# Patient Record
Sex: MALE | Race: WHITE | NOT HISPANIC OR LATINO | Employment: OTHER | RURAL
[De-identification: names, ages, dates, MRNs, and addresses within clinical notes are randomized per-mention and may not be internally consistent; named-entity substitution may affect disease eponyms.]

---

## 2020-04-20 ENCOUNTER — HISTORICAL (OUTPATIENT)
Dept: ADMINISTRATIVE | Facility: HOSPITAL | Age: 65
End: 2020-04-20

## 2020-04-20 LAB
ALBUMIN SERPL BCP-MCNC: 3.2 G/DL (ref 3.4–5)
ALBUMIN/GLOB SERPL: 1 {RATIO}
ALP SERPL-CCNC: 151 U/L (ref 50–136)
ALT SERPL W P-5'-P-CCNC: 30 U/L (ref 12–78)
AST SERPL W P-5'-P-CCNC: 20 U/L (ref 15–37)
BILIRUB SERPL-MCNC: 0.4 MG/DL (ref 0.2–1)
BUN SERPL-MCNC: 10 MG/DL (ref 7–18)
CALCIUM SERPL-MCNC: 8.4 MG/DL (ref 8.5–10.1)
CHLORIDE SERPL-SCNC: 105 MMOL/L (ref 101–111)
CK SERPL-CCNC: 59 U/L (ref 26–308)
CO2 SERPL-SCNC: 30 MMOL/L (ref 21–32)
CREAT SERPL-MCNC: 0.9 MG/DL (ref 0.6–1.3)
GLOBULIN SER-MCNC: 3.8 G/DL
GLUCOSE SERPL-MCNC: 71 MG/DL (ref 74–106)
POTASSIUM SERPL-SCNC: 3.8 MMOL/L (ref 3.6–5)
PROT SERPL-MCNC: 7 G/DL (ref 6.4–8.2)
SODIUM SERPL-SCNC: 141 MMOL/L (ref 135–145)

## 2020-04-21 LAB — MYOGLOBIN SERPL-MCNC: 42 NG/ML (ref 16–116)

## 2020-10-20 LAB — NONINV COLON CA DNA+OCC BLD SCRN STL QL: NEGATIVE

## 2020-11-09 LAB — COMPLEXED PSA SERPL-MCNC: 0.6 NG/ML

## 2021-06-01 ENCOUNTER — TELEPHONE (OUTPATIENT)
Dept: FAMILY MEDICINE | Facility: CLINIC | Age: 66
End: 2021-06-01

## 2021-06-01 RX ORDER — METOPROLOL SUCCINATE 25 MG/1
25 TABLET, EXTENDED RELEASE ORAL DAILY
COMMUNITY
End: 2021-12-01

## 2021-06-01 RX ORDER — DOCUSATE SODIUM 100 MG/1
100 CAPSULE, LIQUID FILLED ORAL DAILY PRN
COMMUNITY

## 2021-06-01 RX ORDER — ASPIRIN 81 MG/1
81 TABLET ORAL DAILY
COMMUNITY

## 2021-06-01 RX ORDER — PANTOPRAZOLE SODIUM 40 MG/1
40 TABLET, DELAYED RELEASE ORAL DAILY
COMMUNITY
End: 2021-10-06 | Stop reason: SINTOL

## 2021-06-01 RX ORDER — ATORVASTATIN CALCIUM 80 MG/1
80 TABLET, FILM COATED ORAL DAILY
COMMUNITY
End: 2022-04-01

## 2021-06-10 ENCOUNTER — OFFICE VISIT (OUTPATIENT)
Dept: SURGERY | Facility: CLINIC | Age: 66
End: 2021-06-10
Payer: MEDICARE

## 2021-06-10 VITALS — BODY MASS INDEX: 19.89 KG/M2 | HEART RATE: 91 BPM | OXYGEN SATURATION: 96 % | HEIGHT: 75 IN | WEIGHT: 160 LBS

## 2021-06-10 DIAGNOSIS — I73.9 PVD (PERIPHERAL VASCULAR DISEASE): Primary | ICD-10-CM

## 2021-06-10 DIAGNOSIS — G45.8 SUBCLAVIAN STEAL SYNDROME: ICD-10-CM

## 2021-06-10 PROCEDURE — 99212 OFFICE O/P EST SF 10 MIN: CPT | Mod: S$PBB,,, | Performed by: SURGERY

## 2021-06-10 PROCEDURE — 99214 OFFICE O/P EST MOD 30 MIN: CPT | Mod: PBBFAC | Performed by: SURGERY

## 2021-06-10 PROCEDURE — 99212 PR OFFICE/OUTPT VISIT, EST, LEVL II, 10-19 MIN: ICD-10-PCS | Mod: S$PBB,,, | Performed by: SURGERY

## 2021-06-23 ENCOUNTER — OFFICE VISIT (OUTPATIENT)
Dept: SURGERY | Facility: CLINIC | Age: 66
End: 2021-06-23
Payer: MEDICARE

## 2021-06-23 ENCOUNTER — HOSPITAL ENCOUNTER (OUTPATIENT)
Dept: RADIOLOGY | Facility: HOSPITAL | Age: 66
Discharge: HOME | End: 2021-06-23
Attending: SURGERY
Payer: MEDICARE

## 2021-06-23 VITALS — BODY MASS INDEX: 19.89 KG/M2 | WEIGHT: 160 LBS | OXYGEN SATURATION: 98 % | HEIGHT: 75 IN

## 2021-06-23 DIAGNOSIS — R59.0 LOCALIZED ENLARGED LYMPH NODES: ICD-10-CM

## 2021-06-23 DIAGNOSIS — I65.29 OCCLUSION AND STENOSIS OF UNSPECIFIED CAROTID ARTERY: ICD-10-CM

## 2021-06-23 DIAGNOSIS — G45.8 SUBCLAVIAN STEAL SYNDROME: Primary | ICD-10-CM

## 2021-06-23 DIAGNOSIS — I65.23 BILATERAL CAROTID ARTERY STENOSIS: ICD-10-CM

## 2021-06-23 DIAGNOSIS — R59.0 LYMPHADENOPATHY, CERVICAL: ICD-10-CM

## 2021-06-23 DIAGNOSIS — G45.8 SUBCLAVIAN STEAL SYNDROME: ICD-10-CM

## 2021-06-23 PROCEDURE — 93880 US CAROTID BILATERAL: ICD-10-PCS | Mod: 26,,, | Performed by: SURGERY

## 2021-06-23 PROCEDURE — 93880 EXTRACRANIAL BILAT STUDY: CPT | Mod: 26,,, | Performed by: SURGERY

## 2021-06-23 PROCEDURE — 99213 OFFICE O/P EST LOW 20 MIN: CPT | Mod: PBBFAC,25 | Performed by: SURGERY

## 2021-06-23 PROCEDURE — 99212 OFFICE O/P EST SF 10 MIN: CPT | Mod: S$PBB,,, | Performed by: SURGERY

## 2021-06-23 PROCEDURE — 99212 PR OFFICE/OUTPT VISIT, EST, LEVL II, 10-19 MIN: ICD-10-PCS | Mod: S$PBB,,, | Performed by: SURGERY

## 2021-06-23 PROCEDURE — 93880 EXTRACRANIAL BILAT STUDY: CPT | Mod: TC

## 2021-07-07 ENCOUNTER — HOSPITAL ENCOUNTER (OUTPATIENT)
Dept: RADIOLOGY | Facility: HOSPITAL | Age: 66
Discharge: HOME | End: 2021-07-07
Attending: SURGERY
Payer: MEDICARE

## 2021-07-07 DIAGNOSIS — G45.8 SUBCLAVIAN STEAL SYNDROME: ICD-10-CM

## 2021-07-07 LAB — CREAT SERPL-MCNC: 1.1 MG/DL (ref 0.6–1.3)

## 2021-07-07 PROCEDURE — 70498 CT ANGIOGRAPHY NECK: CPT | Mod: 26,,, | Performed by: RADIOLOGY

## 2021-07-07 PROCEDURE — 70498 CT ANGIOGRAPHY NECK: CPT | Mod: TC

## 2021-07-07 PROCEDURE — 70498 CTA NECK: ICD-10-PCS | Mod: 26,,, | Performed by: RADIOLOGY

## 2021-07-07 PROCEDURE — 82565 ASSAY OF CREATININE: CPT

## 2021-07-07 PROCEDURE — 25500020 PHARM REV CODE 255: Performed by: SURGERY

## 2021-07-07 RX ADMIN — IOPAMIDOL 100 ML: 755 INJECTION, SOLUTION INTRAVENOUS at 10:07

## 2021-07-15 ENCOUNTER — OFFICE VISIT (OUTPATIENT)
Dept: SURGERY | Facility: CLINIC | Age: 66
End: 2021-07-15
Payer: MEDICARE

## 2021-07-15 VITALS — OXYGEN SATURATION: 98 % | HEART RATE: 96 BPM | WEIGHT: 160 LBS | HEIGHT: 75 IN | BODY MASS INDEX: 19.89 KG/M2

## 2021-07-15 DIAGNOSIS — I65.23 BILATERAL CAROTID ARTERY STENOSIS: Primary | ICD-10-CM

## 2021-07-15 PROCEDURE — 99212 PR OFFICE/OUTPT VISIT, EST, LEVL II, 10-19 MIN: ICD-10-PCS | Mod: S$PBB,,, | Performed by: SURGERY

## 2021-07-15 PROCEDURE — 99214 OFFICE O/P EST MOD 30 MIN: CPT | Mod: PBBFAC | Performed by: SURGERY

## 2021-07-15 PROCEDURE — 99212 OFFICE O/P EST SF 10 MIN: CPT | Mod: S$PBB,,, | Performed by: SURGERY

## 2021-08-09 ENCOUNTER — OFFICE VISIT (OUTPATIENT)
Dept: CARDIOLOGY | Facility: CLINIC | Age: 66
End: 2021-08-09
Payer: MEDICARE

## 2021-08-09 VITALS
OXYGEN SATURATION: 92 % | BODY MASS INDEX: 19.89 KG/M2 | SYSTOLIC BLOOD PRESSURE: 142 MMHG | HEIGHT: 75 IN | HEART RATE: 74 BPM | DIASTOLIC BLOOD PRESSURE: 74 MMHG | WEIGHT: 160 LBS

## 2021-08-09 DIAGNOSIS — I10 ESSENTIAL HYPERTENSION: ICD-10-CM

## 2021-08-09 DIAGNOSIS — I25.10 CORONARY ARTERY DISEASE INVOLVING NATIVE CORONARY ARTERY OF NATIVE HEART WITHOUT ANGINA PECTORIS: ICD-10-CM

## 2021-08-09 DIAGNOSIS — I10 HYPERTENSION, UNSPECIFIED TYPE: Primary | ICD-10-CM

## 2021-08-09 DIAGNOSIS — R94.31 ABNORMAL ELECTROCARDIOGRAM (ECG) (EKG): ICD-10-CM

## 2021-08-09 DIAGNOSIS — E78.5 HYPERLIPIDEMIA, UNSPECIFIED HYPERLIPIDEMIA TYPE: ICD-10-CM

## 2021-08-09 DIAGNOSIS — Z72.0 TOBACCO ABUSE DISORDER: ICD-10-CM

## 2021-08-09 DIAGNOSIS — I65.21 CAROTID STENOSIS, ASYMPTOMATIC, RIGHT: ICD-10-CM

## 2021-08-09 PROCEDURE — 99214 OFFICE O/P EST MOD 30 MIN: CPT | Mod: PBBFAC | Performed by: INTERNAL MEDICINE

## 2021-08-09 PROCEDURE — 99214 PR OFFICE/OUTPT VISIT, EST, LEVL IV, 30-39 MIN: ICD-10-PCS | Mod: S$PBB,,, | Performed by: INTERNAL MEDICINE

## 2021-08-09 PROCEDURE — 99214 OFFICE O/P EST MOD 30 MIN: CPT | Mod: S$PBB,,, | Performed by: INTERNAL MEDICINE

## 2021-08-09 PROCEDURE — 93005 ELECTROCARDIOGRAM TRACING: CPT | Mod: PBBFAC | Performed by: INTERNAL MEDICINE

## 2021-08-09 PROCEDURE — 93010 ELECTROCARDIOGRAM REPORT: CPT | Mod: S$PBB,,, | Performed by: INTERNAL MEDICINE

## 2021-08-09 PROCEDURE — 93010 EKG 12-LEAD: ICD-10-PCS | Mod: S$PBB,,, | Performed by: INTERNAL MEDICINE

## 2021-08-10 ENCOUNTER — DOCUMENTATION ONLY (OUTPATIENT)
Dept: CARDIOLOGY | Facility: CLINIC | Age: 66
End: 2021-08-10

## 2021-08-18 ENCOUNTER — HOSPITAL ENCOUNTER (OUTPATIENT)
Dept: RADIOLOGY | Facility: HOSPITAL | Age: 66
Discharge: HOME | End: 2021-08-18
Attending: INTERNAL MEDICINE
Payer: MEDICARE

## 2021-08-18 ENCOUNTER — HOSPITAL ENCOUNTER (OUTPATIENT)
Dept: CARDIOLOGY | Facility: HOSPITAL | Age: 66
Discharge: HOME | End: 2021-08-18
Attending: INTERNAL MEDICINE
Payer: MEDICARE

## 2021-08-18 VITALS
HEIGHT: 75 IN | SYSTOLIC BLOOD PRESSURE: 171 MMHG | BODY MASS INDEX: 19.89 KG/M2 | DIASTOLIC BLOOD PRESSURE: 103 MMHG | HEART RATE: 90 BPM | WEIGHT: 160 LBS

## 2021-08-18 DIAGNOSIS — R94.31 ABNORMAL ELECTROCARDIOGRAM (ECG) (EKG): ICD-10-CM

## 2021-08-18 LAB
CV STRESS BASE HR: 78 BPM
DIASTOLIC BLOOD PRESSURE: 103 MMHG
OHS CV CPX 1 MINUTE RECOVERY HEART RATE: 125 BPM
OHS CV CPX 85 PERCENT MAX PREDICTED HEART RATE MALE: 130
OHS CV CPX MAX PREDICTED HEART RATE: 153
OHS CV CPX PATIENT IS FEMALE: 0
OHS CV CPX PATIENT IS MALE: 1
OHS CV CPX PEAK DIASTOLIC BLOOD PRESSURE: 87 MMHG
OHS CV CPX PEAK HEAR RATE: 137 BPM
OHS CV CPX PEAK RATE PRESSURE PRODUCT: NORMAL
OHS CV CPX PEAK SYSTOLIC BLOOD PRESSURE: 202 MMHG
OHS CV CPX PERCENT MAX PREDICTED HEART RATE ACHIEVED: 90
OHS CV CPX RATE PRESSURE PRODUCT PRESENTING: NORMAL
SYSTOLIC BLOOD PRESSURE: 171 MMHG

## 2021-08-18 PROCEDURE — 93018 NUCLEAR STRESS TEST (CUPID ONLY): ICD-10-PCS | Mod: ,,, | Performed by: INTERNAL MEDICINE

## 2021-08-18 PROCEDURE — A9500 TC99M SESTAMIBI: HCPCS

## 2021-08-18 PROCEDURE — 93017 CV STRESS TEST TRACING ONLY: CPT

## 2021-08-18 PROCEDURE — 93016 NUCLEAR STRESS TEST (CUPID ONLY): ICD-10-PCS | Mod: ,,, | Performed by: NURSE PRACTITIONER

## 2021-08-18 PROCEDURE — 78452 HT MUSCLE IMAGE SPECT MULT: CPT | Mod: TC

## 2021-08-18 PROCEDURE — 93016 CV STRESS TEST SUPVJ ONLY: CPT | Mod: ,,, | Performed by: NURSE PRACTITIONER

## 2021-08-18 PROCEDURE — 78452 NM MYOCARDIAL PERFUSION SPECT MULTI STUDY: ICD-10-PCS | Mod: 26,,, | Performed by: INTERNAL MEDICINE

## 2021-08-18 PROCEDURE — 93018 CV STRESS TEST I&R ONLY: CPT | Mod: ,,, | Performed by: INTERNAL MEDICINE

## 2021-08-18 PROCEDURE — 78452 HT MUSCLE IMAGE SPECT MULT: CPT | Mod: 26,,, | Performed by: INTERNAL MEDICINE

## 2021-09-01 PROBLEM — I10 HYPERTENSION: Status: ACTIVE | Noted: 2021-09-01

## 2021-09-01 PROBLEM — I65.21 CAROTID STENOSIS, ASYMPTOMATIC, RIGHT: Status: ACTIVE | Noted: 2021-09-01

## 2021-09-01 PROBLEM — E78.5 HYPERLIPIDEMIA: Status: ACTIVE | Noted: 2021-09-01

## 2021-09-01 PROBLEM — I25.10 CORONARY ARTERY DISEASE INVOLVING NATIVE CORONARY ARTERY OF NATIVE HEART WITHOUT ANGINA PECTORIS: Status: ACTIVE | Noted: 2021-09-01

## 2021-09-01 PROBLEM — Z72.0 TOBACCO ABUSE DISORDER: Status: ACTIVE | Noted: 2021-09-01

## 2021-09-01 PROBLEM — R94.31 ABNORMAL ELECTROCARDIOGRAM (ECG) (EKG): Status: ACTIVE | Noted: 2021-09-01

## 2021-09-02 ENCOUNTER — OFFICE VISIT (OUTPATIENT)
Dept: CARDIOLOGY | Facility: CLINIC | Age: 66
End: 2021-09-02
Payer: MEDICARE

## 2021-09-02 VITALS
BODY MASS INDEX: 19.65 KG/M2 | OXYGEN SATURATION: 95 % | HEIGHT: 75 IN | DIASTOLIC BLOOD PRESSURE: 94 MMHG | SYSTOLIC BLOOD PRESSURE: 156 MMHG | HEART RATE: 75 BPM | WEIGHT: 158 LBS

## 2021-09-02 DIAGNOSIS — R94.39 ABNORMAL STRESS TEST: Primary | ICD-10-CM

## 2021-09-02 DIAGNOSIS — I25.10 CORONARY ARTERY DISEASE INVOLVING NATIVE CORONARY ARTERY OF NATIVE HEART WITHOUT ANGINA PECTORIS: ICD-10-CM

## 2021-09-02 DIAGNOSIS — R94.39 ABNORMAL STRESS TEST: ICD-10-CM

## 2021-09-02 DIAGNOSIS — Z01.812 PRE-PROCEDURE LAB EXAM: Primary | ICD-10-CM

## 2021-09-02 DIAGNOSIS — I10 ESSENTIAL HYPERTENSION: ICD-10-CM

## 2021-09-02 PROCEDURE — 99215 OFFICE O/P EST HI 40 MIN: CPT | Mod: S$PBB,,, | Performed by: INTERNAL MEDICINE

## 2021-09-02 PROCEDURE — 99215 PR OFFICE/OUTPT VISIT, EST, LEVL V, 40-54 MIN: ICD-10-PCS | Mod: S$PBB,,, | Performed by: INTERNAL MEDICINE

## 2021-09-02 PROCEDURE — 99214 OFFICE O/P EST MOD 30 MIN: CPT | Mod: PBBFAC | Performed by: INTERNAL MEDICINE

## 2021-09-02 RX ORDER — SODIUM CHLORIDE 0.9 % (FLUSH) 0.9 %
2 SYRINGE (ML) INJECTION
Status: CANCELLED | OUTPATIENT
Start: 2021-09-15

## 2021-09-13 DIAGNOSIS — Z01.812 PRE-PROCEDURE LAB EXAM: Primary | ICD-10-CM

## 2021-09-13 LAB
CTP QC/QA: YES
SARS-COV-2 RDRP RESP QL NAA+PROBE: NEGATIVE

## 2021-09-14 PROBLEM — Z01.812 PRE-PROCEDURE LAB EXAM: Status: ACTIVE | Noted: 2021-09-14

## 2021-09-14 PROBLEM — R94.39 ABNORMAL STRESS TEST: Status: ACTIVE | Noted: 2021-09-14

## 2021-09-15 ENCOUNTER — HOSPITAL ENCOUNTER (OUTPATIENT)
Facility: HOSPITAL | Age: 66
Discharge: HOME | End: 2021-09-15
Attending: INTERNAL MEDICINE | Admitting: INTERNAL MEDICINE
Payer: MEDICARE

## 2021-09-15 VITALS
BODY MASS INDEX: 19.4 KG/M2 | WEIGHT: 156 LBS | HEART RATE: 71 BPM | OXYGEN SATURATION: 95 % | DIASTOLIC BLOOD PRESSURE: 78 MMHG | SYSTOLIC BLOOD PRESSURE: 148 MMHG | TEMPERATURE: 98 F | RESPIRATION RATE: 16 BRPM | HEIGHT: 75 IN

## 2021-09-15 DIAGNOSIS — R94.39 ABNORMAL STRESS TEST: ICD-10-CM

## 2021-09-15 PROCEDURE — 93459 L HRT ART/GRFT ANGIO: CPT | Mod: 26,,, | Performed by: INTERNAL MEDICINE

## 2021-09-15 PROCEDURE — 63600175 PHARM REV CODE 636 W HCPCS: Performed by: INTERNAL MEDICINE

## 2021-09-15 PROCEDURE — 93005 ELECTROCARDIOGRAM TRACING: CPT

## 2021-09-15 PROCEDURE — 93005 ELECTROCARDIOGRAM TRACING: CPT | Mod: 59

## 2021-09-15 PROCEDURE — 93459 L HRT ART/GRFT ANGIO: CPT | Performed by: INTERNAL MEDICINE

## 2021-09-15 PROCEDURE — 99153 MOD SED SAME PHYS/QHP EA: CPT | Performed by: INTERNAL MEDICINE

## 2021-09-15 PROCEDURE — 99152 MOD SED SAME PHYS/QHP 5/>YRS: CPT | Performed by: INTERNAL MEDICINE

## 2021-09-15 PROCEDURE — 25500020 PHARM REV CODE 255: Performed by: INTERNAL MEDICINE

## 2021-09-15 PROCEDURE — 93459: ICD-10-PCS | Mod: 26,,, | Performed by: INTERNAL MEDICINE

## 2021-09-15 PROCEDURE — C1894 INTRO/SHEATH, NON-LASER: HCPCS | Performed by: INTERNAL MEDICINE

## 2021-09-15 PROCEDURE — 27201423 OPTIME MED/SURG SUP & DEVICES STERILE SUPPLY: Performed by: INTERNAL MEDICINE

## 2021-09-15 PROCEDURE — 27000080 OPTIME MED/SURG SUP & DEVICES GENERAL CLASSIFICATION: Performed by: INTERNAL MEDICINE

## 2021-09-15 PROCEDURE — 93010 ELECTROCARDIOGRAM REPORT: CPT | Mod: 59,ICN,, | Performed by: INTERNAL MEDICINE

## 2021-09-15 PROCEDURE — 93010 EKG 12-LEAD: ICD-10-PCS | Mod: 59,ICN,, | Performed by: INTERNAL MEDICINE

## 2021-09-15 PROCEDURE — 27100168 OPTIME MED/SURG SUP & DEVICES NON-STERILE SUPPLY: Performed by: INTERNAL MEDICINE

## 2021-09-15 PROCEDURE — 27800903 OPTIME MED/SURG SUP & DEVICES OTHER IMPLANTS: Performed by: INTERNAL MEDICINE

## 2021-09-15 PROCEDURE — 25000003 PHARM REV CODE 250: Performed by: INTERNAL MEDICINE

## 2021-09-15 RX ORDER — ACETAMINOPHEN 325 MG/1
650 TABLET ORAL EVERY 4 HOURS PRN
Status: DISCONTINUED | OUTPATIENT
Start: 2021-09-15 | End: 2021-09-15 | Stop reason: HOSPADM

## 2021-09-15 RX ORDER — SODIUM CHLORIDE 0.9 % (FLUSH) 0.9 %
2 SYRINGE (ML) INJECTION
Status: DISCONTINUED | OUTPATIENT
Start: 2021-09-15 | End: 2021-09-15 | Stop reason: HOSPADM

## 2021-09-15 RX ORDER — LIDOCAINE HYDROCHLORIDE 10 MG/ML
INJECTION INFILTRATION; PERINEURAL
Status: DISCONTINUED | OUTPATIENT
Start: 2021-09-15 | End: 2021-09-15 | Stop reason: HOSPADM

## 2021-09-15 RX ORDER — SODIUM CHLORIDE 450 MG/100ML
INJECTION, SOLUTION INTRAVENOUS
Status: DISCONTINUED | OUTPATIENT
Start: 2021-09-15 | End: 2021-09-15 | Stop reason: HOSPADM

## 2021-09-15 RX ORDER — FENTANYL CITRATE 50 UG/ML
INJECTION, SOLUTION INTRAMUSCULAR; INTRAVENOUS
Status: DISCONTINUED | OUTPATIENT
Start: 2021-09-15 | End: 2021-09-15 | Stop reason: HOSPADM

## 2021-09-15 RX ORDER — SODIUM CHLORIDE 450 MG/100ML
125 INJECTION, SOLUTION INTRAVENOUS CONTINUOUS
Status: DISCONTINUED | OUTPATIENT
Start: 2021-09-15 | End: 2021-09-15 | Stop reason: HOSPADM

## 2021-09-15 RX ORDER — ONDANSETRON 4 MG/1
8 TABLET, ORALLY DISINTEGRATING ORAL EVERY 8 HOURS PRN
Status: DISCONTINUED | OUTPATIENT
Start: 2021-09-15 | End: 2021-09-15 | Stop reason: HOSPADM

## 2021-09-15 RX ORDER — HEPARIN SOD,PORCINE/0.9 % NACL 1000/500ML
INTRAVENOUS SOLUTION INTRAVENOUS
Status: DISCONTINUED | OUTPATIENT
Start: 2021-09-15 | End: 2021-09-15 | Stop reason: HOSPADM

## 2021-09-15 RX ORDER — MIDAZOLAM HYDROCHLORIDE 1 MG/ML
INJECTION INTRAMUSCULAR; INTRAVENOUS
Status: DISCONTINUED | OUTPATIENT
Start: 2021-09-15 | End: 2021-09-15 | Stop reason: HOSPADM

## 2021-09-16 LAB — CATH EF QUANTITATIVE: 65 %

## 2021-10-05 ENCOUNTER — PATIENT OUTREACH (OUTPATIENT)
Dept: FAMILY MEDICINE | Facility: CLINIC | Age: 66
End: 2021-10-05

## 2021-10-06 ENCOUNTER — OFFICE VISIT (OUTPATIENT)
Dept: FAMILY MEDICINE | Facility: CLINIC | Age: 66
End: 2021-10-06
Payer: MEDICARE

## 2021-10-06 VITALS
SYSTOLIC BLOOD PRESSURE: 142 MMHG | WEIGHT: 156.81 LBS | OXYGEN SATURATION: 99 % | TEMPERATURE: 98 F | DIASTOLIC BLOOD PRESSURE: 94 MMHG | HEART RATE: 71 BPM | HEIGHT: 75 IN | BODY MASS INDEX: 19.5 KG/M2 | RESPIRATION RATE: 14 BRPM

## 2021-10-06 DIAGNOSIS — Z12.5 ENCOUNTER FOR SCREENING FOR MALIGNANT NEOPLASM OF PROSTATE: ICD-10-CM

## 2021-10-06 DIAGNOSIS — Z23 ENCOUNTER FOR IMMUNIZATION: Primary | ICD-10-CM

## 2021-10-06 DIAGNOSIS — Z11.59 ENCOUNTER FOR SCREENING FOR OTHER VIRAL DISEASES: ICD-10-CM

## 2021-10-06 DIAGNOSIS — K21.9 GASTROESOPHAGEAL REFLUX DISEASE, UNSPECIFIED WHETHER ESOPHAGITIS PRESENT: ICD-10-CM

## 2021-10-06 DIAGNOSIS — F17.210 CIGARETTE NICOTINE DEPENDENCE WITHOUT COMPLICATION: ICD-10-CM

## 2021-10-06 PROCEDURE — G0439 PPPS, SUBSEQ VISIT: HCPCS | Mod: ,,, | Performed by: FAMILY MEDICINE

## 2021-10-06 PROCEDURE — G0008 FLU VACCINE - QUADRIVALENT - HIGH DOSE (65+) PRESERVATIVE FREE IM: ICD-10-PCS | Mod: ,,, | Performed by: FAMILY MEDICINE

## 2021-10-06 PROCEDURE — 90662 IIV NO PRSV INCREASED AG IM: CPT | Mod: ,,, | Performed by: FAMILY MEDICINE

## 2021-10-06 PROCEDURE — G0008 ADMIN INFLUENZA VIRUS VAC: HCPCS | Mod: ,,, | Performed by: FAMILY MEDICINE

## 2021-10-06 PROCEDURE — G0439 PR MEDICARE ANNUAL WELLNESS SUBSEQUENT VISIT: ICD-10-PCS | Mod: ,,, | Performed by: FAMILY MEDICINE

## 2021-10-06 PROCEDURE — 90662 FLU VACCINE - QUADRIVALENT - HIGH DOSE (65+) PRESERVATIVE FREE IM: ICD-10-PCS | Mod: ,,, | Performed by: FAMILY MEDICINE

## 2021-10-06 RX ORDER — OMEPRAZOLE 20 MG/1
20 CAPSULE, DELAYED RELEASE ORAL DAILY
Qty: 30 CAPSULE | Refills: 1 | Status: SHIPPED | OUTPATIENT
Start: 2021-10-06 | End: 2021-11-15

## 2021-10-06 RX ORDER — OMEPRAZOLE 20 MG/1
20 CAPSULE, DELAYED RELEASE ORAL DAILY
COMMUNITY
End: 2021-10-06 | Stop reason: SDUPTHER

## 2021-10-14 ENCOUNTER — OFFICE VISIT (OUTPATIENT)
Dept: CARDIOLOGY | Facility: CLINIC | Age: 66
End: 2021-10-14
Payer: MEDICARE

## 2021-10-14 VITALS
WEIGHT: 158 LBS | TEMPERATURE: 98 F | HEIGHT: 75 IN | BODY MASS INDEX: 19.65 KG/M2 | HEART RATE: 80 BPM | OXYGEN SATURATION: 95 % | SYSTOLIC BLOOD PRESSURE: 142 MMHG | DIASTOLIC BLOOD PRESSURE: 74 MMHG

## 2021-10-14 DIAGNOSIS — I25.10 CORONARY ARTERY DISEASE INVOLVING NATIVE CORONARY ARTERY OF NATIVE HEART WITHOUT ANGINA PECTORIS: Primary | ICD-10-CM

## 2021-10-14 DIAGNOSIS — I10 ESSENTIAL HYPERTENSION: ICD-10-CM

## 2021-10-14 DIAGNOSIS — E78.5 HYPERLIPIDEMIA, UNSPECIFIED HYPERLIPIDEMIA TYPE: ICD-10-CM

## 2021-10-14 DIAGNOSIS — I73.9 PVD (PERIPHERAL VASCULAR DISEASE): ICD-10-CM

## 2021-10-14 PROCEDURE — 99213 PR OFFICE/OUTPT VISIT, EST, LEVL III, 20-29 MIN: ICD-10-PCS | Mod: ,,, | Performed by: INTERNAL MEDICINE

## 2021-10-14 PROCEDURE — 99213 OFFICE O/P EST LOW 20 MIN: CPT | Mod: ,,, | Performed by: INTERNAL MEDICINE

## 2021-11-10 PROBLEM — I73.9 PVD (PERIPHERAL VASCULAR DISEASE): Status: ACTIVE | Noted: 2021-11-10

## 2021-11-15 DIAGNOSIS — K21.9 GASTROESOPHAGEAL REFLUX DISEASE, UNSPECIFIED WHETHER ESOPHAGITIS PRESENT: ICD-10-CM

## 2021-11-15 RX ORDER — OMEPRAZOLE 20 MG/1
CAPSULE, DELAYED RELEASE ORAL
Qty: 30 CAPSULE | Refills: 1 | Status: SHIPPED | OUTPATIENT
Start: 2021-11-15 | End: 2022-03-08 | Stop reason: SDUPTHER

## 2021-11-23 ENCOUNTER — LAB VISIT (OUTPATIENT)
Dept: LAB | Facility: CLINIC | Age: 66
End: 2021-11-23
Payer: MEDICARE

## 2021-11-23 DIAGNOSIS — E78.5 HYPERLIPIDEMIA, UNSPECIFIED HYPERLIPIDEMIA TYPE: ICD-10-CM

## 2021-11-23 DIAGNOSIS — Z12.5 SCREENING FOR MALIGNANT NEOPLASM OF PROSTATE: ICD-10-CM

## 2021-11-23 DIAGNOSIS — Z11.59 ENCOUNTER FOR SCREENING FOR OTHER VIRAL DISEASES: Primary | ICD-10-CM

## 2021-11-23 LAB
CHOLEST SERPL-MCNC: 139 MG/DL (ref 0–200)
CHOLEST/HDLC SERPL: 2.5 {RATIO}
HCV AB SER QL: NORMAL
HDLC SERPL-MCNC: 55 MG/DL (ref 40–60)
LDLC SERPL CALC-MCNC: 66 MG/DL
LDLC/HDLC SERPL: 1.2 {RATIO}
NONHDLC SERPL-MCNC: 84 MG/DL
PSA SERPL-MCNC: 0.56 NG/ML (ref 0–4.1)
TRIGL SERPL-MCNC: 88 MG/DL (ref 35–150)
VLDLC SERPL-MCNC: 18 MG/DL

## 2021-11-23 PROCEDURE — G0103 PSA SCREENING: HCPCS | Mod: ,,, | Performed by: CLINICAL MEDICAL LABORATORY

## 2021-11-23 PROCEDURE — 86803 HEPATITIS C ANTIBODY: ICD-10-PCS | Mod: ,,, | Performed by: CLINICAL MEDICAL LABORATORY

## 2021-11-23 PROCEDURE — 80061 LIPID PANEL: CPT | Mod: ,,, | Performed by: CLINICAL MEDICAL LABORATORY

## 2021-11-23 PROCEDURE — 80061 LIPID PANEL: ICD-10-PCS | Mod: ,,, | Performed by: CLINICAL MEDICAL LABORATORY

## 2021-11-23 PROCEDURE — G0103 PSA, SCREENING: ICD-10-PCS | Mod: ,,, | Performed by: CLINICAL MEDICAL LABORATORY

## 2021-11-23 PROCEDURE — 86803 HEPATITIS C AB TEST: CPT | Mod: ,,, | Performed by: CLINICAL MEDICAL LABORATORY

## 2021-12-01 RX ORDER — METOPROLOL SUCCINATE 25 MG/1
TABLET, EXTENDED RELEASE ORAL
Qty: 90 TABLET | Refills: 1 | Status: SHIPPED | OUTPATIENT
Start: 2021-12-01 | End: 2022-04-27

## 2022-03-08 DIAGNOSIS — K21.9 GASTROESOPHAGEAL REFLUX DISEASE, UNSPECIFIED WHETHER ESOPHAGITIS PRESENT: ICD-10-CM

## 2022-03-11 DIAGNOSIS — Z71.89 COMPLEX CARE COORDINATION: ICD-10-CM

## 2022-03-14 RX ORDER — OMEPRAZOLE 20 MG/1
20 CAPSULE, DELAYED RELEASE ORAL DAILY
Qty: 30 CAPSULE | Refills: 2 | Status: SHIPPED | OUTPATIENT
Start: 2022-03-14 | End: 2022-10-24 | Stop reason: SDUPTHER

## 2022-04-01 DIAGNOSIS — E78.5 HYPERLIPIDEMIA, UNSPECIFIED HYPERLIPIDEMIA TYPE: Primary | ICD-10-CM

## 2022-04-01 RX ORDER — ATORVASTATIN CALCIUM 80 MG/1
TABLET, FILM COATED ORAL
Qty: 90 TABLET | Refills: 1 | Status: SHIPPED | OUTPATIENT
Start: 2022-04-01 | End: 2022-10-24 | Stop reason: SDUPTHER

## 2022-04-01 RX ORDER — ATORVASTATIN CALCIUM 80 MG/1
80 TABLET, FILM COATED ORAL NIGHTLY
Qty: 90 TABLET | Refills: 1 | Status: CANCELLED | OUTPATIENT
Start: 2022-04-01

## 2022-04-14 ENCOUNTER — OFFICE VISIT (OUTPATIENT)
Dept: CARDIOLOGY | Facility: CLINIC | Age: 67
End: 2022-04-14
Payer: MEDICARE

## 2022-04-14 VITALS
HEIGHT: 75 IN | DIASTOLIC BLOOD PRESSURE: 83 MMHG | OXYGEN SATURATION: 97 % | HEART RATE: 74 BPM | SYSTOLIC BLOOD PRESSURE: 149 MMHG | WEIGHT: 157 LBS | BODY MASS INDEX: 19.52 KG/M2

## 2022-04-14 DIAGNOSIS — I73.9 PVD (PERIPHERAL VASCULAR DISEASE): Primary | ICD-10-CM

## 2022-04-14 DIAGNOSIS — I10 ESSENTIAL HYPERTENSION: ICD-10-CM

## 2022-04-14 DIAGNOSIS — I65.21 CAROTID STENOSIS, ASYMPTOMATIC, RIGHT: ICD-10-CM

## 2022-04-14 DIAGNOSIS — F17.210 CIGARETTE NICOTINE DEPENDENCE WITHOUT COMPLICATION: ICD-10-CM

## 2022-04-14 DIAGNOSIS — I25.10 CORONARY ARTERY DISEASE INVOLVING NATIVE CORONARY ARTERY OF NATIVE HEART WITHOUT ANGINA PECTORIS: ICD-10-CM

## 2022-04-14 PROCEDURE — 99214 OFFICE O/P EST MOD 30 MIN: CPT | Mod: ,,, | Performed by: INTERNAL MEDICINE

## 2022-04-14 PROCEDURE — 99214 PR OFFICE/OUTPT VISIT, EST, LEVL IV, 30-39 MIN: ICD-10-PCS | Mod: ,,, | Performed by: INTERNAL MEDICINE

## 2022-04-14 NOTE — PROGRESS NOTES
Cardiology Clinic Note:    PCP: Jc Walton DO    REFERRING PHYSICIAN: Jc Walton DO    CHIEF COMPLAINT: CAD    HISTORY OF PRESENT ILLNESS:  Bhaskar Patel is a 67 y.o. male who presents for evaluation of CAD.     Pt reports chest pain has resolved. He is been able to perform all normal activities of daily living without provocation of chest pain, pressure or shortness of breath.  Pt did not have planned carotid revascularization due to spike in COVID again.     He continues to smoke 3/4 to 1/2 pack daily       Review of Systems   Constitutional: Negative.    HENT: Negative.    Eyes: Negative.    Respiratory: Negative.    Cardiovascular: Negative.    Gastrointestinal: Negative.    Genitourinary: Negative.    Musculoskeletal: Negative.    Skin: Negative.    Neurological: Negative.    Endo/Heme/Allergies: Negative.    Psychiatric/Behavioral: Negative.        PAST MEDICAL HISTORY:  Past Medical History:   Diagnosis Date    GERD (gastroesophageal reflux disease)     Hypertension        PAST SURGICAL HISTORY:  Past Surgical History:   Procedure Laterality Date    CORONARY ARTERY BYPASS GRAFT      HERNIA REPAIR      LEFT HEART CATHETERIZATION Left 9/15/2021    Procedure: Left heart cath;  Surgeon: Quoc Vo DO;  Location: [facility] CATH LAB;  Service: Cardiology;  Laterality: Left;       SOCIAL HISTORY:  Social History     Socioeconomic History    Marital status:    Occupational History    Occupation: retired   Tobacco Use    Smoking status: Current Every Day Smoker     Packs/day: 0.50     Years: 50.00     Pack years: 25.00     Types: Cigarettes     Start date: 1971    Smokeless tobacco: Never Used    Tobacco comment: already did [place] tobacco quit[place]. not interested in trying again. states he will quit on his own   Substance and Sexual Activity    Alcohol use: Not Currently    Drug use: Never    Sexual activity: Not Currently       FAMILY HISTORY:  Family History   Problem  "Relation Age of Onset    Alzheimer's disease Mother     Polycystic kidney disease Mother     Hypertension Father     Heart disease Father     Cancer Sister     Heart disease Sister     Hypertension Sister        ALLERGIES:  Allergies as of 04/14/2022    (No Known Allergies)         MEDICATIONS:  Current Outpatient Medications on File Prior to Visit   Medication Sig Dispense Refill    aspirin (ECOTRIN) 81 MG EC tablet Take 81 mg by mouth once daily.      atorvastatin (LIPITOR) 80 MG tablet TAKE ONE TABLET BY MOUTH AT BEDTIME 90 tablet 1    docusate sodium (COLACE) 100 MG capsule Take 100 mg by mouth daily as needed.       metoprolol succinate (TOPROL-XL) 25 MG 24 hr tablet TAKE ONE TABLET BY MOUTH EVERY DAY 90 tablet 1    omeprazole (PRILOSEC) 20 MG capsule Take 1 capsule (20 mg total) by mouth once daily. 30 capsule 2     No current facility-administered medications on file prior to visit.          PHYSICAL EXAM:  Blood pressure (!) 149/83, pulse 74, height 6' 3" (1.905 m), weight 71.2 kg (157 lb), SpO2 97 %.  Wt Readings from Last 3 Encounters:   04/14/22 71.2 kg (157 lb)   10/14/21 71.7 kg (158 lb)   10/06/21 71.1 kg (156 lb 12.8 oz)      Body mass index is 19.62 kg/m².    Physical Exam  Vitals and nursing note reviewed.   Constitutional:       Appearance: Normal appearance. He is normal weight.   HENT:      Head: Normocephalic and atraumatic.      Right Ear: External ear normal.      Left Ear: External ear normal.   Eyes:      General: No scleral icterus.        Right eye: No discharge.         Left eye: No discharge.      Extraocular Movements: Extraocular movements intact.      Conjunctiva/sclera: Conjunctivae normal.      Pupils: Pupils are equal, round, and reactive to light.   Cardiovascular:      Rate and Rhythm: Normal rate and regular rhythm.      Pulses: Normal pulses.      Heart sounds: Normal heart sounds. No murmur heard.    No friction rub. No gallop.   Pulmonary:      Effort: Pulmonary " effort is normal.      Breath sounds: Normal breath sounds. No wheezing, rhonchi or rales.   Chest:      Chest wall: No tenderness.   Abdominal:      General: Abdomen is flat. Bowel sounds are normal. There is no distension.      Palpations: Abdomen is soft.      Tenderness: There is no abdominal tenderness. There is no guarding or rebound.   Musculoskeletal:         General: No swelling or tenderness. Normal range of motion.      Cervical back: Normal range of motion and neck supple.   Skin:     General: Skin is warm and dry.      Findings: No erythema or rash.   Neurological:      General: No focal deficit present.      Mental Status: He is alert and oriented to person, place, and time.      Cranial Nerves: No cranial nerve deficit.      Motor: No weakness.      Gait: Gait normal.   Psychiatric:         Mood and Affect: Mood normal.         Behavior: Behavior normal.         Thought Content: Thought content normal.         Judgment: Judgment normal.          LABS REVIEWED:  Lab Results   Component Value Date    WBC 9.47 09/02/2021    RBC 4.51 (L) 09/02/2021    HGB 14.7 09/02/2021    HCT 43.7 09/02/2021    MCV 96.9 (H) 09/02/2021    MCH 32.6 (H) 09/02/2021    MCHC 33.6 09/02/2021    RDW 14.1 09/02/2021     (L) 09/02/2021    MPV 12.7 (H) 09/02/2021    NRBC 0.0 09/02/2021     Lab Results   Component Value Date     09/02/2021    K 4.2 09/02/2021     09/02/2021    CO2 29 09/02/2021    BUN 11 09/02/2021     Lab Results   Component Value Date     05/13/2021    AST 20 05/13/2021    ALT 22 05/13/2021     Lab Results   Component Value Date    GLU 92 09/02/2021     Lab Results   Component Value Date    CHOL 139 11/23/2021    HDL 55 11/23/2021    TRIG 88 11/23/2021    CHOLHDL 2.5 11/23/2021       CARDIAC STUDIES REVIEWED:    OTHER IMAGING STUDIES REVIEWED:    ASSESSMENT: PLAN:  1. CAD: stable class 1 angina, post CABG, severe triple vessel disease with SVG to RCA and LAD, grafts patent at [facility] 9/2021,  well preserved EF at 55%             \        2  PVD: severe carotid stenosis, undergoing evaluation for revascularization, pt is at low cardiovascular risk for planned carotid surgery. Refer back to Dr. Spencer                     3. Hypertension: adequate control on current meds.                4.  Tobacco abuse - discussed smoking cessatino - decrease to 5 cigarettes daily

## 2022-04-27 ENCOUNTER — OFFICE VISIT (OUTPATIENT)
Dept: SURGERY | Facility: CLINIC | Age: 67
End: 2022-04-27
Payer: MEDICARE

## 2022-04-27 VITALS — WEIGHT: 160 LBS | BODY MASS INDEX: 28.35 KG/M2 | HEIGHT: 63 IN

## 2022-04-27 DIAGNOSIS — I65.22 LEFT CAROTID STENOSIS: Primary | ICD-10-CM

## 2022-04-27 DIAGNOSIS — G45.8 SUBCLAVIAN STEAL SYNDROME: ICD-10-CM

## 2022-04-27 DIAGNOSIS — Z01.818 PRE-OP TESTING: ICD-10-CM

## 2022-04-27 PROCEDURE — 99213 OFFICE O/P EST LOW 20 MIN: CPT | Mod: S$PBB,,, | Performed by: SURGERY

## 2022-04-27 PROCEDURE — 99213 PR OFFICE/OUTPT VISIT, EST, LEVL III, 20-29 MIN: ICD-10-PCS | Mod: S$PBB,,, | Performed by: SURGERY

## 2022-04-27 PROCEDURE — 99214 OFFICE O/P EST MOD 30 MIN: CPT | Mod: PBBFAC | Performed by: SURGERY

## 2022-04-27 RX ORDER — METOPROLOL SUCCINATE 25 MG/1
TABLET, EXTENDED RELEASE ORAL
Qty: 90 TABLET | Refills: 1 | Status: SHIPPED | OUTPATIENT
Start: 2022-04-27 | End: 2022-10-24 | Stop reason: SDUPTHER

## 2022-04-27 RX ORDER — SODIUM CHLORIDE 9 MG/ML
INJECTION, SOLUTION INTRAVENOUS CONTINUOUS
Status: CANCELLED | OUTPATIENT
Start: 2022-04-27

## 2022-04-27 NOTE — PROGRESS NOTES
"Subjective:       Patient ID: Bhaskar Patel is a 67 y.o. male.    Chief Complaint: Follow-up (Carotid stenosis. Had cardiac eval)  Status patient follow-up.  Patient has a proxy 70% left ICA stenosis.  He has got some right subclavian stenosis.  Patient is asymptomatic from his carotid disease he has been deemed low risk for surgery by Cardiology plans schedule if he wants to after May 15th, cut the pros and cons clean bleeding infection the goal of reducing stroke risk over time, the risk of stroke with surgery bleeding infection scarring Cardiac Risk  family history includes Alzheimer's disease in his mother; Cancer in his sister; Heart disease in his father and sister; Hypertension in his father and sister; Polycystic kidney disease in his mother.  Past Medical History:   Diagnosis Date    GERD (gastroesophageal reflux disease)     Hypertension       Past Surgical History:   Procedure Laterality Date    CORONARY ARTERY BYPASS GRAFT      HERNIA REPAIR      LEFT HEART CATHETERIZATION Left 9/15/2021    Procedure: Left heart cath;  Surgeon: Quoc Vo DO;  Location: [facility] CATH LAB;  Service: Cardiology;  Laterality: Left;       reports that he has been smoking cigarettes. He started smoking about 51 years ago. He has a 25.00 pack-year smoking history. He has never used smokeless tobacco. He reports previous alcohol use. He reports that he does not use drugs.   HPI  Review of Systems      Objective:      Ht 5' 3" (1.6 m)   Wt 72.6 kg (160 lb)   BMI 28.34 kg/m²    Physical Exam  Constitutional:       Appearance: Normal appearance.   Cardiovascular:      Rate and Rhythm: Normal rate.      Pulses: Normal pulses.      Comments: Left carotid bruit  Pulmonary:      Effort: Pulmonary effort is normal.      Breath sounds: Normal breath sounds.   Skin:     General: Skin is warm and dry.   Neurological:      General: No focal deficit present.      Mental Status: He is alert and oriented to person, place, and " time.   Psychiatric:         Mood and Affect: Mood normal.         Behavior: Behavior normal.         Thought Content: Thought content normal.         Judgment: Judgment normal.           Assessment:       1. Subclavian steal syndrome    2. Left carotid stenosis        Plan:       Left carotid endarterectomy will schedule

## 2022-04-27 NOTE — H&P (VIEW-ONLY)
"Subjective:       Patient ID: Bhaskar Patel is a 67 y.o. male.    Chief Complaint: Follow-up (Carotid stenosis. Had cardiac eval)  Status patient follow-up.  Patient has a proxy 70% left ICA stenosis.  He has got some right subclavian stenosis.  Patient is asymptomatic from his carotid disease he has been deemed low risk for surgery by Cardiology plans schedule if he wants to after May 15th, cut the pros and cons clean bleeding infection the goal of reducing stroke risk over time, the risk of stroke with surgery bleeding infection scarring Cardiac Risk  family history includes Alzheimer's disease in his mother; Cancer in his sister; Heart disease in his father and sister; Hypertension in his father and sister; Polycystic kidney disease in his mother.  Past Medical History:   Diagnosis Date    GERD (gastroesophageal reflux disease)     Hypertension       Past Surgical History:   Procedure Laterality Date    CORONARY ARTERY BYPASS GRAFT      HERNIA REPAIR      LEFT HEART CATHETERIZATION Left 9/15/2021    Procedure: Left heart cath;  Surgeon: Quoc Vo DO;  Location: [facility] CATH LAB;  Service: Cardiology;  Laterality: Left;       reports that he has been smoking cigarettes. He started smoking about 51 years ago. He has a 25.00 pack-year smoking history. He has never used smokeless tobacco. He reports previous alcohol use. He reports that he does not use drugs.   HPI  Review of Systems      Objective:      Ht 5' 3" (1.6 m)   Wt 72.6 kg (160 lb)   BMI 28.34 kg/m²    Physical Exam  Constitutional:       Appearance: Normal appearance.   Cardiovascular:      Rate and Rhythm: Normal rate.      Pulses: Normal pulses.      Comments: Left carotid bruit  Pulmonary:      Effort: Pulmonary effort is normal.      Breath sounds: Normal breath sounds.   Skin:     General: Skin is warm and dry.   Neurological:      General: No focal deficit present.      Mental Status: He is alert and oriented to person, place, and " time.   Psychiatric:         Mood and Affect: Mood normal.         Behavior: Behavior normal.         Thought Content: Thought content normal.         Judgment: Judgment normal.           Assessment:       1. Subclavian steal syndrome    2. Left carotid stenosis        Plan:       Left carotid endarterectomy will schedule

## 2022-04-27 NOTE — PATIENT INSTRUCTIONS
[facility]                                                                                                                                                                                                                                                                                                                                                                                                                                                                         Preoperative Instructions        Your pre-op lab work and Covid testing will be on 5/12 on the 1st floor of the [facility] building.  EKG is located on 2nd floor of [facility].     YOU MUST QUARANTINE FROM TIME OF COVID TESTING UNTIL SURGERY                                                                                  Day of Surgery      Your surgery is scheduled for 5/16 at [facility] on the ground floor of the Ambulatory building.     Your arrival time is 6:00              DO NOT EAT OR DRINK ANYTHING AFTER MIDNIGHT.          You may take blood pressure medication with a small drink of water the morning of surgery.                                 DO NOT TAKE INSULIN OR ANY OTHER BLOOD SUGAR MEDICATIONS.           The following blood sugar medications have to be stopped 48 hours prior to surgery:                    Metformin        Glucovance          Metaglip             Fortamet           Glucophage                   Riomet             Avandamet          Glimepiride              IF YOU ARE ON ANY OF THESE BLOOD THINNERS, MAKE SURE YOUR PHYSICIAN IS AWARE.                Eliquis/Apixaban             Xarelto/Rivaroxaban             Plavix/Clopidogrel                  Wafarin/Coumadin,Jantoven           Pletal/Cilostazol               Pradaxa/Dibigatran                           PLEASE USE CHLORHEXIDINE WASH THE NIGHT BEFORE SURGERY AND THE MORNING OF SURGERY.             YOU CANNOT DRIVE YOURSELF HOME FROM THE HOSPITAL THE DAY OF SURGERY.        Please have a  with you.           Bring all medications, that you are currently taking, with you to the hospital the morning of your procedure.           Please leave all valuables at home.          Children under the age of 18 must be accompanied by an adult.          PLEASE UNDERSTAND THAT OUR OFFICE DOES NOT GIVE PATHOLOGY RESULTS OR TEST RESULTS OVER THE PHONE.         THIS WILL BE DISCUSSED WITH YOU ON YOUR FOLLOW UP APPOINTMENT TO DISCUSS IN PERSON.

## 2022-05-16 ENCOUNTER — HOSPITAL ENCOUNTER (INPATIENT)
Facility: HOSPITAL | Age: 67
LOS: 1 days | Discharge: HOME | DRG: 039 | End: 2022-05-17
Attending: SURGERY | Admitting: SURGERY
Payer: MEDICARE

## 2022-05-16 ENCOUNTER — ANESTHESIA (OUTPATIENT)
Dept: SURGERY | Facility: HOSPITAL | Age: 67
DRG: 039 | End: 2022-05-16
Payer: MEDICARE

## 2022-05-16 ENCOUNTER — ANESTHESIA EVENT (OUTPATIENT)
Dept: SURGERY | Facility: HOSPITAL | Age: 67
DRG: 039 | End: 2022-05-16
Payer: MEDICARE

## 2022-05-16 DIAGNOSIS — G45.8 SUBCLAVIAN STEAL SYNDROME: ICD-10-CM

## 2022-05-16 DIAGNOSIS — I65.21 CAROTID STENOSIS, ASYMPTOMATIC, RIGHT: Primary | ICD-10-CM

## 2022-05-16 DIAGNOSIS — I65.22 LEFT CAROTID STENOSIS: ICD-10-CM

## 2022-05-16 DIAGNOSIS — I10 HYPERTENSION: ICD-10-CM

## 2022-05-16 LAB
HGB BLD-MCNC: 14.1 G/DL (ref 13.5–18)
INDIRECT COOMBS: NORMAL
RH BLD: NORMAL

## 2022-05-16 PROCEDURE — 36000708 HC OR TIME LEV III 1ST 15 MIN: Performed by: SURGERY

## 2022-05-16 PROCEDURE — 27000716 HC OXISENSOR PROBE, ANY SIZE: Performed by: ANESTHESIOLOGY

## 2022-05-16 PROCEDURE — 27000689 HC BLADE LARYNGOSCOPE ANY SIZE: Performed by: ANESTHESIOLOGY

## 2022-05-16 PROCEDURE — D9220A PRA ANESTHESIA: Mod: CRNA,,, | Performed by: NURSE ANESTHETIST, CERTIFIED REGISTERED

## 2022-05-16 PROCEDURE — 27201423 OPTIME MED/SURG SUP & DEVICES STERILE SUPPLY: Performed by: SURGERY

## 2022-05-16 PROCEDURE — 88304 TISSUE EXAM BY PATHOLOGIST: CPT | Mod: 26,XU,, | Performed by: PATHOLOGY

## 2022-05-16 PROCEDURE — 88304 TISSUE EXAM BY PATHOLOGIST: CPT | Mod: SUR | Performed by: SURGERY

## 2022-05-16 PROCEDURE — 37000008 HC ANESTHESIA 1ST 15 MINUTES: Performed by: SURGERY

## 2022-05-16 PROCEDURE — C1729 CATH, DRAINAGE: HCPCS | Performed by: SURGERY

## 2022-05-16 PROCEDURE — 88304 PR  SURG PATH,LEVEL III: ICD-10-PCS | Mod: 26,XU,, | Performed by: PATHOLOGY

## 2022-05-16 PROCEDURE — D9220A PRA ANESTHESIA: Mod: ANES,,, | Performed by: ANESTHESIOLOGY

## 2022-05-16 PROCEDURE — 88311 DECALCIFY TISSUE: CPT | Mod: 26,,, | Performed by: PATHOLOGY

## 2022-05-16 PROCEDURE — 85018 HEMOGLOBIN: CPT | Performed by: NURSE PRACTITIONER

## 2022-05-16 PROCEDURE — 88304 TISSUE EXAM BY PATHOLOGIST: CPT | Mod: 26,,, | Performed by: PATHOLOGY

## 2022-05-16 PROCEDURE — 25000003 PHARM REV CODE 250: Performed by: NURSE ANESTHETIST, CERTIFIED REGISTERED

## 2022-05-16 PROCEDURE — 35301 RECHANNELING OF ARTERY: CPT | Mod: LT,,, | Performed by: SURGERY

## 2022-05-16 PROCEDURE — 27000165 HC TUBE, ETT CUFFED: Performed by: ANESTHESIOLOGY

## 2022-05-16 PROCEDURE — 63600175 PHARM REV CODE 636 W HCPCS: Performed by: NURSE PRACTITIONER

## 2022-05-16 PROCEDURE — 27000510 HC BLANKET BAIR HUGGER ANY SIZE: Performed by: ANESTHESIOLOGY

## 2022-05-16 PROCEDURE — C1768 GRAFT, VASCULAR: HCPCS | Performed by: SURGERY

## 2022-05-16 PROCEDURE — D9220A PRA ANESTHESIA: ICD-10-PCS | Mod: ANES,,, | Performed by: ANESTHESIOLOGY

## 2022-05-16 PROCEDURE — 25000003 PHARM REV CODE 250: Performed by: SURGERY

## 2022-05-16 PROCEDURE — 25000003 PHARM REV CODE 250: Performed by: NURSE PRACTITIONER

## 2022-05-16 PROCEDURE — 35301 PR THROMBOENDARTECTMY NECK,NECK INCIS: ICD-10-PCS | Mod: LT,,, | Performed by: SURGERY

## 2022-05-16 PROCEDURE — 27000260 *HC AIRWAY ORAL: Performed by: ANESTHESIOLOGY

## 2022-05-16 PROCEDURE — 20000000 HC ICU ROOM

## 2022-05-16 PROCEDURE — 37000009 HC ANESTHESIA EA ADD 15 MINS: Performed by: SURGERY

## 2022-05-16 PROCEDURE — 88311 SURGICAL PATHOLOGY: ICD-10-PCS | Mod: 26,,, | Performed by: PATHOLOGY

## 2022-05-16 PROCEDURE — D9220A PRA ANESTHESIA: ICD-10-PCS | Mod: CRNA,,, | Performed by: NURSE ANESTHETIST, CERTIFIED REGISTERED

## 2022-05-16 PROCEDURE — 63600175 PHARM REV CODE 636 W HCPCS: Performed by: NURSE ANESTHETIST, CERTIFIED REGISTERED

## 2022-05-16 PROCEDURE — 36000709 HC OR TIME LEV III EA ADD 15 MIN: Performed by: SURGERY

## 2022-05-16 PROCEDURE — 36415 COLL VENOUS BLD VENIPUNCTURE: CPT | Performed by: SURGERY

## 2022-05-16 PROCEDURE — 86850 RBC ANTIBODY SCREEN: CPT | Performed by: SURGERY

## 2022-05-16 PROCEDURE — 27000655: Performed by: ANESTHESIOLOGY

## 2022-05-16 PROCEDURE — 36415 COLL VENOUS BLD VENIPUNCTURE: CPT | Performed by: NURSE PRACTITIONER

## 2022-05-16 DEVICE — PATCH XENOSURE BIOLOGIC 1CM X 6CM: Type: IMPLANTABLE DEVICE | Site: CAROTID | Status: FUNCTIONAL

## 2022-05-16 RX ORDER — PROTAMINE SULFATE 10 MG/ML
INJECTION, SOLUTION INTRAVENOUS
Status: DISCONTINUED | OUTPATIENT
Start: 2022-05-16 | End: 2022-05-16

## 2022-05-16 RX ORDER — ASPIRIN 325 MG
325 TABLET, DELAYED RELEASE (ENTERIC COATED) ORAL DAILY
Status: DISCONTINUED | OUTPATIENT
Start: 2022-05-16 | End: 2022-05-17 | Stop reason: HOSPADM

## 2022-05-16 RX ORDER — LIDOCAINE HYDROCHLORIDE 20 MG/ML
INJECTION, SOLUTION EPIDURAL; INFILTRATION; INTRACAUDAL; PERINEURAL
Status: DISCONTINUED | OUTPATIENT
Start: 2022-05-16 | End: 2022-05-16

## 2022-05-16 RX ORDER — DOCUSATE SODIUM 100 MG/1
100 CAPSULE, LIQUID FILLED ORAL DAILY PRN
Status: DISCONTINUED | OUTPATIENT
Start: 2022-05-16 | End: 2022-05-17 | Stop reason: HOSPADM

## 2022-05-16 RX ORDER — TRAMADOL HYDROCHLORIDE 50 MG/1
50 TABLET ORAL EVERY 4 HOURS PRN
Status: DISCONTINUED | OUTPATIENT
Start: 2022-05-16 | End: 2022-05-17 | Stop reason: HOSPADM

## 2022-05-16 RX ORDER — OXYCODONE HYDROCHLORIDE 5 MG/1
5 TABLET ORAL
Status: CANCELLED | OUTPATIENT
Start: 2022-05-16

## 2022-05-16 RX ORDER — ATORVASTATIN CALCIUM 80 MG/1
80 TABLET, FILM COATED ORAL NIGHTLY
Status: DISCONTINUED | OUTPATIENT
Start: 2022-05-16 | End: 2022-05-17 | Stop reason: HOSPADM

## 2022-05-16 RX ORDER — FENTANYL CITRATE 50 UG/ML
INJECTION, SOLUTION INTRAMUSCULAR; INTRAVENOUS
Status: DISCONTINUED | OUTPATIENT
Start: 2022-05-16 | End: 2022-05-16

## 2022-05-16 RX ORDER — CEFAZOLIN SODIUM 2 G/50ML
2 SOLUTION INTRAVENOUS
Status: DISCONTINUED | OUTPATIENT
Start: 2022-05-16 | End: 2022-05-16 | Stop reason: HOSPADM

## 2022-05-16 RX ORDER — SODIUM CHLORIDE 9 MG/ML
INJECTION, SOLUTION INTRAVENOUS CONTINUOUS
Status: DISCONTINUED | OUTPATIENT
Start: 2022-05-16 | End: 2022-05-16

## 2022-05-16 RX ORDER — MEPERIDINE HYDROCHLORIDE 25 MG/ML
25 INJECTION INTRAMUSCULAR; INTRAVENOUS; SUBCUTANEOUS EVERY 10 MIN PRN
Status: CANCELLED | OUTPATIENT
Start: 2022-05-16 | End: 2022-05-16

## 2022-05-16 RX ORDER — PROPOFOL 10 MG/ML
VIAL (ML) INTRAVENOUS
Status: DISCONTINUED | OUTPATIENT
Start: 2022-05-16 | End: 2022-05-16

## 2022-05-16 RX ORDER — FAMOTIDINE 20 MG/1
20 TABLET, FILM COATED ORAL 2 TIMES DAILY
Status: DISCONTINUED | OUTPATIENT
Start: 2022-05-16 | End: 2022-05-17 | Stop reason: HOSPADM

## 2022-05-16 RX ORDER — CEFAZOLIN SODIUM 2 G/50ML
2 SOLUTION INTRAVENOUS
Status: COMPLETED | OUTPATIENT
Start: 2022-05-16 | End: 2022-05-17

## 2022-05-16 RX ORDER — ONDANSETRON 2 MG/ML
4 INJECTION INTRAMUSCULAR; INTRAVENOUS EVERY 12 HOURS PRN
Status: DISCONTINUED | OUTPATIENT
Start: 2022-05-16 | End: 2022-05-17 | Stop reason: HOSPADM

## 2022-05-16 RX ORDER — ATORVASTATIN CALCIUM 40 MG/1
40 TABLET, FILM COATED ORAL DAILY
Status: DISCONTINUED | OUTPATIENT
Start: 2022-05-16 | End: 2022-05-16

## 2022-05-16 RX ORDER — DOCUSATE SODIUM 100 MG/1
100 CAPSULE, LIQUID FILLED ORAL 2 TIMES DAILY
Status: DISCONTINUED | OUTPATIENT
Start: 2022-05-16 | End: 2022-05-17 | Stop reason: HOSPADM

## 2022-05-16 RX ORDER — ONDANSETRON 2 MG/ML
4 INJECTION INTRAMUSCULAR; INTRAVENOUS DAILY PRN
Status: CANCELLED | OUTPATIENT
Start: 2022-05-16

## 2022-05-16 RX ORDER — DIPHENHYDRAMINE HYDROCHLORIDE 50 MG/ML
25 INJECTION INTRAMUSCULAR; INTRAVENOUS EVERY 6 HOURS PRN
Status: CANCELLED | OUTPATIENT
Start: 2022-05-16

## 2022-05-16 RX ORDER — ROCURONIUM BROMIDE 10 MG/ML
INJECTION, SOLUTION INTRAVENOUS
Status: DISCONTINUED | OUTPATIENT
Start: 2022-05-16 | End: 2022-05-16

## 2022-05-16 RX ORDER — LABETALOL HYDROCHLORIDE 5 MG/ML
INJECTION, SOLUTION INTRAVENOUS
Status: DISCONTINUED | OUTPATIENT
Start: 2022-05-16 | End: 2022-05-16

## 2022-05-16 RX ORDER — SODIUM CHLORIDE 9 MG/ML
INJECTION, SOLUTION INTRAVENOUS CONTINUOUS
Status: DISCONTINUED | OUTPATIENT
Start: 2022-05-16 | End: 2022-05-17 | Stop reason: HOSPADM

## 2022-05-16 RX ORDER — MUPIROCIN 20 MG/G
OINTMENT TOPICAL 2 TIMES DAILY
Status: DISCONTINUED | OUTPATIENT
Start: 2022-05-16 | End: 2022-05-17 | Stop reason: HOSPADM

## 2022-05-16 RX ORDER — PHENYLEPHRINE HYDROCHLORIDE 10 MG/ML
INJECTION INTRAVENOUS
Status: DISCONTINUED | OUTPATIENT
Start: 2022-05-16 | End: 2022-05-16

## 2022-05-16 RX ORDER — PANTOPRAZOLE SODIUM 40 MG/1
40 TABLET, DELAYED RELEASE ORAL DAILY
Refills: 2 | Status: DISCONTINUED | OUTPATIENT
Start: 2022-05-16 | End: 2022-05-17 | Stop reason: HOSPADM

## 2022-05-16 RX ORDER — HYDROMORPHONE HYDROCHLORIDE 2 MG/ML
0.5 INJECTION, SOLUTION INTRAMUSCULAR; INTRAVENOUS; SUBCUTANEOUS EVERY 5 MIN PRN
Status: CANCELLED | OUTPATIENT
Start: 2022-05-16

## 2022-05-16 RX ORDER — MORPHINE SULFATE 8 MG/ML
3 INJECTION INTRAMUSCULAR; INTRAVENOUS; SUBCUTANEOUS
Status: DISCONTINUED | OUTPATIENT
Start: 2022-05-16 | End: 2022-05-17 | Stop reason: HOSPADM

## 2022-05-16 RX ORDER — NICARDIPINE HYDROCHLORIDE 0.2 MG/ML
0-15 INJECTION INTRAVENOUS CONTINUOUS
Status: DISCONTINUED | OUTPATIENT
Start: 2022-05-16 | End: 2022-05-16

## 2022-05-16 RX ORDER — MORPHINE SULFATE 8 MG/ML
4 INJECTION INTRAMUSCULAR; INTRAVENOUS; SUBCUTANEOUS EVERY 5 MIN PRN
Status: CANCELLED | OUTPATIENT
Start: 2022-05-16

## 2022-05-16 RX ORDER — HEPARIN SODIUM 1000 [USP'U]/ML
INJECTION, SOLUTION INTRAVENOUS; SUBCUTANEOUS
Status: DISCONTINUED | OUTPATIENT
Start: 2022-05-16 | End: 2022-05-16

## 2022-05-16 RX ORDER — HYDROCODONE BITARTRATE AND ACETAMINOPHEN 5; 325 MG/1; MG/1
1 TABLET ORAL EVERY 4 HOURS PRN
Status: DISCONTINUED | OUTPATIENT
Start: 2022-05-16 | End: 2022-05-17 | Stop reason: HOSPADM

## 2022-05-16 RX ORDER — ENOXAPARIN SODIUM 100 MG/ML
40 INJECTION SUBCUTANEOUS EVERY 24 HOURS
Status: DISCONTINUED | OUTPATIENT
Start: 2022-05-16 | End: 2022-05-17 | Stop reason: HOSPADM

## 2022-05-16 RX ORDER — EPHEDRINE SULFATE 50 MG/ML
INJECTION, SOLUTION INTRAVENOUS
Status: DISCONTINUED | OUTPATIENT
Start: 2022-05-16 | End: 2022-05-16

## 2022-05-16 RX ORDER — NICARDIPINE HYDROCHLORIDE 2.5 MG/ML
INJECTION INTRAVENOUS CONTINUOUS PRN
Status: DISCONTINUED | OUTPATIENT
Start: 2022-05-16 | End: 2022-05-16

## 2022-05-16 RX ORDER — METOPROLOL SUCCINATE 25 MG/1
25 TABLET, EXTENDED RELEASE ORAL DAILY
Status: DISCONTINUED | OUTPATIENT
Start: 2022-05-16 | End: 2022-05-17 | Stop reason: HOSPADM

## 2022-05-16 RX ORDER — ASPIRIN 81 MG/1
81 TABLET ORAL DAILY
Status: DISCONTINUED | OUTPATIENT
Start: 2022-05-16 | End: 2022-05-16

## 2022-05-16 RX ORDER — ONDANSETRON 2 MG/ML
INJECTION INTRAMUSCULAR; INTRAVENOUS
Status: DISCONTINUED | OUTPATIENT
Start: 2022-05-16 | End: 2022-05-16

## 2022-05-16 RX ORDER — LIDOCAINE HYDROCHLORIDE 10 MG/ML
INJECTION INFILTRATION; PERINEURAL
Status: DISCONTINUED | OUTPATIENT
Start: 2022-05-16 | End: 2022-05-16 | Stop reason: HOSPADM

## 2022-05-16 RX ADMIN — PANTOPRAZOLE SODIUM 40 MG: 40 TABLET, DELAYED RELEASE ORAL at 10:05

## 2022-05-16 RX ADMIN — CEFAZOLIN SODIUM 2 G: 10 INJECTION, POWDER, FOR SOLUTION INTRAVENOUS at 11:05

## 2022-05-16 RX ADMIN — FAMOTIDINE 20 MG: 20 TABLET ORAL at 10:05

## 2022-05-16 RX ADMIN — EPHEDRINE SULFATE 25 MG: 50 INJECTION INTRAVENOUS at 08:05

## 2022-05-16 RX ADMIN — FAMOTIDINE 20 MG: 20 TABLET ORAL at 08:05

## 2022-05-16 RX ADMIN — METOPROLOL SUCCINATE 25 MG: 25 TABLET, EXTENDED RELEASE ORAL at 10:05

## 2022-05-16 RX ADMIN — LIDOCAINE HYDROCHLORIDE 100 MG: 20 INJECTION, SOLUTION INTRAVENOUS at 07:05

## 2022-05-16 RX ADMIN — SODIUM CHLORIDE 5 MG/HR: 9 INJECTION, SOLUTION INTRAVENOUS at 10:05

## 2022-05-16 RX ADMIN — ONDANSETRON 4 MG: 2 INJECTION INTRAMUSCULAR; INTRAVENOUS at 08:05

## 2022-05-16 RX ADMIN — NICARDIPINE HYDROCHLORIDE 5 MG/HR: 25 INJECTION INTRAVENOUS at 08:05

## 2022-05-16 RX ADMIN — DOCUSATE SODIUM 100 MG: 100 CAPSULE, LIQUID FILLED ORAL at 08:05

## 2022-05-16 RX ADMIN — SODIUM CHLORIDE 10 MG/HR: 9 INJECTION, SOLUTION INTRAVENOUS at 04:05

## 2022-05-16 RX ADMIN — PHENYLEPHRINE HYDROCHLORIDE 100 MCG: 10 INJECTION INTRAVENOUS at 09:05

## 2022-05-16 RX ADMIN — SODIUM CHLORIDE: 9 INJECTION, SOLUTION INTRAVENOUS at 06:05

## 2022-05-16 RX ADMIN — MUPIROCIN: 20 OINTMENT TOPICAL at 10:05

## 2022-05-16 RX ADMIN — FENTANYL CITRATE 100 MCG: 50 INJECTION INTRAMUSCULAR; INTRAVENOUS at 07:05

## 2022-05-16 RX ADMIN — ROCURONIUM BROMIDE 50 MG: 10 INJECTION, SOLUTION INTRAVENOUS at 08:05

## 2022-05-16 RX ADMIN — CEFAZOLIN 2 G: 1 INJECTION, POWDER, FOR SOLUTION INTRAMUSCULAR; INTRAVENOUS; PARENTERAL at 08:05

## 2022-05-16 RX ADMIN — ASPIRIN 325 MG: 325 TABLET, COATED ORAL at 10:05

## 2022-05-16 RX ADMIN — HYDROCODONE BITARTRATE AND ACETAMINOPHEN 1 TABLET: 5; 325 TABLET ORAL at 08:05

## 2022-05-16 RX ADMIN — PHENYLEPHRINE HYDROCHLORIDE 100 MCG: 10 INJECTION INTRAVENOUS at 08:05

## 2022-05-16 RX ADMIN — ROCURONIUM BROMIDE 50 MG: 10 INJECTION, SOLUTION INTRAVENOUS at 07:05

## 2022-05-16 RX ADMIN — TRAMADOL HYDROCHLORIDE 50 MG: 50 TABLET, COATED ORAL at 04:05

## 2022-05-16 RX ADMIN — SODIUM CHLORIDE: 9 INJECTION, SOLUTION INTRAVENOUS at 10:05

## 2022-05-16 RX ADMIN — PROTAMINE SULFATE 20 MG: 10 INJECTION, SOLUTION INTRAVENOUS at 09:05

## 2022-05-16 RX ADMIN — MORPHINE SULFATE 3.04 MG: 8 INJECTION INTRAVENOUS at 10:05

## 2022-05-16 RX ADMIN — ATORVASTATIN CALCIUM 80 MG: 80 TABLET, FILM COATED ORAL at 08:05

## 2022-05-16 RX ADMIN — HEPARIN SODIUM 4000 UNITS: 1000 INJECTION INTRAVENOUS; SUBCUTANEOUS at 08:05

## 2022-05-16 RX ADMIN — PROPOFOL 150 MG: 10 INJECTION, EMULSION INTRAVENOUS at 07:05

## 2022-05-16 RX ADMIN — DOCUSATE SODIUM 100 MG: 100 CAPSULE, LIQUID FILLED ORAL at 10:05

## 2022-05-16 RX ADMIN — MUPIROCIN: 20 OINTMENT TOPICAL at 08:05

## 2022-05-16 RX ADMIN — SUGAMMADEX 200 MG: 100 INJECTION, SOLUTION INTRAVENOUS at 09:05

## 2022-05-16 RX ADMIN — LABETALOL HYDROCHLORIDE 5 MG: 5 INJECTION, SOLUTION INTRAVENOUS at 09:05

## 2022-05-16 RX ADMIN — ENOXAPARIN SODIUM 40 MG: 40 INJECTION SUBCUTANEOUS at 04:05

## 2022-05-16 RX ADMIN — CEFAZOLIN SODIUM 2 G: 10 INJECTION, POWDER, FOR SOLUTION INTRAVENOUS at 04:05

## 2022-05-16 NOTE — PLAN OF CARE
Problem: Adult Inpatient Plan of Care  Goal: Plan of Care Review  Outcome: Ongoing, Progressing  Goal: Patient-Specific Goal (Individualized)  Outcome: Ongoing, Progressing  Goal: Absence of Hospital-Acquired Illness or Injury  Outcome: Ongoing, Progressing  Goal: Optimal Comfort and Wellbeing  Outcome: Ongoing, Progressing  Goal: Readiness for Transition of Care  Outcome: Ongoing, Progressing     Problem: Infection  Goal: Absence of Infection Signs and Symptoms  Outcome: Ongoing, Progressing     Problem: Bleeding (Revascularization)  Goal: Absence of Bleeding  Outcome: Ongoing, Progressing     Problem: Bowel Motility Impaired (Revascularization)  Goal: Effective Bowel Elimination  Outcome: Ongoing, Progressing     Problem: Fluid and Electrolyte Imbalance (Revascularization)  Goal: Fluid and Electrolyte Balance  Outcome: Ongoing, Progressing     Problem: Infection (Revascularization)  Goal: Absence of Infection Signs and Symptoms  Outcome: Ongoing, Progressing     Problem: Ongoing Anesthesia Effects (Revascularization)  Goal: Anesthesia/Sedation Recovery  Outcome: Ongoing, Progressing     Problem: Pain (Revascularization)  Goal: Acceptable Pain Control  Outcome: Ongoing, Progressing     Problem: Postoperative Nausea and Vomiting (Revascularization)  Goal: Nausea and Vomiting Relief  Outcome: Ongoing, Progressing     Problem: Postoperative Urinary Retention (Revascularization)  Goal: Effective Urinary Elimination  Outcome: Ongoing, Progressing     Problem: Respiratory Compromise (Revascularization)  Goal: Effective Oxygenation and Ventilation  Outcome: Ongoing, Progressing     Problem: Tissue Perfusion Altered (Revascularization)  Goal: Effective Tissue Perfusion  Outcome: Ongoing, Progressing

## 2022-05-16 NOTE — ANESTHESIA PREPROCEDURE EVALUATION
05/16/2022  Bhaskar Patel is a 67 y.o., male.      Pre-op Assessment    I have reviewed the Patient Summary Reports.    I have reviewed the NPO Status.   I have reviewed the Medications.     Review of Systems  Social:  Non-Smoker, No Alcohol Use    Hematology/Oncology:  Hematology Normal   Oncology Normal     EENT/Dental:EENT/Dental Normal   Cardiovascular:   Hypertension CAD  CABG/stent  ECG has been reviewed.    Pulmonary:  Pulmonary Normal    Renal/:  Renal/ Normal     Hepatic/GI:   GERD    Musculoskeletal:  Musculoskeletal Normal    Neurological:  Neurology Normal    Endocrine:  Endocrine Normal    Dermatological:  Skin Normal    Psych:  Psychiatric Normal           Physical Exam  General: Well nourished, Cooperative, Alert and Oriented    Airway:  Mallampati: II / II  Mouth Opening: Normal  TM Distance: Normal  Neck ROM: Normal ROM    Dental:  Dentures    Chest/Lungs:  Clear to auscultation    Heart:  Rate: Normal  Rhythm: Regular Rhythm  Sounds: Normal        Chemistry        Component Value Date/Time     05/12/2022 0849    K 4.7 05/12/2022 0849     05/12/2022 0849    CO2 28 05/12/2022 0849    BUN 13 05/12/2022 0849    CREATININE 1.11 05/12/2022 0849     05/12/2022 0849        Component Value Date/Time    CALCIUM 8.7 05/12/2022 0849    ALKPHOS 119 (H) 05/13/2021 0853    AST 20 05/13/2021 0853    ALT 22 05/13/2021 0853    BILITOT 0.6 05/13/2021 0853    ESTGFRAFRICA 109 04/20/2020 1330    EGFRNONAA 70 05/12/2022 0849        Lab Results   Component Value Date    WBC 8.51 05/12/2022    RBC 4.51 (L) 05/12/2022    HGB 14.8 05/12/2022    MCV 98.2 (H) 05/12/2022    MCH 32.8 (H) 05/12/2022    MCHC 33.4 05/12/2022    RDW 14.0 05/12/2022     (L) 05/12/2022    MPV 13.1 (H) 05/12/2022    LYMPH 25.1 (L) 05/12/2022    LYMPH 2.14 05/12/2022    MONO 6.1 (H) 05/12/2022    EOS 0.25  05/12/2022    BASO 0.04 05/12/2022     Results for orders placed or performed during the hospital encounter of 09/15/21   EKG 12-lead    Collection Time: 09/15/21  7:28 AM    Narrative    Test Reason : R94.39,    Vent. Rate : 059 BPM     Atrial Rate : 059 BPM     P-R Int : 142 ms          QRS Dur : 096 ms      QT Int : 412 ms       P-R-T Axes : 090 086 086 degrees     QTc Int : 407 ms    Sinus bradycardia  Incomplete right bundle branch block  Possible Anterior infarct (cited on or before 09-AUG-2021)  Abnormal ECG  When compared with ECG of 09-AUG-2021 16:14,  No significant change was found  Confirmed by Yamileth Adame DO ([id number]) on 9/21/2021 12:56:14 PM    Referred By: YAMILETH ADAME           Confirmed By:Yamileth Adame DO         Anesthesia Plan  Type of Anesthesia, risks & benefits discussed:    Anesthesia Type: Gen ETT  Intra-op Monitoring Plan: Standard ASA Monitors and Art Line  Post Op Pain Control Plan: multimodal analgesia  Induction:  IV  Airway Plan: Direct  Informed Consent: Informed consent signed with the Patient and all parties understand the risks and agree with anesthesia plan.  All questions answered. Patient consented to blood products? Yes  ASA Score: 3  Day of Surgery Review of History & Physical: H&P Update referred to the surgeon/provider.    Ready For Surgery From Anesthesia Perspective.     .

## 2022-05-16 NOTE — BRIEF OP NOTE
[facility] - Periop Services  Brief Operative Note    SUMMARY     Surgery Date: 5/16/2022     Surgeon(s) and Role:     * Karen Spencer MD - Primary    Assisting Surgeon: None      Patient has asymptomatic cerebrovascular disease 70% or greater left carotid  Pre-op Diagnosis:  Left carotid stenosis [I65.22]    Post-op Diagnosis:  Post-Op Diagnosis Codes:     * Left carotid stenosis [I65.22]    Procedure(s) (LRB):  ENDARTERECTOMY-CAROTID (Left)    Anesthesia: General    Operative Findings:  Moderate severe calcified atherosclerosis of the bulb and internal carotid artery on the left    Estimated Blood Loss: * No values recorded between 5/16/2022  8:20 AM and 5/16/2022  9:31 AM *    Estimated Blood Loss has been documented.         Specimens:   Specimen (24h ago, onward)             Start     Ordered    05/16/22 0914  Surgical Pathology  RELEASE UPON ORDERING         05/16/22 0914                [id number]

## 2022-05-16 NOTE — OP NOTE
[facility] - Periop Services  General Surgery  Operative Note    SUMMARY     Date of Procedure: 5/16/2022     Procedure: Procedure(s) (LRB):  ENDARTERECTOMY-CAROTID (Left)       Surgeon(s) and Role:     * Karen Sepncer MD - Primary    Assisting Surgeon: None      Patient has is moderate to severe calcified atherosclerosis asymptomatic left internal carotid, pros and cons associated endarterectomy would been addressed with patient family member i interoperative neuro monitoring was utilized      Pre-Operative Diagnosis: Left carotid stenosis [I65.22]    Post-Operative Diagnosis: Post-Op Diagnosis Codes:     * Left carotid stenosis [I65.22]    Anesthesia: General    Operative Findings (including complications, if any):  Moderate to severe left ICA stenosis    Description of Technical Procedures:  Patient taken operative suite.  Appropriate general endotracheal anesthesia assured.  Left neck and chest prepped draped.  Ioban applied.  Preop antibiotics utilized.  Oblique left neck incision was made we dissected medial sternocleidomastoid take control the common carotid artery the distal internal carotid artery the external carotid artery and the superior thyroid arteries.  Vessel loops were applied.  We identified preserve the hypoglossal and vagus nerves.  Utilize hemoclips and suture ligated small vascular structures as needed for visualization interrupted neuro monitoring was utilized.  The patient was heparinized after adequate circulation time a clamped the vascular structures longitudinal arteriotomy was made on the internal starting at the common extending up to the internal.  We next utilized [place] a ball hook performed endarterectomy in usual fashion transected the plaque just above the bifurcation endarterectomy proximally and eversion endarterectomy of the external.  We next removed the distal plaque with downward lateral motion.  Removed all intraluminal debris placed 270 Prolene tacking sutures  distally.  We next took bovine pericardium patch sutured it running 6 0 Prolene on a C1 needle.  Prior to closure we flushed it secured the sutures restored flow the external carotid artery after several cardiac cycles to the internal carotid.  Single repair stitch was applied proximally.  On-table Doppler was performed with excellent signals.  Patient's heparin was reversed with protamine.  Ten flat GIO drain was placed.  We close multiple layers 2-0 Vicryl 3-0 Vicryl running 4 Monocryl no changes during intraoperative monitoring    Significant Surgical Tasks Conducted by the Assistant(s), if Applicable:  Skin closure    Estimated Blood Loss (EBL): * No values recorded between 5/16/2022  8:20 AM and 5/16/2022  9:32 AM *           Implants:   Implant Name Type Inv. Item Serial No.  Lot No. LRB No. Used Action   PATCH XENOSURE BIOLOGIC 1CM X 6CM - [id number] Graft PATCH XENOSURE BIOLOGIC 1CM X 6CM  [facility] ([facility] [id number] 1 Implanted       Specimens:   Specimen (24h ago, onward)             Start     Ordered    05/16/22 0914  Surgical Pathology  RELEASE UPON ORDERING         05/16/22 0914                        Condition: Good    Disposition: PACU - hemodynamically stable.    Attestation: I was present and scrubbed for the entire procedure.

## 2022-05-16 NOTE — ANESTHESIA PROCEDURE NOTES
Intubation    Date/Time: 5/16/2022 7:46 AM  Performed by: Jc Lopez CRNA  Authorized by: Robert Contrersa MD     Intubation:     Induction:  Intravenous    Mask Ventilation:  Easy mask    Attempts:  1    Attempted By:  CRNA    Method of Intubation:  Direct    Blade:  Yuli 3    Laryngeal View Grade: Grade I - full view of cords      Difficult Airway Encountered?: No      Complications:  None    Airway Device:  Oral endotracheal tube    Airway Device Size:  7.5    Style/Cuff Inflation:  Cuffed    Tube secured:  23    Secured at:  The lips    Placement Verified By:  Colorimetric ETCO2 device    Complicating Factors:  Large/floppy epiglottis    Findings Post-Intubation:  BS equal bilateral and atraumatic/condition of teeth unchanged

## 2022-05-16 NOTE — TRANSFER OF CARE
"Anesthesia Transfer of Care Note    Patient: Bhaskar Patel    Procedure(s) Performed: Procedure(s) (LRB):  ENDARTERECTOMY-CAROTID (Left)    Patient location: ICU    Anesthesia Type: general    Transport from OR: Transported from OR on 2-3 L/min O2 by NC with adequate spontaneous ventilation. Continuous ECG monitoring in transport. Continuous SpO2 monitoring in transport. Continuos invasive BP monitoring in transport    Post pain: adequate analgesia    Post assessment: no apparent anesthetic complications and tolerated procedure well    Post vital signs: stable    Level of consciousness: awake, alert and oriented    Nausea/Vomiting: no nausea/vomiting    Complications: none    Transfer of care protocol was followed      Last vitals:   Visit Vitals  /72 (BP Location: Other (Comment), Patient Position: Lying)   Pulse 71   Temp 36.7 °C (98.1 °F) (Oral)   Resp 17   Ht 6' 3" (1.905 m)   Wt 70.3 kg (155 lb)   SpO2 96%   BMI 19.37 kg/m²     "

## 2022-05-16 NOTE — ANESTHESIA POSTPROCEDURE EVALUATION
Anesthesia Post Evaluation    Patient: Bhaskar VU Amanda    Procedure(s) Performed: Procedure(s) (LRB):  ENDARTERECTOMY-CAROTID (Left)    Final Anesthesia Type: general      Patient location during evaluation: PACU  Patient participation: Yes- Able to Participate  Level of consciousness: awake and alert  Post-procedure vital signs: reviewed and stable  Pain management: adequate  Airway patency: patent  CHANDAN mitigation strategies: Multimodal analgesia  PONV status at discharge: No PONV  Anesthetic complications: no      Cardiovascular status: blood pressure returned to baseline  Respiratory status: unassisted  Hydration status: euvolemic  Follow-up not needed.          Vitals Value Taken Time   /60 05/16/22 1500   Temp 36.4 °C (97.6 °F) 05/16/22 1300   Pulse 65 05/16/22 1505   Resp 15 05/16/22 1505   SpO2 92 % 05/16/22 1505   Vitals shown include unvalidated device data.      No case tracking events are documented in the log.      Pain/Chao Score: Pain Rating Prior to Med Admin: 10 (5/16/2022 10:14 AM)  Pain Rating Post Med Admin: 2 (5/16/2022 11:01 AM)

## 2022-05-17 VITALS
OXYGEN SATURATION: 90 % | BODY MASS INDEX: 21.32 KG/M2 | WEIGHT: 171.5 LBS | HEART RATE: 56 BPM | DIASTOLIC BLOOD PRESSURE: 63 MMHG | RESPIRATION RATE: 16 BRPM | TEMPERATURE: 98 F | HEIGHT: 75 IN | SYSTOLIC BLOOD PRESSURE: 139 MMHG

## 2022-05-17 LAB — HCT VFR BLD AUTO: 38.8 % (ref 40–54)

## 2022-05-17 PROCEDURE — 99024 PR POST-OP FOLLOW-UP VISIT: ICD-10-PCS | Mod: ,,, | Performed by: SURGERY

## 2022-05-17 PROCEDURE — 36415 COLL VENOUS BLD VENIPUNCTURE: CPT | Performed by: SURGERY

## 2022-05-17 PROCEDURE — 36415 COLL VENOUS BLD VENIPUNCTURE: CPT | Performed by: NURSE PRACTITIONER

## 2022-05-17 PROCEDURE — 99024 POSTOP FOLLOW-UP VISIT: CPT | Mod: ,,, | Performed by: SURGERY

## 2022-05-17 PROCEDURE — 25000003 PHARM REV CODE 250: Performed by: NURSE PRACTITIONER

## 2022-05-17 PROCEDURE — 85014 HEMATOCRIT: CPT | Performed by: NURSE PRACTITIONER

## 2022-05-17 RX ORDER — HYDROCODONE BITARTRATE AND ACETAMINOPHEN 5; 325 MG/1; MG/1
1 TABLET ORAL EVERY 4 HOURS PRN
Qty: 12 TABLET | Refills: 0 | Status: SHIPPED | OUTPATIENT
Start: 2022-05-17 | End: 2022-10-24

## 2022-05-17 RX ORDER — HYDROCODONE BITARTRATE AND ACETAMINOPHEN 5; 325 MG/1; MG/1
1 TABLET ORAL EVERY 4 HOURS PRN
Qty: 12 TABLET | Refills: 0 | OUTPATIENT
Start: 2022-05-17

## 2022-05-17 RX ORDER — SODIUM CHLORIDE 9 MG/ML
INJECTION, SOLUTION INTRAVENOUS CONTINUOUS
Status: CANCELLED | OUTPATIENT
Start: 2022-05-17

## 2022-05-17 RX ADMIN — ASPIRIN 325 MG: 325 TABLET, COATED ORAL at 08:05

## 2022-05-17 RX ADMIN — HYDROCODONE BITARTRATE AND ACETAMINOPHEN 1 TABLET: 5; 325 TABLET ORAL at 03:05

## 2022-05-17 RX ADMIN — METOPROLOL SUCCINATE 25 MG: 25 TABLET, EXTENDED RELEASE ORAL at 08:05

## 2022-05-17 RX ADMIN — MUPIROCIN: 20 OINTMENT TOPICAL at 08:05

## 2022-05-17 RX ADMIN — PANTOPRAZOLE SODIUM 40 MG: 40 TABLET, DELAYED RELEASE ORAL at 08:05

## 2022-05-17 RX ADMIN — FAMOTIDINE 20 MG: 20 TABLET ORAL at 08:05

## 2022-05-17 RX ADMIN — DOCUSATE SODIUM 100 MG: 100 CAPSULE, LIQUID FILLED ORAL at 08:05

## 2022-05-17 NOTE — DISCHARGE SUMMARY
[facility] ICU  Discharge Note  Short Stay    Procedure(s) (LRB):  ENDARTERECTOMY-CAROTID (Left)    OUTCOME: Patient tolerated treatment/procedure well without complication and is now ready for discharge.    DISPOSITION: Home or Self Care    FINAL DIAGNOSIS:  Left carotid stenosis    FOLLOWUP: In clinic    DISCHARGE INSTRUCTIONS:    Discharge Procedure Orders   Diet general     Other restrictions (specify):   Order Comments: No driving five days     Call MD for:  temperature >100.4     Remove dressing in 48 hours     Nursing communication   Order Comments: Remove laurie drain off suction please        TIME SPENT ON DISCHARGE: 15 minutes

## 2022-05-17 NOTE — DISCHARGE SUMMARY
[facility] ICU  General Surgery  Discharge Summary      Patient Name: Bhaskar Patel  MRN: [id number]  Admission Date: 5/16/2022  Hospital Length of Stay: 1 days  Discharge Date and Time:  05/17/2022 8:25 AM  Attending Physician: Karen Spencer MD   Discharging Provider: Karen Spencer MD  Primary Care Provider: Jc Walton DO    HPI:   No notes on file    Procedure(s) (LRB):  ENDARTERECTOMY-CAROTID (Left)      Indwelling Lines/Drains at time of discharge:   Lines/Drains/Airways     Drain  Duration                Closed/Suction Drain 05/16/22 1046 Left;Anterior Neck Bulb <1 day              Hospital Course: Admit  Left cea  Uncomplicated course      Goals of Care Treatment Preferences:  Code Status: Full Code      Consults:     Significant Diagnostic Studies: Labs: BMP: No results for input(s): GLU, NA, K, CL, CO2, BUN, CREATININE, CALCIUM, MG in the last 48 hours. and CMP No results for input(s): NA, K, CL, CO2, GLU, BUN, CREATININE, CALCIUM, PROT, ALBUMIN, BILITOT, ALKPHOS, AST, ALT, ANIONGAP, ESTGFRAFRICA, EGFRNONAA in the last 48 hours.    Pending Diagnostic Studies:     Procedure Component Value Units Date/Time    EXTRA TUBES [[id number]] Collected: 05/17/22 0422    Order Status: Sent Lab Status: In process Updated: 05/17/22 0422    Specimen: Blood, Venous     Narrative:      The following orders were created for panel order EXTRA TUBES.  Procedure                               Abnormality         Status                     ---------                               -----------         ------                     Red Top Hold[[id number]]                                     In process                   Please view results for these tests on the individual orders.        Final Active Diagnoses:    Diagnosis Date Noted POA    PRINCIPAL PROBLEM:  Left carotid stenosis [I65.22]  Yes      Problems Resolved During this Admission:      Discharged Condition: good    Disposition: Home or Self  Care    Follow Up:   Follow-up Information     Karen Spencer MD Follow up in 10 day(s).    Specialty: General Surgery  Contact information:  [address]  [facility]  [place] [address]  [phone]                       Patient Instructions:      Diet general     Other restrictions (specify):   Order Comments: No driving five days     Call MD for:  temperature >100.4     Remove dressing in 48 hours     Nursing communication   Order Comments: Remove laurie drain off suction please     Medications:  Reconciled Home Medications:      Medication List      START taking these medications    HYDROcodone-acetaminophen 5-325 mg per tablet  Commonly known as: NORCO  Take 1 tablet by mouth every 4 (four) hours as needed.        CONTINUE taking these medications    aspirin 81 MG EC tablet  Commonly known as: ECOTRIN  Take 81 mg by mouth once daily.     atorvastatin 80 MG tablet  Commonly known as: LIPITOR  TAKE ONE TABLET BY MOUTH AT BEDTIME     docusate sodium 100 MG capsule  Commonly known as: COLACE  Take 100 mg by mouth daily as needed.     metoprolol succinate 25 MG 24 hr tablet  Commonly known as: TOPROL-XL  TAKE ONE TABLET BY MOUTH EVERY DAY     omeprazole 20 MG capsule  Commonly known as: PRILOSEC  Take 1 capsule (20 mg total) by mouth once daily.          Time spent on the discharge of patient: 15 minutes    Karen Spencer MD  General Surgery  [facility] ICU

## 2022-05-17 NOTE — NURSING
0720 pt aao, denies pain at present. Dressing with old bloody drainage noted. laurie drain in place with bulb compressed, serous drainage noted. Removed left radial a line without difficulty. Pressure held and dressing applied.  0900 LAURIE drain removed by CHEMO Reese without difficulty  0955: reviewed discharge instructions with pt. Removed iv's. Dressing intact. Pt verbalized understanding. Await ride  1030 pt discharged via w/c to POV with friend. No acute distress noted.

## 2022-05-17 NOTE — PLAN OF CARE
Problem: Adult Inpatient Plan of Care  Goal: Plan of Care Review  Outcome: Ongoing, Progressing  Goal: Patient-Specific Goal (Individualized)  Outcome: Ongoing, Progressing  Goal: Absence of Hospital-Acquired Illness or Injury  Outcome: Ongoing, Progressing  Goal: Optimal Comfort and Wellbeing  Outcome: Ongoing, Progressing  Goal: Readiness for Transition of Care  Outcome: Ongoing, Progressing     Problem: Infection  Goal: Absence of Infection Signs and Symptoms  Outcome: Ongoing, Progressing     Problem: Bleeding (Revascularization)  Goal: Absence of Bleeding  Outcome: Ongoing, Progressing     Problem: Bowel Motility Impaired (Revascularization)  Goal: Effective Bowel Elimination  Outcome: Ongoing, Progressing     Problem: Fluid and Electrolyte Imbalance (Revascularization)  Goal: Fluid and Electrolyte Balance  Outcome: Ongoing, Progressing     Problem: Infection (Revascularization)  Goal: Absence of Infection Signs and Symptoms  Outcome: Ongoing, Progressing     Problem: Ongoing Anesthesia Effects (Revascularization)  Goal: Anesthesia/Sedation Recovery  Outcome: Ongoing, Progressing     Problem: Pain (Revascularization)  Goal: Acceptable Pain Control  Outcome: Ongoing, Progressing     Problem: Postoperative Nausea and Vomiting (Revascularization)  Goal: Nausea and Vomiting Relief  Outcome: Ongoing, Progressing     Problem: Postoperative Urinary Retention (Revascularization)  Goal: Effective Urinary Elimination  Outcome: Ongoing, Progressing     Problem: Respiratory Compromise (Revascularization)  Goal: Effective Oxygenation and Ventilation  Outcome: Ongoing, Progressing     Problem: Tissue Perfusion Altered (Revascularization)  Goal: Effective Tissue Perfusion  Outcome: Ongoing, Progressing     Problem: Skin Injury Risk Increased  Goal: Skin Health and Integrity  Outcome: Ongoing, Progressing     Problem: Fall Injury Risk  Goal: Absence of Fall and Fall-Related Injury  Outcome: Ongoing, Progressing

## 2022-05-18 LAB
ESTROGEN SERPL-MCNC: NORMAL PG/ML
INSULIN SERPL-ACNC: NORMAL U[IU]/ML
LAB AP GROSS DESCRIPTION: NORMAL
LAB AP LABORATORY NOTES: NORMAL
T3RU NFR SERPL: NORMAL %

## 2022-05-26 ENCOUNTER — OFFICE VISIT (OUTPATIENT)
Dept: SURGERY | Facility: CLINIC | Age: 67
End: 2022-05-26
Payer: MEDICARE

## 2022-05-26 VITALS — HEIGHT: 75 IN | BODY MASS INDEX: 19.89 KG/M2 | WEIGHT: 160 LBS

## 2022-05-26 DIAGNOSIS — Z09 SURGERY FOLLOW-UP: Primary | ICD-10-CM

## 2022-05-26 DIAGNOSIS — I65.22 LEFT CAROTID STENOSIS: ICD-10-CM

## 2022-05-26 DIAGNOSIS — Z72.0 TOBACCO ABUSE DISORDER: ICD-10-CM

## 2022-05-26 PROCEDURE — 99024 PR POST-OP FOLLOW-UP VISIT: ICD-10-PCS | Mod: ,,, | Performed by: SURGERY

## 2022-05-26 PROCEDURE — 99213 OFFICE O/P EST LOW 20 MIN: CPT | Mod: PBBFAC | Performed by: SURGERY

## 2022-05-26 PROCEDURE — 99024 POSTOP FOLLOW-UP VISIT: CPT | Mod: ,,, | Performed by: SURGERY

## 2022-05-26 NOTE — PROGRESS NOTES
Vascular clinic    Follow-up left carotid endarterectomy    Neurologic baseline intact    Doing well    Ultrasound duplex carotid 6 weeks    Follow-up after above    Educated smoking cessation

## 2022-07-14 ENCOUNTER — OFFICE VISIT (OUTPATIENT)
Dept: SURGERY | Facility: CLINIC | Age: 67
End: 2022-07-14
Payer: MEDICARE

## 2022-07-14 ENCOUNTER — HOSPITAL ENCOUNTER (OUTPATIENT)
Dept: RADIOLOGY | Facility: HOSPITAL | Age: 67
Discharge: HOME | End: 2022-07-14
Attending: SURGERY
Payer: MEDICARE

## 2022-07-14 VITALS — BODY MASS INDEX: 19.89 KG/M2 | HEIGHT: 75 IN | WEIGHT: 160 LBS

## 2022-07-14 DIAGNOSIS — I65.22 LEFT CAROTID STENOSIS: ICD-10-CM

## 2022-07-14 DIAGNOSIS — Z09 SURGERY FOLLOW-UP: ICD-10-CM

## 2022-07-14 DIAGNOSIS — R59.1 LYMPHADENOPATHY OF HEAD AND NECK: Primary | ICD-10-CM

## 2022-07-14 PROCEDURE — 99213 OFFICE O/P EST LOW 20 MIN: CPT | Mod: PBBFAC,25 | Performed by: SURGERY

## 2022-07-14 PROCEDURE — 93880 EXTRACRANIAL BILAT STUDY: CPT | Mod: 26,,, | Performed by: SURGERY

## 2022-07-14 PROCEDURE — 99024 PR POST-OP FOLLOW-UP VISIT: ICD-10-PCS | Mod: S$PBB,,, | Performed by: SURGERY

## 2022-07-14 PROCEDURE — 93880 US CAROTID BILATERAL: ICD-10-PCS | Mod: 26,,, | Performed by: SURGERY

## 2022-07-14 PROCEDURE — 93880 EXTRACRANIAL BILAT STUDY: CPT | Mod: TC

## 2022-07-14 PROCEDURE — 99024 POSTOP FOLLOW-UP VISIT: CPT | Mod: S$PBB,,, | Performed by: SURGERY

## 2022-07-14 NOTE — PROGRESS NOTES
Vascular clinic    Follow-up left carotid endarterectomy    Neurologic baseline intact    Doing well    Ultrasound duplex carotid 6 weeks    Follow-up after above    Educated smoking cessation    07/14/2022    Follow-up carotid duplex velocities acceptable    Neurological baseline intact    He does have some lymphadenopathy in the neck possibly reactive associated with surgery plan is follow month repeat the ultrasound

## 2022-08-24 ENCOUNTER — HOSPITAL ENCOUNTER (OUTPATIENT)
Dept: RADIOLOGY | Facility: HOSPITAL | Age: 67
Discharge: HOME | End: 2022-08-24
Attending: SURGERY
Payer: MEDICARE

## 2022-08-24 ENCOUNTER — OFFICE VISIT (OUTPATIENT)
Dept: SURGERY | Facility: CLINIC | Age: 67
End: 2022-08-24
Payer: MEDICARE

## 2022-08-24 VITALS — HEIGHT: 75 IN | BODY MASS INDEX: 19.89 KG/M2 | WEIGHT: 160 LBS

## 2022-08-24 DIAGNOSIS — R59.1 LYMPHADENOPATHY OF HEAD AND NECK: ICD-10-CM

## 2022-08-24 DIAGNOSIS — G45.8 SUBCLAVIAN STEAL SYNDROME: ICD-10-CM

## 2022-08-24 DIAGNOSIS — I65.22 LEFT CAROTID STENOSIS: ICD-10-CM

## 2022-08-24 DIAGNOSIS — R59.1 LYMPHADENOPATHY OF HEAD AND NECK: Primary | ICD-10-CM

## 2022-08-24 PROCEDURE — 71046 XR CHEST PA AND LATERAL: ICD-10-PCS | Mod: 26,,, | Performed by: RADIOLOGY

## 2022-08-24 PROCEDURE — 76536 US EXAM OF HEAD AND NECK: CPT | Mod: TC

## 2022-08-24 PROCEDURE — 99213 PR OFFICE/OUTPT VISIT, EST, LEVL III, 20-29 MIN: ICD-10-PCS | Mod: S$PBB,,, | Performed by: SURGERY

## 2022-08-24 PROCEDURE — 99213 OFFICE O/P EST LOW 20 MIN: CPT | Mod: S$PBB,,, | Performed by: SURGERY

## 2022-08-24 PROCEDURE — 99213 OFFICE O/P EST LOW 20 MIN: CPT | Mod: PBBFAC,25 | Performed by: SURGERY

## 2022-08-24 PROCEDURE — 76536 US EXAM OF HEAD AND NECK: CPT | Mod: 26,,, | Performed by: RADIOLOGY

## 2022-08-24 PROCEDURE — 71046 X-RAY EXAM CHEST 2 VIEWS: CPT | Mod: 26,,, | Performed by: RADIOLOGY

## 2022-08-24 PROCEDURE — 76536 US THYROID: ICD-10-PCS | Mod: 26,,, | Performed by: RADIOLOGY

## 2022-08-24 PROCEDURE — 71046 X-RAY EXAM CHEST 2 VIEWS: CPT | Mod: TC

## 2022-08-24 NOTE — PROGRESS NOTES
"Subjective:       Patient ID: Bhaskar Patel is a 67 y.o. male.    Chief Complaint: Follow-up (US this am)   patient underwent left carotid endarterectomy.  Said some lymphadenopathy.  This is a follow-up ultrasound today.  Right cervical lymph nodes decreased in size by 3 mm left cervical lymph nodes decreased in size by 2-3 mm    Still smoking     Denies fever weight loss on lymphadenopathy  family history includes Alzheimer's disease in his mother; Cancer in his sister; Heart disease in his father and sister; Hypertension in his father and sister; Polycystic kidney disease in his mother.  Past Medical History:   Diagnosis Date    GERD (gastroesophageal reflux disease)     Hypertension       Past Surgical History:   Procedure Laterality Date    CAROTID ENDARTERECTOMY Left 5/16/2022    Procedure: ENDARTERECTOMY-CAROTID;  Surgeon: Karen Spencer MD;  Location: [facility] OR;  Service: General;  Laterality: Left;    CORONARY ARTERY BYPASS GRAFT      HERNIA REPAIR      LEFT HEART CATHETERIZATION Left 9/15/2021    Procedure: Left heart cath;  Surgeon: Quoc Vo DO;  Location: [facility] CATH LAB;  Service: Cardiology;  Laterality: Left;       reports that he has been smoking cigarettes. He started smoking about 51 years ago. He has a 25.00 pack-year smoking history. He has never used smokeless tobacco. He reports previous alcohol use. He reports that he does not use drugs.   HPI  Review of Systems      Objective:      Ht 6' 3" (1.905 m)   Wt 72.6 kg (160 lb)   BMI 20.00 kg/m²    Physical Exam  Constitutional:       Appearance: Normal appearance.   Neck:      Comments: No prominent lymphadenopathy on exam well-healed left neck surgical scar  Cardiovascular:      Rate and Rhythm: Normal rate.      Comments: Carotid upstrokes are good palpable radial pulses right greater than left  Pulmonary:      Effort: Pulmonary effort is normal.   Skin:     Capillary Refill: Capillary refill takes less than 2 seconds. "   Neurological:      General: No focal deficit present.      Mental Status: He is alert and oriented to person, place, and time.           Assessment:       1. Lymphadenopathy of head and neck    2. Left carotid stenosis    3. Subclavian steal syndrome        Plan:        will get chest x-ray today, will of clinic follow-up ultrasound his neck 2 months educated regarding smoking cessation call for problems

## 2022-10-19 ENCOUNTER — HOSPITAL ENCOUNTER (OUTPATIENT)
Dept: RADIOLOGY | Facility: HOSPITAL | Age: 67
Discharge: HOME | End: 2022-10-19
Attending: SURGERY
Payer: MEDICARE

## 2022-10-19 ENCOUNTER — OFFICE VISIT (OUTPATIENT)
Dept: SURGERY | Facility: CLINIC | Age: 67
End: 2022-10-19
Payer: MEDICARE

## 2022-10-19 VITALS — WEIGHT: 160 LBS | HEIGHT: 75 IN | BODY MASS INDEX: 19.89 KG/M2

## 2022-10-19 DIAGNOSIS — I65.22 LEFT CAROTID STENOSIS: ICD-10-CM

## 2022-10-19 DIAGNOSIS — G45.8 SUBCLAVIAN STEAL SYNDROME: ICD-10-CM

## 2022-10-19 DIAGNOSIS — R59.1 LYMPHADENOPATHY OF HEAD AND NECK: ICD-10-CM

## 2022-10-19 DIAGNOSIS — R59.1 LYMPHADENOPATHY OF HEAD AND NECK: Primary | ICD-10-CM

## 2022-10-19 DIAGNOSIS — R59.0 LOCALIZED ENLARGED LYMPH NODES: ICD-10-CM

## 2022-10-19 PROCEDURE — 99213 OFFICE O/P EST LOW 20 MIN: CPT | Mod: PBBFAC | Performed by: SURGERY

## 2022-10-19 PROCEDURE — 99213 OFFICE O/P EST LOW 20 MIN: CPT | Mod: S$PBB,,, | Performed by: SURGERY

## 2022-10-19 PROCEDURE — 76536 US THYROID: ICD-10-PCS | Mod: 26,,, | Performed by: RADIOLOGY

## 2022-10-19 PROCEDURE — 76536 US EXAM OF HEAD AND NECK: CPT | Mod: TC

## 2022-10-19 PROCEDURE — 76536 US EXAM OF HEAD AND NECK: CPT | Mod: 26,,, | Performed by: RADIOLOGY

## 2022-10-19 PROCEDURE — 99213 PR OFFICE/OUTPT VISIT, EST, LEVL III, 20-29 MIN: ICD-10-PCS | Mod: S$PBB,,, | Performed by: SURGERY

## 2022-10-19 NOTE — PATIENT INSTRUCTIONS
Your CT scan will be on 10/28 1:30    [facility] on 1st floor     Your follow up with Dr. Spencer will be on 11/2 at 9:00

## 2022-10-19 NOTE — PROGRESS NOTES
"Subjective:       Patient ID: Bhaskar Patel is a 67 y.o. male.    Chief Complaint: Follow-up (US this am)   patient underwent left carotid endarterectomy.  Said some lymphadenopathy.  This is a follow-up ultrasound today.  Right cervical lymph nodes decreased in size by 3 mm left cervical lymph nodes decreased in size by 2-3 mm    Still smoking       Follow-up ultrasound 10/19/2022.  Right neck 2.8 x 0.98 x 1.4 cm and large node left neck 1.6 x 0.86 x 1.1 cm slightly enlarged from previous study    On exam unimpressive plan is CT soft tissues neck  Denies fever weight loss on lymphadenopathy  family history includes Alzheimer's disease in his mother; Cancer in his sister; Heart disease in his father and sister; Hypertension in his father and sister; Polycystic kidney disease in his mother.  Past Medical History:   Diagnosis Date    GERD (gastroesophageal reflux disease)     Hypertension       Past Surgical History:   Procedure Laterality Date    CAROTID ENDARTERECTOMY Left 5/16/2022    Procedure: ENDARTERECTOMY-CAROTID;  Surgeon: Karen Spencer MD;  Location: [facility] OR;  Service: General;  Laterality: Left;    CORONARY ARTERY BYPASS GRAFT      HERNIA REPAIR      LEFT HEART CATHETERIZATION Left 9/15/2021    Procedure: Left heart cath;  Surgeon: Quoc Vo DO;  Location: [facility] CATH LAB;  Service: Cardiology;  Laterality: Left;       reports that he has been smoking cigarettes. He started smoking about 51 years ago. He has a 25.00 pack-year smoking history. He has never used smokeless tobacco. He reports that he does not currently use alcohol. He reports that he does not use drugs.   HPI  Review of Systems      Objective:      Ht 6' 3" (1.905 m)   Wt 72.6 kg (160 lb)   BMI 20.00 kg/m²    Physical Exam  Neck:      Comments: No prominent lymphadenopathy on exam well-healed left neck surgical scar  Cardiovascular:      Rate and Rhythm: Normal rate.      Comments: Carotid upstrokes are good palpable radial " pulses right greater than left  Pulmonary:      Effort: Pulmonary effort is normal.      Breath sounds: No stridor.   Lymphadenopathy:      Cervical:      Right cervical: No superficial cervical adenopathy.     Upper Body:      Right upper body: No axillary adenopathy.      Left upper body: No axillary adenopathy.   Skin:     Capillary Refill: Capillary refill takes less than 2 seconds.   Neurological:      General: No focal deficit present.      Mental Status: He is alert and oriented to person, place, and time.         Assessment:       1. Lymphadenopathy of head and neck    2. Left carotid stenosis    3. Subclavian steal syndrome          Plan:        Educated stop smoking, CT soft tissue neck,

## 2022-10-24 ENCOUNTER — OFFICE VISIT (OUTPATIENT)
Dept: FAMILY MEDICINE | Facility: CLINIC | Age: 67
End: 2022-10-24
Payer: MEDICARE

## 2022-10-24 VITALS
WEIGHT: 155.19 LBS | OXYGEN SATURATION: 98 % | RESPIRATION RATE: 18 BRPM | HEIGHT: 75 IN | BODY MASS INDEX: 19.29 KG/M2 | HEART RATE: 65 BPM | DIASTOLIC BLOOD PRESSURE: 80 MMHG | TEMPERATURE: 98 F | SYSTOLIC BLOOD PRESSURE: 140 MMHG

## 2022-10-24 DIAGNOSIS — Z23 FLU VACCINE NEED: ICD-10-CM

## 2022-10-24 DIAGNOSIS — F17.200 SMOKER: ICD-10-CM

## 2022-10-24 DIAGNOSIS — K21.9 GASTROESOPHAGEAL REFLUX DISEASE, UNSPECIFIED WHETHER ESOPHAGITIS PRESENT: ICD-10-CM

## 2022-10-24 DIAGNOSIS — I10 ESSENTIAL HYPERTENSION: Primary | ICD-10-CM

## 2022-10-24 DIAGNOSIS — E78.5 HYPERLIPIDEMIA, UNSPECIFIED HYPERLIPIDEMIA TYPE: ICD-10-CM

## 2022-10-24 LAB
ALBUMIN SERPL BCP-MCNC: 3.4 G/DL (ref 3.5–5)
ALBUMIN/GLOB SERPL: 0.9 {RATIO}
ALP SERPL-CCNC: 118 U/L (ref 45–115)
ALT SERPL W P-5'-P-CCNC: 24 U/L (ref 16–61)
ANION GAP SERPL CALCULATED.3IONS-SCNC: 8 MMOL/L (ref 7–16)
AST SERPL W P-5'-P-CCNC: 18 U/L (ref 15–37)
BASOPHILS # BLD AUTO: 0.07 K/UL (ref 0–0.2)
BASOPHILS NFR BLD AUTO: 0.7 % (ref 0–1)
BILIRUB SERPL-MCNC: 0.7 MG/DL (ref ?–1.2)
BUN SERPL-MCNC: 9 MG/DL (ref 7–18)
BUN/CREAT SERPL: 9 (ref 6–20)
CALCIUM SERPL-MCNC: 9.2 MG/DL (ref 8.5–10.1)
CHLORIDE SERPL-SCNC: 106 MMOL/L (ref 98–107)
CHOLEST SERPL-MCNC: 128 MG/DL (ref 0–200)
CHOLEST/HDLC SERPL: 2.1 {RATIO}
CO2 SERPL-SCNC: 29 MMOL/L (ref 21–32)
CREAT SERPL-MCNC: 1.01 MG/DL (ref 0.7–1.3)
DIFFERENTIAL METHOD BLD: ABNORMAL
EGFR (NO RACE VARIABLE) (RUSH/TITUS): 81 ML/MIN/1.73M²
EOSINOPHIL # BLD AUTO: 0.23 K/UL (ref 0–0.5)
EOSINOPHIL NFR BLD AUTO: 2.5 % (ref 1–4)
ERYTHROCYTE [DISTWIDTH] IN BLOOD BY AUTOMATED COUNT: 14 % (ref 11.5–14.5)
GLOBULIN SER-MCNC: 4 G/DL (ref 2–4)
GLUCOSE SERPL-MCNC: 82 MG/DL (ref 74–106)
HCT VFR BLD AUTO: 47 % (ref 40–54)
HDLC SERPL-MCNC: 62 MG/DL (ref 40–60)
HGB BLD-MCNC: 15.6 G/DL (ref 13.5–18)
IMM GRANULOCYTES # BLD AUTO: 0.02 K/UL (ref 0–0.04)
IMM GRANULOCYTES NFR BLD: 0.2 % (ref 0–0.4)
LDLC SERPL CALC-MCNC: 53 MG/DL
LYMPHOCYTES # BLD AUTO: 2.14 K/UL (ref 1–4.8)
LYMPHOCYTES NFR BLD AUTO: 22.9 % (ref 27–41)
MCH RBC QN AUTO: 32.5 PG (ref 27–31)
MCHC RBC AUTO-ENTMCNC: 33.2 G/DL (ref 32–36)
MCV RBC AUTO: 97.9 FL (ref 80–96)
MONOCYTES # BLD AUTO: 0.6 K/UL (ref 0–0.8)
MONOCYTES NFR BLD AUTO: 6.4 % (ref 2–6)
MPC BLD CALC-MCNC: 12 FL (ref 9.4–12.4)
NEUTROPHILS # BLD AUTO: 6.3 K/UL (ref 1.8–7.7)
NEUTROPHILS NFR BLD AUTO: 67.3 % (ref 53–65)
NONHDLC SERPL-MCNC: 66 MG/DL
NRBC # BLD AUTO: 0 X10E3/UL
NRBC, AUTO (.00): 0 %
PLATELET # BLD AUTO: 144 K/UL (ref 150–400)
POTASSIUM SERPL-SCNC: 4.8 MMOL/L (ref 3.5–5.1)
PROT SERPL-MCNC: 7.4 G/DL (ref 6.4–8.2)
RBC # BLD AUTO: 4.8 M/UL (ref 4.6–6.2)
SODIUM SERPL-SCNC: 138 MMOL/L (ref 136–145)
TRIGL SERPL-MCNC: 67 MG/DL (ref 35–150)
VLDLC SERPL-MCNC: 13 MG/DL
WBC # BLD AUTO: 9.36 K/UL (ref 4.5–11)

## 2022-10-24 PROCEDURE — 80061 LIPID PANEL: CPT | Mod: ,,, | Performed by: CLINICAL MEDICAL LABORATORY

## 2022-10-24 PROCEDURE — 90694 FLU VACCINE - QUADRIVALENT - ADJUVANTED: ICD-10-PCS | Mod: ,,, | Performed by: FAMILY MEDICINE

## 2022-10-24 PROCEDURE — 85025 COMPLETE CBC W/AUTO DIFF WBC: CPT | Mod: ,,, | Performed by: CLINICAL MEDICAL LABORATORY

## 2022-10-24 PROCEDURE — G0008 ADMIN INFLUENZA VIRUS VAC: HCPCS | Mod: ,,, | Performed by: FAMILY MEDICINE

## 2022-10-24 PROCEDURE — 80061 LIPID PANEL: ICD-10-PCS | Mod: ,,, | Performed by: CLINICAL MEDICAL LABORATORY

## 2022-10-24 PROCEDURE — 99214 OFFICE O/P EST MOD 30 MIN: CPT | Mod: ,,, | Performed by: FAMILY MEDICINE

## 2022-10-24 PROCEDURE — 85025 CBC WITH DIFFERENTIAL: ICD-10-PCS | Mod: ,,, | Performed by: CLINICAL MEDICAL LABORATORY

## 2022-10-24 PROCEDURE — 99214 PR OFFICE/OUTPT VISIT, EST, LEVL IV, 30-39 MIN: ICD-10-PCS | Mod: ,,, | Performed by: FAMILY MEDICINE

## 2022-10-24 PROCEDURE — G0008 FLU VACCINE - QUADRIVALENT - ADJUVANTED: ICD-10-PCS | Mod: ,,, | Performed by: FAMILY MEDICINE

## 2022-10-24 PROCEDURE — 99406 PR TOBACCO USE CESSATION INTERMEDIATE 3-10 MINUTES: ICD-10-PCS | Mod: ,,, | Performed by: FAMILY MEDICINE

## 2022-10-24 PROCEDURE — 80053 COMPREHEN METABOLIC PANEL: CPT | Mod: ,,, | Performed by: CLINICAL MEDICAL LABORATORY

## 2022-10-24 PROCEDURE — 99406 BEHAV CHNG SMOKING 3-10 MIN: CPT | Mod: ,,, | Performed by: FAMILY MEDICINE

## 2022-10-24 PROCEDURE — 80053 COMPREHENSIVE METABOLIC PANEL: ICD-10-PCS | Mod: ,,, | Performed by: CLINICAL MEDICAL LABORATORY

## 2022-10-24 PROCEDURE — 90694 VACC AIIV4 NO PRSRV 0.5ML IM: CPT | Mod: ,,, | Performed by: FAMILY MEDICINE

## 2022-10-24 RX ORDER — METOPROLOL SUCCINATE 25 MG/1
25 TABLET, EXTENDED RELEASE ORAL DAILY
Qty: 90 TABLET | Refills: 1 | Status: SHIPPED | OUTPATIENT
Start: 2022-10-24 | End: 2023-04-03 | Stop reason: SDUPTHER

## 2022-10-24 RX ORDER — VARENICLINE TARTRATE 0.5 (11)-1
KIT ORAL
Qty: 1 EACH | Refills: 0 | Status: SHIPPED | OUTPATIENT
Start: 2022-10-24 | End: 2023-04-12

## 2022-10-24 RX ORDER — LOSARTAN POTASSIUM 25 MG/1
25 TABLET ORAL DAILY
Qty: 90 TABLET | Refills: 1 | Status: SHIPPED | OUTPATIENT
Start: 2022-10-24 | End: 2023-04-03 | Stop reason: SDUPTHER

## 2022-10-24 RX ORDER — ATORVASTATIN CALCIUM 80 MG/1
80 TABLET, FILM COATED ORAL NIGHTLY
Qty: 90 TABLET | Refills: 1 | Status: SHIPPED | OUTPATIENT
Start: 2022-10-24 | End: 2023-04-03 | Stop reason: SDUPTHER

## 2022-10-24 RX ORDER — OMEPRAZOLE 20 MG/1
20 CAPSULE, DELAYED RELEASE ORAL DAILY
Qty: 90 CAPSULE | Refills: 1 | Status: SHIPPED | OUTPATIENT
Start: 2022-10-24 | End: 2023-04-03 | Stop reason: SDUPTHER

## 2022-10-24 NOTE — PROGRESS NOTES
Jami Tapia MD        PATIENT NAME: Bhaskar Patel  : 1955  DATE: 10/24/22  MRN: [id number]      Billing Provider: Jami Tapia MD  Level of Service:   Patient PCP Information       Provider PCP Type    Jami Tapia MD General            Reason for Visit / Chief Complaint: Establish Care (And refills) and Hyperlipidemia       History of Present Illness      Bhaskar Patel presents to the clinic with Establish Care (And refills) and Hyperlipidemia     He is a smoker, CT neck pending for lymphadenopathy    Hyperlipidemia  This is a chronic problem. The current episode started more than 1 year ago. The problem is uncontrolled. He has no history of chronic renal disease, diabetes, hypothyroidism, liver disease, obesity or nephrotic syndrome. Pertinent negatives include no chest pain, focal sensory loss, focal weakness, leg pain, myalgias or shortness of breath.   Hypertension  This is a chronic problem. The current episode started more than 1 year ago. The problem has been gradually worsening since onset. Associated symptoms include neck pain. Pertinent negatives include no anxiety, blurred vision, chest pain, headaches, malaise/fatigue, orthopnea, palpitations, peripheral edema, PND or shortness of breath. There is no history of chronic renal disease.     Review of Systems     Review of Systems   Constitutional:  Negative for activity change, appetite change, fatigue, fever and malaise/fatigue.   Eyes:  Negative for blurred vision.   Respiratory:  Negative for shortness of breath.    Cardiovascular:  Negative for chest pain, palpitations, orthopnea and PND.   Musculoskeletal:  Positive for neck pain. Negative for myalgias.   Allergic/Immunologic: Positive for environmental allergies.   Neurological:  Negative for focal weakness and headaches.   Psychiatric/Behavioral:  Negative for agitation, behavioral problems and suicidal ideas.      Medical / Social / Family History     Past Medical History:    Diagnosis Date    GERD (gastroesophageal reflux disease)     Hypertension        Past Surgical History:   Procedure Laterality Date    CAROTID ENDARTERECTOMY Left 5/16/2022    Procedure: ENDARTERECTOMY-CAROTID;  Surgeon: Karen Spencer MD;  Location: [facility] OR;  Service: General;  Laterality: Left;    CORONARY ARTERY BYPASS GRAFT      HERNIA REPAIR      LEFT HEART CATHETERIZATION Left 9/15/2021    Procedure: Left heart cath;  Surgeon: Quoc Vo DO;  Location: [facility] CATH LAB;  Service: Cardiology;  Laterality: Left;       Social History    reports that he has been smoking cigarettes. He started smoking about 51 years ago. He has a 25.00 pack-year smoking history. He has never used smokeless tobacco. He reports that he does not currently use alcohol. He reports that he does not use drugs.    Family History  's family history includes Alzheimer's disease in his mother; Cancer in his sister; Heart disease in his father and sister; Hypertension in his father and sister; Polycystic kidney disease in his mother.    Medications and Allergies     Medications  Outpatient Medications Marked as Taking for the 10/24/22 encounter (Office Visit) with Jami Tapai MD   Medication Sig Dispense Refill    aspirin (ECOTRIN) 81 MG EC tablet Take 81 mg by mouth once daily.      docusate sodium (COLACE) 100 MG capsule Take 100 mg by mouth daily as needed.       [DISCONTINUED] atorvastatin (LIPITOR) 80 MG tablet TAKE ONE TABLET BY MOUTH AT BEDTIME 90 tablet 1    [DISCONTINUED] metoprolol succinate (TOPROL-XL) 25 MG 24 hr tablet TAKE ONE TABLET BY MOUTH EVERY DAY 90 tablet 1    [DISCONTINUED] omeprazole (PRILOSEC) 20 MG capsule Take 1 capsule (20 mg total) by mouth once daily. 30 capsule 2       Allergies  Review of patient's allergies indicates:  No Known Allergies    Physical Examination   BP (!) 140/80 (BP Location: Left arm, Patient Position: Sitting)   Pulse 65   Temp 98 °F (36.7 °C) (Oral)   Resp 18   Ht 6'  "3" (1.905 m)   Wt 70.4 kg (155 lb 3.2 oz)   SpO2 98%   BMI 19.40 kg/m²     Physical Exam  Constitutional:       Appearance: Normal appearance. He is normal weight.   Cardiovascular:      Rate and Rhythm: Normal rate and regular rhythm.   Pulmonary:      Effort: Pulmonary effort is normal.      Breath sounds: Normal breath sounds.   Lymphadenopathy:      Cervical: Cervical adenopathy present.   Neurological:      Mental Status: He is alert.   Psychiatric:         Mood and Affect: Mood normal.         Behavior: Behavior normal.         Assessment and Plan (including Health Maintenance)     Plan:         Problem List Items Addressed This Visit          Cardiac/Vascular    Essential hypertension - Primary    Relevant Medications    metoprolol succinate (TOPROL-XL) 25 MG 24 hr tablet    losartan (COZAAR) 25 MG tablet    Other Relevant Orders    Lipid Panel    Comprehensive Metabolic Panel    CBC Auto Differential    Hyperlipidemia    Relevant Medications    atorvastatin (LIPITOR) 80 MG tablet    Other Relevant Orders    Lipid Panel       GI    Gastroesophageal reflux disease    Relevant Medications    omeprazole (PRILOSEC) 20 MG capsule     Other Visit Diagnoses       Flu vaccine need        Relevant Orders    Influenza (FLUAD) - Quadrivalent (Adjuvanted) *Preferred* (65+) (PF) (Completed)    Smoker        Relevant Medications    varenicline (CHANTIX STARTING MONTH BOX) 0.5 mg (11)- 1 mg (42) tablet          - for about 5 minutes we talked about smoking cessation, he has tried patches, but never chantix so I send it  - he got his flu shot  - work on diet, specifically cutting back bread, breaded things, cereal, pasta, potatoes, rice  - try to exercise at least 150min a week divided however you want  - if you can do weight, even very light weight do them  - try not to use to much salt, if any, remember that things that are preserved or frozen often contain large amounts of salt as a perseve      Follow up in about 6 " months (around 4/24/2023).        Signature:  Jami Tapia MD      Date of encounter: 10/24/22

## 2022-10-27 ENCOUNTER — OFFICE VISIT (OUTPATIENT)
Dept: CARDIOLOGY | Facility: CLINIC | Age: 67
End: 2022-10-27
Payer: MEDICARE

## 2022-10-27 VITALS
HEIGHT: 75 IN | BODY MASS INDEX: 19.41 KG/M2 | OXYGEN SATURATION: 98 % | WEIGHT: 156.13 LBS | HEART RATE: 71 BPM | DIASTOLIC BLOOD PRESSURE: 86 MMHG | SYSTOLIC BLOOD PRESSURE: 131 MMHG

## 2022-10-27 DIAGNOSIS — Z72.0 TOBACCO ABUSE DISORDER: ICD-10-CM

## 2022-10-27 DIAGNOSIS — I65.22 STENOSIS OF LEFT CAROTID ARTERY: ICD-10-CM

## 2022-10-27 DIAGNOSIS — I10 ESSENTIAL HYPERTENSION: ICD-10-CM

## 2022-10-27 DIAGNOSIS — I25.10 CORONARY ARTERY DISEASE INVOLVING NATIVE CORONARY ARTERY OF NATIVE HEART WITHOUT ANGINA PECTORIS: Primary | ICD-10-CM

## 2022-10-27 PROCEDURE — 99214 OFFICE O/P EST MOD 30 MIN: CPT | Mod: ,,, | Performed by: INTERNAL MEDICINE

## 2022-10-27 PROCEDURE — 99214 PR OFFICE/OUTPT VISIT, EST, LEVL IV, 30-39 MIN: ICD-10-PCS | Mod: ,,, | Performed by: INTERNAL MEDICINE

## 2022-10-28 ENCOUNTER — HOSPITAL ENCOUNTER (OUTPATIENT)
Dept: RADIOLOGY | Facility: HOSPITAL | Age: 67
Discharge: HOME | End: 2022-10-28
Attending: SURGERY
Payer: MEDICARE

## 2022-10-28 DIAGNOSIS — R59.0 LOCALIZED ENLARGED LYMPH NODES: ICD-10-CM

## 2022-10-28 PROCEDURE — 70491 CT SOFT TISSUE NECK WITH CONTRAST: ICD-10-PCS | Mod: 26,,, | Performed by: RADIOLOGY

## 2022-10-28 PROCEDURE — 70491 CT SOFT TISSUE NECK W/DYE: CPT | Mod: TC

## 2022-10-28 PROCEDURE — 70491 CT SOFT TISSUE NECK W/DYE: CPT | Mod: 26,,, | Performed by: RADIOLOGY

## 2022-10-28 PROCEDURE — 25500020 PHARM REV CODE 255: Performed by: SURGERY

## 2022-10-28 RX ADMIN — IOPAMIDOL 100 ML: 755 INJECTION, SOLUTION INTRAVENOUS at 01:10

## 2022-10-28 NOTE — PROGRESS NOTES
Cardiology Clinic Note:    PCP: Jami Tapia MD    REFERRING PHYSICIAN: Jc Walton DO    CHIEF COMPLAINT: CAD    HISTORY OF PRESENT ILLNESS:  Bhaskar Patel is a 67 y.o. male who presents for evaluation of CAD.     Pt reports chest pain has resolved, is been able to perform all normal activities of daily living without provocation of chest pain, pressure or shortness of breath.  Pt underwent planned carotid revascularization without incident, however has developed a persistant left sided cervical lymph node enlargement, is scheduled for CT of the area. He continues to smoke 3/4 to 1/2 pack daily       Review of Systems   Constitutional: Negative.    HENT: Negative.          Left neck pain with enlarged lymph node, undergoing CT scan for further evaluation   Eyes: Negative.    Respiratory: Negative.     Cardiovascular: Negative.    Gastrointestinal: Negative.    Genitourinary: Negative.    Musculoskeletal: Negative.    Skin: Negative.    Neurological: Negative.    Endo/Heme/Allergies: Negative.    Psychiatric/Behavioral: Negative.       PAST MEDICAL HISTORY:  Past Medical History:   Diagnosis Date    GERD (gastroesophageal reflux disease)     Hypertension        PAST SURGICAL HISTORY:  Past Surgical History:   Procedure Laterality Date    CAROTID ENDARTERECTOMY Left 5/16/2022    Procedure: ENDARTERECTOMY-CAROTID;  Surgeon: Karen Spencer MD;  Location: [facility] OR;  Service: General;  Laterality: Left;    CORONARY ARTERY BYPASS GRAFT      HERNIA REPAIR      LEFT HEART CATHETERIZATION Left 9/15/2021    Procedure: Left heart cath;  Surgeon: Quoc Vo DO;  Location: [facility] CATH LAB;  Service: Cardiology;  Laterality: Left;       SOCIAL HISTORY:  Social History     Socioeconomic History    Marital status:    Occupational History    Occupation: retired   Tobacco Use    Smoking status: Every Day     Packs/day: 0.50     Years: 50.00     Pack years: 25.00     Types: Cigarettes     Start date:  "1971    Smokeless tobacco: Never    Tobacco comments:     already did [place] tobacco quitline. not interested in trying again. states he will quit on his own   Substance and Sexual Activity    Alcohol use: Not Currently    Drug use: Never    Sexual activity: Not Currently       FAMILY HISTORY:  Family History   Problem Relation Age of Onset    Alzheimer's disease Mother     Polycystic kidney disease Mother     Hypertension Father     Heart disease Father     Cancer Sister     Heart disease Sister     Hypertension Sister        ALLERGIES:  Allergies as of 10/27/2022    (No Known Allergies)         MEDICATIONS:  Current Outpatient Medications on File Prior to Visit   Medication Sig Dispense Refill    aspirin (ECOTRIN) 81 MG EC tablet Take 81 mg by mouth once daily.      atorvastatin (LIPITOR) 80 MG tablet Take 1 tablet (80 mg total) by mouth every evening. 90 tablet 1    losartan (COZAAR) 25 MG tablet Take 1 tablet (25 mg total) by mouth once daily. 90 tablet 1    metoprolol succinate (TOPROL-XL) 25 MG 24 hr tablet Take 1 tablet (25 mg total) by mouth once daily. 90 tablet 1    omeprazole (PRILOSEC) 20 MG capsule Take 1 capsule (20 mg total) by mouth once daily. 90 capsule 1    docusate sodium (COLACE) 100 MG capsule Take 100 mg by mouth daily as needed.       varenicline (CHANTIX STARTING MONTH BOX) 0.5 mg (11)- 1 mg (42) tablet Take one 0.5mg tab by mouth once daily X3 days,then increase to one 0.5mg tab twice daily X4 days,then increase to one 1mg tab twice daily 1 each 0     No current facility-administered medications on file prior to visit.          PHYSICAL EXAM:  Blood pressure 131/86, pulse 71, height 6' 3" (1.905 m), weight 70.8 kg (156 lb 2 oz), SpO2 98 %.  Wt Readings from Last 3 Encounters:   10/27/22 70.8 kg (156 lb 2 oz)   10/24/22 70.4 kg (155 lb 3.2 oz)   10/19/22 72.6 kg (160 lb)      Body mass index is 19.51 kg/m².    Physical Exam  Vitals and nursing note reviewed.   Constitutional:       Appearance: " Normal appearance. He is normal weight.   HENT:      Head: Normocephalic and atraumatic.      Right Ear: External ear normal.      Left Ear: External ear normal.   Eyes:      General: No scleral icterus.        Right eye: No discharge.         Left eye: No discharge.      Extraocular Movements: Extraocular movements intact.      Conjunctiva/sclera: Conjunctivae normal.      Pupils: Pupils are equal, round, and reactive to light.   Cardiovascular:      Rate and Rhythm: Normal rate and regular rhythm.      Pulses: Normal pulses.      Heart sounds: Normal heart sounds. No murmur heard.    No friction rub. No gallop.   Pulmonary:      Effort: Pulmonary effort is normal.      Breath sounds: No wheezing or rales.      Comments: Breath sounds decreased bilaterlly, scattered rhonchi, clear with cough  Chest:      Chest wall: No tenderness.   Abdominal:      General: Abdomen is flat. Bowel sounds are normal. There is no distension.      Palpations: Abdomen is soft.      Tenderness: There is no abdominal tenderness. There is no guarding or rebound.   Musculoskeletal:         General: No swelling or tenderness. Normal range of motion.      Cervical back: Normal range of motion and neck supple.   Skin:     General: Skin is warm and dry.      Findings: No erythema or rash.   Neurological:      General: No focal deficit present.      Mental Status: He is alert and oriented to person, place, and time.      Cranial Nerves: No cranial nerve deficit.      Motor: No weakness.      Gait: Gait normal.   Psychiatric:         Mood and Affect: Mood normal.         Behavior: Behavior normal.         Thought Content: Thought content normal.         Judgment: Judgment normal.        LABS REVIEWED:  Lab Results   Component Value Date    WBC 9.36 10/24/2022    RBC 4.80 10/24/2022    HGB 15.6 10/24/2022    HCT 47.0 10/24/2022    MCV 97.9 (H) 10/24/2022    MCH 32.5 (H) 10/24/2022    MCHC 33.2 10/24/2022    RDW 14.0 10/24/2022     (L)  10/24/2022    MPV 12.0 10/24/2022    NRBC 0.0 10/24/2022     Lab Results   Component Value Date     10/24/2022    K 4.8 10/24/2022     10/24/2022    CO2 29 10/24/2022    BUN 9 10/24/2022     Lab Results   Component Value Date     05/13/2021    AST 18 10/24/2022    ALT 24 10/24/2022     Lab Results   Component Value Date    GLU 82 10/24/2022     Lab Results   Component Value Date    CHOL 128 10/24/2022    HDL 62 (H) 10/24/2022    TRIG 67 10/24/2022    CHOLHDL 2.1 10/24/2022       CARDIAC STUDIES REVIEWED:    OTHER IMAGING STUDIES REVIEWED:    ASSESSMENT: PLAN:  1. CAD: stable class 1 angina, post CABG, severe triple vessel disease with SVG to RCA and LAD, grafts patent at [facility] 9/2021, well preserved EF at 55%      2  PVD: severe carotid stenosis, underwent   successful revascularization, pt is at low cardiovascular risk for planned carotid surgery.         3. Hypertension: adequate control on current meds.                4.  Tobacco abuse - discussed smoking cessation, did not start Chantex, recommend - decrease to 5 cigarettes daily

## 2022-11-02 ENCOUNTER — OFFICE VISIT (OUTPATIENT)
Dept: SURGERY | Facility: CLINIC | Age: 67
End: 2022-11-02
Payer: MEDICARE

## 2022-11-02 VITALS — WEIGHT: 156 LBS | BODY MASS INDEX: 19.4 KG/M2 | HEIGHT: 75 IN

## 2022-11-02 DIAGNOSIS — R59.1 LYMPHADENOPATHY OF HEAD AND NECK: Primary | ICD-10-CM

## 2022-11-02 DIAGNOSIS — I65.23 BILATERAL CAROTID ARTERY STENOSIS: ICD-10-CM

## 2022-11-02 PROCEDURE — 99212 PR OFFICE/OUTPT VISIT, EST, LEVL II, 10-19 MIN: ICD-10-PCS | Mod: S$PBB,,, | Performed by: SURGERY

## 2022-11-02 PROCEDURE — 99213 OFFICE O/P EST LOW 20 MIN: CPT | Mod: PBBFAC | Performed by: SURGERY

## 2022-11-02 PROCEDURE — 99212 OFFICE O/P EST SF 10 MIN: CPT | Mod: S$PBB,,, | Performed by: SURGERY

## 2022-11-02 NOTE — PROGRESS NOTES
Follow-up lymphadenopathy     CT scan no suspicious lymphadenopathy identified     Cerebrovascular disease status post carotid endarterectomy     Doing well follow-up 6 months repeat exam will that time schedule repeat carotid duplex

## 2022-11-09 DIAGNOSIS — Z71.89 COMPLEX CARE COORDINATION: ICD-10-CM

## 2023-04-03 DIAGNOSIS — E78.5 HYPERLIPIDEMIA, UNSPECIFIED HYPERLIPIDEMIA TYPE: ICD-10-CM

## 2023-04-03 DIAGNOSIS — K21.9 GASTROESOPHAGEAL REFLUX DISEASE, UNSPECIFIED WHETHER ESOPHAGITIS PRESENT: ICD-10-CM

## 2023-04-03 DIAGNOSIS — I10 ESSENTIAL HYPERTENSION: ICD-10-CM

## 2023-04-03 RX ORDER — LOSARTAN POTASSIUM 25 MG/1
25 TABLET ORAL DAILY
Qty: 90 TABLET | Refills: 0 | Status: SHIPPED | OUTPATIENT
Start: 2023-04-03 | End: 2023-04-12 | Stop reason: SDUPTHER

## 2023-04-03 RX ORDER — ATORVASTATIN CALCIUM 80 MG/1
80 TABLET, FILM COATED ORAL NIGHTLY
Qty: 90 TABLET | Refills: 0 | Status: SHIPPED | OUTPATIENT
Start: 2023-04-03 | End: 2023-04-12 | Stop reason: SDUPTHER

## 2023-04-03 RX ORDER — METOPROLOL SUCCINATE 25 MG/1
25 TABLET, EXTENDED RELEASE ORAL DAILY
Qty: 90 TABLET | Refills: 0 | Status: SHIPPED | OUTPATIENT
Start: 2023-04-03 | End: 2023-04-12 | Stop reason: SDUPTHER

## 2023-04-03 RX ORDER — OMEPRAZOLE 20 MG/1
20 CAPSULE, DELAYED RELEASE ORAL DAILY
Qty: 90 CAPSULE | Refills: 0 | Status: SHIPPED | OUTPATIENT
Start: 2023-04-03 | End: 2023-04-12 | Stop reason: SDUPTHER

## 2023-04-12 ENCOUNTER — OFFICE VISIT (OUTPATIENT)
Dept: FAMILY MEDICINE | Facility: CLINIC | Age: 68
End: 2023-04-12
Payer: MEDICARE

## 2023-04-12 VITALS
HEART RATE: 68 BPM | DIASTOLIC BLOOD PRESSURE: 80 MMHG | HEIGHT: 75 IN | TEMPERATURE: 98 F | RESPIRATION RATE: 18 BRPM | BODY MASS INDEX: 19.52 KG/M2 | SYSTOLIC BLOOD PRESSURE: 150 MMHG | WEIGHT: 157 LBS | OXYGEN SATURATION: 99 %

## 2023-04-12 DIAGNOSIS — E78.5 HYPERLIPIDEMIA, UNSPECIFIED HYPERLIPIDEMIA TYPE: ICD-10-CM

## 2023-04-12 DIAGNOSIS — K21.9 GASTROESOPHAGEAL REFLUX DISEASE, UNSPECIFIED WHETHER ESOPHAGITIS PRESENT: ICD-10-CM

## 2023-04-12 DIAGNOSIS — B02.8 HERPES ZOSTER WITH COMPLICATION: Primary | ICD-10-CM

## 2023-04-12 DIAGNOSIS — I10 ESSENTIAL HYPERTENSION: ICD-10-CM

## 2023-04-12 DIAGNOSIS — Z12.5 ENCOUNTER FOR SCREENING FOR MALIGNANT NEOPLASM OF PROSTATE: ICD-10-CM

## 2023-04-12 LAB
ALBUMIN SERPL BCP-MCNC: 3.4 G/DL (ref 3.5–5)
ALBUMIN/GLOB SERPL: 0.9 {RATIO}
ALP SERPL-CCNC: 105 U/L (ref 45–115)
ALT SERPL W P-5'-P-CCNC: 21 U/L (ref 16–61)
ANION GAP SERPL CALCULATED.3IONS-SCNC: 10 MMOL/L (ref 7–16)
AST SERPL W P-5'-P-CCNC: 21 U/L (ref 15–37)
BASOPHILS # BLD AUTO: 0.05 K/UL (ref 0–0.2)
BASOPHILS NFR BLD AUTO: 0.5 % (ref 0–1)
BILIRUB SERPL-MCNC: 0.6 MG/DL (ref ?–1.2)
BUN SERPL-MCNC: 18 MG/DL (ref 7–18)
BUN/CREAT SERPL: 16 (ref 6–20)
CALCIUM SERPL-MCNC: 8.6 MG/DL (ref 8.5–10.1)
CHLORIDE SERPL-SCNC: 108 MMOL/L (ref 98–107)
CHOLEST SERPL-MCNC: 119 MG/DL (ref 0–200)
CHOLEST/HDLC SERPL: 2.1 {RATIO}
CK SERPL-CCNC: 122 U/L (ref 39–308)
CO2 SERPL-SCNC: 27 MMOL/L (ref 21–32)
CREAT SERPL-MCNC: 1.12 MG/DL (ref 0.7–1.3)
DIFFERENTIAL METHOD BLD: ABNORMAL
EGFR (NO RACE VARIABLE) (RUSH/TITUS): 71 ML/MIN/1.73M²
EOSINOPHIL # BLD AUTO: 0.21 K/UL (ref 0–0.5)
EOSINOPHIL NFR BLD AUTO: 2.2 % (ref 1–4)
ERYTHROCYTE [DISTWIDTH] IN BLOOD BY AUTOMATED COUNT: 14.3 % (ref 11.5–14.5)
GLOBULIN SER-MCNC: 3.9 G/DL (ref 2–4)
GLUCOSE SERPL-MCNC: 92 MG/DL (ref 74–106)
HCT VFR BLD AUTO: 44.9 % (ref 40–54)
HDLC SERPL-MCNC: 58 MG/DL (ref 40–60)
HGB BLD-MCNC: 14.8 G/DL (ref 13.5–18)
IMM GRANULOCYTES # BLD AUTO: 0.02 K/UL (ref 0–0.04)
IMM GRANULOCYTES NFR BLD: 0.2 % (ref 0–0.4)
LDLC SERPL CALC-MCNC: 51 MG/DL
LYMPHOCYTES # BLD AUTO: 2.06 K/UL (ref 1–4.8)
LYMPHOCYTES NFR BLD AUTO: 21.6 % (ref 27–41)
MCH RBC QN AUTO: 33 PG (ref 27–31)
MCHC RBC AUTO-ENTMCNC: 33 G/DL (ref 32–36)
MCV RBC AUTO: 100.2 FL (ref 80–96)
MONOCYTES # BLD AUTO: 0.48 K/UL (ref 0–0.8)
MONOCYTES NFR BLD AUTO: 5 % (ref 2–6)
MPC BLD CALC-MCNC: 12.7 FL (ref 9.4–12.4)
NEUTROPHILS # BLD AUTO: 6.7 K/UL (ref 1.8–7.7)
NEUTROPHILS NFR BLD AUTO: 70.5 % (ref 53–65)
NONHDLC SERPL-MCNC: 61 MG/DL
NRBC # BLD AUTO: 0 X10E3/UL
NRBC, AUTO (.00): 0 %
PLATELET # BLD AUTO: 119 K/UL (ref 150–400)
POTASSIUM SERPL-SCNC: 4.4 MMOL/L (ref 3.5–5.1)
PROT SERPL-MCNC: 7.3 G/DL (ref 6.4–8.2)
PSA SERPL-MCNC: 0.95 NG/ML
RBC # BLD AUTO: 4.48 M/UL (ref 4.6–6.2)
SODIUM SERPL-SCNC: 141 MMOL/L (ref 136–145)
TRIGL SERPL-MCNC: 52 MG/DL (ref 35–150)
VLDLC SERPL-MCNC: 10 MG/DL
WBC # BLD AUTO: 9.52 K/UL (ref 4.5–11)

## 2023-04-12 PROCEDURE — 85025 COMPLETE CBC W/AUTO DIFF WBC: CPT | Mod: ,,, | Performed by: CLINICAL MEDICAL LABORATORY

## 2023-04-12 PROCEDURE — G0103 PSA, SCREENING: ICD-10-PCS | Mod: ,,, | Performed by: CLINICAL MEDICAL LABORATORY

## 2023-04-12 PROCEDURE — 99214 OFFICE O/P EST MOD 30 MIN: CPT | Mod: ,,, | Performed by: NURSE PRACTITIONER

## 2023-04-12 PROCEDURE — 99214 PR OFFICE/OUTPT VISIT, EST, LEVL IV, 30-39 MIN: ICD-10-PCS | Mod: ,,, | Performed by: NURSE PRACTITIONER

## 2023-04-12 PROCEDURE — 80053 COMPREHEN METABOLIC PANEL: CPT | Mod: ,,, | Performed by: CLINICAL MEDICAL LABORATORY

## 2023-04-12 PROCEDURE — 82550 CK: ICD-10-PCS | Mod: ,,, | Performed by: CLINICAL MEDICAL LABORATORY

## 2023-04-12 PROCEDURE — 85025 CBC WITH DIFFERENTIAL: ICD-10-PCS | Mod: ,,, | Performed by: CLINICAL MEDICAL LABORATORY

## 2023-04-12 PROCEDURE — 80061 LIPID PANEL: ICD-10-PCS | Mod: ,,, | Performed by: CLINICAL MEDICAL LABORATORY

## 2023-04-12 PROCEDURE — 80061 LIPID PANEL: CPT | Mod: ,,, | Performed by: CLINICAL MEDICAL LABORATORY

## 2023-04-12 PROCEDURE — 80053 COMPREHENSIVE METABOLIC PANEL: ICD-10-PCS | Mod: ,,, | Performed by: CLINICAL MEDICAL LABORATORY

## 2023-04-12 PROCEDURE — G0103 PSA SCREENING: HCPCS | Mod: ,,, | Performed by: CLINICAL MEDICAL LABORATORY

## 2023-04-12 PROCEDURE — 82550 ASSAY OF CK (CPK): CPT | Mod: ,,, | Performed by: CLINICAL MEDICAL LABORATORY

## 2023-04-12 RX ORDER — OMEPRAZOLE 20 MG/1
20 CAPSULE, DELAYED RELEASE ORAL DAILY
Qty: 90 CAPSULE | Refills: 1 | Status: SHIPPED | OUTPATIENT
Start: 2023-04-12 | End: 2023-10-11 | Stop reason: SDUPTHER

## 2023-04-12 RX ORDER — VALACYCLOVIR HYDROCHLORIDE 500 MG/1
500 TABLET, FILM COATED ORAL 3 TIMES DAILY
Qty: 30 TABLET | Refills: 0 | Status: SHIPPED | OUTPATIENT
Start: 2023-04-12 | End: 2023-05-05 | Stop reason: ALTCHOICE

## 2023-04-12 RX ORDER — METOPROLOL SUCCINATE 25 MG/1
25 TABLET, EXTENDED RELEASE ORAL DAILY
Qty: 90 TABLET | Refills: 1 | Status: SHIPPED | OUTPATIENT
Start: 2023-04-12 | End: 2023-10-11 | Stop reason: SDUPTHER

## 2023-04-12 RX ORDER — LOSARTAN POTASSIUM 25 MG/1
25 TABLET ORAL DAILY
Qty: 90 TABLET | Refills: 1 | Status: SHIPPED | OUTPATIENT
Start: 2023-04-12 | End: 2023-10-11 | Stop reason: SDUPTHER

## 2023-04-12 RX ORDER — ATORVASTATIN CALCIUM 80 MG/1
80 TABLET, FILM COATED ORAL NIGHTLY
Qty: 90 TABLET | Refills: 1 | Status: SHIPPED | OUTPATIENT
Start: 2023-04-12 | End: 2023-10-11 | Stop reason: SDUPTHER

## 2023-04-12 NOTE — PROGRESS NOTES
BLANCA Kaiser   [facility] [place]  [address]  [place] [address]     PATIENT NAME: Bhaskar Patel  : 1955  DATE: 23  MRN: [id number]      Billing Provider: BLANCA Kaiser  Level of Service:   Patient PCP Information       Provider PCP Type    Jami Tapia MD General            Reason for Visit / Chief Complaint: Rash (Left upper arm, shoulder and hips ongoing x1 month, itching and tingling at times), Hyperlipidemia, and Hypertension       Update PCP  Update Chief Complaint         History of Present Illness / Problem Focused Workflow     Bhaskar Patel presents to the clinic here to establish care was a former Dr Tapia patient - he has had a rash to left upper arm shoulder for 1 month itch tingle burns, breaks into blisters and gets a scab.     No results found for: HGBA1C     CMP  Sodium   Date Value Ref Range Status   2023 141 136 - 145 mmol/L Final     Potassium   Date Value Ref Range Status   2023 4.4 3.5 - 5.1 mmol/L Final     Chloride   Date Value Ref Range Status   2023 108 (H) 98 - 107 mmol/L Final     CO2   Date Value Ref Range Status   2023 27 21 - 32 mmol/L Final     Glucose   Date Value Ref Range Status   2023 92 74 - 106 mg/dL Final     BUN   Date Value Ref Range Status   2023 18 7 - 18 mg/dL Final     Creatinine   Date Value Ref Range Status   2023 1.12 0.70 - 1.30 mg/dL Final     Calcium   Date Value Ref Range Status   2023 8.6 8.5 - 10.1 mg/dL Final     Total Protein   Date Value Ref Range Status   2023 7.3 6.4 - 8.2 g/dL Final     Albumin   Date Value Ref Range Status   2023 3.4 (L) 3.5 - 5.0 g/dL Final     Bilirubin, Total   Date Value Ref Range Status   2023 0.6 >0.0 - 1.2 mg/dL Final     Alk Phos   Date Value Ref Range Status   2023 105 45 - 115 U/L Final     AST   Date Value Ref Range Status   2023 21 15 - 37 U/L Final     ALT   Date Value Ref Range Status    04/12/2023 21 16 - 61 U/L Final     Anion Gap   Date Value Ref Range Status   04/12/2023 10 7 - 16 mmol/L Final     eGFR   Date Value Ref Range Status   04/12/2023 71 >=60 mL/min/1.73m² Final        Lab Results   Component Value Date    WBC 9.52 04/12/2023    RBC 4.48 (L) 04/12/2023    HGB 14.8 04/12/2023    HCT 44.9 04/12/2023    .2 (H) 04/12/2023    MCH 33.0 (H) 04/12/2023    MCHC 33.0 04/12/2023    RDW 14.3 04/12/2023     (L) 04/12/2023    MPV 12.7 (H) 04/12/2023    LYMPH 21.6 (L) 04/12/2023    LYMPH 2.06 04/12/2023    MONO 5.0 04/12/2023    EOS 0.21 04/12/2023    BASO 0.05 04/12/2023    EOSINOPHIL 2.2 04/12/2023    BASOPHIL 0.5 04/12/2023        Lab Results   Component Value Date    CHOL 119 04/12/2023    CHOL 128 10/24/2022    CHOL 139 11/23/2021     Lab Results   Component Value Date    HDL 58 04/12/2023    HDL 62 (H) 10/24/2022    HDL 55 11/23/2021     Lab Results   Component Value Date    LDLCALC 51 04/12/2023    LDLCALC 53 10/24/2022    LDLCALC 66 11/23/2021     Lab Results   Component Value Date    TRIG 52 04/12/2023    TRIG 67 10/24/2022    TRIG 88 11/23/2021     Lab Results   Component Value Date    CHOLHDL 2.1 04/12/2023    CHOLHDL 2.1 10/24/2022    CHOLHDL 2.5 11/23/2021        Wt Readings from Last 3 Encounters:   04/12/23 1604 71.2 kg (157 lb)   11/02/22 0831 70.8 kg (156 lb)   10/27/22 1320 70.8 kg (156 lb 2 oz)        BP Readings from Last 3 Encounters:   04/12/23 (!) 150/80   10/27/22 131/86   10/24/22 (!) 140/80        Review of Systems     Review of Systems   Constitutional:  Negative for fever.   Respiratory:  Positive for cough. Negative for chest tightness.    Cardiovascular:  Negative for chest pain.   Genitourinary:  Negative for difficulty urinating.   Integumentary:  Positive for rash.   Neurological:  Negative for syncope and headaches.      Medical / Social / Family History     Past Medical History:   Diagnosis Date    GERD (gastroesophageal reflux disease)      Hypertension        Past Surgical History:   Procedure Laterality Date    CAROTID ENDARTERECTOMY Left 5/16/2022    Procedure: ENDARTERECTOMY-CAROTID;  Surgeon: Karen Spencer MD;  Location: [facility] OR;  Service: General;  Laterality: Left;    CORONARY ARTERY BYPASS GRAFT      HERNIA REPAIR      LEFT HEART CATHETERIZATION Left 9/15/2021    Procedure: Left heart cath;  Surgeon: Quoc Vo DO;  Location: [facility] CATH LAB;  Service: Cardiology;  Laterality: Left;       Social History    reports that he has been smoking cigarettes. He started smoking about 52 years ago. He has a 25.00 pack-year smoking history. He has been exposed to tobacco smoke. He has never used smokeless tobacco. He reports that he does not currently use alcohol. He reports that he does not use drugs.    Family History  's family history includes Alzheimer's disease in his mother; Cancer in his sister; Heart disease in his father and sister; Hypertension in his father and sister; Polycystic kidney disease in his mother.    Medications and Allergies     Medications  Outpatient Medications Marked as Taking for the 4/12/23 encounter (Office Visit) with DEMARCO Kaiser   Medication Sig Dispense Refill    aspirin (ECOTRIN) 81 MG EC tablet Take 81 mg by mouth once daily.      docusate sodium (COLACE) 100 MG capsule Take 100 mg by mouth daily as needed.       [DISCONTINUED] atorvastatin (LIPITOR) 80 MG tablet Take 1 tablet (80 mg total) by mouth every evening. 90 tablet 0    [DISCONTINUED] losartan (COZAAR) 25 MG tablet Take 1 tablet (25 mg total) by mouth once daily. 90 tablet 0    [DISCONTINUED] metoprolol succinate (TOPROL-XL) 25 MG 24 hr tablet Take 1 tablet (25 mg total) by mouth once daily. 90 tablet 0    [DISCONTINUED] omeprazole (PRILOSEC) 20 MG capsule Take 1 capsule (20 mg total) by mouth once daily. 90 capsule 0       Allergies  Review of patient's allergies indicates:  No Known Allergies    Physical Examination  "    Vitals:    04/12/23 1604   BP: (!) 150/80   BP Location: Left arm   Patient Position: Sitting   Pulse: 68   Resp: 18   Temp: 98 °F (36.7 °C)   TempSrc: Oral   SpO2: 99%   Weight: 71.2 kg (157 lb)   Height: 6' 3" (1.905 m)      Physical Exam  Vitals and nursing note reviewed.   Constitutional:       General: He is not in acute distress.     Appearance: He is diaphoretic. He is not ill-appearing or toxic-appearing.   HENT:      Right Ear: External ear normal.      Left Ear: External ear normal.      Nose: Nose normal.      Mouth/Throat:      Mouth: Mucous membranes are moist.      Pharynx: Oropharynx is clear.   Eyes:      Conjunctiva/sclera: Conjunctivae normal.      Pupils: Pupils are equal, round, and reactive to light.   Cardiovascular:      Rate and Rhythm: Normal rate and regular rhythm.      Heart sounds: Normal heart sounds.   Pulmonary:      Effort: Pulmonary effort is normal.      Breath sounds: Wheezing present.   Skin:     General: Skin is warm.      Capillary Refill: Capillary refill takes less than 2 seconds.      Findings: Rash present. Rash is crusting and vesicular.          Neurological:      Mental Status: He is alert and oriented to person, place, and time.   Psychiatric:         Behavior: Behavior normal.        Assessment and Plan (including Health Maintenance)      Problem List  Smart Sets  Document Outside HM   :    Plan:     Patient Instructions   Use 10 Valeria aspirin, aloe, vaseline. Crush Aspirin to a fine powder. Mix together aspirin powder, aloe and vaseline to make a paste. Apply to shingle area. As pain improves, decrease amount of aspirins.       Health Maintenance Due   Topic Date Due    LDCT Lung Screen  Never done       Problem List Items Addressed This Visit          Cardiac/Vascular    Essential hypertension    Current Assessment & Plan     Monitor blood pressure elevated today has shingles           Relevant Medications    losartan (COZAAR) 25 MG tablet    metoprolol succinate " (TOPROL-XL) 25 MG 24 hr tablet    Other Relevant Orders    CBC Auto Differential (Completed)    Comprehensive Metabolic Panel (Completed)    Hyperlipidemia    Current Assessment & Plan     Labs today on atorvastin will monitor           Relevant Medications    atorvastatin (LIPITOR) 80 MG tablet    Other Relevant Orders    Lipid Panel (Completed)    CK (Completed)       Renal/    Encounter for screening for malignant neoplasm of prostate    Relevant Orders    PSA, Screening (Completed)       GI    Gastroesophageal reflux disease    Relevant Medications    omeprazole (PRILOSEC) 20 MG capsule     Other Visit Diagnoses       Herpes zoster with complication    -  Primary    Relevant Medications    valACYclovir (VALTREX) 500 MG tablet          Herpes zoster with complication  -     valACYclovir (VALTREX) 500 MG tablet; Take 1 tablet (500 mg total) by mouth 3 (three) times daily. for 10 days  Dispense: 30 tablet; Refill: 0    Essential hypertension  -     CBC Auto Differential; Future; Expected date: 04/12/2023  -     Comprehensive Metabolic Panel; Future; Expected date: 04/12/2023  -     losartan (COZAAR) 25 MG tablet; Take 1 tablet (25 mg total) by mouth once daily.  Dispense: 90 tablet; Refill: 1  -     metoprolol succinate (TOPROL-XL) 25 MG 24 hr tablet; Take 1 tablet (25 mg total) by mouth once daily.  Dispense: 90 tablet; Refill: 1    Hyperlipidemia, unspecified hyperlipidemia type  -     Lipid Panel; Future; Expected date: 04/12/2023  -     CK; Future; Expected date: 04/12/2023  -     atorvastatin (LIPITOR) 80 MG tablet; Take 1 tablet (80 mg total) by mouth every evening.  Dispense: 90 tablet; Refill: 1    Encounter for screening for malignant neoplasm of prostate  -     PSA, Screening; Future; Expected date: 04/12/2023    Gastroesophageal reflux disease, unspecified whether esophagitis present  -     omeprazole (PRILOSEC) 20 MG capsule; Take 1 capsule (20 mg total) by mouth once daily.  Dispense: 90 capsule;  Refill: 1       Health Maintenance Topics with due status: Not Due       Topic Last Completion Date    Colorectal Cancer Screening 10/20/2020    PROSTATE-SPECIFIC ANTIGEN 04/12/2023    High Dose Statin 04/12/2023    Lipid Panel 04/12/2023       Procedures     Future Appointments   Date Time Provider Department Center   5/3/2023  1:15 PM Karen Spencer MD OB [facility]   5/4/2023 11:00 AM Quoc Vo DO [facility] [place]   7/5/2023  1:30 PM [facility]   10/11/2023  8:15 AM [facility]        Follow up in about 6 months (around 10/12/2023).     Signature:  BLANCA Kaiser    Date of encounter: 4/12/234/13/2023

## 2023-04-12 NOTE — PATIENT INSTRUCTIONS
Use 10 Valeria aspirin, aloe, vaseline. Crush Aspirin to a fine powder. Mix together aspirin powder, aloe and vaseline to make a paste. Apply to shingle area. As pain improves, decrease amount of aspirins.

## 2023-05-03 ENCOUNTER — OFFICE VISIT (OUTPATIENT)
Dept: SURGERY | Facility: CLINIC | Age: 68
End: 2023-05-03
Payer: MEDICARE

## 2023-05-03 VITALS
WEIGHT: 160 LBS | TEMPERATURE: 98 F | BODY MASS INDEX: 19.89 KG/M2 | OXYGEN SATURATION: 95 % | HEART RATE: 66 BPM | HEIGHT: 75 IN

## 2023-05-03 DIAGNOSIS — I65.23 BILATERAL CAROTID ARTERY STENOSIS: Primary | ICD-10-CM

## 2023-05-03 PROCEDURE — 99212 OFFICE O/P EST SF 10 MIN: CPT | Mod: S$PBB,,, | Performed by: SURGERY

## 2023-05-03 PROCEDURE — 99212 PR OFFICE/OUTPT VISIT, EST, LEVL II, 10-19 MIN: ICD-10-PCS | Mod: S$PBB,,, | Performed by: SURGERY

## 2023-05-03 PROCEDURE — 99214 OFFICE O/P EST MOD 30 MIN: CPT | Mod: PBBFAC | Performed by: SURGERY

## 2023-05-03 NOTE — PROGRESS NOTES
Vascular clinic    Follow-up she is approximate 1 year out from left carotid endarterectomy     Doing well without complaints     Still smoking despite counseling     He is on anticholesterol medications on antiplatelets on blood pressure medicines on a beta-blocker     Exam he is neurologically baseline intact well-healed surgical scar left no abnormal lymphadenopathy     Carotid duplex study follow-up 6 months, stop smoking

## 2023-05-04 ENCOUNTER — OFFICE VISIT (OUTPATIENT)
Dept: CARDIOLOGY | Facility: CLINIC | Age: 68
End: 2023-05-04
Payer: MEDICARE

## 2023-05-04 VITALS
DIASTOLIC BLOOD PRESSURE: 84 MMHG | BODY MASS INDEX: 19.71 KG/M2 | HEIGHT: 75 IN | HEART RATE: 73 BPM | OXYGEN SATURATION: 94 % | WEIGHT: 158.5 LBS | SYSTOLIC BLOOD PRESSURE: 126 MMHG

## 2023-05-04 DIAGNOSIS — Z72.0 TOBACCO ABUSE DISORDER: ICD-10-CM

## 2023-05-04 DIAGNOSIS — R07.9 CHEST PAIN, UNSPECIFIED TYPE: Primary | ICD-10-CM

## 2023-05-04 DIAGNOSIS — I25.10 CORONARY ARTERY DISEASE INVOLVING NATIVE CORONARY ARTERY OF NATIVE HEART WITHOUT ANGINA PECTORIS: ICD-10-CM

## 2023-05-04 DIAGNOSIS — R06.09 EXERTIONAL DYSPNEA: ICD-10-CM

## 2023-05-04 PROCEDURE — 99214 OFFICE O/P EST MOD 30 MIN: CPT | Mod: ,,, | Performed by: INTERNAL MEDICINE

## 2023-05-04 PROCEDURE — 99214 PR OFFICE/OUTPT VISIT, EST, LEVL IV, 30-39 MIN: ICD-10-PCS | Mod: ,,, | Performed by: INTERNAL MEDICINE

## 2023-05-04 NOTE — PROGRESS NOTES
Cardiology Clinic Note:    PCP: BLANCA Kaiser    REFERRING PHYSICIAN: Jc Walton DO    CHIEF COMPLAINT: CAD    HISTORY OF PRESENT ILLNESS:  Bhaskar Patel is a 68 y.o. male who presents for evaluation of CAD.     Pt reports chest pain resolved post CABG, has not reoccurred. He is been able to perform all normal activities of daily living without provocation of chest pain, pressure or shortness of breath.  Pt underwent planned carotid revascularization without incident, persistant left sided cervical lymph node resolved, reportedly normal CT of the area. He continues to smoke 3/4 to 1/2 pack daily against medical advice, not interested in smoking cessation,        Review of Systems   Constitutional: Negative.    HENT: Negative.          Left neck pain with enlarged lymph node has resolved.   Eyes: Negative.    Respiratory: Negative.     Cardiovascular: Negative.    Gastrointestinal: Negative.    Genitourinary: Negative.    Musculoskeletal: Negative.    Skin: Negative.    Neurological: Negative.    Endo/Heme/Allergies: Negative.    Psychiatric/Behavioral: Negative.       PAST MEDICAL HISTORY:  Past Medical History:   Diagnosis Date    GERD (gastroesophageal reflux disease)     Hypertension        PAST SURGICAL HISTORY:  Past Surgical History:   Procedure Laterality Date    CAROTID ENDARTERECTOMY Left 5/16/2022    Procedure: ENDARTERECTOMY-CAROTID;  Surgeon: Karen Spencer MD;  Location: [facility] OR;  Service: General;  Laterality: Left;    CORONARY ARTERY BYPASS GRAFT      HERNIA REPAIR      LEFT HEART CATHETERIZATION Left 9/15/2021    Procedure: Left heart cath;  Surgeon: Quoc Vo DO;  Location: [facility] CATH LAB;  Service: Cardiology;  Laterality: Left;       SOCIAL HISTORY:  Social History     Socioeconomic History    Marital status:    Occupational History    Occupation: retired   Tobacco Use    Smoking status: Every Day     Packs/day: 0.50     Years: 50.00     Pack  "years: 25.00     Types: Cigarettes     Start date: 1971     Passive exposure: Current    Smokeless tobacco: Never    Tobacco comments:     already did [place] tobacco quitline. not interested in trying again. states he will quit on his own   Substance and Sexual Activity    Alcohol use: Not Currently    Drug use: Never    Sexual activity: Not Currently       FAMILY HISTORY:  Family History   Problem Relation Age of Onset    Alzheimer's disease Mother     Polycystic kidney disease Mother     Hypertension Father     Heart disease Father     Cancer Sister     Heart disease Sister     Hypertension Sister        ALLERGIES:  Allergies as of 05/04/2023    (No Known Allergies)         MEDICATIONS:  Current Outpatient Medications on File Prior to Visit   Medication Sig Dispense Refill    aspirin (ECOTRIN) 81 MG EC tablet Take 81 mg by mouth once daily.      atorvastatin (LIPITOR) 80 MG tablet Take 1 tablet (80 mg total) by mouth every evening. 90 tablet 1    docusate sodium (COLACE) 100 MG capsule Take 100 mg by mouth daily as needed.       losartan (COZAAR) 25 MG tablet Take 1 tablet (25 mg total) by mouth once daily. 90 tablet 1    metoprolol succinate (TOPROL-XL) 25 MG 24 hr tablet Take 1 tablet (25 mg total) by mouth once daily. 90 tablet 1    omeprazole (PRILOSEC) 20 MG capsule Take 1 capsule (20 mg total) by mouth once daily. 90 capsule 1    valACYclovir (VALTREX) 500 MG tablet Take 1 tablet (500 mg total) by mouth 3 (three) times daily. for 10 days (Patient not taking: Reported on 5/4/2023) 30 tablet 0     No current facility-administered medications on file prior to visit.          PHYSICAL EXAM:  Blood pressure 126/84, pulse 73, height 6' 3" (1.905 m), weight 71.9 kg (158 lb 8 oz), SpO2 (!) 94 %.  Wt Readings from Last 3 Encounters:   05/04/23 71.9 kg (158 lb 8 oz)   05/03/23 72.6 kg (160 lb)   04/12/23 71.2 kg (157 lb)      Body mass index is 19.81 kg/m².    Physical Exam  Vitals and nursing note reviewed. "   Constitutional:       Appearance: Normal appearance. He is normal weight.   HENT:      Head: Normocephalic and atraumatic.      Right Ear: External ear normal.      Left Ear: External ear normal.   Eyes:      General: No scleral icterus.        Right eye: No discharge.         Left eye: No discharge.      Extraocular Movements: Extraocular movements intact.      Conjunctiva/sclera: Conjunctivae normal.      Pupils: Pupils are equal, round, and reactive to light.   Cardiovascular:      Rate and Rhythm: Normal rate and regular rhythm.      Pulses: Normal pulses.      Heart sounds: Normal heart sounds. No murmur heard.    No friction rub. No gallop.   Pulmonary:      Effort: Pulmonary effort is normal.      Breath sounds: No wheezing or rales.      Comments: Breath sounds decreased bilaterlly, scattered rhonchi, clear with cough  Chest:      Chest wall: No tenderness.   Abdominal:      General: Abdomen is flat. Bowel sounds are normal. There is no distension.      Palpations: Abdomen is soft.      Tenderness: There is no abdominal tenderness. There is no guarding or rebound.   Musculoskeletal:         General: No swelling or tenderness. Normal range of motion.      Cervical back: Normal range of motion and neck supple.   Skin:     General: Skin is warm and dry.      Findings: No erythema or rash.   Neurological:      General: No focal deficit present.      Mental Status: He is alert and oriented to person, place, and time.      Cranial Nerves: No cranial nerve deficit.      Motor: No weakness.      Gait: Gait normal.   Psychiatric:         Mood and Affect: Mood normal.         Behavior: Behavior normal.         Thought Content: Thought content normal.         Judgment: Judgment normal.        LABS REVIEWED:  Lab Results   Component Value Date    WBC 9.52 04/12/2023    RBC 4.48 (L) 04/12/2023    HGB 14.8 04/12/2023    HCT 44.9 04/12/2023    .2 (H) 04/12/2023    MCH 33.0 (H) 04/12/2023    MCHC 33.0 04/12/2023     RDW 14.3 04/12/2023     (L) 04/12/2023    MPV 12.7 (H) 04/12/2023    NRBC 0.0 04/12/2023     Lab Results   Component Value Date     04/12/2023    K 4.4 04/12/2023     (H) 04/12/2023    CO2 27 04/12/2023    BUN 18 04/12/2023     Lab Results   Component Value Date     04/12/2023    AST 21 04/12/2023    ALT 21 04/12/2023     Lab Results   Component Value Date    GLU 92 04/12/2023     Lab Results   Component Value Date    CHOL 119 04/12/2023    HDL 58 04/12/2023    TRIG 52 04/12/2023    CHOLHDL 2.1 04/12/2023       CARDIAC STUDIES REVIEWED:    OTHER IMAGING STUDIES REVIEWED:    ASSESSMENT: PLAN:  1. CAD: stable class 1 angina, post CABG, severe triple vessel disease with SVG to RCA and LAD, grafts patent at [facility] 9/2021, well preserved EF at 55%          2  PVD: severe carotid stenosis, successful revascularization,          3. Hypertension: adequate control on current meds.                4.  Tobacco abuse - discussed smoking cessation, did not start Chantex, declines pharmacologic support

## 2023-05-05 ENCOUNTER — OFFICE VISIT (OUTPATIENT)
Dept: FAMILY MEDICINE | Facility: CLINIC | Age: 68
End: 2023-05-05
Payer: MEDICARE

## 2023-05-05 VITALS
RESPIRATION RATE: 18 BRPM | SYSTOLIC BLOOD PRESSURE: 156 MMHG | OXYGEN SATURATION: 97 % | WEIGHT: 158 LBS | BODY MASS INDEX: 19.65 KG/M2 | HEART RATE: 60 BPM | HEIGHT: 75 IN | DIASTOLIC BLOOD PRESSURE: 92 MMHG | TEMPERATURE: 98 F

## 2023-05-05 DIAGNOSIS — B02.7 DISSEMINATED HERPES ZOSTER: Primary | ICD-10-CM

## 2023-05-05 PROCEDURE — 99214 PR OFFICE/OUTPT VISIT, EST, LEVL IV, 30-39 MIN: ICD-10-PCS | Mod: ,,, | Performed by: NURSE PRACTITIONER

## 2023-05-05 PROCEDURE — 99214 OFFICE O/P EST MOD 30 MIN: CPT | Mod: ,,, | Performed by: NURSE PRACTITIONER

## 2023-05-05 RX ORDER — VALACYCLOVIR HYDROCHLORIDE 1 G/1
1000 TABLET, FILM COATED ORAL 3 TIMES DAILY
Qty: 21 TABLET | Refills: 0 | Status: SHIPPED | OUTPATIENT
Start: 2023-05-05 | End: 2023-05-15

## 2023-05-05 RX ORDER — CALAMINE, PRAMOXIND HCL 8.6; 20; 1 MG/ML; MG/ML; MG/ML
LOTION TOPICAL 4 TIMES DAILY PRN
Qty: 177 ML | Refills: 1 | Status: SHIPPED | OUTPATIENT
Start: 2023-05-05 | End: 2023-10-11

## 2023-05-05 RX ORDER — PREDNISONE 10 MG/1
10 TABLET ORAL DAILY
Qty: 5 TABLET | Refills: 0 | Status: SHIPPED | OUTPATIENT
Start: 2023-05-05 | End: 2023-07-10

## 2023-05-05 NOTE — PROGRESS NOTES
Cyndi Galvez NP   [facility]  [address]  [place]     PATIENT NAME: Bhaskar Patel  : 1955  DATE: 23Labs good repeat 6 monthsLabs good repeat 6 months  MRN: [id number]      Billing Provider: Cyndi Galvez NP  Level of Service:   Patient PCP Information       Provider PCP Type    Maryjane Brewer, [facility] General            Reason for Visit / Chief Complaint: Herpes Zoster (Patient treated for shingles on , completed 10 days of Valtrex 3 times daily, patient states rash calmed down but after completing medication it has flared back up)       Update PCP  Update Chief Complaint         History of Present Illness / Problem Focused Workflow     Bhaskar Patel presents to the clinic f/u herpes zoster, treated on , completed 10 days of valtrex, stated rash calmed down but after completing medication it lhas flared back up      Review of Systems     Review of Systems   Constitutional:  Negative for chills, fatigue and fever.   HENT:  Negative for nasal congestion, ear pain, facial swelling, hearing loss, mouth dryness, mouth sores, postnasal drip, rhinorrhea, sinus pressure/congestion and goiter.    Eyes:  Negative for discharge and itching.   Respiratory:  Negative for cough, shortness of breath and wheezing.    Cardiovascular:  Negative for chest pain and leg swelling.   Gastrointestinal:  Negative for abdominal pain, change in bowel habit and change in bowel habit.   Genitourinary:  Negative for difficulty urinating, dysuria, enuresis, frequency, hematuria and urgency.   Integumentary:  Positive for rash (left shoulder).   Neurological:  Negative for dizziness, vertigo, syncope, weakness and headaches.   Psychiatric/Behavioral:  Negative for decreased concentration.       Medical / Social / Family History     Past Medical History:   Diagnosis Date    GERD (gastroesophageal reflux disease)     Hypertension        Past Surgical History:   Procedure Laterality Date    CAROTID  ENDARTERECTOMY Left 5/16/2022    Procedure: ENDARTERECTOMY-CAROTID;  Surgeon: Karen Spencer MD;  Location: [facility] OR;  Service: General;  Laterality: Left;    CORONARY ARTERY BYPASS GRAFT      HERNIA REPAIR      LEFT HEART CATHETERIZATION Left 9/15/2021    Procedure: Left heart cath;  Surgeon: Quoc Vo DO;  Location: [facility] CATH LAB;  Service: Cardiology;  Laterality: Left;       Social History    reports that he has been smoking cigarettes. He started smoking about 52 years ago. He has a 25.00 pack-year smoking history. He has been exposed to tobacco smoke. He has never used smokeless tobacco. He reports that he does not currently use alcohol. He reports that he does not use drugs.    Family History  's family history includes Alzheimer's disease in his mother; Cancer in his sister; Heart disease in his father and sister; Hypertension in his father and sister; Polycystic kidney disease in his mother.    Medications and Allergies     Medications  Outpatient Medications Marked as Taking for the 5/5/23 encounter (Office Visit) with Cyndi Galvez NP   Medication Sig Dispense Refill    aspirin (ECOTRIN) 81 MG EC tablet Take 81 mg by mouth once daily.      atorvastatin (LIPITOR) 80 MG tablet Take 1 tablet (80 mg total) by mouth every evening. 90 tablet 1    docusate sodium (COLACE) 100 MG capsule Take 100 mg by mouth daily as needed.       losartan (COZAAR) 25 MG tablet Take 1 tablet (25 mg total) by mouth once daily. 90 tablet 1    metoprolol succinate (TOPROL-XL) 25 MG 24 hr tablet Take 1 tablet (25 mg total) by mouth once daily. 90 tablet 1    omeprazole (PRILOSEC) 20 MG capsule Take 1 capsule (20 mg total) by mouth once daily. 90 capsule 1       Allergies  Review of patient's allergies indicates:  No Known Allergies    Physical Examination     Vitals:    05/05/23 0921 05/05/23 1023   BP: (!) 178/83 (!) 156/92   BP Location: Left arm Left arm   Patient Position: Sitting Sitting   BP Method:   "Large (Manual)   Pulse: 60    Resp: 18    Temp: 97.9 °F (36.6 °C)    TempSrc: Oral    SpO2: 97%    Weight: 71.7 kg (158 lb)    Height: 6' 3" (1.905 m)       Physical Exam  Vitals and nursing note reviewed.   Constitutional:       Appearance: Normal appearance.   HENT:      Head: Normocephalic.      Right Ear: Tympanic membrane, ear canal and external ear normal.      Left Ear: Tympanic membrane, ear canal and external ear normal.      Nose: Nose normal.      Mouth/Throat:      Mouth: Mucous membranes are moist.      Pharynx: Oropharynx is clear.   Eyes:      Extraocular Movements: Extraocular movements intact.      Conjunctiva/sclera: Conjunctivae normal.      Pupils: Pupils are equal, round, and reactive to light.   Cardiovascular:      Rate and Rhythm: Normal rate and regular rhythm.      Pulses: Normal pulses.      Heart sounds: Normal heart sounds.   Pulmonary:      Effort: Pulmonary effort is normal.      Breath sounds: Normal breath sounds.   Abdominal:      General: Bowel sounds are normal.      Palpations: Abdomen is soft.   Musculoskeletal:         General: Normal range of motion.      Cervical back: Normal range of motion and neck supple.   Skin:     General: Skin is warm and dry.      Capillary Refill: Capillary refill takes less than 2 seconds.      Findings: Rash (large area vesicular rash with erythema left upper back/shoulder, c/o tingling/stinging sensation) present.   Neurological:      General: No focal deficit present.      Mental Status: He is alert and oriented to person, place, and time.   Psychiatric:         Mood and Affect: Mood normal.         Behavior: Behavior normal.        Assessment and Plan (including Health Maintenance)      Problem List  Smart Sets  Document Outside HM   :    Plan:     Disseminated herpes zoster    Other orders  -     valACYclovir (VALTREX) 1000 MG tablet; Take 1 tablet (1,000 mg total) by mouth 3 (three) times daily.  Dispense: 21 tablet; Refill: 0  -     " predniSONE (DELTASONE) 10 MG tablet; Take 1 tablet (10 mg total) by mouth once daily.  Dispense: 5 tablet; Refill: 0  -     diphenhydramine-calamine (CALAMINE MEDICATED) 1-8 % Lotn; Apply topically 4 (four) times daily as needed (pain, itching).  Dispense: 177 mL; Refill: 1            Health Maintenance Due   Topic Date Due    LDCT Lung Screen  Never done       Problem List Items Addressed This Visit          ID    Disseminated herpes zoster - Primary    Current Assessment & Plan     Cont meds as ordered, return to clinic as needed                Health Maintenance Topics with due status: Not Due       Topic Last Completion Date    Colorectal Cancer Screening 10/20/2020    PROSTATE-SPECIFIC ANTIGEN 04/12/2023    Lipid Panel 04/12/2023    High Dose Statin 05/05/2023       Procedures     Future Appointments   Date Time Provider Department Center   5/5/2023 10:30 AM Cyndi Galvez NP [facility]   7/5/2023  1:30 PM AWNEIL NURSE, [facility] FAMILY MEDICINE [facility]   10/11/2023  8:15 AM BLANCA Kaiser [facility]   11/1/2023  1:00 PM [facility]   11/1/2023  1:45 PM Karen Spencer MD [facility]        Follow up in about 1 week (around 5/12/2023).       Signature:  Cyndi Galvez NP    Date of encounter: 5/5/23

## 2023-05-15 ENCOUNTER — OFFICE VISIT (OUTPATIENT)
Dept: FAMILY MEDICINE | Facility: CLINIC | Age: 68
End: 2023-05-15
Payer: MEDICARE

## 2023-05-15 VITALS
WEIGHT: 157 LBS | OXYGEN SATURATION: 93 % | DIASTOLIC BLOOD PRESSURE: 82 MMHG | SYSTOLIC BLOOD PRESSURE: 164 MMHG | HEART RATE: 65 BPM | HEIGHT: 75 IN | RESPIRATION RATE: 20 BRPM | TEMPERATURE: 98 F | BODY MASS INDEX: 19.52 KG/M2

## 2023-05-15 DIAGNOSIS — B02.8 HERPES ZOSTER WITH COMPLICATION: Primary | ICD-10-CM

## 2023-05-15 DIAGNOSIS — J44.9 CHRONIC OBSTRUCTIVE PULMONARY DISEASE, UNSPECIFIED COPD TYPE: ICD-10-CM

## 2023-05-15 DIAGNOSIS — B02.7 DISSEMINATED HERPES ZOSTER: ICD-10-CM

## 2023-05-15 LAB
ALBUMIN SERPL BCP-MCNC: 3.2 G/DL (ref 3.5–5)
ALBUMIN/GLOB SERPL: 0.9 {RATIO}
ALP SERPL-CCNC: 107 U/L (ref 45–115)
ALT SERPL W P-5'-P-CCNC: 19 U/L (ref 16–61)
ANION GAP SERPL CALCULATED.3IONS-SCNC: 5 MMOL/L (ref 7–16)
AST SERPL W P-5'-P-CCNC: 21 U/L (ref 15–37)
BASOPHILS # BLD AUTO: 0.05 K/UL (ref 0–0.2)
BASOPHILS NFR BLD AUTO: 0.5 % (ref 0–1)
BILIRUB SERPL-MCNC: 0.8 MG/DL (ref ?–1.2)
BUN SERPL-MCNC: 24 MG/DL (ref 7–18)
BUN/CREAT SERPL: 21 (ref 6–20)
CALCIUM SERPL-MCNC: 8.5 MG/DL (ref 8.5–10.1)
CHLORIDE SERPL-SCNC: 108 MMOL/L (ref 98–107)
CO2 SERPL-SCNC: 29 MMOL/L (ref 21–32)
CREAT SERPL-MCNC: 1.13 MG/DL (ref 0.7–1.3)
DIFFERENTIAL METHOD BLD: ABNORMAL
EGFR (NO RACE VARIABLE) (RUSH/TITUS): 70 ML/MIN/1.73M2
EOSINOPHIL # BLD AUTO: 0.31 K/UL (ref 0–0.5)
EOSINOPHIL NFR BLD AUTO: 3.4 % (ref 1–4)
ERYTHROCYTE [DISTWIDTH] IN BLOOD BY AUTOMATED COUNT: 14.3 % (ref 11.5–14.5)
GLOBULIN SER-MCNC: 3.5 G/DL (ref 2–4)
GLUCOSE SERPL-MCNC: 87 MG/DL (ref 74–106)
HCT VFR BLD AUTO: 43.5 % (ref 40–54)
HGB BLD-MCNC: 14.3 G/DL (ref 13.5–18)
HIV 1+O+2 AB SERPL QL: NORMAL
IMM GRANULOCYTES # BLD AUTO: 0.02 K/UL (ref 0–0.04)
IMM GRANULOCYTES NFR BLD: 0.2 % (ref 0–0.4)
LYMPHOCYTES # BLD AUTO: 2.12 K/UL (ref 1–4.8)
LYMPHOCYTES NFR BLD AUTO: 23 % (ref 27–41)
MCH RBC QN AUTO: 33.2 PG (ref 27–31)
MCHC RBC AUTO-ENTMCNC: 32.9 G/DL (ref 32–36)
MCV RBC AUTO: 100.9 FL (ref 80–96)
MONOCYTES # BLD AUTO: 0.68 K/UL (ref 0–0.8)
MONOCYTES NFR BLD AUTO: 7.4 % (ref 2–6)
MPC BLD CALC-MCNC: 12.9 FL (ref 9.4–12.4)
NEUTROPHILS # BLD AUTO: 6.04 K/UL (ref 1.8–7.7)
NEUTROPHILS NFR BLD AUTO: 65.5 % (ref 53–65)
NRBC # BLD AUTO: 0 X10E3/UL
NRBC, AUTO (.00): 0 %
PLATELET # BLD AUTO: 134 K/UL (ref 150–400)
POTASSIUM SERPL-SCNC: 4.7 MMOL/L (ref 3.5–5.1)
PROT SERPL-MCNC: 6.7 G/DL (ref 6.4–8.2)
RBC # BLD AUTO: 4.31 M/UL (ref 4.6–6.2)
SODIUM SERPL-SCNC: 137 MMOL/L (ref 136–145)
SYPHILIS AB INTERPRETATION: NORMAL
WBC # BLD AUTO: 9.22 K/UL (ref 4.5–11)

## 2023-05-15 PROCEDURE — 87389 HIV-1 AG W/HIV-1&-2 AB AG IA: CPT | Mod: ,,, | Performed by: CLINICAL MEDICAL LABORATORY

## 2023-05-15 PROCEDURE — 85025 COMPLETE CBC W/AUTO DIFF WBC: CPT | Mod: ,,, | Performed by: CLINICAL MEDICAL LABORATORY

## 2023-05-15 PROCEDURE — 86780 TREPONEMA PALLIDUM: CPT | Mod: ,,, | Performed by: CLINICAL MEDICAL LABORATORY

## 2023-05-15 PROCEDURE — 85025 CBC WITH DIFFERENTIAL: ICD-10-PCS | Mod: ,,, | Performed by: CLINICAL MEDICAL LABORATORY

## 2023-05-15 PROCEDURE — 86780 TREPONEMA PALLIDUM (SYPHILIS) ANTIBODY: ICD-10-PCS | Mod: ,,, | Performed by: CLINICAL MEDICAL LABORATORY

## 2023-05-15 PROCEDURE — 86694 HERPES SIMPLEX NES ANTBDY: CPT | Mod: ,,, | Performed by: CLINICAL MEDICAL LABORATORY

## 2023-05-15 PROCEDURE — 99214 OFFICE O/P EST MOD 30 MIN: CPT | Mod: ,,, | Performed by: NURSE PRACTITIONER

## 2023-05-15 PROCEDURE — 99214 PR OFFICE/OUTPT VISIT, EST, LEVL IV, 30-39 MIN: ICD-10-PCS | Mod: ,,, | Performed by: NURSE PRACTITIONER

## 2023-05-15 PROCEDURE — 87389 HIV 1 / 2 ANTIBODY: ICD-10-PCS | Mod: ,,, | Performed by: CLINICAL MEDICAL LABORATORY

## 2023-05-15 PROCEDURE — 80053 COMPREHENSIVE METABOLIC PANEL: ICD-10-PCS | Mod: ,,, | Performed by: CLINICAL MEDICAL LABORATORY

## 2023-05-15 PROCEDURE — 80053 COMPREHEN METABOLIC PANEL: CPT | Mod: ,,, | Performed by: CLINICAL MEDICAL LABORATORY

## 2023-05-15 PROCEDURE — 86694 HERPES SIMPLEX 1 & 2 IGM: ICD-10-PCS | Mod: ,,, | Performed by: CLINICAL MEDICAL LABORATORY

## 2023-05-15 RX ORDER — VALACYCLOVIR HYDROCHLORIDE 1 G/1
1000 TABLET, FILM COATED ORAL EVERY 12 HOURS
Qty: 60 TABLET | Refills: 2 | Status: SHIPPED | OUTPATIENT
Start: 2023-05-15 | End: 2023-07-10

## 2023-05-15 NOTE — PATIENT INSTRUCTIONS
Will start valtrex twice daily labs today to look for other causes, may also initiate referral to dermatology

## 2023-05-15 NOTE — PROGRESS NOTES
BLANCA Kaiser   [facility]  [address]  [place] [address]     PATIENT NAME: Bhaskar Patel  : 1955  DATE: 5/15/23  MRN: [id number]      Billing Provider: BLANCA Kaiser  Level of Service:   Patient PCP Information       Provider PCP Type    BLANCA Kaiser General            Reason for Visit / Chief Complaint: Herpes Zoster (Shingles- Still having outbreaks to left upper arm/ shoulder/ hips/ legs- Completed Valtrex RX) and Follow-up   Health Maintenance Due   Topic Date Due    LDCT Lung Screen  Never done          Update PCP  Update Chief Complaint         History of Present Illness / Problem Focused Workflow     Bhaskar Patel presents to the clinic here as a walk in with complaints of shingles rash reoccurring, he has been on acyclovir multiple times - the rash never rely resolves. [facility]  Will obtain additional labs today and refer to derm    No results found for: HGBA1C     CMP  Sodium   Date Value Ref Range Status   2023 141 136 - 145 mmol/L Final     Potassium   Date Value Ref Range Status   2023 4.4 3.5 - 5.1 mmol/L Final     Chloride   Date Value Ref Range Status   2023 108 (H) 98 - 107 mmol/L Final     CO2   Date Value Ref Range Status   2023 27 21 - 32 mmol/L Final     Glucose   Date Value Ref Range Status   2023 92 74 - 106 mg/dL Final     BUN   Date Value Ref Range Status   2023 18 7 - 18 mg/dL Final     Creatinine   Date Value Ref Range Status   2023 1.12 0.70 - 1.30 mg/dL Final     Calcium   Date Value Ref Range Status   2023 8.6 8.5 - 10.1 mg/dL Final     Total Protein   Date Value Ref Range Status   2023 7.3 6.4 - 8.2 g/dL Final     Albumin   Date Value Ref Range Status   2023 3.4 (L) 3.5 - 5.0 g/dL Final     Bilirubin, Total   Date Value Ref Range Status   2023 0.6 >0.0 - 1.2 mg/dL Final     Alk Phos   Date Value Ref Range Status   2023  105 45 - 115 U/L Final     AST   Date Value Ref Range Status   04/12/2023 21 15 - 37 U/L Final     ALT   Date Value Ref Range Status   04/12/2023 21 16 - 61 U/L Final     Anion Gap   Date Value Ref Range Status   04/12/2023 10 7 - 16 mmol/L Final     eGFR   Date Value Ref Range Status   04/12/2023 71 >=60 mL/min/1.73m² Final        Lab Results   Component Value Date    WBC 9.52 04/12/2023    RBC 4.48 (L) 04/12/2023    HGB 14.8 04/12/2023    HCT 44.9 04/12/2023    .2 (H) 04/12/2023    MCH 33.0 (H) 04/12/2023    MCHC 33.0 04/12/2023    RDW 14.3 04/12/2023     (L) 04/12/2023    MPV 12.7 (H) 04/12/2023    LYMPH 21.6 (L) 04/12/2023    LYMPH 2.06 04/12/2023    MONO 5.0 04/12/2023    EOS 0.21 04/12/2023    BASO 0.05 04/12/2023    EOSINOPHIL 2.2 04/12/2023    BASOPHIL 0.5 04/12/2023        Lab Results   Component Value Date    CHOL 119 04/12/2023    CHOL 128 10/24/2022    CHOL 139 11/23/2021     Lab Results   Component Value Date    HDL 58 04/12/2023    HDL 62 (H) 10/24/2022    HDL 55 11/23/2021     Lab Results   Component Value Date    LDLCALC 51 04/12/2023    LDLCALC 53 10/24/2022    LDLCALC 66 11/23/2021     Lab Results   Component Value Date    TRIG 52 04/12/2023    TRIG 67 10/24/2022    TRIG 88 11/23/2021     Lab Results   Component Value Date    CHOLHDL 2.1 04/12/2023    CHOLHDL 2.1 10/24/2022    CHOLHDL 2.5 11/23/2021        Wt Readings from Last 3 Encounters:   05/15/23 0828 71.2 kg (157 lb)   05/05/23 0921 71.7 kg (158 lb)   05/04/23 1127 71.9 kg (158 lb 8 oz)        BP Readings from Last 3 Encounters:   05/15/23 (!) 164/82   05/05/23 (!) 156/92   05/04/23 126/84        Review of Systems     Review of Systems   Integumentary:  Positive for rash.      Medical / Social / Family History     Past Medical History:   Diagnosis Date    GERD (gastroesophageal reflux disease)     Hypertension        Past Surgical History:   Procedure Laterality Date    CAROTID ENDARTERECTOMY Left 5/16/2022    Procedure:  ENDARTERECTOMY-CAROTID;  Surgeon: Karen Spencer MD;  Location: [facility] OR;  Service: General;  Laterality: Left;    CORONARY ARTERY BYPASS GRAFT      HERNIA REPAIR      LEFT HEART CATHETERIZATION Left 9/15/2021    Procedure: Left heart cath;  Surgeon: Quoc Vo DO;  Location: [facility] CATH LAB;  Service: Cardiology;  Laterality: Left;       Social History    reports that he has been smoking cigarettes. He started smoking about 52 years ago. He has a 25.00 pack-year smoking history. He has been exposed to tobacco smoke. He has never used smokeless tobacco. He reports that he does not currently use alcohol. He reports that he does not use drugs.    Family History  's family history includes Alzheimer's disease in his mother; Cancer in his sister; Heart disease in his father and sister; Hypertension in his father and sister; Polycystic kidney disease in his mother.    Medications and Allergies     Medications  Outpatient Medications Marked as Taking for the 5/15/23 encounter (Office Visit) with Maryjane Brewer [facility]   Medication Sig Dispense Refill    aspirin (ECOTRIN) 81 MG EC tablet Take 81 mg by mouth once daily.      atorvastatin (LIPITOR) 80 MG tablet Take 1 tablet (80 mg total) by mouth every evening. 90 tablet 1    diphenhydramine-calamine (CALAMINE MEDICATED) 1-8 % Lotn Apply topically 4 (four) times daily as needed (pain, itching). 177 mL 1    docusate sodium (COLACE) 100 MG capsule Take 100 mg by mouth daily as needed.       losartan (COZAAR) 25 MG tablet Take 1 tablet (25 mg total) by mouth once daily. 90 tablet 1    metoprolol succinate (TOPROL-XL) 25 MG 24 hr tablet Take 1 tablet (25 mg total) by mouth once daily. 90 tablet 1    omeprazole (PRILOSEC) 20 MG capsule Take 1 capsule (20 mg total) by mouth once daily. 90 capsule 1       Allergies  Review of patient's allergies indicates:  No Known Allergies    Physical Examination     Vitals:    05/15/23 0828 05/15/23 0852   BP: (!)  "150/76 (!) 164/82   BP Location: Left arm Left arm   Patient Position: Sitting Sitting   Pulse: 65    Resp: 20    Temp: 98.2 °F (36.8 °C)    TempSrc: Oral    SpO2: (!) 93%    Weight: 71.2 kg (157 lb)    Height: 6' 3" (1.905 m)       Physical Exam  Vitals and nursing note reviewed.   Constitutional:       General: He is not in acute distress.     Appearance: He is not ill-appearing or toxic-appearing.   HENT:      Right Ear: External ear normal.      Left Ear: External ear normal.   Cardiovascular:      Rate and Rhythm: Regular rhythm.      Heart sounds: Normal heart sounds.   Pulmonary:      Effort: Pulmonary effort is normal.   Skin:     Comments: LUE with vesicular scabbed lesions, left lower leg with small scabs noted, right leg also with scab lesion, no vesicular lesion noted to hands   Neurological:      Mental Status: He is alert and oriented to person, place, and time.   Psychiatric:         Behavior: Behavior normal.        Assessment and Plan (including Health Maintenance)      Problem List  Smart Sets  Document Outside HM   :    Plan:     Patient Instructions   Will start valtrex twice daily labs today to look for other causes, may also initiate referral to dermatology   Health Maintenance Due   Topic Date Due    LDCT Lung Screen  Never done        Health Maintenance Due   Topic Date Due    LDCT Lung Screen  Never done       Problem List Items Addressed This Visit          Pulmonary    Chronic obstructive pulmonary disease, unspecified COPD type       ID    Disseminated herpes zoster    Current Assessment & Plan     Resume acyclovir and labs and derm referral           Herpes zoster with complication - Primary    Relevant Medications    valACYclovir (VALTREX) 1000 MG tablet    Other Relevant Orders    Comprehensive Metabolic Panel    CBC Auto Differential    HIV 1/2 Ag/Ab (4th Gen)    Syphilis Antibody with reflex to RPR    HSV 1 & 2, IgM    Ambulatory referral/consult to Dermatology     Herpes zoster with " complication  -     valACYclovir (VALTREX) 1000 MG tablet; Take 1 tablet (1,000 mg total) by mouth every 12 (twelve) hours.  Dispense: 60 tablet; Refill: 2  -     Comprehensive Metabolic Panel; Future; Expected date: 05/15/2023  -     CBC Auto Differential; Future; Expected date: 05/15/2023  -     HIV 1/2 Ag/Ab (4th Gen); Future; Expected date: 05/15/2023  -     Syphilis Antibody with reflex to RPR; Future; Expected date: 05/15/2023  -     HSV 1 & 2, IgM; Future; Expected date: 05/15/2023  -     Ambulatory referral/consult to Dermatology; Future; Expected date: 05/22/2023    Chronic obstructive pulmonary disease, unspecified COPD type    Disseminated herpes zoster       Health Maintenance Topics with due status: Not Due       Topic Last Completion Date    Colorectal Cancer Screening 10/20/2020    PROSTATE-SPECIFIC ANTIGEN 04/12/2023    Lipid Panel 04/12/2023    High Dose Statin 05/15/2023       Procedures   Health Maintenance Due   Topic Date Due    LDCT Lung Screen  Never done        Future Appointments   Date Time Provider Department Center   5/15/2023 10:15 AM BLANCA Kaiser [facility]   6/14/2023 11:00 AM Natali Jenkins MD [facility]   7/5/2023  1:30 PM AWV [facility]UR[facility] [place]   10/11/2023  8:15 AM [facility] [place]   11/1/2023  1:00 PM [facility]1 [facility]   11/1/2023  1:45 PM Karen Spencer MD [facility]        No follow-ups on file.    Health Maintenance Due   Topic Date Due    LDCT Lung Screen  Never done        Signature:  BLANCA Kaiser    Date of encounter: 5/15/235/15/2023

## 2023-05-16 LAB — HSV IGM SER QL IA: NEGATIVE

## 2023-06-09 DIAGNOSIS — Z71.89 COMPLEX CARE COORDINATION: ICD-10-CM

## 2023-06-14 ENCOUNTER — OFFICE VISIT (OUTPATIENT)
Dept: DERMATOLOGY | Facility: CLINIC | Age: 68
End: 2023-06-14
Payer: MEDICARE

## 2023-06-14 DIAGNOSIS — B02.8 HERPES ZOSTER WITH COMPLICATION: ICD-10-CM

## 2023-06-14 DIAGNOSIS — L30.9 DERMATITIS, UNSPECIFIED: Primary | ICD-10-CM

## 2023-06-14 PROCEDURE — 11105 PUNCH BX SKIN EA SEP/ADDL: CPT | Mod: ,,, | Performed by: DERMATOLOGY

## 2023-06-14 PROCEDURE — 11104 PUNCH BX SKIN SINGLE LESION: CPT | Mod: ,,, | Performed by: DERMATOLOGY

## 2023-06-14 PROCEDURE — 99204 PR OFFICE/OUTPT VISIT, NEW, LEVL IV, 45-59 MIN: ICD-10-PCS | Mod: 25,,, | Performed by: DERMATOLOGY

## 2023-06-14 PROCEDURE — 11104 PR PUNCH BIOPSY, SKIN, SINGLE LESION: ICD-10-PCS | Mod: ,,, | Performed by: DERMATOLOGY

## 2023-06-14 PROCEDURE — 11105 PR PUNCH BIOPSY, SKIN, EA ADDTL LESION: ICD-10-PCS | Mod: ,,, | Performed by: DERMATOLOGY

## 2023-06-14 PROCEDURE — 99204 OFFICE O/P NEW MOD 45 MIN: CPT | Mod: 25,,, | Performed by: DERMATOLOGY

## 2023-06-14 RX ORDER — TRIAMCINOLONE ACETONIDE 1 MG/G
CREAM TOPICAL
Qty: 454 G | Refills: 1 | Status: SHIPPED | OUTPATIENT
Start: 2023-06-14 | End: 2024-02-29 | Stop reason: SDUPTHER

## 2023-06-14 NOTE — PROGRESS NOTES
[facility]   Natali Jenkins MD    Patient Name: Bhaskar Patel  Patient YOB: 1955   Date of Service: 6/14/23    CC: Rash    HPI: Bhaskar Patel is a 68 y.o. male here today for rash, located on the bilateral hips, legs, and left upper arm.  Rash has been present for 5 months.  Previous treatments include Valtrex, hydrocortisone, and calamine/benadryl lotion.      Past Medical History:   Diagnosis Date    GERD (gastroesophageal reflux disease)     Hypertension      Past Surgical History:   Procedure Laterality Date    CAROTID ENDARTERECTOMY Left 5/16/2022    Procedure: ENDARTERECTOMY-CAROTID;  Surgeon: Karen Spencer MD;  Location: [facility] OR;  Service: General;  Laterality: Left;    CORONARY ARTERY BYPASS GRAFT      HERNIA REPAIR      LEFT HEART CATHETERIZATION Left 9/15/2021    Procedure: Left heart cath;  Surgeon: Quoc Vo DO;  Location: [facility] CATH LAB;  Service: Cardiology;  Laterality: Left;     Review of patient's allergies indicates:  No Known Allergies    Current Outpatient Medications:     aspirin (ECOTRIN) 81 MG EC tablet, Take 81 mg by mouth once daily., Disp: , Rfl:     atorvastatin (LIPITOR) 80 MG tablet, Take 1 tablet (80 mg total) by mouth every evening., Disp: 90 tablet, Rfl: 1    diphenhydramine-calamine (CALAMINE MEDICATED) 1-8 % Lotn, Apply topically 4 (four) times daily as needed (pain, itching)., Disp: 177 mL, Rfl: 1    docusate sodium (COLACE) 100 MG capsule, Take 100 mg by mouth daily as needed. , Disp: , Rfl:     losartan (COZAAR) 25 MG tablet, Take 1 tablet (25 mg total) by mouth once daily., Disp: 90 tablet, Rfl: 1    metoprolol succinate (TOPROL-XL) 25 MG 24 hr tablet, Take 1 tablet (25 mg total) by mouth once daily., Disp: 90 tablet, Rfl: 1    omeprazole (PRILOSEC) 20 MG capsule, Take 1 capsule (20 mg total) by mouth once daily., Disp: 90 capsule, Rfl: 1    predniSONE (DELTASONE) 10 MG tablet, Take 1 tablet (10 mg total) by mouth once daily. (Patient  not taking: Reported on 5/15/2023), Disp: 5 tablet, Rfl: 0    triamcinolone acetonide 0.1% (KENALOG) 0.1 % cream, Apply to AA on body BID PRN flares tapering with improvement, Disp: 454 g, Rfl: 1    valACYclovir (VALTREX) 1000 MG tablet, Take 1 tablet (1,000 mg total) by mouth every 12 (twelve) hours., Disp: 60 tablet, Rfl: 2    ROS: A focused review of systems was obtained and negative.     Exam: A focused skin exam was performed. All areas examined were normal except as mentioned in the assessment and plan below.  General Appearance of the patient is well developed and well nourished.  Orientation: alert and oriented x 3.  Mood and affect: pleasant.    Assessment:   The primary encounter diagnosis was Dermatitis, unspecified. A diagnosis of Herpes zoster with complication was also pertinent to this visit.    Plan:   Medications Ordered This Encounter   Medications    triamcinolone acetonide 0.1% (KENALOG) 0.1 % cream     Sig: Apply to AA on body BID PRN flares tapering with improvement     Dispense:  454 g     Refill:  1       Dermatitis Unspecified  - scattered crusted, urtircarial papules favoring the knees and elbows  DDx: DH vs BP vs linear IgA vs scabies    Plan: Counseling  I counseled the patient regarding the following:  Skin care: Patient instructed to use gentle skin care including dove unscented soap, CeraVe moisturizing cream, and fragrance free laundry detergent.  Expectations: The patient understands that there is not a definitive diagnosis at this time. Further testing or empiric therapy may be necessary to diagnose and improve the condition.  Contact office if: The patient develops a fever, or rash dramatically worsens despite treatment.    Plan: Biopsy by Punch Method  Location (A): Left upper back.     Written consent was obtained and risks were reviewed including but not limited to scarring, infection, bleeding, scabbing, incomplete removal, nerve damage and allergy to anesthesia. The area was  prepped with Chloraprep. Local anesthesia was obtained with approximately 0.5cc of 1% lidocaine with epinephrine. A 4mm punch biopsy (sent for H&E) was performed on the above listed location. Epidermal closure was achieved with 4-0 Ethilon. Following the biopsy Petrolatum and a bandage were applied. Patient will be notified of biopsy results. However, patient instructed to call the office if not contacted within 2 weeks. Suture removal in 10 days.    Location (B): Left upper back.     Written consent was obtained and risks were reviewed including but not limited to scarring, infection, bleeding, scabbing, incomplete removal, nerve damage and allergy to anesthesia. The area was prepped with Chloraprep. Local anesthesia was obtained with approximately 0.5cc of 1% lidocaine with epinephrine. A 4mm punch biopsy (sent for DIF) was performed on the above listed location. Epidermal closure was achieved with 4-0 Ethilon. Following the biopsy Petrolatum and a bandage were applied. Patient will be notified of biopsy results. However, patient instructed to call the office if not contacted within 2 weeks. Suture removal in 10 days.    Follow up in about 10 days (around 6/24/2023) for Suture removal and results.    Natali Jenkins MD

## 2023-06-28 ENCOUNTER — OFFICE VISIT (OUTPATIENT)
Dept: DERMATOLOGY | Facility: CLINIC | Age: 68
End: 2023-06-28
Payer: MEDICARE

## 2023-06-28 VITALS — BODY MASS INDEX: 19.51 KG/M2 | HEIGHT: 75 IN | WEIGHT: 156.94 LBS

## 2023-06-28 DIAGNOSIS — L13.0 DERMATITIS HERPETIFORMIS: Primary | ICD-10-CM

## 2023-06-28 DIAGNOSIS — Z79.899 HIGH RISK MEDICATION USE: ICD-10-CM

## 2023-06-28 DIAGNOSIS — Z48.02 VISIT FOR SUTURE REMOVAL: ICD-10-CM

## 2023-06-28 PROCEDURE — 99214 PR OFFICE/OUTPT VISIT, EST, LEVL IV, 30-39 MIN: ICD-10-PCS | Mod: ,,, | Performed by: DERMATOLOGY

## 2023-06-28 PROCEDURE — 99214 OFFICE O/P EST MOD 30 MIN: CPT | Mod: ,,, | Performed by: DERMATOLOGY

## 2023-06-28 RX ORDER — DAPSONE 100 MG/1
100 TABLET ORAL DAILY
Qty: 30 TABLET | Refills: 0 | Status: SHIPPED | OUTPATIENT
Start: 2023-06-28 | End: 2023-07-31 | Stop reason: SDUPTHER

## 2023-06-28 NOTE — PROGRESS NOTES
[facility]   Natali Jenkins MD    Patient Name: Bhaskar Patel  Patient YOB: 1955   Date of Service: 6/28/23    CC: Suture removal    HPI: Bhaskar Patel is a 68 y.o. male here today for suture removal on the left upper back.  The area is healing well.  Patient denies fever, chills, puss, or redness to the area.    Past Medical History:   Diagnosis Date    GERD (gastroesophageal reflux disease)     Hypertension      Past Surgical History:   Procedure Laterality Date    CAROTID ENDARTERECTOMY Left 5/16/2022    Procedure: ENDARTERECTOMY-CAROTID;  Surgeon: Karen Spencer MD;  Location: [facility] OR;  Service: General;  Laterality: Left;    CORONARY ARTERY BYPASS GRAFT      HERNIA REPAIR      LEFT HEART CATHETERIZATION Left 9/15/2021    Procedure: Left heart cath;  Surgeon: Quoc Vo DO;  Location: [facility] CATH LAB;  Service: Cardiology;  Laterality: Left;     Review of patient's allergies indicates:  No Known Allergies    Current Outpatient Medications:     aspirin (ECOTRIN) 81 MG EC tablet, Take 81 mg by mouth once daily., Disp: , Rfl:     atorvastatin (LIPITOR) 80 MG tablet, Take 1 tablet (80 mg total) by mouth every evening., Disp: 90 tablet, Rfl: 1    dapsone 100 MG Tab, Take 1 tablet (100 mg total) by mouth once daily., Disp: 30 tablet, Rfl: 0    diphenhydramine-calamine (CALAMINE MEDICATED) 1-8 % Lotn, Apply topically 4 (four) times daily as needed (pain, itching)., Disp: 177 mL, Rfl: 1    docusate sodium (COLACE) 100 MG capsule, Take 100 mg by mouth daily as needed. , Disp: , Rfl:     losartan (COZAAR) 25 MG tablet, Take 1 tablet (25 mg total) by mouth once daily., Disp: 90 tablet, Rfl: 1    metoprolol succinate (TOPROL-XL) 25 MG 24 hr tablet, Take 1 tablet (25 mg total) by mouth once daily., Disp: 90 tablet, Rfl: 1    omeprazole (PRILOSEC) 20 MG capsule, Take 1 capsule (20 mg total) by mouth once daily., Disp: 90 capsule, Rfl: 1    predniSONE (DELTASONE) 10 MG tablet, Take 1  tablet (10 mg total) by mouth once daily. (Patient not taking: Reported on 5/15/2023), Disp: 5 tablet, Rfl: 0    triamcinolone acetonide 0.1% (KENALOG) 0.1 % cream, Apply to AA on body BID PRN flares tapering with improvement, Disp: 454 g, Rfl: 1    valACYclovir (VALTREX) 1000 MG tablet, Take 1 tablet (1,000 mg total) by mouth every 12 (twelve) hours., Disp: 60 tablet, Rfl: 2      Exam: A focused skin exam was performed. All areas examined were normal except as mentioned in the assessment and plan below.  General Appearance of the patient is well developed and well nourished.  Orientation: alert and oriented x 3.  Mood and affect: pleasant.    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 Diagnoses of Visit for suture removal and High risk medication use were also pertinent to this visit.    Plan:   Suture Removal (Global Period)  Body Locations: left upper back and left upper back  I reviewed the pathology results with the patient in detail.  The examination of the site was clean, dry and intact. Sutures were removed.  Vaseline applied.    I counseled the patient regarding the following:  Expectations: Sutured wounds tend to have 50% of the strength of normal skin at the time of suture removal. Try avoiding rigorous exercise or lifting greater than 10 pounds.  Contact office if: you develop pain, redness, tenderness or pus at the surgical site.    A culture was not obtained from the area    Dermatitis Herpetiformis  - Symmetrical erythematous papules on the flanks and extremities   Status: Inadequately controlled     Plan: Counseling.  I counseled the patient regarding the following:  Contact office if: Rash fails to resolve despite treatment.    - Will order anti-gliaden as DIF was negative but still high suspicion for DH   - Will refer to GI to evaluate for celiac   - Begin dapsone    High Risk Medication Monitoring (Z79.899) : The risks and benefits of the medication were reviewed  in full with the patient. Should any side effects occur, the patient will stop the medication and contact me immediately.    Dapsone Counseling- I discussed with the patient the risks of dapsone including but not limited to hemolytic  anemia, agranulocytosis, rashes, methemoglobinemia, kidney failure, peripheral neuropathy, headaches, GI  upset, and liver toxicity. Patients who start dapsone require monitoring including baseline LFTs and weekly CBCs  for the first month, then every month thereafter. The patient verbalized understanding of the proper use and  possible adverse effects of dapsone. All of the patient's questions and concerns were addressed.    - Will order cbc for 2 weeks after starting dapsone    Medications Ordered This Encounter   Medications    dapsone 100 MG Tab     Sig: Take 1 tablet (100 mg total) by mouth once daily.     Dispense:  30 tablet     Refill:  0     Follow up in about 1 month (around 7/28/2023) for FU Rash.    Natali Jenkins MD

## 2023-07-10 ENCOUNTER — OFFICE VISIT (OUTPATIENT)
Dept: GASTROENTEROLOGY | Facility: CLINIC | Age: 68
End: 2023-07-10
Payer: MEDICARE

## 2023-07-10 VITALS
DIASTOLIC BLOOD PRESSURE: 66 MMHG | SYSTOLIC BLOOD PRESSURE: 133 MMHG | BODY MASS INDEX: 19.29 KG/M2 | HEART RATE: 67 BPM | HEIGHT: 75 IN | OXYGEN SATURATION: 94 % | WEIGHT: 155.19 LBS

## 2023-07-10 DIAGNOSIS — K90.0 CELIAC DISEASE: ICD-10-CM

## 2023-07-10 DIAGNOSIS — Z79.899 HIGH RISK MEDICATION USE: ICD-10-CM

## 2023-07-10 DIAGNOSIS — L13.0 DERMATITIS HERPETIFORMIS: Primary | ICD-10-CM

## 2023-07-10 PROCEDURE — 99214 OFFICE O/P EST MOD 30 MIN: CPT | Mod: S$PBB,,,

## 2023-07-10 PROCEDURE — 99214 OFFICE O/P EST MOD 30 MIN: CPT | Mod: PBBFAC

## 2023-07-10 PROCEDURE — 99214 PR OFFICE/OUTPT VISIT, EST, LEVL IV, 30-39 MIN: ICD-10-PCS | Mod: S$PBB,,,

## 2023-07-10 NOTE — PROGRESS NOTES
Bhaskar Patel is a 68 y.o. male here for Celiac Disease        PCP: Maryjane Brewer  Referring Provider: Natali Jenkins Md  [address]  [address]  [facility] [place] [address]     HPI:  Mr. Patel is a 69 yo male who presents to clinic today with dermatitis herpetiformis. Patient reports a new onset rash that occurred around March of this year. He was treated twice for shingles. The rash would improve temporarily but then return. He had recent visit with Dr. Jenkins and was diagnosed with dermatitis herpetiformis. He had abnormal gliadin on recent labs. Dr. Jenkins started patient on Dapsone and he reports today that the rash has cleared up. He denies any complaints or problems at present. He denies any abdominal pain, diarrhea, constipation, hematochezia or melena. He denies any problems with toelrating his diet. Does report occasional gas but nothing significant. He denies any known history of celiac disease. He denies any FMH of CRC or prior endoscopy. Did have negative cologuard in October of 2020. He is a current smoker. Drinks alcohol on rare occasion.           ROS:  Review of Systems   Constitutional:  Negative for activity change, appetite change, fatigue and unexpected weight change.   HENT:  Negative for trouble swallowing.    Respiratory:  Negative for shortness of breath.    Cardiovascular:  Negative for chest pain.   Gastrointestinal:  Positive for reflux (controlled). Negative for abdominal pain, blood in stool, constipation, diarrhea, nausea and vomiting.   Musculoskeletal:  Negative for gait problem.   Integumentary:  Positive for rash (improved). Negative for color change.        PMHX:  has a past medical history of GERD (gastroesophageal reflux disease) and Hypertension.    PSHX:  has a past surgical history that includes Hernia repair; Coronary artery bypass graft; Left heart catheterization (Left, 9/15/2021); and Carotid endarterectomy (Left, 5/16/2022).    PFHX: family  "history includes Alzheimer's disease in his mother; Cancer in his sister; Heart disease in his father and sister; Hypertension in his father and sister; Polycystic kidney disease in his mother.    PSlHX:  reports that he has been smoking cigarettes. He started smoking about 52 years ago. He has a 26.00 pack-year smoking history. He has been exposed to tobacco smoke. He has never used smokeless tobacco. He reports that he does not currently use alcohol. He reports that he does not currently use drugs after having used the following drugs: Marijuana.        Review of patient's allergies indicates:  No Known Allergies    Medication List with Changes/Refills   Current Medications    ASPIRIN (ECOTRIN) 81 MG EC TABLET    Take 81 mg by mouth once daily.    ATORVASTATIN (LIPITOR) 80 MG TABLET    Take 1 tablet (80 mg total) by mouth every evening.    DAPSONE 100 MG TAB    Take 1 tablet (100 mg total) by mouth once daily.    DIPHENHYDRAMINE-CALAMINE (CALAMINE MEDICATED) 1-8 % LOTN    Apply topically 4 (four) times daily as needed (pain, itching).    DOCUSATE SODIUM (COLACE) 100 MG CAPSULE    Take 100 mg by mouth daily as needed.     LOSARTAN (COZAAR) 25 MG TABLET    Take 1 tablet (25 mg total) by mouth once daily.    METOPROLOL SUCCINATE (TOPROL-XL) 25 MG 24 HR TABLET    Take 1 tablet (25 mg total) by mouth once daily.    OMEPRAZOLE (PRILOSEC) 20 MG CAPSULE    Take 1 capsule (20 mg total) by mouth once daily.    TRIAMCINOLONE ACETONIDE 0.1% (KENALOG) 0.1 % CREAM    Apply to AA on body BID PRN flares tapering with improvement   Discontinued Medications    PREDNISONE (DELTASONE) 10 MG TABLET    Take 1 tablet (10 mg total) by mouth once daily.    VALACYCLOVIR (VALTREX) 1000 MG TABLET    Take 1 tablet (1,000 mg total) by mouth every 12 (twelve) hours.        Objective Findings:  Vital Signs:  /66   Pulse 67   Ht 6' 3" (1.905 m)   Wt 70.4 kg (155 lb 3.2 oz)   SpO2 (!) 94% Comment: pt not diagnosed with COPD but states " that he probably does have it  BMI 19.40 kg/m²  Body mass index is 19.4 kg/m².    Physical Exam:  Physical Exam  Vitals reviewed.   Constitutional:       General: He is not in acute distress.     Appearance: Normal appearance.   HENT:      Mouth/Throat:      Mouth: Mucous membranes are moist.   Cardiovascular:      Rate and Rhythm: Normal rate.   Pulmonary:      Effort: Pulmonary effort is normal.   Abdominal:      General: Bowel sounds are normal. There is no distension.      Palpations: Abdomen is soft.      Tenderness: There is no abdominal tenderness. There is no guarding.   Skin:     General: Skin is warm and dry.      Findings: Rash (Left upper back, bilateral hips - improved) present.   Neurological:      Mental Status: He is alert and oriented to person, place, and time.   Psychiatric:         Mood and Affect: Mood normal.        Labs:  Lab Results   Component Value Date    WBC 10.01 06/28/2023    HGB 14.4 06/28/2023    HCT 43.6 06/28/2023    .4 (H) 06/28/2023    RDW 14.8 (H) 06/28/2023     (L) 06/28/2023    LYMPH 22.3 (L) 06/28/2023    LYMPH 2.23 06/28/2023    MONO 7.3 (H) 06/28/2023    EOS 0.36 06/28/2023    BASO 0.05 06/28/2023     Lab Results   Component Value Date     05/15/2023    K 4.7 05/15/2023     (H) 05/15/2023    CO2 29 05/15/2023    GLU 87 05/15/2023    BUN 24 (H) 05/15/2023    CREATININE 1.13 05/15/2023    CALCIUM 8.5 05/15/2023    PROT 6.7 05/15/2023    ALBUMIN 3.2 (L) 05/15/2023    BILITOT 0.8 05/15/2023    ALKPHOS 107 05/15/2023    AST 21 05/15/2023    ALT 19 05/15/2023         Imaging: No results found.      Assessment:  Bhaskar Patel is a 68 y.o. male here with:  1. Dermatitis herpetiformis    2. Celiac disease          Recommendations:  1. Labs today - will obtain IgA and TTG   2. EGD with biopsy to confirm celiac disease and r/o other abnormalities   3. Gluten-free diet discussed - will start following biopsy results. Patient voiced understanding of  this    Follow up in about 6 months (around 1/10/2024).      Order summary:  Orders Placed This Encounter    Iron and TIBC    Ferritin    Vitamin B12    Folate    Vitamin D    Tissue Transglutaminase Ab, IgA    IgA    EGD       Thank you for allowing me to participate in the care of Bhaskar Patel.      Radha Chávez, FNP-BC, AG-ACNP-BC

## 2023-07-12 ENCOUNTER — APPOINTMENT (OUTPATIENT)
Dept: LAB | Facility: HOSPITAL | Age: 68
End: 2023-07-12
Payer: MEDICARE

## 2023-07-12 LAB
25(OH)D3 SERPL-MCNC: 42.3 NG/ML
BASOPHILS # BLD AUTO: 0.02 K/UL (ref 0–0.2)
BASOPHILS NFR BLD AUTO: 0.3 % (ref 0–1)
DIFFERENTIAL METHOD BLD: ABNORMAL
EOSINOPHIL # BLD AUTO: 0.19 K/UL (ref 0–0.5)
EOSINOPHIL NFR BLD AUTO: 2.5 % (ref 1–4)
ERYTHROCYTE [DISTWIDTH] IN BLOOD BY AUTOMATED COUNT: 14.2 % (ref 11.5–14.5)
FERRITIN SERPL-MCNC: 88 NG/ML (ref 26–388)
FOLATE SERPL-MCNC: 16.8 NG/ML (ref 3.1–17.5)
HCT VFR BLD AUTO: 43.7 % (ref 40–54)
HGB BLD-MCNC: 14.5 G/DL (ref 13.5–18)
IGA SERPL-MCNC: 431 MG/DL (ref 61–356)
IRON SATN MFR SERPL: 52 % (ref 14–50)
IRON SERPL-MCNC: 120 ΜG/DL (ref 65–175)
LYMPHOCYTES # BLD AUTO: 1.67 K/UL (ref 1–4.8)
LYMPHOCYTES NFR BLD AUTO: 22 % (ref 27–41)
MCH RBC QN AUTO: 34.2 PG (ref 27–31)
MCHC RBC AUTO-ENTMCNC: 33.2 G/DL (ref 32–36)
MCV RBC AUTO: 103.1 FL (ref 80–96)
MONOCYTES # BLD AUTO: 0.52 K/UL (ref 0–0.8)
MONOCYTES NFR BLD AUTO: 6.8 % (ref 2–6)
MPC BLD CALC-MCNC: 12.3 FL (ref 9.4–12.4)
NEUTROPHILS # BLD AUTO: 5.2 K/UL (ref 1.8–7.7)
NEUTROPHILS NFR BLD AUTO: 68.4 % (ref 53–65)
PLATELET # BLD AUTO: 112 K/UL (ref 150–400)
RBC # BLD AUTO: 4.24 M/UL (ref 4.6–6.2)
TIBC SERPL-MCNC: 233 ΜG/DL (ref 250–450)
VIT B12 SERPL-MCNC: 409 PG/ML (ref 193–986)
WBC # BLD AUTO: 7.6 K/UL (ref 4.5–11)

## 2023-07-12 PROCEDURE — 82607 VITAMIN B-12: CPT

## 2023-07-12 PROCEDURE — 82306 VITAMIN D 25 HYDROXY: CPT

## 2023-07-12 PROCEDURE — 82746 ASSAY OF FOLIC ACID SERUM: CPT

## 2023-07-12 PROCEDURE — 36415 COLL VENOUS BLD VENIPUNCTURE: CPT

## 2023-07-12 PROCEDURE — 82784 ASSAY IGA/IGD/IGG/IGM EACH: CPT

## 2023-07-12 PROCEDURE — 83550 IRON BINDING TEST: CPT

## 2023-07-12 PROCEDURE — 83540 ASSAY OF IRON: CPT

## 2023-07-12 PROCEDURE — 82728 ASSAY OF FERRITIN: CPT

## 2023-07-14 LAB — TTG IGA SER IA-ACNC: 18.9 U/ML

## 2023-07-31 ENCOUNTER — OFFICE VISIT (OUTPATIENT)
Dept: DERMATOLOGY | Facility: CLINIC | Age: 68
End: 2023-07-31
Payer: MEDICARE

## 2023-07-31 DIAGNOSIS — Z79.899 HIGH RISK MEDICATION USE: ICD-10-CM

## 2023-07-31 DIAGNOSIS — L13.0 DERMATITIS HERPETIFORMIS: Primary | ICD-10-CM

## 2023-07-31 PROCEDURE — 99214 OFFICE O/P EST MOD 30 MIN: CPT | Mod: ,,, | Performed by: DERMATOLOGY

## 2023-07-31 PROCEDURE — 99214 PR OFFICE/OUTPT VISIT, EST, LEVL IV, 30-39 MIN: ICD-10-PCS | Mod: ,,, | Performed by: DERMATOLOGY

## 2023-07-31 RX ORDER — DAPSONE 100 MG/1
100 TABLET ORAL DAILY
Qty: 30 TABLET | Refills: 1 | Status: SHIPPED | OUTPATIENT
Start: 2023-07-31 | End: 2023-08-30 | Stop reason: SDUPTHER

## 2023-07-31 NOTE — PROGRESS NOTES
[facility]   Natali Jenkins MD    Patient Name: Bhaskar Patel  Patient YOB: 1955   Date of Service: 7/31/23    CC: Follow-up Dermatitis Herpetiformis     HPI: Bhaskar Patel is a 68 y.o. male here today for follow-up of dermatitis herpetaformis, last seen 06/23.  Previous treatments include Dapsone. Overall, the dermatitis herpetiformis is improved.  Treatment plan was followed as directed.    Past Medical History:   Diagnosis Date    GERD (gastroesophageal reflux disease)     Hypertension      Past Surgical History:   Procedure Laterality Date    CAROTID ENDARTERECTOMY Left 5/16/2022    Procedure: ENDARTERECTOMY-CAROTID;  Surgeon: Karen Spencer MD;  Location: [facility] OR;  Service: General;  Laterality: Left;    CORONARY ARTERY BYPASS GRAFT      HERNIA REPAIR      LEFT HEART CATHETERIZATION Left 9/15/2021    Procedure: Left heart cath;  Surgeon: Quoc Vo DO;  Location: [facility] CATH LAB;  Service: Cardiology;  Laterality: Left;     Review of patient's allergies indicates:  No Known Allergies    Current Outpatient Medications:     aspirin (ECOTRIN) 81 MG EC tablet, Take 81 mg by mouth once daily., Disp: , Rfl:     atorvastatin (LIPITOR) 80 MG tablet, Take 1 tablet (80 mg total) by mouth every evening., Disp: 90 tablet, Rfl: 1    dapsone 100 MG Tab, Take 1 tablet (100 mg total) by mouth once daily., Disp: 30 tablet, Rfl: 1    diphenhydramine-calamine (CALAMINE MEDICATED) 1-8 % Lotn, Apply topically 4 (four) times daily as needed (pain, itching)., Disp: 177 mL, Rfl: 1    docusate sodium (COLACE) 100 MG capsule, Take 100 mg by mouth daily as needed. , Disp: , Rfl:     losartan (COZAAR) 25 MG tablet, Take 1 tablet (25 mg total) by mouth once daily., Disp: 90 tablet, Rfl: 1    metoprolol succinate (TOPROL-XL) 25 MG 24 hr tablet, Take 1 tablet (25 mg total) by mouth once daily., Disp: 90 tablet, Rfl: 1    omeprazole (PRILOSEC) 20 MG capsule, Take 1 capsule (20 mg total) by mouth once  daily., Disp: 90 capsule, Rfl: 1    triamcinolone acetonide 0.1% (KENALOG) 0.1 % cream, Apply to AA on body BID PRN flares tapering with improvement, Disp: 454 g, Rfl: 1    ROS: A focused review of systems was obtained and negative.     Exam: A focused skin exam was performed. All areas examined were normal except as mentioned in the assessment and plan below.  General Appearance of the patient is well developed and well nourished.  Orientation: alert and oriented x 3.  Mood and affect: pleasant.    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 A diagnosis of High risk medication use was also pertinent to this visit.    Plan:   Medications Ordered This Encounter   Medications    dapsone 100 MG Tab     Sig: Take 1 tablet (100 mg total) by mouth once daily.     Dispense:  30 tablet     Refill:  1       Dermatitis Herpetiformis  - clear   Status: Improved    Plan: Counseling.  I counseled the patient regarding the following:  Contact office if: Rash fails to resolve despite treatment.    - Will refill Dapsone  - EGD with biopsy scheduled with Dr. Alex for 08/04/23      High Risk Medication Monitoring (Z79.899) : The risks and benefits of the medication were reviewed in full with the patient. Should any side effects occur, the patient will stop the medication and contact me immediately.    - CBC ordered today  - pt denies muscle weakness of ROS    Dapsone Counseling- I discussed with the patient the risks of dapsone including but not limited to hemolytic  anemia, agranulocytosis, rashes, methemoglobinemia, kidney failure, peripheral neuropathy, headaches, GI  upset, and liver toxicity. Patients who start dapsone require monitoring including baseline LFTs and weekly CBCs  for the first month, then every month thereafter. The patient verbalized understanding of the proper use and  possible adverse effects of dapsone. All of the patient's questions and concerns were addressed.      Follow up  in about 1 month (around 8/31/2023) for  RAMBO.    Natali Jenkins MD

## 2023-08-04 ENCOUNTER — HOSPITAL ENCOUNTER (OUTPATIENT)
Dept: GASTROENTEROLOGY | Facility: HOSPITAL | Age: 68
Discharge: HOME | End: 2023-08-04
Payer: MEDICARE

## 2023-08-04 ENCOUNTER — ANESTHESIA EVENT (OUTPATIENT)
Dept: GASTROENTEROLOGY | Facility: HOSPITAL | Age: 68
End: 2023-08-04
Payer: MEDICARE

## 2023-08-04 ENCOUNTER — ANESTHESIA (OUTPATIENT)
Dept: GASTROENTEROLOGY | Facility: HOSPITAL | Age: 68
End: 2023-08-04
Payer: MEDICARE

## 2023-08-04 VITALS
TEMPERATURE: 98 F | HEART RATE: 62 BPM | BODY MASS INDEX: 20 KG/M2 | DIASTOLIC BLOOD PRESSURE: 73 MMHG | OXYGEN SATURATION: 96 % | SYSTOLIC BLOOD PRESSURE: 160 MMHG | WEIGHT: 160 LBS | RESPIRATION RATE: 21 BRPM

## 2023-08-04 DIAGNOSIS — K90.0 CELIAC DISEASE: Primary | ICD-10-CM

## 2023-08-04 DIAGNOSIS — L13.0 DERMATITIS HERPETIFORMIS: ICD-10-CM

## 2023-08-04 DIAGNOSIS — R63.4 WEIGHT LOSS: ICD-10-CM

## 2023-08-04 DIAGNOSIS — K90.0 CELIAC DISEASE: ICD-10-CM

## 2023-08-04 PROCEDURE — 27201423 OPTIME MED/SURG SUP & DEVICES STERILE SUPPLY

## 2023-08-04 PROCEDURE — 88342 IMHCHEM/IMCYTCHM 1ST ANTB: CPT | Mod: 26,,, | Performed by: PATHOLOGY

## 2023-08-04 PROCEDURE — 37000009 HC ANESTHESIA EA ADD 15 MINS

## 2023-08-04 PROCEDURE — 88305 TISSUE EXAM BY PATHOLOGIST: CPT | Mod: 26,,, | Performed by: PATHOLOGY

## 2023-08-04 PROCEDURE — 43239 PR EGD, FLEX, W/BIOPSY, SGL/MULTI: ICD-10-PCS | Mod: ,,, | Performed by: INTERNAL MEDICINE

## 2023-08-04 PROCEDURE — 43239 EGD BIOPSY SINGLE/MULTIPLE: CPT | Mod: ,,, | Performed by: INTERNAL MEDICINE

## 2023-08-04 PROCEDURE — 88305 SURGICAL PATHOLOGY: ICD-10-PCS | Mod: 26,,, | Performed by: PATHOLOGY

## 2023-08-04 PROCEDURE — D9220A PRA ANESTHESIA: ICD-10-PCS | Mod: ,,, | Performed by: ANESTHESIOLOGY

## 2023-08-04 PROCEDURE — D9220A PRA ANESTHESIA: Mod: ,,, | Performed by: ANESTHESIOLOGY

## 2023-08-04 PROCEDURE — 37000008 HC ANESTHESIA 1ST 15 MINUTES

## 2023-08-04 PROCEDURE — 88342 SURGICAL PATHOLOGY: ICD-10-PCS | Mod: 26,,, | Performed by: PATHOLOGY

## 2023-08-04 PROCEDURE — 25000242 PHARM REV CODE 250 ALT 637 W/ HCPCS: Performed by: ANESTHESIOLOGY

## 2023-08-04 PROCEDURE — 88305 TISSUE EXAM BY PATHOLOGIST: CPT | Mod: TC,SUR | Performed by: INTERNAL MEDICINE

## 2023-08-04 PROCEDURE — 43239 EGD BIOPSY SINGLE/MULTIPLE: CPT | Performed by: INTERNAL MEDICINE

## 2023-08-04 PROCEDURE — 63600175 PHARM REV CODE 636 W HCPCS: Performed by: ANESTHESIOLOGY

## 2023-08-04 PROCEDURE — 25000003 PHARM REV CODE 250: Performed by: ANESTHESIOLOGY

## 2023-08-04 RX ORDER — LIDOCAINE HYDROCHLORIDE 20 MG/ML
INJECTION, SOLUTION EPIDURAL; INFILTRATION; INTRACAUDAL; PERINEURAL
Status: DISCONTINUED | OUTPATIENT
Start: 2023-08-04 | End: 2023-08-04

## 2023-08-04 RX ORDER — PROPOFOL 10 MG/ML
VIAL (ML) INTRAVENOUS
Status: DISCONTINUED | OUTPATIENT
Start: 2023-08-04 | End: 2023-08-04

## 2023-08-04 RX ORDER — SODIUM CHLORIDE 0.9 % (FLUSH) 0.9 %
10 SYRINGE (ML) INJECTION
Status: DISCONTINUED | OUTPATIENT
Start: 2023-08-04 | End: 2023-08-05 | Stop reason: HOSPADM

## 2023-08-04 RX ORDER — SODIUM CHLORIDE 9 MG/ML
INJECTION, SOLUTION INTRAVENOUS CONTINUOUS PRN
Status: DISCONTINUED | OUTPATIENT
Start: 2023-08-04 | End: 2023-08-04

## 2023-08-04 RX ORDER — IPRATROPIUM BROMIDE 0.5 MG/2.5ML
SOLUTION RESPIRATORY (INHALATION)
Status: DISCONTINUED | OUTPATIENT
Start: 2023-08-04 | End: 2023-08-04

## 2023-08-04 RX ADMIN — PROPOFOL 50 MG: 10 INJECTION, EMULSION INTRAVENOUS at 07:08

## 2023-08-04 RX ADMIN — SODIUM CHLORIDE: 9 INJECTION, SOLUTION INTRAVENOUS at 07:08

## 2023-08-04 RX ADMIN — PROPOFOL 100 MG: 10 INJECTION, EMULSION INTRAVENOUS at 07:08

## 2023-08-04 RX ADMIN — LIDOCAINE HYDROCHLORIDE 50 MG: 20 INJECTION, SOLUTION INTRAVENOUS at 07:08

## 2023-08-04 RX ADMIN — IPRATROPIUM BROMIDE 0.5 MG: 0.5 SOLUTION RESPIRATORY (INHALATION) at 07:08

## 2023-08-04 NOTE — TRANSFER OF CARE
Anesthesia Transfer of Care Note    Patient: Bhaskar VU Amanda    Procedure(s) Performed: * EGD *    Patient location: GI    Anesthesia Type: general    Transport from OR: Transported from OR on room air with adequate spontaneous ventilation    Post pain: adequate analgesia    Post assessment: no apparent anesthetic complications    Post vital signs: stable    Level of consciousness: awake and responds to stimulation    Nausea/Vomiting: no nausea/vomiting    Complications: none    Transfer of care protocol was followed      Last vitals:   Visit Vitals  BP (!) 155/85 (BP Location: Left arm, Patient Position: Lying)   Pulse 66   Resp 20   Wt 72.6 kg (160 lb)   SpO2 (!) 93%   BMI 20.00 kg/m²

## 2023-08-04 NOTE — DISCHARGE INSTRUCTIONS
Procedure Date  8/4/23     Impression  Overall Impression:   Mild abnormal mucosa, consistent with gastritis in the antrum; performed cold forceps biopsies  The upper third of the esophagus, middle third of the esophagus, lower third of the esophagus, Z-line, cardia, fundus of the stomach, body of the stomach, incisura, duodenal bulb, 1st part of the duodenum and 2nd part of the duodenum appeared normal. Performed random biopsy to rule out celiac disease.     Recommendation    Await pathology results  Gluten free diet will decrease inflammation and should help with your rash as well  I recommend colonoscopy for colon cancer screening (no prior colonoscopy)  Reports 30 lbs weight loss. Given celiac disease diagnosis (DH and serology), he will need small bowel evaluation to exclude a more pathologic process  Please schedule MRI Enterography (ordered)  Keep follow up in GI clinic     NO DRIVING, OPERATING EQUIPMENT, OR SIGNING LEGAL DOCUMENTS FOR 24 HOURS.  THE NURSE WILL CALL YOU WITH YOUR BIOPSY RESULTS IN A FEW DAYS.

## 2023-08-04 NOTE — ANESTHESIA PREPROCEDURE EVALUATION
08/04/2023  Bhaskar Patel is a 68 y.o., male.      Pre-op Assessment    I have reviewed the Patient Summary Reports.    I have reviewed the NPO Status.   I have reviewed the Medications.     Review of Systems  Anesthesia Hx:  No problems with previous Anesthesia    Social:  No Alcohol Use, Smoker    Hematology/Oncology:  Hematology Normal   Oncology Normal     EENT/Dental:EENT/Dental Normal   Cardiovascular:   Hypertension CAD  CABG/stent  ECG has been reviewed.    Pulmonary:   COPD, mild    Renal/:  Renal/ Normal     Hepatic/GI:   GERD    Musculoskeletal:  Musculoskeletal Normal    Neurological:  Neurology Normal    Endocrine:  Endocrine Normal    Dermatological:  Skin Normal    Psych:  Psychiatric Normal           Physical Exam  General: Well nourished, Cooperative, Alert and Oriented    Airway:  Mallampati: II / II  Mouth Opening: Normal  TM Distance: Normal  Neck ROM: Normal ROM    Dental:  Dentures    Chest/Lungs:  Normal Respiratory Rate  Pre procedure albuterol given  Heart:  Rate: Normal  Rhythm: Regular Rhythm  Sounds: Normal        Chemistry        Component Value Date/Time     05/15/2023 0905    K 4.7 05/15/2023 0905     (H) 05/15/2023 0905    CO2 29 05/15/2023 0905    BUN 24 (H) 05/15/2023 0905    CREATININE 1.13 05/15/2023 0905    GLU 87 05/15/2023 0905        Component Value Date/Time    CALCIUM 8.5 05/15/2023 0905    ALKPHOS 107 05/15/2023 0905    AST 21 05/15/2023 0905    ALT 19 05/15/2023 0905    BILITOT 0.8 05/15/2023 0905    ESTGFRAFRICA 109 04/20/2020 1330    EGFRNONAA 70 05/12/2022 0849        Lab Results   Component Value Date    WBC 9.64 08/02/2023    RBC 3.85 (L) 08/02/2023    HGB 13.1 (L) 08/02/2023    .9 (H) 08/02/2023    MCH 34.0 (H) 08/02/2023    MCHC 32.4 08/02/2023    RDW 13.4 08/02/2023     (L) 08/02/2023    MPV 12.4 08/02/2023    LYMPH 23.5 (L)  08/02/2023    LYMPH 2.27 08/02/2023    LYMPH 24 (L) 08/02/2023    MONO 7.3 (H) 08/02/2023    MONO 7 (H) 08/02/2023    EOS 0.29 08/02/2023    BASO 0.06 08/02/2023     Results for orders placed or performed in visit on 05/16/22   EKG 12-lead    Collection Time: 05/16/22  7:23 AM    Narrative    Test Reason :     Vent. Rate : 061 BPM     Atrial Rate : 061 BPM     P-R Int : 146 ms          QRS Dur : 088 ms      QT Int : 412 ms       P-R-T Axes : 081 078 086 degrees     QTc Int : 414 ms    Normal sinus rhythm  Possible Anterior infarct (cited on or before 09-AUG-2021)  Abnormal ECG  When compared with ECG of 15-SEP-2021 07:28,  No significant change was found  Confirmed by Arya UBRCIAGA, Maury REY ([id number]) on 6/4/2022 9:05:09 PM    Referred By:             Confirmed By:Maury Koenig MD         Anesthesia Plan  Type of Anesthesia, risks & benefits discussed:    Anesthesia Type: Gen Natural Airway  Intra-op Monitoring Plan: Standard ASA Monitors  Post Op Pain Control Plan: multimodal analgesia  Induction:  IV  Informed Consent: Informed consent signed with the Patient and all parties understand the risks and agree with anesthesia plan.  All questions answered. Patient consented to blood products? Yes  ASA Score: 3  Day of Surgery Review of History & Physical: H&P Update referred to the surgeon/provider.    Ready For Surgery From Anesthesia Perspective.     .

## 2023-08-04 NOTE — ANESTHESIA POSTPROCEDURE EVALUATION
Anesthesia Post Evaluation    Patient: Bhaskar VU Amanda    Procedure(s) Performed: * colonoscopy *    Final Anesthesia Type: general      Patient location during evaluation: GI PACU  Patient participation: Yes- Able to Participate  Level of consciousness: awake and alert, oriented and awake  Post-procedure vital signs: reviewed and stable  Pain management: adequate  Airway patency: patent    PONV status at discharge: No PONV  Anesthetic complications: no      Cardiovascular status: blood pressure returned to baseline, hemodynamically stable and stable  Respiratory status: unassisted and spontaneous ventilation  Hydration status: euvolemic  Follow-up not needed.          Vitals Value Taken Time   /73 08/04/23 0825   Temp 36.4 °C (97.5 °F) 08/04/23 0806   Pulse 75 08/04/23 0827   Resp 16 08/04/23 0828   SpO2 95 % 08/04/23 0827   Vitals shown include unvalidated device data.      Event Time   Out of Recovery 08:32:33         Pain/Chao Score: Chao Score: 10 (8/4/2023  8:04 AM)

## 2023-08-04 NOTE — H&P
Gastroenterology Pre-procedure H&P    Chief Complaint: Celiac disease    History of Present Illness    Bhaskar Patel is a 68 y.o. male that  has a past medical history of GERD (gastroesophageal reflux disease) and Hypertension.     Patient with dermatitis herpetiformis and positive celiac antibodies here for EGD. No [facility] GI related cancer. Does report 30 lbs weight loss that he attributes to medical issues after having CABG and CEA in the last several years.       Past Medical History:   Diagnosis Date    GERD (gastroesophageal reflux disease)     Hypertension        Past Surgical History:   Procedure Laterality Date    CAROTID ENDARTERECTOMY Left 5/16/2022    Procedure: ENDARTERECTOMY-CAROTID;  Surgeon: Karen Spencer MD;  Location: [facility] OR;  Service: General;  Laterality: Left;    CORONARY ARTERY BYPASS GRAFT      HERNIA REPAIR      LEFT HEART CATHETERIZATION Left 9/15/2021    Procedure: Left heart cath;  Surgeon: Quoc Vo DO;  Location: [facility] CATH LAB;  Service: Cardiology;  Laterality: Left;       Family History   Problem Relation Age of Onset    Alzheimer's disease Mother     Polycystic kidney disease Mother     Hypertension Father     Heart disease Father     Cancer Sister     Heart disease Sister     Hypertension Sister        Social History     Socioeconomic History    Marital status:    Occupational History    Occupation: retired   Tobacco Use    Smoking status: Every Day     Current packs/day: 0.50     Average packs/day: 0.5 packs/day for 52.6 years (26.3 ttl pk-yrs)     Types: Cigarettes     Start date: 1971     Passive exposure: Current    Smokeless tobacco: Never    Tobacco comments:     already did [place] tobacco quitline. not interested in trying again. states he will quit on his own   Substance and Sexual Activity    Alcohol use: Not Currently    Drug use: Not Currently     Types: Marijuana    Sexual activity: Not Currently       Current Outpatient Medications   Medication Sig  Dispense Refill    aspirin (ECOTRIN) 81 MG EC tablet Take 81 mg by mouth once daily.      atorvastatin (LIPITOR) 80 MG tablet Take 1 tablet (80 mg total) by mouth every evening. 90 tablet 1    dapsone 100 MG Tab Take 1 tablet (100 mg total) by mouth once daily. 30 tablet 1    diphenhydramine-calamine (CALAMINE MEDICATED) 1-8 % Lotn Apply topically 4 (four) times daily as needed (pain, itching). 177 mL 1    docusate sodium (COLACE) 100 MG capsule Take 100 mg by mouth daily as needed.       losartan (COZAAR) 25 MG tablet Take 1 tablet (25 mg total) by mouth once daily. 90 tablet 1    metoprolol succinate (TOPROL-XL) 25 MG 24 hr tablet Take 1 tablet (25 mg total) by mouth once daily. 90 tablet 1    omeprazole (PRILOSEC) 20 MG capsule Take 1 capsule (20 mg total) by mouth once daily. 90 capsule 1    triamcinolone acetonide 0.1% (KENALOG) 0.1 % cream Apply to AA on body BID PRN flares tapering with improvement 454 g 1     Current Facility-Administered Medications   Medication Dose Route Frequency Provider Last Rate Last Admin    sodium chloride 0.9% flush 10 mL  10 mL Intravenous PRN Joseph Alex MD         Facility-Administered Medications Ordered in Other Encounters   Medication Dose Route Frequency Provider Last Rate Last Admin    ipratropium 0.02 % nebulizer solution   Nebulization PRN Kristin Cameron CRNA   0.5 mg at 08/04/23 0737       Review of patient's allergies indicates:  No Known Allergies    Objective:  Vitals:    08/04/23 0723   BP: (!) 155/85   Pulse: 66   Resp: 20   SpO2: (!) 93%   Weight: 72.6 kg (160 lb)        GEN: normal appearing, NAD, AAO x3  HENT: NCAT, anicteric, OP benign  CV: normal rate, regular rhythm  RESP: CTA, symmetric rise, unlabored  ABD: soft, ND, no guarding or TTP  SKIN: warm and dry  NEURO: grossly afocal    Assessment and Plan:    Proceed with:    EGD for celiac disease evaluation       Joseph Alex MD  Gastroenterology

## 2023-08-07 LAB
DHEA SERPL-MCNC: NORMAL
ESTROGEN SERPL-MCNC: NORMAL PG/ML
INSULIN SERPL-ACNC: NORMAL U[IU]/ML
LAB AP GROSS DESCRIPTION: NORMAL
LAB AP LABORATORY NOTES: NORMAL
T3RU NFR SERPL: NORMAL %

## 2023-08-07 NOTE — PROGRESS NOTES
I called and spoke to patient about his biopsy results. He is eating gluten currently, but biopsies are discordant with serology and do not support celiac disease. He meets diagnostic criteria with biopsy proven DH and +serology (tTG). I did offer additional testing which would entail additional labs (EMA or DGP followed by possible HLA testing if needed) +/- high gluten challenge and repeat biopsies. I don't think additional testing will make a big difference clinically due to lack of symptoms; patient has similar feeling and doesn't want to pursue further testing at this time. He will follow up with us in clinic, and we can reassess in the future if needed.

## 2023-08-30 ENCOUNTER — OFFICE VISIT (OUTPATIENT)
Dept: DERMATOLOGY | Facility: CLINIC | Age: 68
End: 2023-08-30
Payer: MEDICARE

## 2023-08-30 ENCOUNTER — LAB VISIT (OUTPATIENT)
Dept: LAB | Facility: HOSPITAL | Age: 68
End: 2023-08-30
Attending: DERMATOLOGY
Payer: MEDICARE

## 2023-08-30 DIAGNOSIS — L13.0 DERMATITIS HERPETIFORMIS: Primary | ICD-10-CM

## 2023-08-30 DIAGNOSIS — Z79.899 HIGH RISK MEDICATION USE: ICD-10-CM

## 2023-08-30 LAB
BASOPHILS # BLD AUTO: 0.02 K/UL (ref 0–0.2)
BASOPHILS NFR BLD AUTO: 0.2 % (ref 0–1)
DIFFERENTIAL METHOD BLD: ABNORMAL
EOSINOPHIL # BLD AUTO: 0.11 K/UL (ref 0–0.5)
EOSINOPHIL NFR BLD AUTO: 1.2 % (ref 1–4)
ERYTHROCYTE [DISTWIDTH] IN BLOOD BY AUTOMATED COUNT: 12.7 % (ref 11.5–14.5)
HCT VFR BLD AUTO: 38.8 % (ref 40–54)
HGB BLD-MCNC: 12.9 G/DL (ref 13.5–18)
LYMPHOCYTES # BLD AUTO: 1.76 K/UL (ref 1–4.8)
LYMPHOCYTES NFR BLD AUTO: 18.6 % (ref 27–41)
MCH RBC QN AUTO: 34.4 PG (ref 27–31)
MCHC RBC AUTO-ENTMCNC: 33.2 G/DL (ref 32–36)
MCV RBC AUTO: 103.5 FL (ref 80–96)
MONOCYTES # BLD AUTO: 0.65 K/UL (ref 0–0.8)
MONOCYTES NFR BLD AUTO: 6.9 % (ref 2–6)
MPC BLD CALC-MCNC: 12.4 FL (ref 9.4–12.4)
NEUTROPHILS # BLD AUTO: 6.93 K/UL (ref 1.8–7.7)
NEUTROPHILS NFR BLD AUTO: 73.1 % (ref 53–65)
PLATELET # BLD AUTO: 114 K/UL (ref 150–400)
RBC # BLD AUTO: 3.75 M/UL (ref 4.6–6.2)
WBC # BLD AUTO: 9.47 K/UL (ref 4.5–11)

## 2023-08-30 PROCEDURE — 36415 COLL VENOUS BLD VENIPUNCTURE: CPT

## 2023-08-30 PROCEDURE — 85025 COMPLETE CBC W/AUTO DIFF WBC: CPT

## 2023-08-30 PROCEDURE — 99214 OFFICE O/P EST MOD 30 MIN: CPT | Mod: ,,, | Performed by: DERMATOLOGY

## 2023-08-30 PROCEDURE — 99214 PR OFFICE/OUTPT VISIT, EST, LEVL IV, 30-39 MIN: ICD-10-PCS | Mod: ,,, | Performed by: DERMATOLOGY

## 2023-08-30 RX ORDER — DAPSONE 100 MG/1
100 TABLET ORAL DAILY
Qty: 30 TABLET | Refills: 2 | Status: SHIPPED | OUTPATIENT
Start: 2023-08-30 | End: 2023-11-30 | Stop reason: SDUPTHER

## 2023-08-30 NOTE — PROGRESS NOTES
[facility]   Natali Jenkins MD    Patient Name: Bhaskar Patel  Patient YOB: 1955   Date of Service: 8/30/23    CC: Follow-up Dermatitis Herpetaformis    HPI: Bhaskar Patel is a 68 y.o. male here today for follow-up of DH, last seen 07/21/23.  Previous treatments include dapsone.  Overall, the DH is improved.  Treatment plan was followed as directed.    Past Medical History:   Diagnosis Date    GERD (gastroesophageal reflux disease)     Hypertension      Past Surgical History:   Procedure Laterality Date    CAROTID ENDARTERECTOMY Left 5/16/2022    Procedure: ENDARTERECTOMY-CAROTID;  Surgeon: Karen Spencer MD;  Location: [facility] OR;  Service: General;  Laterality: Left;    CORONARY ARTERY BYPASS GRAFT      HERNIA REPAIR      LEFT HEART CATHETERIZATION Left 9/15/2021    Procedure: Left heart cath;  Surgeon: Quoc Vo DO;  Location: [facility] CATH LAB;  Service: Cardiology;  Laterality: Left;     Review of patient's allergies indicates:  No Known Allergies    Current Outpatient Medications:     aspirin (ECOTRIN) 81 MG EC tablet, Take 81 mg by mouth once daily., Disp: , Rfl:     atorvastatin (LIPITOR) 80 MG tablet, Take 1 tablet (80 mg total) by mouth every evening., Disp: 90 tablet, Rfl: 1    dapsone 100 MG Tab, Take 1 tablet (100 mg total) by mouth once daily., Disp: 30 tablet, Rfl: 2    diphenhydramine-calamine (CALAMINE MEDICATED) 1-8 % Lotn, Apply topically 4 (four) times daily as needed (pain, itching)., Disp: 177 mL, Rfl: 1    docusate sodium (COLACE) 100 MG capsule, Take 100 mg by mouth daily as needed. , Disp: , Rfl:     losartan (COZAAR) 25 MG tablet, Take 1 tablet (25 mg total) by mouth once daily., Disp: 90 tablet, Rfl: 1    metoprolol succinate (TOPROL-XL) 25 MG 24 hr tablet, Take 1 tablet (25 mg total) by mouth once daily., Disp: 90 tablet, Rfl: 1    omeprazole (PRILOSEC) 20 MG capsule, Take 1 capsule (20 mg total) by mouth once daily., Disp: 90 capsule, Rfl: 1     triamcinolone acetonide 0.1% (KENALOG) 0.1 % cream, Apply to AA on body BID PRN flares tapering with improvement, Disp: 454 g, Rfl: 1    ROS: A focused review of systems was obtained and negative.     Exam: A focused skin exam was performed. All areas examined were normal except as mentioned in the assessment and plan below.  General Appearance of the patient is well developed and well nourished.  Orientation: alert and oriented x 3.  Mood and affect: pleasant.    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 A diagnosis of High risk medication use was also pertinent to this visit.    Plan:   Medications Ordered This Encounter   Medications    dapsone 100 MG Tab     Sig: Take 1 tablet (100 mg total) by mouth once daily.     Dispense:  30 tablet     Refill:  2       Dermatitis Herpetiformis  - clear   Status: Improved     Plan: Counseling.  I counseled the patient regarding the following:  Contact office if: Rash fails to resolve despite treatment.     - continue Dapsone  - pathology results from EGD with Dr. Alex reviewed      High Risk Medication Monitoring (Z79.899) : The risks and benefits of the medication were reviewed in full with the patient. Should any side effects occur, the patient will stop the medication and contact me immediately.    - CBC ordered today    Dapsone Counseling- I discussed with the patient the risks of dapsone including but not limited to hemolytic  anemia, agranulocytosis, rashes, methemoglobinemia, kidney failure, peripheral neuropathy, headaches, GI  upset, and liver toxicity. Patients who start dapsone require monitoring including baseline LFTs and weekly CBCs  for the first month, then every month thereafter. The patient verbalized understanding of the proper use and  possible adverse effects of dapsone. All of the patient's questions and concerns were addressed.    Follow up in about 3 months (around 11/30/2023) for RADHA RICKS.    Natali Jenkins MD

## 2023-10-11 ENCOUNTER — OFFICE VISIT (OUTPATIENT)
Dept: FAMILY MEDICINE | Facility: CLINIC | Age: 68
End: 2023-10-11
Payer: MEDICARE

## 2023-10-11 VITALS
RESPIRATION RATE: 18 BRPM | OXYGEN SATURATION: 96 % | BODY MASS INDEX: 19.62 KG/M2 | WEIGHT: 157.81 LBS | TEMPERATURE: 98 F | SYSTOLIC BLOOD PRESSURE: 170 MMHG | DIASTOLIC BLOOD PRESSURE: 92 MMHG | HEIGHT: 75 IN | HEART RATE: 65 BPM

## 2023-10-11 DIAGNOSIS — I10 ESSENTIAL HYPERTENSION: ICD-10-CM

## 2023-10-11 DIAGNOSIS — K21.9 GASTROESOPHAGEAL REFLUX DISEASE, UNSPECIFIED WHETHER ESOPHAGITIS PRESENT: ICD-10-CM

## 2023-10-11 DIAGNOSIS — E78.5 HYPERLIPIDEMIA, UNSPECIFIED HYPERLIPIDEMIA TYPE: ICD-10-CM

## 2023-10-11 PROCEDURE — 99213 OFFICE O/P EST LOW 20 MIN: CPT | Mod: ,,,

## 2023-10-11 PROCEDURE — 90694 VACC AIIV4 NO PRSRV 0.5ML IM: CPT | Mod: ,,,

## 2023-10-11 PROCEDURE — G0008 FLU VACCINE - QUADRIVALENT - ADJUVANTED: ICD-10-PCS | Mod: ,,,

## 2023-10-11 PROCEDURE — 90694 FLU VACCINE - QUADRIVALENT - ADJUVANTED: ICD-10-PCS | Mod: ,,,

## 2023-10-11 PROCEDURE — 99213 PR OFFICE/OUTPT VISIT, EST, LEVL III, 20-29 MIN: ICD-10-PCS | Mod: ,,,

## 2023-10-11 PROCEDURE — G0008 ADMIN INFLUENZA VIRUS VAC: HCPCS | Mod: ,,,

## 2023-10-11 RX ORDER — ATORVASTATIN CALCIUM 80 MG/1
80 TABLET, FILM COATED ORAL NIGHTLY
Qty: 90 TABLET | Refills: 1 | Status: SHIPPED | OUTPATIENT
Start: 2023-10-11

## 2023-10-11 RX ORDER — LOSARTAN POTASSIUM 25 MG/1
25 TABLET ORAL DAILY
Qty: 90 TABLET | Refills: 1 | Status: SHIPPED | OUTPATIENT
Start: 2023-10-11

## 2023-10-11 RX ORDER — OMEPRAZOLE 20 MG/1
20 CAPSULE, DELAYED RELEASE ORAL DAILY
Qty: 90 CAPSULE | Refills: 1 | Status: SHIPPED | OUTPATIENT
Start: 2023-10-11

## 2023-10-11 RX ORDER — METOPROLOL SUCCINATE 25 MG/1
25 TABLET, EXTENDED RELEASE ORAL DAILY
Qty: 90 TABLET | Refills: 1 | Status: SHIPPED | OUTPATIENT
Start: 2023-10-11

## 2023-10-11 NOTE — ASSESSMENT & PLAN NOTE
GERD is controlled. Current medical regimen is effective;   Will adjust according to results and monitor.

## 2023-10-11 NOTE — PROGRESS NOTES
Subjective     Patient ID: Bhaskar Patel is a 68 y.o. male.    Chief Complaint: Follow-up (6 month follow up) and Health Maintenance (LDCT Lung Screen -Declined/Influenza Vaccine(1) 10/11/2023 /Colorectal Cancer Screening -Declined/)      Pt is here for medication refill and labs. Pt has eaten an oatmeal creme pie this am. Pt denies chest pain or sob at this visit.    Follow-up  This is a chronic problem. The current episode started more than 1 year ago. The problem occurs constantly. The problem has been gradually improving. Pertinent negatives include no change in bowel habit, chest pain, fatigue, headaches, nausea, rash, urinary symptoms or vomiting. Nothing aggravates the symptoms. Treatments tried: continues to take meds as prescribed. The treatment provided significant relief.       Review of Systems   Constitutional:  Negative for activity change, appetite change and fatigue.   HENT:  Negative for sinus pressure/congestion and trouble swallowing.    Eyes:  Negative for visual disturbance.   Respiratory:  Negative for chest tightness and shortness of breath.    Cardiovascular:  Negative for chest pain and palpitations.   Gastrointestinal:  Negative for change in bowel habit, nausea and vomiting.   Genitourinary:  Negative for difficulty urinating and dysuria.   Integumentary:  Negative for rash.   Neurological:  Negative for headaches.   Psychiatric/Behavioral:  The patient is not nervous/anxious.             Objective     Physical Exam  Vitals and nursing note reviewed.   Constitutional:       Appearance: Normal appearance. He is not ill-appearing.   HENT:      Head: Normocephalic.      Right Ear: Tympanic membrane, ear canal and external ear normal.      Left Ear: Tympanic membrane, ear canal and external ear normal.      Nose: Nose normal.      Mouth/Throat:      Mouth: Mucous membranes are moist.      Pharynx: Oropharynx is clear.   Eyes:      Extraocular Movements: Extraocular movements intact.       Conjunctiva/sclera: Conjunctivae normal.      Pupils: Pupils are equal, round, and reactive to light.   Cardiovascular:      Rate and Rhythm: Normal rate and regular rhythm.      Pulses: Normal pulses.      Heart sounds: Normal heart sounds.   Pulmonary:      Effort: Pulmonary effort is normal. No respiratory distress.      Breath sounds: Normal breath sounds.   Abdominal:      General: Bowel sounds are normal.      Palpations: Abdomen is soft.      Tenderness: There is no abdominal tenderness.   Genitourinary:     Comments: Denies difficulty starting stream or stopping his stream.  Musculoskeletal:         General: Normal range of motion.      Cervical back: Normal range of motion and neck supple.      Right lower leg: No edema.      Left lower leg: No edema.   Skin:     General: Skin is warm and dry.      Capillary Refill: Capillary refill takes less than 2 seconds.   Neurological:      General: No focal deficit present.      Mental Status: He is alert and oriented to person, place, and time.   Psychiatric:         Mood and Affect: Mood normal.         Behavior: Behavior normal.         Thought Content: Thought content normal.         Judgment: Judgment normal.              Assessment and Plan     1. Hyperlipidemia, unspecified hyperlipidemia type  Assessment & Plan:  Hyperlipidemia is controlled. Current medical regimen is effective;   Will adjust according to results and monitor.    Orders:  -     atorvastatin (LIPITOR) 80 MG tablet; Take 1 tablet (80 mg total) by mouth every evening.  Dispense: 90 tablet; Refill: 1  -     Basic Metabolic Panel; Future; Expected date: 10/11/2023  -     Lipid Panel; Future; Expected date: 10/11/2023  -     Microalbumin/Creatinine Ratio, Urine    2. Essential hypertension  Assessment & Plan:  Blood pressure is controlled. Current medical regimen is effective;   Will adjust according to results and monitor.    Orders:  -     losartan (COZAAR) 25 MG tablet; Take 1 tablet (25 mg  total) by mouth once daily.  Dispense: 90 tablet; Refill: 1  -     metoprolol succinate (TOPROL-XL) 25 MG 24 hr tablet; Take 1 tablet (25 mg total) by mouth once daily.  Dispense: 90 tablet; Refill: 1  -     Basic Metabolic Panel; Future; Expected date: 10/11/2023  -     CBC Auto Differential  -     Microalbumin/Creatinine Ratio, Urine    3. Gastroesophageal reflux disease, unspecified whether esophagitis present  Assessment & Plan:  GERD is controlled. Current medical regimen is effective;   Will adjust according to results and monitor.    Orders:  -     omeprazole (PRILOSEC) 20 MG capsule; Take 1 capsule (20 mg total) by mouth once daily.  Dispense: 90 capsule; Refill: 1    Other orders  -     Influenza (FLUAD) - Quadrivalent (Adjuvanted) *Preferred* (65+) (PF)               Follow up in about 6 months (around 4/11/2024).

## 2023-10-12 LAB
BASOPHILS # BLD AUTO: 0.05 K/UL (ref 0–0.2)
BASOPHILS NFR BLD AUTO: 0.7 % (ref 0–1)
DIFFERENTIAL METHOD BLD: ABNORMAL
EOSINOPHIL # BLD AUTO: 0.3 K/UL (ref 0–0.5)
EOSINOPHIL NFR BLD AUTO: 3.9 % (ref 1–4)
ERYTHROCYTE [DISTWIDTH] IN BLOOD BY AUTOMATED COUNT: 13.4 % (ref 11.5–14.5)
HCT VFR BLD AUTO: 38.4 % (ref 40–54)
HGB BLD-MCNC: 13 G/DL (ref 13.5–18)
IMM GRANULOCYTES # BLD AUTO: 0.02 K/UL (ref 0–0.04)
IMM GRANULOCYTES NFR BLD: 0.3 % (ref 0–0.4)
LYMPHOCYTES # BLD AUTO: 1.72 K/UL (ref 1–4.8)
LYMPHOCYTES NFR BLD AUTO: 22.6 % (ref 27–41)
MCH RBC QN AUTO: 34.1 PG (ref 27–31)
MCHC RBC AUTO-ENTMCNC: 33.9 G/DL (ref 32–36)
MCV RBC AUTO: 100.8 FL (ref 80–96)
MONOCYTES # BLD AUTO: 0.64 K/UL (ref 0–0.8)
MONOCYTES NFR BLD AUTO: 8.4 % (ref 2–6)
MPC BLD CALC-MCNC: 12.6 FL (ref 9.4–12.4)
NEUTROPHILS # BLD AUTO: 4.89 K/UL (ref 1.8–7.7)
NEUTROPHILS NFR BLD AUTO: 64.1 % (ref 53–65)
NRBC # BLD AUTO: 0 X10E3/UL
NRBC, AUTO (.00): 0 %
PLATELET # BLD AUTO: 99 K/UL (ref 150–400)
RBC # BLD AUTO: 3.81 M/UL (ref 4.6–6.2)
WBC # BLD AUTO: 7.62 K/UL (ref 4.5–11)

## 2023-10-12 PROCEDURE — 85025 CBC WITH DIFFERENTIAL: ICD-10-PCS | Mod: ,,, | Performed by: CLINICAL MEDICAL LABORATORY

## 2023-10-12 PROCEDURE — 85025 COMPLETE CBC W/AUTO DIFF WBC: CPT | Mod: ,,, | Performed by: CLINICAL MEDICAL LABORATORY

## 2023-11-01 ENCOUNTER — OFFICE VISIT (OUTPATIENT)
Dept: VASCULAR SURGERY | Facility: CLINIC | Age: 68
End: 2023-11-01
Payer: MEDICARE

## 2023-11-01 ENCOUNTER — HOSPITAL ENCOUNTER (OUTPATIENT)
Dept: RADIOLOGY | Facility: HOSPITAL | Age: 68
Discharge: HOME | End: 2023-11-01
Attending: SURGERY
Payer: MEDICARE

## 2023-11-01 VITALS — BODY MASS INDEX: 19.52 KG/M2 | HEIGHT: 75 IN | WEIGHT: 157 LBS

## 2023-11-01 DIAGNOSIS — I65.23 BILATERAL CAROTID ARTERY STENOSIS: ICD-10-CM

## 2023-11-01 DIAGNOSIS — I65.23 BILATERAL CAROTID ARTERY STENOSIS: Primary | ICD-10-CM

## 2023-11-01 PROCEDURE — 99213 OFFICE O/P EST LOW 20 MIN: CPT | Mod: PBBFAC,25 | Performed by: SURGERY

## 2023-11-01 PROCEDURE — 93880 US CAROTID BILATERAL: ICD-10-PCS | Mod: 26,,, | Performed by: SURGERY

## 2023-11-01 PROCEDURE — 93880 EXTRACRANIAL BILAT STUDY: CPT | Mod: 26,,, | Performed by: SURGERY

## 2023-11-01 PROCEDURE — 93880 EXTRACRANIAL BILAT STUDY: CPT | Mod: TC

## 2023-11-01 PROCEDURE — 99213 PR OFFICE/OUTPT VISIT, EST, LEVL III, 20-29 MIN: ICD-10-PCS | Mod: S$PBB,,, | Performed by: SURGERY

## 2023-11-01 PROCEDURE — 99213 OFFICE O/P EST LOW 20 MIN: CPT | Mod: S$PBB,,, | Performed by: SURGERY

## 2023-11-01 NOTE — PROGRESS NOTES
Vascular clinic    Follow-up previous left carotid endarterectomy duplex studies reviewed no complaints no strokes mini strokes TIAs     Right do carotid less 50% left carotid less 50%.  Low lymph node on the right this was submandibular states he is got a sinus infection currently.  Previous head CT for lymphadenopathy no significant odor suspicious lesions educated him move call if these change    Carotid duplex in a year

## 2023-11-09 ENCOUNTER — OFFICE VISIT (OUTPATIENT)
Dept: CARDIOLOGY | Facility: CLINIC | Age: 68
End: 2023-11-09
Payer: MEDICARE

## 2023-11-09 VITALS
HEIGHT: 75 IN | HEART RATE: 71 BPM | BODY MASS INDEX: 18.53 KG/M2 | WEIGHT: 149 LBS | DIASTOLIC BLOOD PRESSURE: 80 MMHG | OXYGEN SATURATION: 94 % | SYSTOLIC BLOOD PRESSURE: 144 MMHG

## 2023-11-09 DIAGNOSIS — R07.2 PRECORDIAL PAIN: ICD-10-CM

## 2023-11-09 DIAGNOSIS — I10 ESSENTIAL HYPERTENSION: Primary | ICD-10-CM

## 2023-11-09 DIAGNOSIS — R06.09 EXERTIONAL DYSPNEA: ICD-10-CM

## 2023-11-09 DIAGNOSIS — I25.10 CORONARY ARTERY DISEASE INVOLVING NATIVE CORONARY ARTERY OF NATIVE HEART WITHOUT ANGINA PECTORIS: ICD-10-CM

## 2023-11-09 DIAGNOSIS — E78.2 MIXED HYPERLIPIDEMIA: ICD-10-CM

## 2023-11-09 PROCEDURE — 99214 OFFICE O/P EST MOD 30 MIN: CPT | Mod: ,,, | Performed by: INTERNAL MEDICINE

## 2023-11-09 PROCEDURE — 99214 PR OFFICE/OUTPT VISIT, EST, LEVL IV, 30-39 MIN: ICD-10-PCS | Mod: ,,, | Performed by: INTERNAL MEDICINE

## 2023-11-09 NOTE — PROGRESS NOTES
Cardiology Clinic Note:    PCP: Stephany Mathias NP    REFERRING PHYSICIAN: Jc Walton DO    CHIEF COMPLAINT: Chest pain, CAD    HISTORY OF PRESENT ILLNESS:  Bhaskar Patel is a 68 y.o. male who presents for evaluation of CAD.     Pt reports chest pain resolved post CABG, has not reoccurred. He continues working full time, able  perform all normal activities of daily living without provocation of chest pain, pressure or shortness of breath.  Pt underwent planned carotid revascularization without incident, no TIA/stroke symptoms,. He continues to smoke 3/4 to 1/2 pack daily against medical advice, not interested in smoking cessation,        Review of Systems   Constitutional: Negative.    HENT: Negative.          Left neck pain with enlarged lymph node has resolved.   Eyes: Negative.    Respiratory: Negative.     Cardiovascular: Negative.    Gastrointestinal: Negative.    Genitourinary: Negative.    Musculoskeletal: Negative.    Skin: Negative.    Neurological: Negative.    Endo/Heme/Allergies: Negative.    Psychiatric/Behavioral: Negative.         PAST MEDICAL HISTORY:  Past Medical History:   Diagnosis Date    GERD (gastroesophageal reflux disease)     Hypertension        PAST SURGICAL HISTORY:  Past Surgical History:   Procedure Laterality Date    CAROTID ENDARTERECTOMY Left 5/16/2022    Procedure: ENDARTERECTOMY-CAROTID;  Surgeon: Karen Spencer MD;  Location: [facility] OR;  Service: General;  Laterality: Left;    CORONARY ARTERY BYPASS GRAFT      HERNIA REPAIR      LEFT HEART CATHETERIZATION Left 9/15/2021    Procedure: Left heart cath;  Surgeon: Quoc Vo DO;  Location: [facility] CATH LAB;  Service: Cardiology;  Laterality: Left;       SOCIAL HISTORY:  Social History     Socioeconomic History    Marital status:    Occupational History    Occupation: retired   Tobacco Use    Smoking status: Every Day     Current packs/day: 0.50     Average packs/day: 0.5 packs/day for 52.9 years (26.4 ttl  "pk-yrs)     Types: Cigarettes     Start date: 1971     Passive exposure: Current    Smokeless tobacco: Never    Tobacco comments:     already did [place] tobacco quitline. not interested in trying again. states he will quit on his own   Substance and Sexual Activity    Alcohol use: Not Currently    Drug use: Not Currently     Types: Marijuana    Sexual activity: Not Currently       FAMILY HISTORY:  Family History   Problem Relation Age of Onset    Alzheimer's disease Mother     Polycystic kidney disease Mother     Hypertension Father     Heart disease Father     Cancer Sister     Heart disease Sister     Hypertension Sister        ALLERGIES:  Allergies as of 11/09/2023    (No Known Allergies)         MEDICATIONS:  Current Outpatient Medications on File Prior to Visit   Medication Sig Dispense Refill    aspirin (ECOTRIN) 81 MG EC tablet Take 81 mg by mouth once daily.      atorvastatin (LIPITOR) 80 MG tablet Take 1 tablet (80 mg total) by mouth every evening. 90 tablet 1    dapsone 100 MG Tab Take 1 tablet (100 mg total) by mouth once daily. 30 tablet 2    docusate sodium (COLACE) 100 MG capsule Take 100 mg by mouth daily as needed.       losartan (COZAAR) 25 MG tablet Take 1 tablet (25 mg total) by mouth once daily. 90 tablet 1    metoprolol succinate (TOPROL-XL) 25 MG 24 hr tablet Take 1 tablet (25 mg total) by mouth once daily. 90 tablet 1    omeprazole (PRILOSEC) 20 MG capsule Take 1 capsule (20 mg total) by mouth once daily. 90 capsule 1    triamcinolone acetonide 0.1% (KENALOG) 0.1 % cream Apply to AA on body BID PRN flares tapering with improvement (Patient not taking: Reported on 11/1/2023) 454 g 1     No current facility-administered medications on file prior to visit.          PHYSICAL EXAM:  Blood pressure (!) 144/80, pulse 71, height 6' 3" (1.905 m), weight 67.6 kg (149 lb), SpO2 (!) 94 %.  Wt Readings from Last 3 Encounters:   11/09/23 67.6 kg (149 lb)   11/01/23 71.2 kg (157 lb)   10/11/23 71.6 kg (157 lb " 12.8 oz)      Body mass index is 18.62 kg/m².    Physical Exam  Vitals and nursing note reviewed.   Constitutional:       Appearance: Normal appearance. He is normal weight.   HENT:      Head: Normocephalic and atraumatic.      Right Ear: External ear normal.      Left Ear: External ear normal.   Eyes:      General: No scleral icterus.        Right eye: No discharge.         Left eye: No discharge.      Extraocular Movements: Extraocular movements intact.      Conjunctiva/sclera: Conjunctivae normal.      Pupils: Pupils are equal, round, and reactive to light.   Cardiovascular:      Rate and Rhythm: Normal rate and regular rhythm.      Pulses: Normal pulses.      Heart sounds: Normal heart sounds. No murmur heard.     No friction rub. No gallop.   Pulmonary:      Effort: Pulmonary effort is normal.      Breath sounds: No wheezing or rales.      Comments: Breath sounds decreased bilaterlly, scattered rhonchi, clear with cough  Chest:      Chest wall: No tenderness.   Abdominal:      General: Abdomen is flat. Bowel sounds are normal. There is no distension.      Palpations: Abdomen is soft.      Tenderness: There is no abdominal tenderness. There is no guarding or rebound.   Musculoskeletal:         General: No swelling or tenderness. Normal range of motion.      Cervical back: Normal range of motion and neck supple.   Skin:     General: Skin is warm and dry.      Findings: No erythema or rash.   Neurological:      General: No focal deficit present.      Mental Status: He is alert and oriented to person, place, and time.      Cranial Nerves: No cranial nerve deficit.      Motor: No weakness.      Gait: Gait normal.   Psychiatric:         Mood and Affect: Mood normal.         Behavior: Behavior normal.         Thought Content: Thought content normal.         Judgment: Judgment normal.          LABS REVIEWED:  Lab Results   Component Value Date    WBC 7.62 10/12/2023    RBC 3.81 (L) 10/12/2023    HGB 13.0 (L) 10/12/2023     HCT 38.4 (L) 10/12/2023    .8 (H) 10/12/2023    MCH 34.1 (H) 10/12/2023    MCHC 33.9 10/12/2023    RDW 13.4 10/12/2023    PLT 99 (L) 10/12/2023    MPV 12.6 (H) 10/12/2023    NRBC 0.0 10/12/2023     Lab Results   Component Value Date     10/12/2023    K 4.6 10/12/2023     10/12/2023    CO2 29 10/12/2023    BUN 15 10/12/2023     Lab Results   Component Value Date     04/12/2023    AST 21 05/15/2023    ALT 19 05/15/2023     Lab Results   Component Value Date    GLU 90 10/12/2023     Lab Results   Component Value Date    CHOL 153 10/12/2023    HDL 58 10/12/2023    TRIG 56 10/12/2023    CHOLHDL 2.6 10/12/2023       CARDIAC STUDIES REVIEWED:    OTHER IMAGING STUDIES REVIEWED:    ASSESSMENT: PLAN:  1. CAD: stable class 1 angina, post CABG, severe triple vessel disease with SVG to RCA and LAD, grafts patent at [facility] 9/2021, well preserved EF at 55%               2  PVD: severe carotid stenosis, successful revascularization, now asymptomatic         3. Hypertension: adequate control on current meds.                4.  Tobacco abuse - discussed smoking cessation, did not start Chantex, once again declines pharmacologic support    Follow up in six months, sooner if symptoms change.

## 2023-11-30 ENCOUNTER — OFFICE VISIT (OUTPATIENT)
Dept: DERMATOLOGY | Facility: CLINIC | Age: 68
End: 2023-11-30
Payer: MEDICARE

## 2023-11-30 DIAGNOSIS — L13.0 DERMATITIS HERPETIFORMIS: Primary | ICD-10-CM

## 2023-11-30 DIAGNOSIS — Z79.899 HIGH RISK MEDICATION USE: ICD-10-CM

## 2023-11-30 PROCEDURE — 99214 PR OFFICE/OUTPT VISIT, EST, LEVL IV, 30-39 MIN: ICD-10-PCS | Mod: ,,, | Performed by: DERMATOLOGY

## 2023-11-30 PROCEDURE — 99214 OFFICE O/P EST MOD 30 MIN: CPT | Mod: ,,, | Performed by: DERMATOLOGY

## 2023-11-30 RX ORDER — DAPSONE 100 MG/1
100 TABLET ORAL DAILY
Qty: 90 TABLET | Refills: 1 | Status: SHIPPED | OUTPATIENT
Start: 2023-11-30 | End: 2024-02-29 | Stop reason: SDUPTHER

## 2023-11-30 NOTE — PROGRESS NOTES
[facility]   Natali Jenkins MD    Patient Name: Bhaskar Patel  Patient YOB: 1955   Date of Service: 11/30/23    CC: Follow-up Dermatitis Herpetaformis     HPI: Bhaskar Patel is a 68 y.o. male here today for follow-up of DH, last seen 08/30/2023.  Previous treatments include Dapsone.  Overall, the DH is stable.  Treatment plan was followed as directed.    Past Medical History:   Diagnosis Date    GERD (gastroesophageal reflux disease)     Hypertension      Past Surgical History:   Procedure Laterality Date    CAROTID ENDARTERECTOMY Left 5/16/2022    Procedure: ENDARTERECTOMY-CAROTID;  Surgeon: Karen Spencer MD;  Location: [facility] OR;  Service: General;  Laterality: Left;    CORONARY ARTERY BYPASS GRAFT      HERNIA REPAIR      LEFT HEART CATHETERIZATION Left 9/15/2021    Procedure: Left heart cath;  Surgeon: Quoc Vo DO;  Location: [facility] CATH LAB;  Service: Cardiology;  Laterality: Left;     Review of patient's allergies indicates:  No Known Allergies    Current Outpatient Medications:     aspirin (ECOTRIN) 81 MG EC tablet, Take 81 mg by mouth once daily., Disp: , Rfl:     atorvastatin (LIPITOR) 80 MG tablet, Take 1 tablet (80 mg total) by mouth every evening., Disp: 90 tablet, Rfl: 1    dapsone 100 MG Tab, Take 1 tablet (100 mg total) by mouth once daily., Disp: 90 tablet, Rfl: 1    docusate sodium (COLACE) 100 MG capsule, Take 100 mg by mouth daily as needed. , Disp: , Rfl:     losartan (COZAAR) 25 MG tablet, Take 1 tablet (25 mg total) by mouth once daily., Disp: 90 tablet, Rfl: 1    metoprolol succinate (TOPROL-XL) 25 MG 24 hr tablet, Take 1 tablet (25 mg total) by mouth once daily., Disp: 90 tablet, Rfl: 1    omeprazole (PRILOSEC) 20 MG capsule, Take 1 capsule (20 mg total) by mouth once daily., Disp: 90 capsule, Rfl: 1    triamcinolone acetonide 0.1% (KENALOG) 0.1 % cream, Apply to AA on body BID PRN flares tapering with improvement (Patient not taking: Reported on  11/1/2023), Disp: 454 g, Rfl: 1    ROS: A focused review of systems was obtained and negative.     Exam: A focused skin exam was performed. All areas examined were normal except as mentioned in the assessment and plan below.  General Appearance of the patient is well developed and well nourished.  Orientation: alert and oriented x 3.  Mood and affect: pleasant.    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 A diagnosis of High risk medication use was also pertinent to this visit.    Plan:   Medications Ordered This Encounter   Medications    dapsone 100 MG Tab     Sig: Take 1 tablet (100 mg total) by mouth once daily.     Dispense:  90 tablet     Refill:  1     Dermatitis Herpetiformis  - clear  Status: stable    Plan: Counseling.  I counseled the patient regarding the following:  Contact office if: Rash fails to resolve despite treatment.    -continue Dapsone     High Risk Medication Monitoring (Z79.899) : The risks and benefits of the medication were reviewed in full with the patient. Should any side effects occur, the patient will stop the medication and contact me immediately.    Dapsone Counseling- I discussed with the patient the risks of dapsone including but not limited to hemolytic  anemia, agranulocytosis, rashes, methemoglobinemia, kidney failure, peripheral neuropathy, headaches, GI  upset, and liver toxicity. Patients who start dapsone require monitoring including baseline LFTs and weekly CBCs  for the first month, then every month thereafter. The patient verbalized understanding of the proper use and  possible adverse effects of dapsone. All of the patient's questions and concerns were addressed.    - CBC from Stephany Mathias reviewed and normal, will repeat labwork in 3 months       Follow up in about 3 months (around 2/29/2024) for DH.    Natali Jenkins MD

## 2023-12-08 NOTE — PROGRESS NOTES
"    Bhaskar Patel presented for a  Medicare AWV and comprehensive Health Risk Assessment today. The following components were reviewed and updated:    Medical history  Family History  Social history  Allergies and Current Medications  Health Risk Assessment  Health Maintenance  Care Team         ** See Completed Assessments for Annual Wellness Visit within the encounter summary.**         The following assessments were completed:  Living Situation  CAGE  Depression Screening  Timed Get Up and Go  Whisper Test  Cognitive Function Screening  Nutrition Screening  ADL Screening  PAQ Screening        Vitals:    12/11/23 1048 12/11/23 1120   BP: (!) 150/84 138/84   BP Location: Left arm Left arm   Patient Position: Sitting Sitting   Pulse: 68    Resp: 20    Temp: 98.1 °F (36.7 °C)    SpO2: (!) 94%    Weight: 68.5 kg (151 lb)    Height: 6' 3" (1.905 m)      Body mass index is 18.87 kg/m².  Physical Exam  Vitals and nursing note reviewed.   Constitutional:       Appearance: Normal appearance. He is not ill-appearing.   HENT:      Head: Normocephalic.      Right Ear: Tympanic membrane, ear canal and external ear normal.      Left Ear: Tympanic membrane, ear canal and external ear normal.      Nose: Nose normal.      Mouth/Throat:      Mouth: Mucous membranes are moist.      Pharynx: Oropharynx is clear.   Eyes:      Extraocular Movements: Extraocular movements intact.      Conjunctiva/sclera: Conjunctivae normal.      Pupils: Pupils are equal, round, and reactive to light.   Cardiovascular:      Rate and Rhythm: Normal rate and regular rhythm.      Pulses: Normal pulses.      Heart sounds: Normal heart sounds.   Pulmonary:      Effort: Pulmonary effort is normal. No respiratory distress.      Breath sounds: Normal breath sounds.   Abdominal:      General: Bowel sounds are normal.      Palpations: Abdomen is soft.      Tenderness: There is no abdominal tenderness.   Musculoskeletal:         General: Normal range of motion.     "  Cervical back: Normal range of motion and neck supple.   Skin:     General: Skin is warm and dry.      Capillary Refill: Capillary refill takes less than 2 seconds.   Neurological:      General: No focal deficit present.      Mental Status: He is alert and oriented to person, place, and time.   Psychiatric:         Mood and Affect: Mood normal.         Behavior: Behavior normal.         Thought Content: Thought content normal.         Judgment: Judgment normal.             Diagnoses and health risks identified today and associated recommendations/orders:    Problem List Items Addressed This Visit          Pulmonary    Chronic obstructive pulmonary disease, unspecified COPD type     COPD is controlled. Current medical regimen is effective;   Will adjust according to results and monitor.             Cardiac/Vascular    Essential hypertension     Blood pressure is controlled. Current medical regimen is effective;   Will adjust according to results and monitor.          Hyperlipidemia     Hyperlipidemia is controlled. Current medical regimen is effective;   Will adjust according to results and monitor.             Endocrine    BMI less than 19,adult     Will continue to monitor            Other    Smoker     Pt has declined smoking cessation at this time.         Encounter for subsequent annual wellness visit (AWV) in Medicare patient - Primary     Will continue current medication regimen as prescribed. Symptoms under control          Declined smoking cessation     Pt states he would like to stop smoking and has started smoking ultra lights but does not believe he is quite ready to completely stop            Provided Bhaskar with a 5-10 year written screening schedule and personal prevention plan. Recommendations were developed using the USPSTF age appropriate recommendations. Education, counseling, and referrals were provided as needed. After Visit Summary printed and given to patient which includes a list of additional  screenings\tests needed.    Follow up for yearly annual wellness visit  Declined Covid booster today. Pt has appt with GI in 01/08/2024 for follow up and to schedule Colonoscopy.  Declined LDCT    I offered to discuss advanced care planning, including how to pick a person who would make decisions for you if you were unable to make them for yourself, called a health care power of , and what kind of decisions you might make such as use of life sustaining treatments such as ventilators and tube feeding when faced with a life limiting illness recorded on a living will that they will need to know. (How you want to be cared for as you near the end of your natural life)     X  Patient is unwilling to engage in a discussion regarding advance directives at this time.

## 2023-12-11 ENCOUNTER — OFFICE VISIT (OUTPATIENT)
Dept: FAMILY MEDICINE | Facility: CLINIC | Age: 68
End: 2023-12-11
Payer: MEDICARE

## 2023-12-11 VITALS
HEIGHT: 75 IN | HEART RATE: 68 BPM | SYSTOLIC BLOOD PRESSURE: 138 MMHG | WEIGHT: 151 LBS | OXYGEN SATURATION: 94 % | DIASTOLIC BLOOD PRESSURE: 84 MMHG | TEMPERATURE: 98 F | RESPIRATION RATE: 20 BRPM | BODY MASS INDEX: 18.77 KG/M2

## 2023-12-11 DIAGNOSIS — J44.9 CHRONIC OBSTRUCTIVE PULMONARY DISEASE, UNSPECIFIED COPD TYPE: ICD-10-CM

## 2023-12-11 DIAGNOSIS — Z00.00 ENCOUNTER FOR SUBSEQUENT ANNUAL WELLNESS VISIT (AWV) IN MEDICARE PATIENT: Primary | ICD-10-CM

## 2023-12-11 DIAGNOSIS — F17.200 SMOKER: ICD-10-CM

## 2023-12-11 DIAGNOSIS — E78.5 HYPERLIPIDEMIA, UNSPECIFIED HYPERLIPIDEMIA TYPE: ICD-10-CM

## 2023-12-11 DIAGNOSIS — Z72.0 DECLINED SMOKING CESSATION: ICD-10-CM

## 2023-12-11 DIAGNOSIS — I10 ESSENTIAL HYPERTENSION: ICD-10-CM

## 2023-12-11 PROCEDURE — G0439 PR MEDICARE ANNUAL WELLNESS SUBSEQUENT VISIT: ICD-10-PCS | Mod: ,,,

## 2023-12-11 PROCEDURE — G0439 PPPS, SUBSEQ VISIT: HCPCS | Mod: ,,,

## 2023-12-11 NOTE — PATIENT INSTRUCTIONS
Counseling and Referral of Other Preventative  (Italic type indicates deductible and co-insurance are waived)    Patient Name: Bhaskar Patel  Today's Date: 12/11/2023    Health Maintenance       Date Due Completion Date    TETANUS VACCINE Never done ---    LDCT Lung Screen Never done ---    Shingles Vaccine (1 of 2) Never done ---    RSV Vaccine (Age 60+ and Pregnant patients) (1 - 1-dose 60+ series) Never done ---    COVID-19 Vaccine (3 - 2023-24 season) 09/01/2023 4/12/2021    Colorectal Cancer Screening 10/20/2023 10/20/2020    PROSTATE-SPECIFIC ANTIGEN 04/12/2024 4/12/2023    Lipid Panel 10/12/2024 10/12/2023    High Dose Statin 11/09/2024 11/9/2023    Aspirin/Antiplatelet Therapy 11/09/2024 11/9/2023        No orders of the defined types were placed in this encounter.      Counseling and Referral of Other Preventative  (Italic type indicates deductible and co-insurance are waived)    Patient Name: Bhaskar Patel  Today's Date: 12/11/2023    Health Maintenance       Date Due Completion Date    TETANUS VACCINE Never done ---    LDCT Lung Screen Never done ---    Shingles Vaccine (1 of 2) Never done ---    RSV Vaccine (Age 60+ and Pregnant patients) (1 - 1-dose 60+ series) Never done ---    COVID-19 Vaccine (3 - 2023-24 season) 09/01/2023 4/12/2021    Colorectal Cancer Screening 10/20/2023 10/20/2020    PROSTATE-SPECIFIC ANTIGEN 04/12/2024 4/12/2023    Lipid Panel 10/12/2024 10/12/2023    High Dose Statin 11/09/2024 11/9/2023    Aspirin/Antiplatelet Therapy 11/09/2024 11/9/2023        No orders of the defined types were placed in this encounter.      The following information is provided to all patients.  This information is to help you find resources for any of the problems found today that may be affecting your health:                Living healthy guide: www.[facility].[place].gov      Understanding Diabetes: www.diabetes.org      Eating healthy: www.cdc.gov/healthyweight      [facility] home safety checklist:  www.cdc.gov/steadi/patient.html      Agency on Aging: www.goea.[place].[facility]      Alcoholics anonymous (AA): www.aa.org      Physical Activity: www.keara.nih.gov/lh6fith      Tobacco use: www.quitwithusla.org

## 2023-12-13 PROBLEM — Z72.0 DECLINED SMOKING CESSATION: Status: ACTIVE | Noted: 2023-12-13

## 2023-12-13 NOTE — ASSESSMENT & PLAN NOTE
Pt states he would like to stop smoking and has started smoking ultra lights but does not believe he is quite ready to completely stop

## 2023-12-13 NOTE — ASSESSMENT & PLAN NOTE
COPD is controlled. Current medical regimen is effective;   Will adjust according to results and monitor.

## 2024-01-08 ENCOUNTER — OFFICE VISIT (OUTPATIENT)
Dept: GASTROENTEROLOGY | Facility: CLINIC | Age: 69
End: 2024-01-08
Payer: MEDICARE

## 2024-01-08 VITALS
DIASTOLIC BLOOD PRESSURE: 80 MMHG | SYSTOLIC BLOOD PRESSURE: 160 MMHG | HEART RATE: 69 BPM | WEIGHT: 152 LBS | HEIGHT: 75 IN | OXYGEN SATURATION: 94 % | BODY MASS INDEX: 18.9 KG/M2

## 2024-01-08 DIAGNOSIS — L13.0 DERMATITIS HERPETIFORMIS: ICD-10-CM

## 2024-01-08 DIAGNOSIS — K90.0 CELIAC DISEASE: Primary | ICD-10-CM

## 2024-01-08 PROCEDURE — 99215 OFFICE O/P EST HI 40 MIN: CPT | Mod: PBBFAC

## 2024-01-08 PROCEDURE — 1126F AMNT PAIN NOTED NONE PRSNT: CPT | Mod: CPTII,,,

## 2024-01-08 PROCEDURE — 4010F ACE/ARB THERAPY RXD/TAKEN: CPT | Mod: CPTII,,,

## 2024-01-08 PROCEDURE — 3008F BODY MASS INDEX DOCD: CPT | Mod: CPTII,,,

## 2024-01-08 PROCEDURE — 99213 OFFICE O/P EST LOW 20 MIN: CPT | Mod: S$PBB,,,

## 2024-01-08 PROCEDURE — 3288F FALL RISK ASSESSMENT DOCD: CPT | Mod: CPTII,,,

## 2024-01-08 PROCEDURE — 1160F RVW MEDS BY RX/DR IN RCRD: CPT | Mod: CPTII,,,

## 2024-01-08 PROCEDURE — 3077F SYST BP >= 140 MM HG: CPT | Mod: CPTII,,,

## 2024-01-08 PROCEDURE — 1159F MED LIST DOCD IN RCRD: CPT | Mod: CPTII,,,

## 2024-01-08 PROCEDURE — 3079F DIAST BP 80-89 MM HG: CPT | Mod: CPTII,,,

## 2024-01-08 PROCEDURE — 1101F PT FALLS ASSESS-DOCD LE1/YR: CPT | Mod: CPTII,,,

## 2024-01-09 DIAGNOSIS — Z71.89 COMPLEX CARE COORDINATION: ICD-10-CM

## 2024-01-10 NOTE — PROGRESS NOTES
Bhaskar Patel is a 68 y.o. male here for Follow-up (6 month)        PCP: Stephany Mathias  Referring Provider: No referring provider defined for this encounter.     HPI:  Mr. Patel is a 69 yo male who presents to clinic today with dermatitis herpetiformis. Patient reports a new onset rash that occurred around March of this year. He was treated twice for shingles. The rash would improve temporarily but then return. He had recent visit with Dr. Jenkins and was diagnosed with dermatitis herpetiformis. He had abnormal gliadin on recent labs. Dr. Jenkins started patient on Dapsone and he reports today that the rash has cleared up. He denies any complaints or problems at present. He denies any abdominal pain, diarrhea, constipation, hematochezia or melena. He denies any problems with toelrating his diet. Does report occasional gas but nothing significant. He denies any known history of celiac disease. He denies any FMH of CRC or prior endoscopy. Did have negative cologuard in October of 2020. He is a current smoker. Drinks alcohol on rare occasion.     Interval: patient is doing well per reports. He continues to have occasional diarrhea triggered by certain foods. He is still following with Dr. Jenkins. He had EGD with Dr. Alex who recommended MRI Enterography for small bowel evaluation along with colonoscopy for weight loss. I offered this work-up to patient again today, however, patient reports he would like to hold off on scheduling these procedures until later in the year.            ROS:  Review of Systems   Constitutional:  Negative for activity change, appetite change, fatigue and unexpected weight change.   HENT:  Negative for trouble swallowing.    Respiratory:  Negative for shortness of breath.    Cardiovascular:  Negative for chest pain.   Gastrointestinal:  Positive for reflux (controlled). Negative for abdominal pain, blood in stool, constipation, diarrhea, nausea and vomiting.   Musculoskeletal:  Negative for  "gait problem.   Integumentary:  Positive for rash (improved). Negative for color change.          PMHX:  has a past medical history of GERD (gastroesophageal reflux disease) and Hypertension.    PSHX:  has a past surgical history that includes Hernia repair; Coronary artery bypass graft; Left heart catheterization (Left, 9/15/2021); and Carotid endarterectomy (Left, 5/16/2022).    PFHX: family history includes Alzheimer's disease in his mother; Cancer in his sister; Heart disease in his father and sister; Hypertension in his father and sister; Polycystic kidney disease in his mother.    PSlHX:  reports that he has been smoking cigarettes. He started smoking about 53 years ago. He has a 26.5 pack-year smoking history. He has been exposed to tobacco smoke. He has never used smokeless tobacco. He reports that he does not currently use alcohol. He reports that he does not currently use drugs after having used the following drugs: Marijuana.        Review of patient's allergies indicates:  No Known Allergies    Medication List with Changes/Refills   Current Medications    ASPIRIN (ECOTRIN) 81 MG EC TABLET    Take 81 mg by mouth once daily.    ATORVASTATIN (LIPITOR) 80 MG TABLET    Take 1 tablet (80 mg total) by mouth every evening.    DAPSONE 100 MG TAB    Take 1 tablet (100 mg total) by mouth once daily.    DOCUSATE SODIUM (COLACE) 100 MG CAPSULE    Take 100 mg by mouth daily as needed.     LOSARTAN (COZAAR) 25 MG TABLET    Take 1 tablet (25 mg total) by mouth once daily.    METOPROLOL SUCCINATE (TOPROL-XL) 25 MG 24 HR TABLET    Take 1 tablet (25 mg total) by mouth once daily.    OMEPRAZOLE (PRILOSEC) 20 MG CAPSULE    Take 1 capsule (20 mg total) by mouth once daily.    TRIAMCINOLONE ACETONIDE 0.1% (KENALOG) 0.1 % CREAM    Apply to AA on body BID PRN flares tapering with improvement        Objective Findings:  Vital Signs:  BP (!) 160/80   Pulse 69   Ht 6' 3" (1.905 m)   Wt 68.9 kg (152 lb)   SpO2 (!) 94%   BMI " 19.00 kg/m²  Body mass index is 19 kg/m².    Physical Exam:  Physical Exam  Vitals reviewed.   Constitutional:       General: He is not in acute distress.     Appearance: Normal appearance.   HENT:      Mouth/Throat:      Mouth: Mucous membranes are moist.   Cardiovascular:      Rate and Rhythm: Normal rate.   Pulmonary:      Effort: Pulmonary effort is normal.   Abdominal:      General: There is no distension.      Tenderness: There is no guarding.   Skin:     General: Skin is warm and dry.   Neurological:      Mental Status: He is alert and oriented to person, place, and time.          Labs:  Lab Results   Component Value Date    WBC 7.62 10/12/2023    HGB 13.0 (L) 10/12/2023    HCT 38.4 (L) 10/12/2023    .8 (H) 10/12/2023    RDW 13.4 10/12/2023    PLT 99 (L) 10/12/2023    LYMPH 22.6 (L) 10/12/2023    LYMPH 1.72 10/12/2023    MONO 8.4 (H) 10/12/2023    EOS 0.30 10/12/2023    BASO 0.05 10/12/2023     Lab Results   Component Value Date     10/12/2023    K 4.6 10/12/2023     10/12/2023    CO2 29 10/12/2023    GLU 90 10/12/2023    BUN 15 10/12/2023    CREATININE 1.03 10/12/2023    CALCIUM 8.5 10/12/2023    PROT 6.7 05/15/2023    ALBUMIN 3.2 (L) 05/15/2023    BILITOT 0.8 05/15/2023    ALKPHOS 107 05/15/2023    AST 21 05/15/2023    ALT 19 05/15/2023         Imaging: No results found.      Assessment:  Bhaskar Patel is a 68 y.o. male here with:  1. Celiac disease    2. Dermatitis herpetiformis            Recommendations:  1. Continue gluten free diet  2. Will follow-up in 6 months to reassess MRI and colonoscopy at patient's request  3. RTC sooner PRN    No follow-ups on file.      Order summary:         Thank you for allowing me to participate in the care of Bhaskar Patel.      Radha Chávez, FNP-BC, AG-ACNP-BC

## 2024-02-29 ENCOUNTER — OFFICE VISIT (OUTPATIENT)
Dept: DERMATOLOGY | Facility: CLINIC | Age: 69
End: 2024-02-29
Payer: MEDICARE

## 2024-02-29 DIAGNOSIS — L13.0 DERMATITIS HERPETIFORMIS: Primary | ICD-10-CM

## 2024-02-29 DIAGNOSIS — Z79.899 HIGH RISK MEDICATION USE: ICD-10-CM

## 2024-02-29 PROCEDURE — 99214 OFFICE O/P EST MOD 30 MIN: CPT | Mod: ,,, | Performed by: DERMATOLOGY

## 2024-02-29 PROCEDURE — 1159F MED LIST DOCD IN RCRD: CPT | Mod: CPTII,,, | Performed by: DERMATOLOGY

## 2024-02-29 PROCEDURE — 1160F RVW MEDS BY RX/DR IN RCRD: CPT | Mod: CPTII,,, | Performed by: DERMATOLOGY

## 2024-02-29 PROCEDURE — 4010F ACE/ARB THERAPY RXD/TAKEN: CPT | Mod: CPTII,,, | Performed by: DERMATOLOGY

## 2024-02-29 RX ORDER — TRIAMCINOLONE ACETONIDE 1 MG/G
CREAM TOPICAL
Qty: 454 G | Refills: 1 | Status: SHIPPED | OUTPATIENT
Start: 2024-02-29

## 2024-02-29 RX ORDER — DAPSONE 100 MG/1
100 TABLET ORAL DAILY
Qty: 90 TABLET | Refills: 1 | Status: SHIPPED | OUTPATIENT
Start: 2024-02-29 | End: 2024-08-27

## 2024-02-29 NOTE — PROGRESS NOTES
[facility]   Natali Jenkins MD    Patient Name: Bhaskar Patel  Patient YOB: 1955   Date of Service: 2/29/24    CC: Follow-up Dermatitis Herpetaformis      HPI: Bhaskar Patel is a 69 y.o. male here today for follow-up of Dermatitis Herpetaformis, last seen 11/30/2023.  Previous treatments include dapsone.  Overall, the Dermatitis Herpetaformis is stable.  Treatment plan was followed as directed.    Past Medical History:   Diagnosis Date    GERD (gastroesophageal reflux disease)     Hypertension      Past Surgical History:   Procedure Laterality Date    CAROTID ENDARTERECTOMY Left 5/16/2022    Procedure: ENDARTERECTOMY-CAROTID;  Surgeon: Karen Spencer MD;  Location: [facility] OR;  Service: General;  Laterality: Left;    CORONARY ARTERY BYPASS GRAFT      HERNIA REPAIR      LEFT HEART CATHETERIZATION Left 9/15/2021    Procedure: Left heart cath;  Surgeon: Quoc Vo DO;  Location: [facility] CATH LAB;  Service: Cardiology;  Laterality: Left;     Review of patient's allergies indicates:  No Known Allergies    Current Outpatient Medications:     aspirin (ECOTRIN) 81 MG EC tablet, Take 81 mg by mouth once daily., Disp: , Rfl:     atorvastatin (LIPITOR) 80 MG tablet, Take 1 tablet (80 mg total) by mouth every evening., Disp: 90 tablet, Rfl: 1    dapsone 100 MG Tab, Take 1 tablet (100 mg total) by mouth once daily., Disp: 90 tablet, Rfl: 1    docusate sodium (COLACE) 100 MG capsule, Take 100 mg by mouth daily as needed. , Disp: , Rfl:     losartan (COZAAR) 25 MG tablet, Take 1 tablet (25 mg total) by mouth once daily., Disp: 90 tablet, Rfl: 1    metoprolol succinate (TOPROL-XL) 25 MG 24 hr tablet, Take 1 tablet (25 mg total) by mouth once daily., Disp: 90 tablet, Rfl: 1    omeprazole (PRILOSEC) 20 MG capsule, Take 1 capsule (20 mg total) by mouth once daily., Disp: 90 capsule, Rfl: 1    triamcinolone acetonide 0.1% (KENALOG) 0.1 % cream, Apply to AA on body BID PRN flares tapering with  improvement, Disp: 454 g, Rfl: 1    ROS: A focused review of systems was obtained and negative.     Exam: A focused skin exam was performed. All areas examined were normal except as mentioned in the assessment and plan below.  General Appearance of the patient is well developed and well nourished.  Orientation: alert and oriented x 3.  Mood and affect: pleasant.    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 A diagnosis of High risk medication use was also pertinent to this visit.    Plan:   Medications Ordered This Encounter   Medications    dapsone 100 MG Tab     Sig: Take 1 tablet (100 mg total) by mouth once daily.     Dispense:  90 tablet     Refill:  1    triamcinolone acetonide 0.1% (KENALOG) 0.1 % cream     Sig: Apply to AA on body BID PRN flares tapering with improvement     Dispense:  454 g     Refill:  1     Dermatitis Herpetiformis  - Symmetrical erythematous papules  Status: Inadequately controlled       Plan: Counseling.  I counseled the patient regarding the following:  Contact office if: Rash fails to resolve despite treatment.    - recent flare after eating crackers and drinking beer, reviewed importance of gluten free diet   -continue dapsone  -will restart TAC 0.1% for current flare     High Risk Medication Monitoring (Z79.899) : The risks and benefits of the medication were reviewed in full with the patient. Should any side effects occur, the patient will stop the medication and contact me immediately.    Dapsone Counseling- I discussed with the patient the risks of dapsone including but not limited to hemolytic  anemia, agranulocytosis, rashes, methemoglobinemia, kidney failure, peripheral neuropathy, headaches, GI  upset, and liver toxicity. Patients who start dapsone require monitoring including baseline LFTs and weekly CBCs  for the first month, then every month thereafter. The patient verbalized understanding of the proper use and  possible adverse effects of  dapsone. All of the patient's questions and concerns were addressed.    -will order CBC and CMP     Follow up in about 6 months (around 8/29/2024) for Dermatitis Herpetiformis.    Natali Jenkins MD

## 2024-03-01 DIAGNOSIS — Z79.899 HIGH RISK MEDICATION USE: Primary | ICD-10-CM

## 2024-03-11 PROBLEM — Z00.00 ENCOUNTER FOR SUBSEQUENT ANNUAL WELLNESS VISIT (AWV) IN MEDICARE PATIENT: Status: RESOLVED | Noted: 2021-10-06 | Resolved: 2024-03-11

## 2024-04-17 ENCOUNTER — OFFICE VISIT (OUTPATIENT)
Dept: FAMILY MEDICINE | Facility: CLINIC | Age: 69
End: 2024-04-17
Payer: MEDICARE

## 2024-04-17 VITALS
HEIGHT: 75 IN | TEMPERATURE: 99 F | WEIGHT: 150.19 LBS | RESPIRATION RATE: 18 BRPM | SYSTOLIC BLOOD PRESSURE: 160 MMHG | DIASTOLIC BLOOD PRESSURE: 82 MMHG | HEART RATE: 71 BPM | OXYGEN SATURATION: 94 % | BODY MASS INDEX: 18.67 KG/M2

## 2024-04-17 DIAGNOSIS — I10 ESSENTIAL HYPERTENSION: Primary | ICD-10-CM

## 2024-04-17 DIAGNOSIS — I73.9 PVD (PERIPHERAL VASCULAR DISEASE): ICD-10-CM

## 2024-04-17 DIAGNOSIS — J44.9 CHRONIC OBSTRUCTIVE PULMONARY DISEASE, UNSPECIFIED COPD TYPE: ICD-10-CM

## 2024-04-17 DIAGNOSIS — K21.9 GASTROESOPHAGEAL REFLUX DISEASE, UNSPECIFIED WHETHER ESOPHAGITIS PRESENT: ICD-10-CM

## 2024-04-17 DIAGNOSIS — E78.5 HYPERLIPIDEMIA, UNSPECIFIED HYPERLIPIDEMIA TYPE: ICD-10-CM

## 2024-04-17 PROCEDURE — 1126F AMNT PAIN NOTED NONE PRSNT: CPT | Mod: ,,,

## 2024-04-17 PROCEDURE — 3079F DIAST BP 80-89 MM HG: CPT | Mod: ,,,

## 2024-04-17 PROCEDURE — 1159F MED LIST DOCD IN RCRD: CPT | Mod: ,,,

## 2024-04-17 PROCEDURE — 1101F PT FALLS ASSESS-DOCD LE1/YR: CPT | Mod: ,,,

## 2024-04-17 PROCEDURE — 4010F ACE/ARB THERAPY RXD/TAKEN: CPT | Mod: ,,,

## 2024-04-17 PROCEDURE — 99213 OFFICE O/P EST LOW 20 MIN: CPT | Mod: ,,,

## 2024-04-17 PROCEDURE — 3288F FALL RISK ASSESSMENT DOCD: CPT | Mod: ,,,

## 2024-04-17 PROCEDURE — 3077F SYST BP >= 140 MM HG: CPT | Mod: ,,,

## 2024-04-17 PROCEDURE — 1160F RVW MEDS BY RX/DR IN RCRD: CPT | Mod: ,,,

## 2024-04-17 PROCEDURE — 3008F BODY MASS INDEX DOCD: CPT | Mod: ,,,

## 2024-04-17 RX ORDER — OMEPRAZOLE 20 MG/1
20 CAPSULE, DELAYED RELEASE ORAL DAILY
Qty: 90 CAPSULE | Refills: 1 | Status: SHIPPED | OUTPATIENT
Start: 2024-04-17

## 2024-04-17 RX ORDER — ATORVASTATIN CALCIUM 80 MG/1
80 TABLET, FILM COATED ORAL NIGHTLY
Qty: 90 TABLET | Refills: 1 | Status: SHIPPED | OUTPATIENT
Start: 2024-04-17

## 2024-04-17 RX ORDER — LOSARTAN POTASSIUM 50 MG/1
50 TABLET ORAL DAILY
Qty: 90 TABLET | Refills: 1 | Status: SHIPPED | OUTPATIENT
Start: 2024-04-17 | End: 2025-04-17

## 2024-04-17 RX ORDER — LOSARTAN POTASSIUM 25 MG/1
25 TABLET ORAL DAILY
Qty: 90 TABLET | Refills: 1 | Status: CANCELLED | OUTPATIENT
Start: 2024-04-17

## 2024-04-17 NOTE — ASSESSMENT & PLAN NOTE
Hyperlipidemia is controlled.Current medical regimen is effective. Will adjust according to lab results tomorrow.   Low fat, low chol diet, less fried foods, more baked foods, more vegetables.   Exercise daily  Take medications as ordered for hyperlipidemia  Return to the clinic as needed   Follow up in 6 months   Lipids panel today ,will call with results and adjust medications accordingly.

## 2024-04-17 NOTE — PATIENT INSTRUCTIONS
Losartan 50 mg daily **Increasing**  -Take blood pressure once a day and write down on a blood pressure log. Return to the clinic in one week for a nurse blood pressue check and bring the blood pressure log and give to the nurse.   -Low salt, low sodium diet, less fried foods, more baked foods, more green leafy vegetables, more fruits, less bread  -CBC, CMP today. Will call with results.   -Exercise at least 30-45 minutes a day  -Follow up in 6 months.

## 2024-04-17 NOTE — PROGRESS NOTES
HPI:   Bhaskar Patel is a pleasant 69 y.o. patient who reports to clinic with complaints of medication refill and lab work. He has a history of HTN, hyperlipidemia, GERD, PVD. He denies any problems at this time. He states he has been feeling good. He denies any chestpain or shortness of breath. His blood pressure is elevated today. We will increase Losartan to 50 mg today. He will take his blood pressure for a week and return for nurse blood pressure check. He denies any chest pain or shortness of breath, blurry vision or headaches. He is in no distress at this time.     Hyperlipidemia  This is a chronic problem. The current episode started more than 1 year ago. The problem is controlled. Recent lipid tests were reviewed and are normal. Pertinent negatives include no chest pain. The current treatment provides moderate improvement of lipids.   Hypertension  This is a chronic problem. The current episode started more than 1 year ago. The problem has been gradually improving since onset. The problem is uncontrolled (increasing losartan). Pertinent negatives include no chest pain or palpitations.                Past Medical History:   Diagnosis Date    GERD (gastroesophageal reflux disease)     Hypertension        PAST SURGICAL HISTORY:   Past Surgical History:   Procedure Laterality Date    CAROTID ENDARTERECTOMY Left 5/16/2022    Procedure: ENDARTERECTOMY-CAROTID;  Surgeon: Karen Spencer MD;  Location: [facility] OR;  Service: General;  Laterality: Left;    CORONARY ARTERY BYPASS GRAFT      HERNIA REPAIR      LEFT HEART CATHETERIZATION Left 9/15/2021    Procedure: Left heart cath;  Surgeon: Quoc Vo DO;  Location: [facility] CATH LAB;  Service: Cardiology;  Laterality: Left;       MEDICATIONS:    Current Outpatient Medications:     aspirin (ECOTRIN) 81 MG EC tablet, Take 81 mg by mouth once daily., Disp: , Rfl:     dapsone 100 MG Tab, Take 1 tablet (100 mg total) by mouth once daily., Disp: 90 tablet, Rfl:  1    docusate sodium (COLACE) 100 MG capsule, Take 100 mg by mouth daily as needed. , Disp: , Rfl:     metoprolol succinate (TOPROL-XL) 25 MG 24 hr tablet, Take 1 tablet (25 mg total) by mouth once daily., Disp: 90 tablet, Rfl: 1    triamcinolone acetonide 0.1% (KENALOG) 0.1 % cream, Apply to AA on body BID PRN flares tapering with improvement, Disp: 454 g, Rfl: 1    atorvastatin (LIPITOR) 80 MG tablet, Take 1 tablet (80 mg total) by mouth every evening., Disp: 90 tablet, Rfl: 1    losartan (COZAAR) 50 MG tablet, Take 1 tablet (50 mg total) by mouth once daily., Disp: 90 tablet, Rfl: 1    omeprazole (PRILOSEC) 20 MG capsule, Take 1 capsule (20 mg total) by mouth once daily., Disp: 90 capsule, Rfl: 1    ALLERGIES:   Review of patient's allergies indicates:  No Known Allergies      Review of Systems   Constitutional: Negative.  Negative for activity change, chills and fever.   HENT: Negative.  Negative for drooling and nosebleeds.    Eyes: Negative.    Respiratory: Negative.  Negative for chest tightness.    Cardiovascular: Negative.  Negative for chest pain and palpitations.   Gastrointestinal: Negative.    Endocrine: Negative.    Genitourinary: Negative.  Negative for difficulty urinating.   Musculoskeletal: Negative.    Integumentary:  Negative.   Allergic/Immunologic: Negative.    Neurological: Negative.  Negative for dizziness.   Hematological: Negative.    Psychiatric/Behavioral: Negative.     All other systems reviewed and are negative.         Physical Exam  Constitutional:       General: He is not in acute distress.     Appearance: Normal appearance. He is well-developed. He is not ill-appearing.   HENT:      Head: Normocephalic and atraumatic.      Right Ear: Tympanic membrane normal.      Left Ear: Tympanic membrane normal.      Nose: Nose normal.      Mouth/Throat:      Mouth: Mucous membranes are moist.      Pharynx: Oropharynx is clear. No posterior oropharyngeal erythema.   Cardiovascular:      Rate and  "Rhythm: Normal rate and regular rhythm.      Pulses: Normal pulses.      Heart sounds: Normal heart sounds.   Pulmonary:      Effort: Pulmonary effort is normal. No accessory muscle usage or respiratory distress.      Breath sounds: Normal breath sounds.   Abdominal:      General: Abdomen is flat. Bowel sounds are normal. There is no distension.      Palpations: Abdomen is soft.      Tenderness: There is no abdominal tenderness.   Musculoskeletal:         General: Normal range of motion.      Cervical back: Normal range of motion.   Skin:     General: Skin is warm and dry.      Capillary Refill: Capillary refill takes less than 2 seconds.   Neurological:      Mental Status: He is alert and oriented to person, place, and time. Mental status is at baseline.   Psychiatric:         Mood and Affect: Mood normal.         Speech: Speech normal.         Behavior: Behavior normal. Behavior is cooperative.         Thought Content: Thought content normal.          VITAL SIGNS:   BP (!) 160/82 (BP Location: Right arm, Patient Position: Sitting, BP Method: Medium (Manual))   Pulse 71   Temp 98.7 °F (37.1 °C) (Oral)   Resp 18   Ht 6' 3" (1.905 m)   Wt 68.1 kg (150 lb 3.2 oz)   SpO2 (!) 94%   BMI 18.77 kg/m²       ASSESSMENT/PLAN  1. Hyperlipidemia, unspecified hyperlipidemia type  Assessment & Plan:  Hyperlipidemia is controlled.Current medical regimen is effective. Will adjust according to lab results tomorrow.   Low fat, low chol diet, less fried foods, more baked foods, more vegetables.   Exercise daily  Take medications as ordered for hyperlipidemia  Return to the clinic as needed   Follow up in 6 months   Lipids panel today ,will call with results and adjust medications accordingly.      Orders:  -     atorvastatin (LIPITOR) 80 MG tablet; Take 1 tablet (80 mg total) by mouth every evening.  Dispense: 90 tablet; Refill: 1  -     Comprehensive Metabolic Panel; Future; Expected date: 04/17/2024  -     CBC Auto " Differential; Future; Expected date: 04/17/2024    2. Essential hypertension  Assessment & Plan:  -Increased Losartan to 50mg from 25mg daily.   -Take blood pressure once a day and write down on a blood pressure log. Return to the clinic in one week for a nurse blood pressue check and bring the blood pressure log and give to the nurse.   -Low salt, low sodium diet, less fried foods, more baked foods, more green leafy vegetables, more fruits, less bread  -CBC, CMP today. Will call with results.   -Exercise at least 30-45 minutes a day  -Follow up in 6 months.       Orders:  -     Comprehensive Metabolic Panel; Future; Expected date: 04/17/2024  -     CBC Auto Differential; Future; Expected date: 04/17/2024  -     losartan (COZAAR) 50 MG tablet; Take 1 tablet (50 mg total) by mouth once daily.  Dispense: 90 tablet; Refill: 1    3. Gastroesophageal reflux disease, unspecified whether esophagitis present  Assessment & Plan:  -Avoid spicy, greasy foods  -Avoid caffeine, citric acid, chocolate, peppermint, and carbonated drinks  -Do not lay down within 3 hours of eating  -Exercise 150 minutes per week  -Increase fluid to 64 ounces daily  -Avoid antiinflammatory medications such as motrin, advil, aleve, ibuprofen, and BC powder  Continue current medication, is effective at this time. Return to the clinic as needed.       Orders:  -     omeprazole (PRILOSEC) 20 MG capsule; Take 1 capsule (20 mg total) by mouth once daily.  Dispense: 90 capsule; Refill: 1    4. Chronic obstructive pulmonary disease, unspecified COPD type  Assessment & Plan:  COPD is controlled. Current medical regimen is effective.       5. PVD (peripheral vascular disease)  Assessment & Plan:  Continue current medication, is effective at this time. Return to the clinic as needed.                  Patient Instructions   Losartan 50 mg daily **Increasing**  -Take blood pressure once a day and write down on a blood pressure log. Return to the clinic in one  week for a nurse blood pressue check and bring the blood pressure log and give to the nurse.   -Low salt, low sodium diet, less fried foods, more baked foods, more green leafy vegetables, more fruits, less bread  -CBC, CMP today. Will call with results.   -Exercise at least 30-45 minutes a day  -Follow up in 6 months.     Orders Placed This Encounter   Procedures    Comprehensive Metabolic Panel     Standing Status:   Future     Standing Expiration Date:   7/16/2025    CBC Auto Differential     Standing Status:   Future     Standing Expiration Date:   7/16/2025

## 2024-04-17 NOTE — ASSESSMENT & PLAN NOTE
-Increased Losartan to 50mg from 25mg daily.   -Take blood pressure once a day and write down on a blood pressure log. Return to the clinic in one week for a nurse blood pressue check and bring the blood pressure log and give to the nurse.   -Low salt, low sodium diet, less fried foods, more baked foods, more green leafy vegetables, more fruits, less bread  -CBC, CMP today. Will call with results.   -Exercise at least 30-45 minutes a day  -Follow up in 6 months.

## 2024-04-17 NOTE — ASSESSMENT & PLAN NOTE
-Avoid spicy, greasy foods  -Avoid caffeine, citric acid, chocolate, peppermint, and carbonated drinks  -Do not lay down within 3 hours of eating  -Exercise 150 minutes per week  -Increase fluid to 64 ounces daily  -Avoid antiinflammatory medications such as motrin, advil, aleve, ibuprofen, and BC powder  Continue current medication, is effective at this time. Return to the clinic as needed.

## 2024-04-25 ENCOUNTER — CLINICAL SUPPORT (OUTPATIENT)
Dept: FAMILY MEDICINE | Facility: CLINIC | Age: 69
End: 2024-04-25
Payer: MEDICARE

## 2024-04-25 VITALS — SYSTOLIC BLOOD PRESSURE: 147 MMHG | DIASTOLIC BLOOD PRESSURE: 82 MMHG

## 2024-04-25 DIAGNOSIS — Z01.30 BP CHECK: Primary | ICD-10-CM

## 2024-04-29 DIAGNOSIS — I10 ESSENTIAL HYPERTENSION: ICD-10-CM

## 2024-05-03 RX ORDER — METOPROLOL SUCCINATE 25 MG/1
25 TABLET, EXTENDED RELEASE ORAL
Qty: 90 TABLET | Refills: 1 | Status: SHIPPED | OUTPATIENT
Start: 2024-05-03

## 2024-05-23 ENCOUNTER — OFFICE VISIT (OUTPATIENT)
Dept: CARDIOLOGY | Facility: CLINIC | Age: 69
End: 2024-05-23
Payer: MEDICARE

## 2024-05-23 VITALS
DIASTOLIC BLOOD PRESSURE: 80 MMHG | SYSTOLIC BLOOD PRESSURE: 170 MMHG | HEIGHT: 75 IN | OXYGEN SATURATION: 91 % | BODY MASS INDEX: 18.63 KG/M2 | WEIGHT: 149.81 LBS

## 2024-05-23 DIAGNOSIS — I25.10 CORONARY ARTERY DISEASE INVOLVING NATIVE CORONARY ARTERY OF NATIVE HEART WITHOUT ANGINA PECTORIS: ICD-10-CM

## 2024-05-23 DIAGNOSIS — R07.2 PRECORDIAL PAIN: ICD-10-CM

## 2024-05-23 DIAGNOSIS — E78.2 MIXED HYPERLIPIDEMIA: ICD-10-CM

## 2024-05-23 DIAGNOSIS — J44.9 CHRONIC OBSTRUCTIVE PULMONARY DISEASE, UNSPECIFIED COPD TYPE: Primary | ICD-10-CM

## 2024-05-23 DIAGNOSIS — I10 ESSENTIAL HYPERTENSION: ICD-10-CM

## 2024-05-23 PROCEDURE — 3288F FALL RISK ASSESSMENT DOCD: CPT | Mod: CPTII,,, | Performed by: INTERNAL MEDICINE

## 2024-05-23 PROCEDURE — 99214 OFFICE O/P EST MOD 30 MIN: CPT | Mod: ,,, | Performed by: INTERNAL MEDICINE

## 2024-05-23 PROCEDURE — 1160F RVW MEDS BY RX/DR IN RCRD: CPT | Mod: CPTII,,, | Performed by: INTERNAL MEDICINE

## 2024-05-23 PROCEDURE — 3079F DIAST BP 80-89 MM HG: CPT | Mod: CPTII,,, | Performed by: INTERNAL MEDICINE

## 2024-05-23 PROCEDURE — 1159F MED LIST DOCD IN RCRD: CPT | Mod: CPTII,,, | Performed by: INTERNAL MEDICINE

## 2024-05-23 PROCEDURE — 1101F PT FALLS ASSESS-DOCD LE1/YR: CPT | Mod: CPTII,,, | Performed by: INTERNAL MEDICINE

## 2024-05-23 PROCEDURE — 3008F BODY MASS INDEX DOCD: CPT | Mod: CPTII,,, | Performed by: INTERNAL MEDICINE

## 2024-05-23 PROCEDURE — 4010F ACE/ARB THERAPY RXD/TAKEN: CPT | Mod: CPTII,,, | Performed by: INTERNAL MEDICINE

## 2024-05-23 PROCEDURE — 3077F SYST BP >= 140 MM HG: CPT | Mod: CPTII,,, | Performed by: INTERNAL MEDICINE

## 2024-05-23 NOTE — PROGRESS NOTES
Cardiology Clinic Note:    PCP: Crys Jackson FNP    REFERRING PHYSICIAN: Jc Walton DO    CHIEF COMPLAINT: Chest pain, CAD    HISTORY OF PRESENT ILLNESS:  Bhaskar Patel is a 69 y.o. male who presents for evaluation of CAD.     Pt reports chest pain resolved post CABG, has not reoccurred. HE was evaluated in primary care office around a month ago, found to have elevated BP, increased Cozaar to 50 mg q d, bp is better controlled at home. He continues working full time, able  perform all normal activities of daily living without provocation of chest pain, pressure or shortness of breath.  Pt underwent planned carotid revascularization without incident, no TIA/stroke symptoms,. He continues to smoke 3/4 to 1/2 pack daily against medical advice, not interested in smoking cessation,        Review of Systems   Constitutional: Negative.    HENT: Negative.          Left neck pain with enlarged lymph node has resolved.   Eyes: Negative.    Respiratory: Negative.     Cardiovascular: Negative.    Gastrointestinal: Negative.    Genitourinary: Negative.    Musculoskeletal: Negative.    Skin: Negative.    Neurological: Negative.    Endo/Heme/Allergies: Negative.    Psychiatric/Behavioral: Negative.         PAST MEDICAL HISTORY:  Past Medical History:   Diagnosis Date    GERD (gastroesophageal reflux disease)     Hypertension        PAST SURGICAL HISTORY:  Past Surgical History:   Procedure Laterality Date    CAROTID ENDARTERECTOMY Left 5/16/2022    Procedure: ENDARTERECTOMY-CAROTID;  Surgeon: Karen Spencer MD;  Location: [facility] OR;  Service: General;  Laterality: Left;    CORONARY ARTERY BYPASS GRAFT      HERNIA REPAIR      LEFT HEART CATHETERIZATION Left 9/15/2021    Procedure: Left heart cath;  Surgeon: Quoc Vo DO;  Location: [facility] CATH LAB;  Service: Cardiology;  Laterality: Left;       SOCIAL HISTORY:  Social History     Socioeconomic History    Marital status:    Occupational History  Occupation: retired   Tobacco Use    Smoking status: Every Day     Current packs/day: 0.50     Average packs/day: 0.5 packs/day for 53.4 years (26.7 ttl pk-yrs)     Types: Cigarettes     Start date: 1971     Passive exposure: Current    Smokeless tobacco: Never    Tobacco comments:     already did [place] tobacco quitline. not interested in trying again. states he will quit on his own   Substance and Sexual Activity    Alcohol use: Not Currently    Drug use: Not Currently     Types: Marijuana    Sexual activity: Not Currently     Social Determinants of Health     Financial Resource Strain: Low Risk  (12/11/2023)    Overall Financial Resource Strain (CARDIA)     Difficulty of Paying Living Expenses: Not hard at all   Food Insecurity: No Food Insecurity (12/11/2023)    Hunger Vital Sign     Worried About Running Out of Food in the Last Year: Never true     Ran Out of Food in the Last Year: Never true   Transportation Needs: No Transportation Needs (12/11/2023)    PRAPARE - Transportation     Lack of Transportation (Medical): No     Lack of Transportation (Non-Medical): No   Physical Activity: Sufficiently Active (12/11/2023)    Exercise Vital Sign     Days of Exercise per Week: 5 days     Minutes of Exercise per Session: 30 min   Stress: No Stress Concern Present (12/11/2023)    [employer] [place] [employer] - Occupational Stress Questionnaire     Feeling of Stress : Not at all   Housing Stability: Low Risk  (12/11/2023)    Housing Stability Vital Sign     Unable to Pay for Housing in the Last Year: No     Number of Places Lived in the Last Year: 1     Unstable Housing in the Last Year: No       FAMILY HISTORY:  Family History   Problem Relation Name Age of Onset    Alzheimer's disease Mother      Polycystic kidney disease Mother      Hypertension Father      Heart disease Father      Cancer Sister      Heart disease Sister      Hypertension Sister         ALLERGIES:  Allergies as of 05/23/2024    (No Known  "Allergies)         MEDICATIONS:  Current Outpatient Medications on File Prior to Visit   Medication Sig Dispense Refill    aspirin (ECOTRIN) 81 MG EC tablet Take 81 mg by mouth once daily.      atorvastatin (LIPITOR) 80 MG tablet Take 1 tablet (80 mg total) by mouth every evening. 90 tablet 1    dapsone 100 MG Tab Take 1 tablet (100 mg total) by mouth once daily. 90 tablet 1    docusate sodium (COLACE) 100 MG capsule Take 100 mg by mouth daily as needed.       losartan (COZAAR) 50 MG tablet Take 1 tablet (50 mg total) by mouth once daily. 90 tablet 1    metoprolol succinate (TOPROL-XL) 25 MG 24 hr tablet TAKE ONE TABLET BY MOUTH EVERY DAY 90 tablet 1    omeprazole (PRILOSEC) 20 MG capsule Take 1 capsule (20 mg total) by mouth once daily. 90 capsule 1    triamcinolone acetonide 0.1% (KENALOG) 0.1 % cream Apply to AA on body BID PRN flares tapering with improvement (Patient not taking: Reported on 5/23/2024) 454 g 1     No current facility-administered medications on file prior to visit.          PHYSICAL EXAM:  Blood pressure (!) 170/80, height 6' 3" (1.905 m), weight 67.9 kg (149 lb 12.8 oz), SpO2 (!) 91%.  Wt Readings from Last 3 Encounters:   05/23/24 67.9 kg (149 lb 12.8 oz)   04/17/24 68.1 kg (150 lb 3.2 oz)   01/08/24 68.9 kg (152 lb)      Body mass index is 18.72 kg/m².    Physical Exam  Vitals and nursing note reviewed.   Constitutional:       Appearance: Normal appearance. He is normal weight.   HENT:      Head: Normocephalic and atraumatic.      Right Ear: External ear normal.      Left Ear: External ear normal.   Eyes:      General: No scleral icterus.        Right eye: No discharge.         Left eye: No discharge.      Extraocular Movements: Extraocular movements intact.      Conjunctiva/sclera: Conjunctivae normal.      Pupils: Pupils are equal, round, and reactive to light.   Cardiovascular:      Rate and Rhythm: Normal rate and regular rhythm.      Pulses: Normal pulses.      Heart sounds: Normal heart " sounds. No murmur heard.     No friction rub. No gallop.   Pulmonary:      Effort: Pulmonary effort is normal.      Breath sounds: No wheezing or rales.      Comments: Breath sounds decreased bilaterlly, scattered rhonchi, clear with cough  Chest:      Chest wall: No tenderness.   Abdominal:      General: Abdomen is flat. Bowel sounds are normal. There is no distension.      Palpations: Abdomen is soft.      Tenderness: There is no abdominal tenderness. There is no guarding or rebound.   Musculoskeletal:         General: No swelling or tenderness. Normal range of motion.      Cervical back: Normal range of motion and neck supple.   Skin:     General: Skin is warm and dry.      Findings: No erythema or rash.   Neurological:      General: No focal deficit present.      Mental Status: He is alert and oriented to person, place, and time.      Cranial Nerves: No cranial nerve deficit.      Motor: No weakness.      Gait: Gait normal.   Psychiatric:         Mood and Affect: Mood normal.         Behavior: Behavior normal.         Thought Content: Thought content normal.         Judgment: Judgment normal.          LABS REVIEWED:  Lab Results   Component Value Date    WBC 7.94 04/18/2024    RBC 3.92 (L) 04/18/2024    HGB 13.2 (L) 04/18/2024    HCT 41.5 04/18/2024    .9 (H) 04/18/2024    MCH 33.7 (H) 04/18/2024    MCHC 31.8 (L) 04/18/2024    RDW 13.7 04/18/2024     (L) 04/18/2024    MPV 12.9 (H) 04/18/2024    NRBC 0.0 04/18/2024     Lab Results   Component Value Date     04/18/2024    K 4.3 04/18/2024     (H) 04/18/2024    CO2 26 04/18/2024    BUN 18 04/18/2024     Lab Results   Component Value Date     04/12/2023    AST 21 04/18/2024    ALT 19 04/18/2024     Lab Results   Component Value Date    GLU 95 04/18/2024     Lab Results   Component Value Date    CHOL 172 04/18/2024    HDL 66 (H) 04/18/2024    TRIG 75 04/18/2024    CHOLHDL 2.6 04/18/2024       CARDIAC STUDIES REVIEWED:    OTHER IMAGING  STUDIES REVIEWED:    ASSESSMENT: PLAN:  1. CAD: stable class 1 angina, post CABG, severe triple vessel disease with SVG to RCA and LAD, grafts patent at [facility] 9/2021, well preserved EF at 55%                 2  PVD: severe carotid stenosis, successful revascularization, now asymptomatic         3. Hypertension: better controlled with increased Cozaar. control on current meds.                4.  Tobacco abuse - discussed smoking cessation, did not start Chantex, once again declines pharmacologic support    Follow up in six months, sooner if symptoms change.

## 2024-05-30 ENCOUNTER — LAB VISIT (OUTPATIENT)
Dept: LAB | Facility: HOSPITAL | Age: 69
End: 2024-05-30
Attending: DERMATOLOGY
Payer: MEDICARE

## 2024-05-30 DIAGNOSIS — Z79.899 HIGH RISK MEDICATION USE: ICD-10-CM

## 2024-05-30 LAB
ALBUMIN SERPL BCP-MCNC: 3.5 G/DL (ref 3.5–5)
ALBUMIN/GLOB SERPL: 1 {RATIO}
ALP SERPL-CCNC: 102 U/L (ref 45–115)
ALT SERPL W P-5'-P-CCNC: 17 U/L (ref 16–61)
ANION GAP SERPL CALCULATED.3IONS-SCNC: 8 MMOL/L (ref 7–16)
AST SERPL W P-5'-P-CCNC: 18 U/L (ref 15–37)
BASOPHILS # BLD AUTO: 0.02 K/UL (ref 0–0.2)
BASOPHILS NFR BLD AUTO: 0.2 % (ref 0–1)
BILIRUB SERPL-MCNC: 0.9 MG/DL (ref ?–1.2)
BUN SERPL-MCNC: 18 MG/DL (ref 7–18)
BUN/CREAT SERPL: 15 (ref 6–20)
CALCIUM SERPL-MCNC: 8.3 MG/DL (ref 8.5–10.1)
CHLORIDE SERPL-SCNC: 104 MMOL/L (ref 98–107)
CO2 SERPL-SCNC: 29 MMOL/L (ref 21–32)
CREAT SERPL-MCNC: 1.21 MG/DL (ref 0.7–1.3)
DIFFERENTIAL METHOD BLD: ABNORMAL
EGFR (NO RACE VARIABLE) (RUSH/TITUS): 64 ML/MIN/1.73M2
EOSINOPHIL # BLD AUTO: 0.26 K/UL (ref 0–0.5)
EOSINOPHIL NFR BLD AUTO: 3 % (ref 1–4)
ERYTHROCYTE [DISTWIDTH] IN BLOOD BY AUTOMATED COUNT: 14.1 % (ref 11.5–14.5)
GLOBULIN SER-MCNC: 3.6 G/DL (ref 2–4)
GLUCOSE SERPL-MCNC: 98 MG/DL (ref 74–106)
HCT VFR BLD AUTO: 40.1 % (ref 40–54)
HGB BLD-MCNC: 12.7 G/DL (ref 13.5–18)
LYMPHOCYTES # BLD AUTO: 2.03 K/UL (ref 1–4.8)
LYMPHOCYTES NFR BLD AUTO: 23.5 % (ref 27–41)
MCH RBC QN AUTO: 32.7 PG (ref 27–31)
MCHC RBC AUTO-ENTMCNC: 31.7 G/DL (ref 32–36)
MCV RBC AUTO: 103.4 FL (ref 80–96)
MONOCYTES # BLD AUTO: 0.63 K/UL (ref 0–0.8)
MONOCYTES NFR BLD AUTO: 7.3 % (ref 2–6)
MPC BLD CALC-MCNC: 12.6 FL (ref 9.4–12.4)
NEUTROPHILS # BLD AUTO: 5.7 K/UL (ref 1.8–7.7)
NEUTROPHILS NFR BLD AUTO: 66 % (ref 53–65)
PLATELET # BLD AUTO: 106 K/UL (ref 150–400)
POTASSIUM SERPL-SCNC: 4.1 MMOL/L (ref 3.5–5.1)
PROT SERPL-MCNC: 7.1 G/DL (ref 6.4–8.2)
RBC # BLD AUTO: 3.88 M/UL (ref 4.6–6.2)
SODIUM SERPL-SCNC: 137 MMOL/L (ref 136–145)
WBC # BLD AUTO: 8.64 K/UL (ref 4.5–11)

## 2024-05-30 PROCEDURE — 36415 COLL VENOUS BLD VENIPUNCTURE: CPT

## 2024-05-30 PROCEDURE — 85025 COMPLETE CBC W/AUTO DIFF WBC: CPT

## 2024-05-30 PROCEDURE — 80053 COMPREHEN METABOLIC PANEL: CPT

## 2024-08-09 DIAGNOSIS — Z71.89 COMPLEX CARE COORDINATION: ICD-10-CM

## 2024-08-29 ENCOUNTER — OFFICE VISIT (OUTPATIENT)
Dept: DERMATOLOGY | Facility: CLINIC | Age: 69
End: 2024-08-29
Payer: MEDICARE

## 2024-08-29 VITALS — HEIGHT: 75 IN | WEIGHT: 149.69 LBS | BODY MASS INDEX: 18.61 KG/M2 | RESPIRATION RATE: 18 BRPM

## 2024-08-29 DIAGNOSIS — Z79.899 HIGH RISK MEDICATION USE: ICD-10-CM

## 2024-08-29 DIAGNOSIS — L13.0 DERMATITIS HERPETIFORMIS: Primary | ICD-10-CM

## 2024-08-29 RX ORDER — DAPSONE 100 MG/1
100 TABLET ORAL DAILY
Qty: 90 TABLET | Refills: 1 | Status: SHIPPED | OUTPATIENT
Start: 2024-08-29 | End: 2025-02-25

## 2024-08-29 RX ORDER — TRIAMCINOLONE ACETONIDE 1 MG/G
CREAM TOPICAL
Qty: 454 G | Refills: 1 | Status: SHIPPED | OUTPATIENT
Start: 2024-08-29

## 2024-08-29 NOTE — Clinical Note
Radha,  Mr. Patel missed his last appointment with you.  I emphasized the importance of keeping his appointments!  Do you want me to coordinate a follow up with you or do you want to just schedule his scope/MRI (per your last note) and then follow up.  Let me know how I can help! Thanks! Natali

## 2024-08-29 NOTE — PROGRESS NOTES
[facility]   Natali Jenkins MD    Patient Name: Bhaskar Patel  Patient YOB: 1955   Date of Service: 8/29/24    CC: Follow-up Dermatitis Herpetiforms    HPI: Bhaskar Patel is a 69 y.o. male here today for follow-up of dermatitis herpetiforms, last seen 2/29/2024.  Previous treatments include Dapsone and TAC 0.1.  Overall, the dermatitis herpetiformis is improved.  Treatment plan was followed as directed.    Past Medical History:   Diagnosis Date    GERD (gastroesophageal reflux disease)     Hypertension      Past Surgical History:   Procedure Laterality Date    CAROTID ENDARTERECTOMY Left 5/16/2022    Procedure: ENDARTERECTOMY-CAROTID;  Surgeon: Karen Spencer MD;  Location: [facility] OR;  Service: General;  Laterality: Left;    CORONARY ARTERY BYPASS GRAFT      HERNIA REPAIR      LEFT HEART CATHETERIZATION Left 9/15/2021    Procedure: Left heart cath;  Surgeon: Quoc Vo DO;  Location: [facility] CATH LAB;  Service: Cardiology;  Laterality: Left;     Review of patient's allergies indicates:  No Known Allergies    Current Outpatient Medications:     aspirin (ECOTRIN) 81 MG EC tablet, Take 81 mg by mouth once daily., Disp: , Rfl:     atorvastatin (LIPITOR) 80 MG tablet, Take 1 tablet (80 mg total) by mouth every evening., Disp: 90 tablet, Rfl: 1    dapsone 100 MG Tab, Take 1 tablet (100 mg total) by mouth once daily., Disp: 90 tablet, Rfl: 1    docusate sodium (COLACE) 100 MG capsule, Take 100 mg by mouth daily as needed. , Disp: , Rfl:     losartan (COZAAR) 50 MG tablet, Take 1 tablet (50 mg total) by mouth once daily., Disp: 90 tablet, Rfl: 1    metoprolol succinate (TOPROL-XL) 25 MG 24 hr tablet, TAKE ONE TABLET BY MOUTH EVERY DAY, Disp: 90 tablet, Rfl: 1    omeprazole (PRILOSEC) 20 MG capsule, Take 1 capsule (20 mg total) by mouth once daily., Disp: 90 capsule, Rfl: 1    triamcinolone acetonide 0.1% (KENALOG) 0.1 % cream, Apply to AA on body BID PRN flares tapering with improvement,  Disp: 454 g, Rfl: 1    ROS: A focused review of systems was obtained and negative.     Exam: A focused skin exam was performed. All areas examined were normal except as mentioned in the assessment and plan below.  General Appearance of the patient is well developed and well nourished.  Orientation: alert and oriented x 3.  Mood and affect: pleasant.    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 A diagnosis of High risk medication use was also pertinent to this visit.    Plan:   Medications Ordered This Encounter   Medications    dapsone 100 MG Tab     Sig: Take 1 tablet (100 mg total) by mouth once daily.     Dispense:  90 tablet     Refill:  1    triamcinolone acetonide 0.1% (KENALOG) 0.1 % cream     Sig: Apply to AA on body BID PRN flares tapering with improvement     Dispense:  454 g     Refill:  1     Dermatitis Herpetiformis  - Symmetrical erythematous papules  Status: Well Controlled    Plan: Counseling.  I counseled the patient regarding the following:  Contact office if: Rash fails to resolve despite treatment.  - Will refill Dapsone and TAC 0.1  - will coordinate FU with GI for monitoring     High Risk Medication Monitoring (Z79.899) : The risks and benefits of the medication were reviewed in full with the patient. Should any side effects occur, the patient will stop the medication and contact me immediately.    Dapsone Counseling- I discussed with the patient the risks of dapsone including but not limited to hemolytic  anemia, agranulocytosis, rashes, methemoglobinemia, kidney failure, peripheral neuropathy, headaches, GI  upset, and liver toxicity. Patients who start dapsone require monitoring including baseline LFTs and weekly CBCs  for the first month, then every month thereafter. The patient verbalized understanding of the proper use and  possible adverse effects of dapsone. All of the patient's questions and concerns were addressed.  - Will check CBC and CMP  today    Follow up in about 6 months (around 2/28/2025) for Dermatitis Herpetiformis .    Natali Jenkins MD

## 2024-09-11 DIAGNOSIS — I10 ESSENTIAL HYPERTENSION: ICD-10-CM

## 2024-09-11 RX ORDER — LOSARTAN POTASSIUM 50 MG/1
50 TABLET ORAL DAILY
Qty: 90 TABLET | Refills: 1 | Status: SHIPPED | OUTPATIENT
Start: 2024-09-11

## 2024-10-09 DIAGNOSIS — L13.0 DERMATITIS HERPETIFORMIS: ICD-10-CM

## 2024-10-15 RX ORDER — DAPSONE 100 MG/1
100 TABLET ORAL
Qty: 90 TABLET | Refills: 1 | Status: SHIPPED | OUTPATIENT
Start: 2024-10-15

## 2024-10-17 ENCOUNTER — OFFICE VISIT (OUTPATIENT)
Dept: FAMILY MEDICINE | Facility: CLINIC | Age: 69
End: 2024-10-17
Payer: MEDICARE

## 2024-10-17 VITALS
RESPIRATION RATE: 18 BRPM | OXYGEN SATURATION: 95 % | DIASTOLIC BLOOD PRESSURE: 78 MMHG | TEMPERATURE: 98 F | HEIGHT: 75 IN | HEART RATE: 67 BPM | BODY MASS INDEX: 18.48 KG/M2 | WEIGHT: 148.63 LBS | SYSTOLIC BLOOD PRESSURE: 136 MMHG

## 2024-10-17 DIAGNOSIS — K21.9 GASTROESOPHAGEAL REFLUX DISEASE, UNSPECIFIED WHETHER ESOPHAGITIS PRESENT: ICD-10-CM

## 2024-10-17 DIAGNOSIS — E78.5 HYPERLIPIDEMIA, UNSPECIFIED HYPERLIPIDEMIA TYPE: ICD-10-CM

## 2024-10-17 DIAGNOSIS — Z87.891 PERSONAL HISTORY OF NICOTINE DEPENDENCE: ICD-10-CM

## 2024-10-17 DIAGNOSIS — I10 ESSENTIAL HYPERTENSION: Primary | ICD-10-CM

## 2024-10-17 DIAGNOSIS — Z91.018 ALLERGY TO GLUTEN: ICD-10-CM

## 2024-10-17 DIAGNOSIS — Z12.5 PROSTATE CANCER SCREENING: ICD-10-CM

## 2024-10-17 DIAGNOSIS — Z12.2 SCREENING FOR LUNG CANCER: ICD-10-CM

## 2024-10-17 LAB
ALBUMIN SERPL BCP-MCNC: 3.7 G/DL (ref 3.5–5)
ALBUMIN/GLOB SERPL: 0.9 {RATIO}
ALP SERPL-CCNC: 92 U/L (ref 45–115)
ALT SERPL W P-5'-P-CCNC: 19 U/L (ref 16–61)
ANION GAP SERPL CALCULATED.3IONS-SCNC: 9 MMOL/L (ref 7–16)
AST SERPL W P-5'-P-CCNC: 20 U/L (ref 15–37)
BASOPHILS # BLD AUTO: 0.06 K/UL (ref 0–0.2)
BASOPHILS NFR BLD AUTO: 0.7 % (ref 0–1)
BILIRUB SERPL-MCNC: 1.2 MG/DL (ref ?–1.2)
BUN SERPL-MCNC: 18 MG/DL (ref 7–18)
BUN/CREAT SERPL: 15 (ref 6–20)
CALCIUM SERPL-MCNC: 9.1 MG/DL (ref 8.5–10.1)
CHLORIDE SERPL-SCNC: 108 MMOL/L (ref 98–107)
CHOLEST SERPL-MCNC: 159 MG/DL (ref 0–200)
CHOLEST/HDLC SERPL: 2.3 {RATIO}
CO2 SERPL-SCNC: 26 MMOL/L (ref 21–32)
CREAT SERPL-MCNC: 1.21 MG/DL (ref 0.7–1.3)
DIFFERENTIAL METHOD BLD: ABNORMAL
EGFR (NO RACE VARIABLE) (RUSH/TITUS): 64 ML/MIN/1.73M2
EOSINOPHIL # BLD AUTO: 0.14 K/UL (ref 0–0.5)
EOSINOPHIL NFR BLD AUTO: 1.7 % (ref 1–4)
ERYTHROCYTE [DISTWIDTH] IN BLOOD BY AUTOMATED COUNT: 13.8 % (ref 11.5–14.5)
GLOBULIN SER-MCNC: 4 G/DL (ref 2–4)
GLUCOSE SERPL-MCNC: 100 MG/DL (ref 74–106)
HCT VFR BLD AUTO: 40.9 % (ref 40–54)
HDLC SERPL-MCNC: 68 MG/DL (ref 40–60)
HGB BLD-MCNC: 13.2 G/DL (ref 13.5–18)
IMM GRANULOCYTES # BLD AUTO: 0.02 K/UL (ref 0–0.04)
IMM GRANULOCYTES NFR BLD: 0.2 % (ref 0–0.4)
LDLC SERPL CALC-MCNC: 77 MG/DL
LDLC/HDLC SERPL: 1.1 {RATIO}
LYMPHOCYTES # BLD AUTO: 2 K/UL (ref 1–4.8)
LYMPHOCYTES NFR BLD AUTO: 24.3 % (ref 27–41)
MCH RBC QN AUTO: 33.6 PG (ref 27–31)
MCHC RBC AUTO-ENTMCNC: 32.3 G/DL (ref 32–36)
MCV RBC AUTO: 104.1 FL (ref 80–96)
MONOCYTES # BLD AUTO: 0.47 K/UL (ref 0–0.8)
MONOCYTES NFR BLD AUTO: 5.7 % (ref 2–6)
MPC BLD CALC-MCNC: 12.6 FL (ref 9.4–12.4)
NEUTROPHILS # BLD AUTO: 5.53 K/UL (ref 1.8–7.7)
NEUTROPHILS NFR BLD AUTO: 67.4 % (ref 53–65)
NONHDLC SERPL-MCNC: 91 MG/DL
NRBC # BLD AUTO: 0 X10E3/UL
NRBC, AUTO (.00): 0 %
PLATELET # BLD AUTO: 134 K/UL (ref 150–400)
POTASSIUM SERPL-SCNC: 4.2 MMOL/L (ref 3.5–5.1)
PROT SERPL-MCNC: 7.7 G/DL (ref 6.4–8.2)
PSA SERPL-MCNC: 0.78 NG/ML
RBC # BLD AUTO: 3.93 M/UL (ref 4.6–6.2)
SODIUM SERPL-SCNC: 139 MMOL/L (ref 136–145)
TRIGL SERPL-MCNC: 72 MG/DL (ref 35–150)
VLDLC SERPL-MCNC: 14 MG/DL
WBC # BLD AUTO: 8.22 K/UL (ref 4.5–11)

## 2024-10-17 PROCEDURE — 99214 OFFICE O/P EST MOD 30 MIN: CPT | Mod: ,,,

## 2024-10-17 PROCEDURE — 4010F ACE/ARB THERAPY RXD/TAKEN: CPT | Mod: ,,,

## 2024-10-17 PROCEDURE — 3078F DIAST BP <80 MM HG: CPT | Mod: ,,,

## 2024-10-17 PROCEDURE — 1160F RVW MEDS BY RX/DR IN RCRD: CPT | Mod: ,,,

## 2024-10-17 PROCEDURE — 1126F AMNT PAIN NOTED NONE PRSNT: CPT | Mod: ,,,

## 2024-10-17 PROCEDURE — 1101F PT FALLS ASSESS-DOCD LE1/YR: CPT | Mod: ,,,

## 2024-10-17 PROCEDURE — 3288F FALL RISK ASSESSMENT DOCD: CPT | Mod: ,,,

## 2024-10-17 PROCEDURE — 1159F MED LIST DOCD IN RCRD: CPT | Mod: ,,,

## 2024-10-17 PROCEDURE — 3075F SYST BP GE 130 - 139MM HG: CPT | Mod: ,,,

## 2024-10-17 PROCEDURE — 3008F BODY MASS INDEX DOCD: CPT | Mod: ,,,

## 2024-10-17 RX ORDER — ATORVASTATIN CALCIUM 80 MG/1
80 TABLET, FILM COATED ORAL NIGHTLY
Qty: 90 TABLET | Refills: 1 | Status: SHIPPED | OUTPATIENT
Start: 2024-10-17

## 2024-10-17 RX ORDER — OMEPRAZOLE 20 MG/1
20 CAPSULE, DELAYED RELEASE ORAL DAILY
Qty: 90 CAPSULE | Refills: 1 | Status: SHIPPED | OUTPATIENT
Start: 2024-10-17

## 2024-10-17 RX ORDER — METOPROLOL SUCCINATE 25 MG/1
25 TABLET, EXTENDED RELEASE ORAL DAILY
Qty: 90 TABLET | Refills: 1 | Status: SHIPPED | OUTPATIENT
Start: 2024-10-17

## 2024-10-17 NOTE — ASSESSMENT & PLAN NOTE
He is seeing Dr. Jenkins with Derm for his rash he had. He is going to see BASIL Alex for his allergy to gluten.   He states this is controlled at this point.

## 2024-10-17 NOTE — ASSESSMENT & PLAN NOTE
-Increased Losartan to 50 mg from 25mg daily on last visit   -Take blood pressure once a day and write down on a blood pressure log. Return to the clinic in one week for a nurse blood pressue check and bring the blood pressure log and give to the nurse.   -Low salt, low sodium diet, less fried foods, more baked foods, more green leafy vegetables, more fruits, less bread  -CBC, CMP today. Will call with results.   -Exercise at least 30-45 minutes a day  -Follow up in 6 months.

## 2024-10-17 NOTE — PROGRESS NOTES
HPI:   Bhaskar Patel is a pleasant 69 y.o. patient who reports to clinic with complaints of Medication refill and lab work. He has a past medical history of Hypertension, Hyperlipidemia, GERD and a new gluten allergy. He sees Dr. Jenkins in [place]. He states he was broken out with a rash and went to see her and GI, where he had scopes done and was thought to have sialic disease but he did not. He has an allergy to Gluten that causes him to break out in a rash. He has cut a lot of gluten out of his diet and seems to be doing better with rash. He denies any other issues going on. He denies any shortness of breath or chest pain. He is going to see Dr. Barnett on the 18 th GI and he has done a cologaurd test at home prior to that. Declines C scope    Hypertension  This is a chronic problem. The current episode started more than 1 year ago. The problem is unchanged. The problem is controlled. Pertinent negatives include no anxiety, blurred vision, chest pain, headaches or palpitations.   Hyperlipidemia  This is a chronic problem. The current episode started more than 1 year ago. The problem is controlled. Recent lipid tests were reviewed and are normal. Pertinent negatives include no chest pain.                Past Medical History:   Diagnosis Date    GERD (gastroesophageal reflux disease)     Hypertension        PAST SURGICAL HISTORY:   Past Surgical History:   Procedure Laterality Date    CAROTID ENDARTERECTOMY Left 5/16/2022    Procedure: ENDARTERECTOMY-CAROTID;  Surgeon: Karen Spencer MD;  Location: [facility] OR;  Service: General;  Laterality: Left;    CORONARY ARTERY BYPASS GRAFT      HERNIA REPAIR      LEFT HEART CATHETERIZATION Left 9/15/2021    Procedure: Left heart cath;  Surgeon: Quoc Vo DO;  Location: [facility] CATH LAB;  Service: Cardiology;  Laterality: Left;       MEDICATIONS:    Current Outpatient Medications:     aspirin (ECOTRIN) 81 MG EC tablet, Take 81 mg by mouth once daily., Disp: , Rfl:      dapsone 100 MG Tab, TAKE ONE TABLET BY MOUTH DAILY, Disp: 90 tablet, Rfl: 1    docusate sodium (COLACE) 100 MG capsule, Take 100 mg by mouth daily as needed. , Disp: , Rfl:     losartan (COZAAR) 50 MG tablet, Take 1 tablet (50 mg total) by mouth once daily., Disp: 90 tablet, Rfl: 1    triamcinolone acetonide 0.1% (KENALOG) 0.1 % cream, Apply to AA on body BID PRN flares tapering with improvement, Disp: 454 g, Rfl: 1    atorvastatin (LIPITOR) 80 MG tablet, Take 1 tablet (80 mg total) by mouth every evening., Disp: 90 tablet, Rfl: 1    metoprolol succinate (TOPROL-XL) 25 MG 24 hr tablet, Take 1 tablet (25 mg total) by mouth once daily., Disp: 90 tablet, Rfl: 1    omeprazole (PRILOSEC) 20 MG capsule, Take 1 capsule (20 mg total) by mouth once daily., Disp: 90 capsule, Rfl: 1    ALLERGIES:   Review of patient's allergies indicates:  No Known Allergies      Review of Systems   Constitutional: Negative.  Negative for activity change.   HENT: Negative.  Negative for drooling and nosebleeds.    Eyes: Negative.  Negative for blurred vision.   Respiratory: Negative.  Negative for chest tightness.    Cardiovascular: Negative.  Negative for chest pain and palpitations.   Gastrointestinal: Negative.    Endocrine: Negative.    Genitourinary: Negative.  Negative for difficulty urinating.   Musculoskeletal: Negative.    Integumentary:  Negative.   Allergic/Immunologic: Negative.    Neurological: Negative.  Negative for dizziness and headaches.   Hematological: Negative.    Psychiatric/Behavioral: Negative.     All other systems reviewed and are negative.         Physical Exam  Constitutional:       General: He is not in acute distress.     Appearance: Normal appearance. He is well-developed. He is not ill-appearing.   HENT:      Head: Normocephalic and atraumatic.      Right Ear: Tympanic membrane normal.      Left Ear: Tympanic membrane normal.      Nose: Nose normal.      Mouth/Throat:      Mouth: Mucous membranes are moist.       "Pharynx: Oropharynx is clear. No posterior oropharyngeal erythema.   Cardiovascular:      Rate and Rhythm: Normal rate and regular rhythm.      Pulses: Normal pulses.      Heart sounds: Normal heart sounds.   Pulmonary:      Effort: Pulmonary effort is normal. No accessory muscle usage or respiratory distress.      Breath sounds: Normal breath sounds.   Abdominal:      General: Abdomen is flat. Bowel sounds are normal. There is no distension.      Palpations: Abdomen is soft.      Tenderness: There is no abdominal tenderness.   Musculoskeletal:         General: Normal range of motion.      Cervical back: Normal range of motion.   Skin:     General: Skin is warm and dry.      Capillary Refill: Capillary refill takes less than 2 seconds.   Neurological:      Mental Status: He is alert and oriented to person, place, and time. Mental status is at baseline.   Psychiatric:         Mood and Affect: Mood normal.         Speech: Speech normal.         Behavior: Behavior normal. Behavior is cooperative.         Thought Content: Thought content normal.          VITAL SIGNS:   /78 (BP Location: Right arm, Patient Position: Sitting)   Pulse 67   Temp 97.7 °F (36.5 °C) (Oral)   Resp 18   Ht 6' 3" (1.905 m)   Wt 67.4 kg (148 lb 9.6 oz)   SpO2 95%   BMI 18.57 kg/m²       ASSESSMENT/PLAN  1. Essential hypertension  Assessment & Plan:  -Increased Losartan to 50 mg from 25mg daily on last visit   -Take blood pressure once a day and write down on a blood pressure log. Return to the clinic in one week for a nurse blood pressue check and bring the blood pressure log and give to the nurse.   -Low salt, low sodium diet, less fried foods, more baked foods, more green leafy vegetables, more fruits, less bread  -CBC, CMP today. Will call with results.   -Exercise at least 30-45 minutes a day  -Follow up in 6 months.       Orders:  -     metoprolol succinate (TOPROL-XL) 25 MG 24 hr tablet; Take 1 tablet (25 mg total) by mouth once " daily.  Dispense: 90 tablet; Refill: 1  -     CBC Auto Differential; Future; Expected date: 10/17/2024  -     Comprehensive Metabolic Panel; Future; Expected date: 10/17/2024  -     Lipid Panel; Future; Expected date: 10/17/2024    2. Hyperlipidemia, unspecified hyperlipidemia type  Assessment & Plan:  Hyperlipidemia is controlled.Current medical regimen is effective. Will adjust according to lab results tomorrow.   Low fat, low chol diet, less fried foods, more baked foods, more vegetables.   Exercise daily  Take medications as ordered for hyperlipidemia  Return to the clinic as needed   Follow up in 6 months   Lipids panel today ,will call with results and adjust medications accordingly.      Orders:  -     atorvastatin (LIPITOR) 80 MG tablet; Take 1 tablet (80 mg total) by mouth every evening.  Dispense: 90 tablet; Refill: 1    3. Gastroesophageal reflux disease, unspecified whether esophagitis present  Assessment & Plan:  -Avoid spicy, greasy foods  -Avoid caffeine, citric acid, chocolate, peppermint, and carbonated drinks  -Do not lay down within 3 hours of eating  -Exercise 150 minutes per week  -Increase fluid to 64 ounces daily  -Avoid antiinflammatory medications such as motrin, advil, aleve, ibuprofen, and BC powder  Continue current medication, is effective at this time. Return to the clinic as needed.       Orders:  -     omeprazole (PRILOSEC) 20 MG capsule; Take 1 capsule (20 mg total) by mouth once daily.  Dispense: 90 capsule; Refill: 1    4. Prostate cancer screening  Assessment & Plan:  PSA    Orders:  -     PSA, Screening; Future; Expected date: 10/17/2024    5. Allergy to gluten  Assessment & Plan:  He is seeing Dr. Jenkins with Derm for his rash he had. He is going to see BASIL Alex for his allergy to gluten.   He states this is controlled at this point.       6. Screening for lung cancer  -     CT Chest Lung Screening Low Dose; Future; Expected date: 10/17/2024    7. Personal history of nicotine  dependence  Assessment & Plan:  Agreed to do low dose CT scan     Orders:  -     CT Chest Lung Screening Low Dose; Future; Expected date: 10/17/2024             There are no Patient Instructions on file for this visit.  Orders Placed This Encounter   Procedures    CT Chest Lung Screening Low Dose     Standing Status:   Future     Standing Expiration Date:   10/17/2025     Order Specific Question:   Is there documentation of shared decision making for this lung screening exam?     Answer:   Yes     Order Specific Question:   Is the patient a current smoker?     Answer:   Yes     Order Specific Question:   Does the patient have a 20-pack/year or greater smoke history?     Answer:   Yes     Order Specific Question:   Is the patient between the ages 50-80 years old?     Answer:   Yes     Order Specific Question:   Does the patient show any signs or symptoms of lung cancer?     Answer:   No     Order Specific Question:   Is this the first (baseline) CT or an annual exam?     Answer:   Baseline [1]     Order Specific Question:   May the Radiologist modify the order per protocol to meet the clinical needs of the patient?     Answer:   Yes     Order Specific Question:   Is this a low dose screening chest CT?     Answer:   Yes    CBC Auto Differential     Standing Status:   Future     Number of Occurrences:   1     Standing Expiration Date:   1/15/2026    Comprehensive Metabolic Panel     Standing Status:   Future     Number of Occurrences:   1     Standing Expiration Date:   1/15/2026    Lipid Panel     Standing Status:   Future     Number of Occurrences:   1     Standing Expiration Date:   1/15/2026    PSA, Screening     Standing Status:   Future     Number of Occurrences:   1     Standing Expiration Date:   1/15/2026    CBC with Differential

## 2024-10-17 NOTE — Clinical Note
TETANUS VACCINE Never done declines LDCT Lung Screen Never done declines Shingles Vaccine(1 of 2) Never done declines RSV Vaccine (Age 60+ and Pregnant patients)(1 - Risk 60-74 years 1-dose series) Never done declines Colorectal Cancer Screening due on 10/20/2023 Appt in Dec PROSTATE-SPECIFIC ANTIGEN due on 04/12/2024 today Influenza Vaccine(1) due on 09/01/2024 declines COVID-19 Vaccine(3 - 2024-25 season) due on 09/01/2024 declines

## 2024-11-01 ENCOUNTER — OFFICE VISIT (OUTPATIENT)
Dept: VASCULAR SURGERY | Facility: CLINIC | Age: 69
End: 2024-11-01
Payer: MEDICARE

## 2024-11-01 ENCOUNTER — HOSPITAL ENCOUNTER (OUTPATIENT)
Dept: RADIOLOGY | Facility: HOSPITAL | Age: 69
Discharge: HOME | End: 2024-11-01
Attending: SURGERY
Payer: MEDICARE

## 2024-11-01 VITALS — WEIGHT: 148.56 LBS | BODY MASS INDEX: 18.47 KG/M2 | HEIGHT: 75 IN

## 2024-11-01 DIAGNOSIS — I65.23 BILATERAL CAROTID ARTERY STENOSIS: ICD-10-CM

## 2024-11-01 DIAGNOSIS — I65.23 BILATERAL CAROTID ARTERY STENOSIS: Primary | ICD-10-CM

## 2024-11-01 DIAGNOSIS — F17.200 SMOKER: ICD-10-CM

## 2024-11-01 PROCEDURE — 99213 OFFICE O/P EST LOW 20 MIN: CPT | Mod: PBBFAC,25 | Performed by: NURSE PRACTITIONER

## 2024-11-01 PROCEDURE — 4010F ACE/ARB THERAPY RXD/TAKEN: CPT | Mod: CPTII,,, | Performed by: NURSE PRACTITIONER

## 2024-11-01 PROCEDURE — 93880 EXTRACRANIAL BILAT STUDY: CPT | Mod: TC

## 2024-11-01 PROCEDURE — 99214 OFFICE O/P EST MOD 30 MIN: CPT | Mod: S$PBB,,, | Performed by: NURSE PRACTITIONER

## 2024-11-01 PROCEDURE — 1160F RVW MEDS BY RX/DR IN RCRD: CPT | Mod: CPTII,,, | Performed by: NURSE PRACTITIONER

## 2024-11-01 PROCEDURE — 3008F BODY MASS INDEX DOCD: CPT | Mod: CPTII,,, | Performed by: NURSE PRACTITIONER

## 2024-11-01 PROCEDURE — 1126F AMNT PAIN NOTED NONE PRSNT: CPT | Mod: CPTII,,, | Performed by: NURSE PRACTITIONER

## 2024-11-01 PROCEDURE — 1101F PT FALLS ASSESS-DOCD LE1/YR: CPT | Mod: CPTII,,, | Performed by: NURSE PRACTITIONER

## 2024-11-01 PROCEDURE — 1159F MED LIST DOCD IN RCRD: CPT | Mod: CPTII,,, | Performed by: NURSE PRACTITIONER

## 2024-11-01 PROCEDURE — 93880 EXTRACRANIAL BILAT STUDY: CPT | Mod: 26,,, | Performed by: SURGERY

## 2024-11-01 PROCEDURE — 3288F FALL RISK ASSESSMENT DOCD: CPT | Mod: CPTII,,, | Performed by: NURSE PRACTITIONER

## 2024-11-01 PROCEDURE — 99999 PR PBB SHADOW E&M-EST. PATIENT-LVL III: CPT | Mod: PBBFAC,,, | Performed by: NURSE PRACTITIONER

## 2024-11-14 ENCOUNTER — OFFICE VISIT (OUTPATIENT)
Dept: CARDIOLOGY | Facility: CLINIC | Age: 69
End: 2024-11-14
Payer: MEDICARE

## 2024-11-14 VITALS
SYSTOLIC BLOOD PRESSURE: 156 MMHG | OXYGEN SATURATION: 94 % | HEIGHT: 75 IN | WEIGHT: 144.81 LBS | DIASTOLIC BLOOD PRESSURE: 90 MMHG | BODY MASS INDEX: 18 KG/M2 | HEART RATE: 76 BPM

## 2024-11-14 DIAGNOSIS — Z72.0 TOBACCO ABUSE DISORDER: ICD-10-CM

## 2024-11-14 DIAGNOSIS — I10 ESSENTIAL HYPERTENSION: Primary | ICD-10-CM

## 2024-11-14 DIAGNOSIS — I25.10 CORONARY ARTERY DISEASE INVOLVING NATIVE CORONARY ARTERY OF NATIVE HEART WITHOUT ANGINA PECTORIS: ICD-10-CM

## 2024-11-14 PROCEDURE — 4010F ACE/ARB THERAPY RXD/TAKEN: CPT | Mod: CPTII,,, | Performed by: INTERNAL MEDICINE

## 2024-11-14 PROCEDURE — 1159F MED LIST DOCD IN RCRD: CPT | Mod: CPTII,,, | Performed by: INTERNAL MEDICINE

## 2024-11-14 PROCEDURE — 3288F FALL RISK ASSESSMENT DOCD: CPT | Mod: CPTII,,, | Performed by: INTERNAL MEDICINE

## 2024-11-14 PROCEDURE — 3077F SYST BP >= 140 MM HG: CPT | Mod: CPTII,,, | Performed by: INTERNAL MEDICINE

## 2024-11-14 PROCEDURE — 1101F PT FALLS ASSESS-DOCD LE1/YR: CPT | Mod: CPTII,,, | Performed by: INTERNAL MEDICINE

## 2024-11-14 PROCEDURE — 3008F BODY MASS INDEX DOCD: CPT | Mod: CPTII,,, | Performed by: INTERNAL MEDICINE

## 2024-11-14 PROCEDURE — 1126F AMNT PAIN NOTED NONE PRSNT: CPT | Mod: CPTII,,, | Performed by: INTERNAL MEDICINE

## 2024-11-14 PROCEDURE — 3080F DIAST BP >= 90 MM HG: CPT | Mod: CPTII,,, | Performed by: INTERNAL MEDICINE

## 2024-11-14 PROCEDURE — 99214 OFFICE O/P EST MOD 30 MIN: CPT | Mod: ,,, | Performed by: INTERNAL MEDICINE

## 2024-11-14 NOTE — PROGRESS NOTES
Cardiology Clinic Note:    PCP: Crys Jackson FNP    REFERRING PHYSICIAN: cJ Walton DO    CHIEF COMPLAINT: Chest pain, CAD    HISTORY OF PRESENT ILLNESS:  Bhaskar Patel is a 69 y.o. male who presents for evaluation of CAD.     Pt reports chest pain resolved post CABG, has not reoccurred. HE was evaluated in primary care office around a month ago, found to have elevated BP, increased Cozaar to 50 mg q d, bp is better controlled at home. He continues working full time, able  perform all normal activities of daily living without provocation of chest pain, pressure or shortness of breath.  Pt underwent planned carotid revascularization without incident, no TIA/stroke symptoms,. He continues to smoke 3/4 to 1/2 pack daily against medical advice, not interested in smoking cessation,        Review of Systems   Constitutional: Negative.    HENT: Negative.          Left neck pain with enlarged lymph node has resolved.   Eyes: Negative.    Respiratory: Negative.     Cardiovascular: Negative.    Gastrointestinal: Negative.    Genitourinary: Negative.    Musculoskeletal: Negative.    Skin: Negative.    Neurological: Negative.    Endo/Heme/Allergies: Negative.    Psychiatric/Behavioral: Negative.         PAST MEDICAL HISTORY:  Past Medical History:   Diagnosis Date    GERD (gastroesophageal reflux disease)     Hypertension        PAST SURGICAL HISTORY:  Past Surgical History:   Procedure Laterality Date    CAROTID ENDARTERECTOMY Left 5/16/2022    Procedure: ENDARTERECTOMY-CAROTID;  Surgeon: Karen Spencer MD;  Location: [facility] OR;  Service: General;  Laterality: Left;    CORONARY ARTERY BYPASS GRAFT      HERNIA REPAIR      LEFT HEART CATHETERIZATION Left 9/15/2021    Procedure: Left heart cath;  Surgeon: Quoc Vo DO;  Location: [facility] CATH LAB;  Service: Cardiology;  Laterality: Left;       SOCIAL HISTORY:  Social History     Socioeconomic History    Marital status:    Occupational History     Occupation: retired   Tobacco Use    Smoking status: Every Day     Current packs/day: 0.50     Average packs/day: 0.5 packs/day for 53.9 years (26.9 ttl pk-yrs)     Types: Cigarettes     Start date: 1971     Passive exposure: Current    Smokeless tobacco: Never    Tobacco comments:     already did [place] tobacco quitline. not interested in trying again. states he will quit on his own   Substance and Sexual Activity    Alcohol use: Not Currently    Drug use: Not Currently     Types: Marijuana    Sexual activity: Not Currently     Social Drivers of Health     Financial Resource Strain: Low Risk  (12/11/2023)    Overall Financial Resource Strain (CARDIA)     Difficulty of Paying Living Expenses: Not hard at all   Food Insecurity: No Food Insecurity (12/11/2023)    Hunger Vital Sign     Worried About Running Out of Food in the Last Year: Never true     Ran Out of Food in the Last Year: Never true   Transportation Needs: No Transportation Needs (12/11/2023)    PRAPARE - Transportation     Lack of Transportation (Medical): No     Lack of Transportation (Non-Medical): No   Physical Activity: Sufficiently Active (12/11/2023)    Exercise Vital Sign     Days of Exercise per Week: 5 days     Minutes of Exercise per Session: 30 min   Stress: No Stress Concern Present (12/11/2023)    [employer] - Occupational Stress Questionnaire     Feeling of Stress : Not at all   Housing Stability: Low Risk  (12/11/2023)    Housing Stability Vital Sign     Unable to Pay for Housing in the Last Year: No     Number of Places Lived in the Last Year: 1     Unstable Housing in the Last Year: No       FAMILY HISTORY:  Family History   Problem Relation Name Age of Onset    Alzheimer's disease Mother      Polycystic kidney disease Mother      Hypertension Father      Heart disease Father      Cancer Sister      Heart disease Sister      Hypertension Sister         ALLERGIES:  Allergies as of 11/14/2024    (No Known  "Allergies)         MEDICATIONS:  Current Outpatient Medications on File Prior to Visit   Medication Sig Dispense Refill    aspirin (ECOTRIN) 81 MG EC tablet Take 81 mg by mouth once daily.      atorvastatin (LIPITOR) 80 MG tablet Take 1 tablet (80 mg total) by mouth every evening. 90 tablet 1    dapsone 100 MG Tab TAKE ONE TABLET BY MOUTH DAILY 90 tablet 1    docusate sodium (COLACE) 100 MG capsule Take 100 mg by mouth daily as needed.       losartan (COZAAR) 50 MG tablet Take 1 tablet (50 mg total) by mouth once daily. 90 tablet 1    metoprolol succinate (TOPROL-XL) 25 MG 24 hr tablet Take 1 tablet (25 mg total) by mouth once daily. 90 tablet 1    omeprazole (PRILOSEC) 20 MG capsule Take 1 capsule (20 mg total) by mouth once daily. 90 capsule 1    triamcinolone acetonide 0.1% (KENALOG) 0.1 % cream Apply to AA on body BID PRN flares tapering with improvement 454 g 1     No current facility-administered medications on file prior to visit.          PHYSICAL EXAM:  Blood pressure (!) 156/90, pulse 76, height 6' 3" (1.905 m), weight 65.7 kg (144 lb 12.8 oz), SpO2 (!) 94%.  Wt Readings from Last 3 Encounters:   11/14/24 65.7 kg (144 lb 12.8 oz)   11/01/24 67.4 kg (148 lb 9.4 oz)   10/17/24 67.4 kg (148 lb 9.6 oz)      Body mass index is 18.1 kg/m².    Physical Exam  Vitals and nursing note reviewed.   Constitutional:       Appearance: Normal appearance. He is normal weight.   HENT:      Head: Normocephalic and atraumatic.      Right Ear: External ear normal.      Left Ear: External ear normal.   Eyes:      General: No scleral icterus.        Right eye: No discharge.         Left eye: No discharge.      Extraocular Movements: Extraocular movements intact.      Conjunctiva/sclera: Conjunctivae normal.      Pupils: Pupils are equal, round, and reactive to light.   Cardiovascular:      Rate and Rhythm: Normal rate and regular rhythm.      Pulses: Normal pulses.      Heart sounds: Normal heart sounds. No murmur heard.     No " friction rub. No gallop.   Pulmonary:      Effort: Pulmonary effort is normal.      Breath sounds: No wheezing or rales.      Comments: Breath sounds decreased bilaterlly, scattered rhonchi, clear with cough  Chest:      Chest wall: No tenderness.   Abdominal:      General: Abdomen is flat. Bowel sounds are normal. There is no distension.      Palpations: Abdomen is soft.      Tenderness: There is no abdominal tenderness. There is no guarding or rebound.   Musculoskeletal:         General: No swelling or tenderness. Normal range of motion.      Cervical back: Normal range of motion and neck supple.   Skin:     General: Skin is warm and dry.      Findings: No erythema or rash.   Neurological:      General: No focal deficit present.      Mental Status: He is alert and oriented to person, place, and time.      Cranial Nerves: No cranial nerve deficit.      Motor: No weakness.      Gait: Gait normal.   Psychiatric:         Mood and Affect: Mood normal.         Behavior: Behavior normal.         Thought Content: Thought content normal.         Judgment: Judgment normal.          LABS REVIEWED:  Lab Results   Component Value Date    WBC 8.22 10/17/2024    RBC 3.93 (L) 10/17/2024    HGB 13.2 (L) 10/17/2024    HCT 40.9 10/17/2024    .1 (H) 10/17/2024    MCH 33.6 (H) 10/17/2024    MCHC 32.3 10/17/2024    RDW 13.8 10/17/2024     (L) 10/17/2024    MPV 12.6 (H) 10/17/2024    NRBC 0.0 10/17/2024     Lab Results   Component Value Date     10/17/2024    K 4.2 10/17/2024     (H) 10/17/2024    CO2 26 10/17/2024    BUN 18 10/17/2024     Lab Results   Component Value Date     04/12/2023    AST 20 10/17/2024    ALT 19 10/17/2024     Lab Results   Component Value Date     10/17/2024     Lab Results   Component Value Date    CHOL 159 10/17/2024    HDL 68 (H) 10/17/2024    TRIG 72 10/17/2024    CHOLHDL 2.3 10/17/2024       CARDIAC STUDIES REVIEWED:    OTHER IMAGING STUDIES REVIEWED:    ASSESSMENT:  PLAN:  1. CAD: stable class 1 angina, post CABG, severe triple vessel disease with SVG to RCA and LAD, grafts patent at [facility] 9/2021, well preserved EF at 55%                 2  PVD: severe carotid stenosis, successful revascularization, now asymptomatic         3. Hypertension: better controlled with increased Cozaar. control on current meds.                4.  Tobacco abuse - discussed smoking cessation, did not start Chantex, once again declines pharmacologic support    Follow up in six months, sooner if symptoms change.

## 2024-12-13 NOTE — PROGRESS NOTES
Bhaskar Patel presented for a  Medicare AWV and comprehensive Health Risk Assessment today. The following components were reviewed and updated:    Medical history  Family History  Social history  Allergies and Current Medications  Health Risk Assessment  Health Maintenance  Care Team         ** See Completed Assessments for Annual Wellness Visit within the encounter summary.**         The following assessments were completed:  Living Situation  CAGE  Depression Screening  Timed Get Up and Go  Whisper Test  Cognitive Function Screening  Nutrition Screening  ADL Screening  PAQ Screening        Opioid documentation{does/does not have a current opioid prescription:87749}                There were no vitals filed for this visit.  There is no height or weight on file to calculate BMI.  Physical Exam          Diagnoses and health risks identified today and associated recommendations/orders:    Problem List Items Addressed This Visit          Pulmonary    Chronic obstructive pulmonary disease, unspecified COPD type       Cardiac/Vascular    Essential hypertension    Coronary artery disease involving native coronary artery of native heart without angina pectoris    Hyperlipidemia    PVD (peripheral vascular disease)       GI    Gastroesophageal reflux disease     Other Visit Diagnoses       Encounter for subsequent annual wellness visit (AWV) in Medicare patient    -  Primary            Provided Bhaskar with a 5-10 year written screening schedule and personal prevention plan. Recommendations were developed using the USPSTF age appropriate recommendations. Education, counseling, and referrals were provided as needed. After Visit Summary printed and given to patient which includes a list of additional screenings\tests needed.    Follow up for yearly annual wellness visit

## 2024-12-13 NOTE — PATIENT INSTRUCTIONS
Counseling and Referral of Other Preventative  (Italic type indicates deductible and co-insurance are waived)    Patient Name: Bhaskar Patel  Today's Date: 12/17/2024    Health Maintenance       Date Due Completion Date    TETANUS VACCINE Never done ---    Shingles Vaccine (1 of 2) Never done ---    RSV Vaccine (Age 60+ and Pregnant patients) (1 - Risk 60-74 years 1-dose series) Never done ---    Colorectal Cancer Screening 10/20/2023 10/20/2020    Influenza Vaccine (1) 09/01/2024 10/11/2023    COVID-19 Vaccine (3 - 2024-25 season) 09/01/2024 4/12/2021    PROSTATE-SPECIFIC ANTIGEN 10/17/2025 10/17/2024    Lipid Panel 10/17/2025 10/17/2024    High Dose Statin 11/14/2025 11/14/2024    Aspirin/Antiplatelet Therapy 11/14/2025 11/14/2024    LDCT Lung Screen 12/16/2025 12/16/2024        No orders of the defined types were placed in this encounter.      Counseling and Referral of Other Preventative  (Italic type indicates deductible and co-insurance are waived)    Patient Name: Bhaskar Patel  Today's Date: 12/17/2024    Health Maintenance       Date Due Completion Date    TETANUS VACCINE Never done ---    Shingles Vaccine (1 of 2) Never done ---    RSV Vaccine (Age 60+ and Pregnant patients) (1 - Risk 60-74 years 1-dose series) Never done ---    Colorectal Cancer Screening 10/20/2023 10/20/2020    Influenza Vaccine (1) 09/01/2024 10/11/2023    COVID-19 Vaccine (3 - 2024-25 season) 09/01/2024 4/12/2021    PROSTATE-SPECIFIC ANTIGEN 10/17/2025 10/17/2024    Lipid Panel 10/17/2025 10/17/2024    High Dose Statin 11/14/2025 11/14/2024    Aspirin/Antiplatelet Therapy 11/14/2025 11/14/2024    LDCT Lung Screen 12/16/2025 12/16/2024        No orders of the defined types were placed in this encounter.      The following information is provided to all patients.  This information is to help you find resources for any of the problems found today that may be affecting your health:                  Living healthy guide:  [place].gov    Understanding Diabetes: www.diabetes.org      Eating healthy: www.cdc.gov/healthyweight      CDC home safety checklist: www.cdc.gov/steadi/patient.html      Agency on Aging: [place].gov    Alcoholics anonymous (AA): www.aa.org      Physical Activity: www.keara.nih.gov/lj4mubd      Tobacco use: [place].gov

## 2024-12-16 ENCOUNTER — HOSPITAL ENCOUNTER (OUTPATIENT)
Dept: RADIOLOGY | Facility: HOSPITAL | Age: 69
Discharge: HOME | End: 2024-12-16
Payer: MEDICARE

## 2024-12-16 VITALS — HEIGHT: 75 IN | BODY MASS INDEX: 17.91 KG/M2 | WEIGHT: 144 LBS

## 2024-12-16 DIAGNOSIS — Z12.2 SCREENING FOR LUNG CANCER: ICD-10-CM

## 2024-12-16 DIAGNOSIS — Z87.891 PERSONAL HISTORY OF NICOTINE DEPENDENCE: ICD-10-CM

## 2024-12-16 PROCEDURE — 71271 CT THORAX LUNG CANCER SCR C-: CPT | Mod: 26,,, | Performed by: RADIOLOGY

## 2024-12-16 PROCEDURE — 71271 CT THORAX LUNG CANCER SCR C-: CPT | Mod: TC

## 2024-12-17 ENCOUNTER — OFFICE VISIT (OUTPATIENT)
Dept: FAMILY MEDICINE | Facility: CLINIC | Age: 69
End: 2024-12-17
Payer: MEDICARE

## 2024-12-17 VITALS
SYSTOLIC BLOOD PRESSURE: 136 MMHG | HEIGHT: 75 IN | DIASTOLIC BLOOD PRESSURE: 74 MMHG | BODY MASS INDEX: 18.29 KG/M2 | TEMPERATURE: 98 F | OXYGEN SATURATION: 99 % | HEART RATE: 69 BPM | WEIGHT: 147.06 LBS | RESPIRATION RATE: 20 BRPM

## 2024-12-17 DIAGNOSIS — Z00.00 ENCOUNTER FOR SUBSEQUENT ANNUAL WELLNESS VISIT (AWV) IN MEDICARE PATIENT: Primary | ICD-10-CM

## 2024-12-17 DIAGNOSIS — I73.9 PVD (PERIPHERAL VASCULAR DISEASE): ICD-10-CM

## 2024-12-17 DIAGNOSIS — J44.9 CHRONIC OBSTRUCTIVE PULMONARY DISEASE, UNSPECIFIED COPD TYPE: ICD-10-CM

## 2024-12-17 DIAGNOSIS — E78.5 HYPERLIPIDEMIA, UNSPECIFIED HYPERLIPIDEMIA TYPE: ICD-10-CM

## 2024-12-17 DIAGNOSIS — K21.9 GASTROESOPHAGEAL REFLUX DISEASE, UNSPECIFIED WHETHER ESOPHAGITIS PRESENT: ICD-10-CM

## 2024-12-17 DIAGNOSIS — I10 ESSENTIAL HYPERTENSION: ICD-10-CM

## 2024-12-17 DIAGNOSIS — I25.10 CORONARY ARTERY DISEASE INVOLVING NATIVE CORONARY ARTERY OF NATIVE HEART WITHOUT ANGINA PECTORIS: ICD-10-CM

## 2024-12-17 DIAGNOSIS — Z23 NEED FOR VACCINATION: ICD-10-CM

## 2024-12-17 PROCEDURE — G0008 ADMIN INFLUENZA VIRUS VAC: HCPCS | Mod: ,,,

## 2024-12-17 PROCEDURE — 3288F FALL RISK ASSESSMENT DOCD: CPT | Mod: ,,,

## 2024-12-17 PROCEDURE — G0439 PPPS, SUBSEQ VISIT: HCPCS | Mod: ,,,

## 2024-12-17 PROCEDURE — 1159F MED LIST DOCD IN RCRD: CPT | Mod: ,,,

## 2024-12-17 PROCEDURE — 4010F ACE/ARB THERAPY RXD/TAKEN: CPT | Mod: ,,,

## 2024-12-17 PROCEDURE — 1170F FXNL STATUS ASSESSED: CPT | Mod: ,,,

## 2024-12-17 PROCEDURE — 1101F PT FALLS ASSESS-DOCD LE1/YR: CPT | Mod: ,,,

## 2024-12-17 PROCEDURE — 90653 IIV ADJUVANT VACCINE IM: CPT | Mod: ,,,

## 2024-12-17 PROCEDURE — 1126F AMNT PAIN NOTED NONE PRSNT: CPT | Mod: ,,,

## 2024-12-17 PROCEDURE — 3075F SYST BP GE 130 - 139MM HG: CPT | Mod: ,,,

## 2024-12-17 PROCEDURE — 3078F DIAST BP <80 MM HG: CPT | Mod: ,,,

## 2024-12-17 NOTE — ASSESSMENT & PLAN NOTE
Hyperlipidemia is controlled.Current medical regimen is effective.   Low fat, low chol diet, less fried foods, more baked foods, more vegetables.   Exercise daily  Take medications as ordered for hyperlipidemia  Return to the clinic as needed

## 2024-12-17 NOTE — ASSESSMENT & PLAN NOTE
-Increased Losartan to 50 mg from 25mg daily on last visit   Blood pressure controlled. Continue current medication, is effective at this time. Return to the clinic as needed.   -Low salt, low sodium diet, less fried foods, more baked foods, more green leafy vegetables, more fruits, less bread  -CBC, CMP today. Will call with results.   -Exercise at least 30-45 minutes a day  -Follow up in 6 months.

## 2024-12-17 NOTE — PROGRESS NOTES
Bhaskar Patel presented for a follow-up Medicare AWV today. The following components were reviewed and updated:    Medical history  Family History  Social history  Allergies and Current Medications  Health Risk Assessment  Health Maintenance  Care Team    **See Completed Assessments for Annual Wellness visit with in the encounter summary    The following assessments were completed:  Depression Screening  Cognitive function Screening  Timed Get Up Test  Whisper Test      Opioid documentation:      Patient {does/does not have a current opioid prescription:70737}       There were no vitals filed for this visit.  There is no height or weight on file to calculate BMI.       Physical Exam      Diagnoses and health risks identified today and associated recommendations/orders:  1. Encounter for subsequent annual wellness visit (AWV) in Medicare patient  ***    2. Hyperlipidemia, unspecified hyperlipidemia type  ***    3. Essential hypertension  ***    4. Gastroesophageal reflux disease, unspecified whether esophagitis present  ***    5. Chronic obstructive pulmonary disease, unspecified COPD type  ***    6. PVD (peripheral vascular disease)  ***    7. Coronary artery disease involving native coronary artery of native heart without angina pectoris  ***      Provided Bhaskar with a 5-10 year written screening schedule and personal prevention plan. Recommendations were developed using the USPSTF age appropriate recommendations. Education, counseling, and referrals were provided as needed.  After Visit Summary printed and given to patient which includes a list of additional screenings\tests needed.    Follow up for yearly annual wellness visit

## 2024-12-17 NOTE — PROGRESS NOTES
"  Bhaskar Patel presented for a  Medicare AWV and comprehensive Health Risk Assessment today. The following components were reviewed and updated:    Medical history  Family History  Social history  Allergies and Current Medications  Health Risk Assessment  Health Maintenance  Care Team         ** See Completed Assessments for Annual Wellness Visit within the encounter summary.**         The following assessments were completed:  Living Situation  CAGE  Depression Screening  Timed Get Up and Go  Whisper Test  Cognitive Function Screening  Nutrition Screening  ADL Screening  PAQ Screening        Opioid documentationdoes not have a current opioid prescription.                   Vitals:    12/17/24 1056   BP: 136/74   BP Location: Left arm   Patient Position: Sitting   Pulse: 69   Resp: 20   Temp: 97.8 °F (36.6 °C)   TempSrc: Oral   SpO2: 99%   Weight: 66.7 kg (147 lb 0.6 oz)   Height: 6' 3" (1.905 m)     Body mass index is 18.38 kg/m².  Physical Exam  Constitutional:       General: He is not in acute distress.     Appearance: Normal appearance. He is well-developed. He is not ill-appearing.   HENT:      Head: Normocephalic and atraumatic.      Right Ear: Tympanic membrane normal.      Left Ear: Tympanic membrane normal.      Nose: Nose normal.      Mouth/Throat:      Mouth: Mucous membranes are moist.      Pharynx: Oropharynx is clear. No posterior oropharyngeal erythema.   Cardiovascular:      Rate and Rhythm: Normal rate and regular rhythm.      Pulses: Normal pulses.      Heart sounds: Normal heart sounds.   Pulmonary:      Effort: Pulmonary effort is normal. No accessory muscle usage or respiratory distress.      Breath sounds: Normal breath sounds.   Abdominal:      General: Abdomen is flat. Bowel sounds are normal. There is no distension.      Palpations: Abdomen is soft.      Tenderness: There is no abdominal tenderness.   Musculoskeletal:         General: Normal range of motion.      Cervical back: Normal range " of motion.   Skin:     General: Skin is warm and dry.      Capillary Refill: Capillary refill takes less than 2 seconds.   Neurological:      Mental Status: He is alert and oriented to person, place, and time. Mental status is at baseline.   Psychiatric:         Mood and Affect: Mood normal.         Speech: Speech normal.         Behavior: Behavior normal. Behavior is cooperative.         Thought Content: Thought content normal.             Diagnoses and health risks identified today and associated recommendations/orders:    Problem List Items Addressed This Visit       Essential hypertension     -Increased Losartan to 50 mg from 25mg daily on last visit   Blood pressure controlled. Continue current medication, is effective at this time. Return to the clinic as needed.   -Low salt, low sodium diet, less fried foods, more baked foods, more green leafy vegetables, more fruits, less bread  -CBC, CMP today. Will call with results.   -Exercise at least 30-45 minutes a day  -Follow up in 6 months.            Coronary artery disease involving native coronary artery of native heart without angina pectoris     Continue current medication, is effective at this time. Return to the clinic as needed.            Hyperlipidemia     Hyperlipidemia is controlled.Current medical regimen is effective.   Low fat, low chol diet, less fried foods, more baked foods, more vegetables.   Exercise daily  Take medications as ordered for hyperlipidemia  Return to the clinic as needed              Gastroesophageal reflux disease     -Avoid spicy, greasy foods  -Avoid caffeine, citric acid, chocolate, peppermint, and carbonated drinks  -Do not lay down within 3 hours of eating  -Exercise 150 minutes per week  -Increase fluid to 64 ounces daily  -Avoid antiinflammatory medications such as motrin, advil, aleve, ibuprofen, and BC powder  Continue current medication, is effective at this time. Return to the clinic as needed.            PVD  (peripheral vascular disease)     Continue current medication, is effective at this time. Return to the clinic as needed.            Chronic obstructive pulmonary disease, unspecified COPD type     COPD is controlled. Current medical regimen is effective.          BMI less than 19,adult     Will continue to monitor         Encounter for subsequent annual wellness visit (AWV) in Medicare patient - Primary     1 year follow up   Return to the clinic if needed before then.           Other Visit Diagnoses       Need for vaccination        Relevant Medications    influenza (adjuvanted) (Fluad) 45 mcg/0.5 mL IM vaccine (> or = 66 yo) 0.5 mL (Completed)               Provided Bhaskar with a 5-10 year written screening schedule and personal prevention plan. Recommendations were developed using the USPSTF age appropriate recommendations. Education, counseling, and referrals were provided as needed. After Visit Summary printed and given to patient which includes a list of additional screenings\tests needed.    Follow up for yearly annual wellness visit  Pt has GI follow up appt on 01/14/2025 with Radha Chávez regarding Colonoscopy.Pt declined covid booster.        I offered to discuss advanced care planning, including how to pick a person who would make decisions for you if you were unable to make them for yourself, called a health care power of , and what kind of decisions you might make such as use of life sustaining treatments such as ventilators and tube feeding when faced with a life limiting illness recorded on a living will that they will need to know. (How you want to be cared for as you near the end of your natural life)      X Patient is interested in learning more about how to make advanced directives.  I provided them paperwork and offered to discuss this with them.     X Patient is interested in learning more about how to make advanced directives.  I provided them paperwork and offered to discuss this with  them.

## 2024-12-17 NOTE — PROGRESS NOTES
Bhaskar Patel presented for a  Medicare AWV and comprehensive Health Risk Assessment today. The following components were reviewed and updated:    Medical history  Family History  Social history  Allergies and Current Medications  Health Risk Assessment  Health Maintenance  Care Team         ** See Completed Assessments for Annual Wellness Visit within the encounter summary.**         The following assessments were completed:  Living Situation  CAGE  Depression Screening  Timed Get Up and Go  Whisper Test  Cognitive Function Screening  Nutrition Screening  ADL Screening  PAQ Screening        Opioid documentation{does/does not have a current opioid prescription:21008}                There were no vitals filed for this visit.  There is no height or weight on file to calculate BMI.  Physical Exam          Diagnoses and health risks identified today and associated recommendations/orders:    Problem List Items Addressed This Visit          Pulmonary    Chronic obstructive pulmonary disease, unspecified COPD type       Cardiac/Vascular    Essential hypertension    Coronary artery disease involving native coronary artery of native heart without angina pectoris    Hyperlipidemia    PVD (peripheral vascular disease)       GI    Gastroesophageal reflux disease     Other Visit Diagnoses       Encounter for subsequent annual wellness visit (AWV) in Medicare patient    -  Primary            Provided Bhaskar with a 5-10 year written screening schedule and personal prevention plan. Recommendations were developed using the USPSTF age appropriate recommendations. Education, counseling, and referrals were provided as needed. After Visit Summary printed and given to patient which includes a list of additional screenings\tests needed.    No follow-ups on file.    Cherelle Martínez RN

## 2024-12-17 NOTE — PROGRESS NOTES
"    Bhaskar Patel presented for a follow-up Medicare AWV today. The following components were reviewed and updated:    Medical history  Family History  Social history  Allergies and Current Medications  Health Risk Assessment  Health Maintenance  Care Team    **See Completed Assessments for Annual Wellness visit with in the encounter summary    The following assessments were completed:  Depression Screening  Cognitive function Screening  Timed Get Up Test  Whisper Test      Opioid documentation:      Patient does not have a current opioid prescription.   {Stop here if answer is 'does not'. (This text will automatically delete.) :91512}       Vitals:    12/17/24 1056   BP: 136/74   BP Location: Left arm   Patient Position: Sitting   Pulse: 69   Resp: 20   Temp: 97.8 °F (36.6 °C)   TempSrc: Oral   SpO2: 99%   Weight: 66.7 kg (147 lb 0.6 oz)   Height: 6' 3" (1.905 m)     Body mass index is 18.38 kg/m².       Physical Exam      Diagnoses and health risks identified today and associated recommendations/orders:  1. Encounter for subsequent annual wellness visit (AWV) in Medicare patient  ***    2. Hyperlipidemia, unspecified hyperlipidemia type  ***    3. Essential hypertension  ***    4. Gastroesophageal reflux disease, unspecified whether esophagitis present  ***    5. Chronic obstructive pulmonary disease, unspecified COPD type  ***    6. PVD (peripheral vascular disease)  ***    7. Coronary artery disease involving native coronary artery of native heart without angina pectoris  ***    8. BMI less than 19,adult  ***    9. Need for vaccination  ***  - influenza (adjuvanted) (Fluad) 45 mcg/0.5 mL IM vaccine (> or = 64 yo) 0.5 mL      Provided Bhaskar with a 5-10 year written screening schedule and personal prevention plan. Recommendations were developed using the USPSTF age appropriate recommendations. Education, counseling, and referrals were provided as needed.  After Visit Summary printed and given to patient which " includes a list of additional screenings\tests needed.    Follow up for yearly annual wellness visit  Pt has GI follow up appt on 01/14/2025 with Radha Chávez regarding Colonoscopy.Pt declined covid booster.      I offered to discuss advanced care planning, including how to pick a person who would make decisions for you if you were unable to make them for yourself, called a health care power of , and what kind of decisions you might make such as use of life sustaining treatments such as ventilators and tube feeding when faced with a life limiting illness recorded on a living will that they will need to know. (How you want to be cared for as you near the end of your natural life)     X Patient is interested in learning more about how to make advanced directives.  I provided them paperwork and offered to discuss this with them.

## 2024-12-27 ENCOUNTER — DOCUMENTATION ONLY (OUTPATIENT)
Dept: RADIOLOGY | Facility: HOSPITAL | Age: 69
End: 2024-12-27
Payer: MEDICARE

## 2025-01-01 ENCOUNTER — HOSPITAL ENCOUNTER (EMERGENCY)
Facility: HOSPITAL | Age: 70
Discharge: ANOTHER HOSPITAL | End: 2025-05-08
Payer: MEDICARE

## 2025-01-01 ENCOUNTER — HOSPITAL ENCOUNTER (INPATIENT)
Facility: HOSPITAL | Age: 70
LOS: 6 days | Discharge: ANOTHER HOSPITAL | DRG: 871 | End: 2025-05-14
Attending: FAMILY MEDICINE | Admitting: INTERNAL MEDICINE
Payer: MEDICARE

## 2025-01-01 VITALS
OXYGEN SATURATION: 97 % | DIASTOLIC BLOOD PRESSURE: 60 MMHG | BODY MASS INDEX: 17.61 KG/M2 | HEART RATE: 75 BPM | RESPIRATION RATE: 18 BRPM | SYSTOLIC BLOOD PRESSURE: 148 MMHG | TEMPERATURE: 99 F | WEIGHT: 141.63 LBS | HEIGHT: 75 IN

## 2025-01-01 VITALS
HEART RATE: 78 BPM | BODY MASS INDEX: 17.93 KG/M2 | SYSTOLIC BLOOD PRESSURE: 137 MMHG | OXYGEN SATURATION: 94 % | RESPIRATION RATE: 26 BRPM | HEIGHT: 75 IN | WEIGHT: 144.19 LBS | DIASTOLIC BLOOD PRESSURE: 49 MMHG | TEMPERATURE: 97 F

## 2025-01-01 DIAGNOSIS — D69.6 THROMBOCYTOPENIA: ICD-10-CM

## 2025-01-01 DIAGNOSIS — K76.9 ACUTE LIVER DISEASE: ICD-10-CM

## 2025-01-01 DIAGNOSIS — R10.13 EPIGASTRIC PAIN: Primary | ICD-10-CM

## 2025-01-01 DIAGNOSIS — J96.01 ACUTE HYPOXEMIC RESPIRATORY FAILURE: ICD-10-CM

## 2025-01-01 DIAGNOSIS — R65.20 SEPSIS WITH ACUTE RENAL FAILURE AND TUBULAR NECROSIS WITHOUT SEPTIC SHOCK, DUE TO UNSPECIFIED ORGANISM: ICD-10-CM

## 2025-01-01 DIAGNOSIS — A41.9 SEPSIS WITH ACUTE RENAL FAILURE AND TUBULAR NECROSIS WITHOUT SEPTIC SHOCK, DUE TO UNSPECIFIED ORGANISM: ICD-10-CM

## 2025-01-01 DIAGNOSIS — K90.0 CELIAC DISEASE: ICD-10-CM

## 2025-01-01 DIAGNOSIS — R07.9 CHEST PAIN: ICD-10-CM

## 2025-01-01 DIAGNOSIS — A41.9 SEPSIS: ICD-10-CM

## 2025-01-01 DIAGNOSIS — N39.0 URINARY TRACT INFECTION WITHOUT HEMATURIA, SITE UNSPECIFIED: ICD-10-CM

## 2025-01-01 DIAGNOSIS — N17.9 ACUTE RENAL FAILURE SUPERIMPOSED ON STAGE 4 CHRONIC KIDNEY DISEASE, UNSPECIFIED ACUTE RENAL FAILURE TYPE: ICD-10-CM

## 2025-01-01 DIAGNOSIS — N18.4 ACUTE RENAL FAILURE SUPERIMPOSED ON STAGE 4 CHRONIC KIDNEY DISEASE, UNSPECIFIED ACUTE RENAL FAILURE TYPE: ICD-10-CM

## 2025-01-01 DIAGNOSIS — N17.0 SEPSIS WITH ACUTE RENAL FAILURE AND TUBULAR NECROSIS WITHOUT SEPTIC SHOCK, DUE TO UNSPECIFIED ORGANISM: ICD-10-CM

## 2025-01-01 DIAGNOSIS — N17.9 AKI (ACUTE KIDNEY INJURY): ICD-10-CM

## 2025-01-01 DIAGNOSIS — R74.8 ELEVATED LIVER ENZYMES: ICD-10-CM

## 2025-01-01 DIAGNOSIS — D69.3 IMMUNE THROMBOCYTOPENIA: ICD-10-CM

## 2025-01-01 DIAGNOSIS — A41.9 SEPSIS, DUE TO UNSPECIFIED ORGANISM, UNSPECIFIED WHETHER ACUTE ORGAN DYSFUNCTION PRESENT: Primary | ICD-10-CM

## 2025-01-01 DIAGNOSIS — R79.89 ELEVATED TROPONIN: ICD-10-CM

## 2025-01-01 DIAGNOSIS — R10.13 EPIGASTRIC PAIN: ICD-10-CM

## 2025-01-01 DIAGNOSIS — R09.02 HYPOXIA: ICD-10-CM

## 2025-01-01 DIAGNOSIS — R07.9 CHEST PAIN, UNSPECIFIED TYPE: ICD-10-CM

## 2025-01-01 DIAGNOSIS — R11.2 NAUSEA AND VOMITING, UNSPECIFIED VOMITING TYPE: ICD-10-CM

## 2025-01-01 DIAGNOSIS — J90 PLEURAL EFFUSION: ICD-10-CM

## 2025-01-01 DIAGNOSIS — G93.41 ACUTE METABOLIC ENCEPHALOPATHY: ICD-10-CM

## 2025-01-01 LAB
ABO + RH BLD: NORMAL
ALBUMIN PE, BLOOD: 2.71 G/DL (ref 3.5–5.2)
ALBUMIN SERPL BCP-MCNC: 1.7 G/DL (ref 3.4–4.8)
ALBUMIN SERPL BCP-MCNC: 1.8 G/DL (ref 3.4–4.8)
ALBUMIN SERPL BCP-MCNC: 1.8 G/DL (ref 3.4–4.8)
ALBUMIN SERPL BCP-MCNC: 2.2 G/DL (ref 3.4–4.8)
ALBUMIN SERPL BCP-MCNC: 2.4 G/DL (ref 3.4–4.8)
ALBUMIN SERPL BCP-MCNC: 2.5 G/DL (ref 3.4–4.8)
ALBUMIN SERPL BCP-MCNC: 2.9 G/DL (ref 3.4–4.8)
ALBUMIN/GLOB SERPL: 0.4 {RATIO}
ALBUMIN/GLOB SERPL: 0.5 {RATIO}
ALBUMIN/GLOB SERPL: 0.6 {RATIO}
ALBUMIN/GLOB SERPL: 0.7 {RATIO}
ALBUMIN/GLOB SERPL: 0.8 {RATIO}
ALBUMIN/GLOB SERPL: 0.8 {RATIO}
ALP SERPL-CCNC: 236 U/L (ref 40–150)
ALP SERPL-CCNC: 239 U/L (ref 40–150)
ALP SERPL-CCNC: 240 U/L (ref 40–150)
ALP SERPL-CCNC: 259 U/L (ref 40–150)
ALP SERPL-CCNC: 346 U/L (ref 40–150)
ALP SERPL-CCNC: 357 U/L (ref 40–150)
ALP SERPL-CCNC: 444 U/L (ref 40–150)
ALPHA1 GLOB SERPL ELPH-MCNC: 0.2 G/DL (ref 0.1–0.4)
ALPHA2 GLOB SERPL ELPH-MCNC: 0.5 G/DL (ref 0.4–1.3)
ALT SERPL W P-5'-P-CCNC: 102 U/L
ALT SERPL W P-5'-P-CCNC: 69 U/L
ALT SERPL W P-5'-P-CCNC: 70 U/L
ALT SERPL W P-5'-P-CCNC: 74 U/L
ALT SERPL W P-5'-P-CCNC: 89 U/L
ALT SERPL W P-5'-P-CCNC: 91 U/L
ALT SERPL W P-5'-P-CCNC: 91 U/L
AMMONIA PLAS-SCNC: 43 ΜMOL/L (ref 18–72)
AMMONIA PLAS-SCNC: 44 ΜMOL/L (ref 18–72)
ANA SER QL: NEGATIVE
ANION GAP SERPL CALCULATED.3IONS-SCNC: 15 MMOL/L (ref 7–16)
ANION GAP SERPL CALCULATED.3IONS-SCNC: 16 MMOL/L (ref 7–16)
ANION GAP SERPL CALCULATED.3IONS-SCNC: 17 MMOL/L (ref 7–16)
ANION GAP SERPL CALCULATED.3IONS-SCNC: 18 MMOL/L (ref 7–16)
ANION GAP SERPL CALCULATED.3IONS-SCNC: 22 MMOL/L (ref 7–16)
ANISOCYTOSIS BLD QL SMEAR: ABNORMAL
AORTIC ROOT ANNULUS: 3.5 CM
AORTIC VALVE CUSP SEPERATION: 2.63 CM
APICAL FOUR CHAMBER EJECTION FRACTION: 64 %
APTT PPP: 46.7 SECONDS (ref 25.2–37.3)
APTT PPP: 52.2 SECONDS (ref 25.2–37.3)
APTT PPP: 60.7 SECONDS (ref 25.2–37.3)
AST SERPL W P-5'-P-CCNC: 185 U/L (ref 11–45)
AST SERPL W P-5'-P-CCNC: 198 U/L (ref 11–45)
AST SERPL W P-5'-P-CCNC: 252 U/L (ref 11–45)
AST SERPL W P-5'-P-CCNC: 256 U/L (ref 11–45)
AST SERPL W P-5'-P-CCNC: 260 U/L (ref 11–45)
AST SERPL W P-5'-P-CCNC: 415 U/L (ref 11–45)
AST SERPL W P-5'-P-CCNC: 480 U/L (ref 11–45)
AV INDEX (PROSTH): 1.07
AV MEAN GRADIENT: 3 MMHG
AV PEAK GRADIENT: 6 MMHG
AV VALVE AREA BY VELOCITY RATIO: 4.2 CM²
AV VALVE AREA: 4.4 CM²
AV VELOCITY RATIO: 1
B-GLOBULIN SERPL ELPH-MCNC: 0.5 G/DL (ref 0.5–1.5)
BACTERIA #/AREA URNS HPF: ABNORMAL /HPF
BACTERIA BLD CULT: NORMAL
BACTERIA BLD CULT: NORMAL
BASOPHILS # BLD AUTO: 0 K/UL (ref 0–0.2)
BASOPHILS # BLD AUTO: 0.01 K/UL (ref 0–0.2)
BASOPHILS # BLD AUTO: 0.02 K/UL (ref 0–0.2)
BASOPHILS NFR BLD AUTO: 0 % (ref 0–1)
BASOPHILS NFR BLD AUTO: 0.2 % (ref 0–1)
BASOPHILS NFR BLD AUTO: 0.3 % (ref 0–1)
BILIRUB DIRECT SERPL-MCNC: 2.3 MG/DL
BILIRUB DIRECT SERPL-MCNC: 2.3 MG/DL
BILIRUB SERPL-MCNC: 1.4 MG/DL
BILIRUB SERPL-MCNC: 1.4 MG/DL
BILIRUB SERPL-MCNC: 2.1 MG/DL
BILIRUB SERPL-MCNC: 2.5 MG/DL
BILIRUB SERPL-MCNC: 2.5 MG/DL
BILIRUB SERPL-MCNC: 2.8 MG/DL
BILIRUB SERPL-MCNC: 3 MG/DL
BILIRUB UR QL STRIP: NEGATIVE
BLD PROD TYP BPU: NORMAL
BLOOD UNIT EXPIRATION DATE: NORMAL
BLOOD UNIT TYPE CODE: 6200
BLOOD UNIT TYPE CODE: 9500
BSA FOR ECHO PROCEDURE: 1.84 M2
BUN SERPL-MCNC: 109 MG/DL (ref 8–26)
BUN SERPL-MCNC: 153 MG/DL (ref 8–26)
BUN SERPL-MCNC: 56 MG/DL (ref 8–26)
BUN SERPL-MCNC: 56 MG/DL (ref 8–26)
BUN SERPL-MCNC: 69 MG/DL (ref 8–26)
BUN SERPL-MCNC: 92 MG/DL (ref 8–26)
BUN SERPL-MCNC: >125 MG/DL (ref 8–26)
BUN/CREAT SERPL: 18 (ref 6–20)
BUN/CREAT SERPL: 20 (ref 6–20)
BUN/CREAT SERPL: 20 (ref 6–20)
BUN/CREAT SERPL: 21 (ref 6–20)
CALCIUM SERPL-MCNC: 6.1 MG/DL (ref 8.8–10)
CALCIUM SERPL-MCNC: 6.3 MG/DL (ref 8.8–10)
CALCIUM SERPL-MCNC: 6.3 MG/DL (ref 8.8–10)
CALCIUM SERPL-MCNC: 7 MG/DL (ref 8.8–10)
CALCIUM SERPL-MCNC: 7.1 MG/DL (ref 8.8–10)
CALCIUM SERPL-MCNC: 7.3 MG/DL (ref 8.8–10)
CALCIUM SERPL-MCNC: 7.8 MG/DL (ref 8.8–10)
CHLORIDE SERPL-SCNC: 101 MMOL/L (ref 98–107)
CHLORIDE SERPL-SCNC: 102 MMOL/L (ref 98–107)
CHLORIDE SERPL-SCNC: 103 MMOL/L (ref 98–107)
CHLORIDE SERPL-SCNC: 104 MMOL/L (ref 98–107)
CHLORIDE SERPL-SCNC: 105 MMOL/L (ref 98–107)
CHLORIDE SERPL-SCNC: 107 MMOL/L (ref 98–107)
CHLORIDE SERPL-SCNC: 98 MMOL/L (ref 98–107)
CLARITY UR: ABNORMAL
CO2 SERPL-SCNC: 16 MMOL/L (ref 23–31)
CO2 SERPL-SCNC: 17 MMOL/L (ref 23–31)
CO2 SERPL-SCNC: 18 MMOL/L (ref 23–31)
CO2 SERPL-SCNC: 19 MMOL/L (ref 23–31)
CO2 SERPL-SCNC: 20 MMOL/L (ref 23–31)
CO2 SERPL-SCNC: 22 MMOL/L (ref 23–31)
CO2 SERPL-SCNC: 22 MMOL/L (ref 23–31)
COAGULATION SPECIALIST REVIEW: ABNORMAL
COARSE GRAN CASTS #/AREA URNS LPF: ABNORMAL /LPF
COLOR UR: YELLOW
CREAT SERPL-MCNC: 2.68 MG/DL (ref 0.72–1.25)
CREAT SERPL-MCNC: 3.04 MG/DL (ref 0.72–1.25)
CREAT SERPL-MCNC: 3.43 MG/DL (ref 0.72–1.25)
CREAT SERPL-MCNC: 4.34 MG/DL (ref 0.72–1.25)
CREAT SERPL-MCNC: 5.29 MG/DL (ref 0.72–1.25)
CREAT SERPL-MCNC: 6.55 MG/DL (ref 0.72–1.25)
CREAT SERPL-MCNC: 7.55 MG/DL (ref 0.72–1.25)
CRENATED CELLS: ABNORMAL
CRENATED CELLS: ABNORMAL
CROSSMATCH INTERPRETATION: NORMAL
CV ECHO LV RWT: 0.43 CM
DIFFERENTIAL METHOD BLD: ABNORMAL
DISPENSE STATUS: NORMAL
DOP CALC AO PEAK VEL: 1.2 M/S
DOP CALC AO VTI: 21 CM
DOP CALC LVOT AREA: 4.2 CM2
DOP CALC LVOT DIAMETER: 2.3 CM
DOP CALC LVOT PEAK VEL: 1.2 M/S
DOP CALC LVOT STROKE VOLUME: 93 CM3
DOP CALCLVOT PEAK VEL VTI: 22.4 CM
E/A RATIO: 0.69
E/E' RATIO: 4 M/S
ECHO LV POSTERIOR WALL: 1.1 CM (ref 0.6–1.1)
EGFR (NO RACE VARIABLE) (RUSH/TITUS): 11 ML/MIN/1.73M2
EGFR (NO RACE VARIABLE) (RUSH/TITUS): 14 ML/MIN/1.73M2
EGFR (NO RACE VARIABLE) (RUSH/TITUS): 18 ML/MIN/1.73M2
EGFR (NO RACE VARIABLE) (RUSH/TITUS): 21 ML/MIN/1.73M2
EGFR (NO RACE VARIABLE) (RUSH/TITUS): 25 ML/MIN/1.73M2
EGFR (NO RACE VARIABLE) (RUSH/TITUS): 7 ML/MIN/1.73M2
EGFR (NO RACE VARIABLE) (RUSH/TITUS): 8 ML/MIN/1.73M2
EJECTION FRACTION: 65 %
EOSINOPHIL # BLD AUTO: 0 K/UL (ref 0–0.5)
EOSINOPHIL NFR BLD AUTO: 0 % (ref 1–4)
ERYTHROCYTE [DISTWIDTH] IN BLOOD BY AUTOMATED COUNT: 13 % (ref 11.5–14.5)
ERYTHROCYTE [DISTWIDTH] IN BLOOD BY AUTOMATED COUNT: 13.2 % (ref 11.5–14.5)
ERYTHROCYTE [DISTWIDTH] IN BLOOD BY AUTOMATED COUNT: 13.2 % (ref 11.5–14.5)
ERYTHROCYTE [DISTWIDTH] IN BLOOD BY AUTOMATED COUNT: 13.6 % (ref 11.5–14.5)
ERYTHROCYTE [DISTWIDTH] IN BLOOD BY AUTOMATED COUNT: 13.7 % (ref 11.5–14.5)
ERYTHROCYTE [DISTWIDTH] IN BLOOD BY AUTOMATED COUNT: 13.9 % (ref 11.5–14.5)
ERYTHROCYTE [DISTWIDTH] IN BLOOD BY AUTOMATED COUNT: 14 % (ref 11.5–14.5)
ERYTHROCYTE [DISTWIDTH] IN BLOOD BY AUTOMATED COUNT: 14.5 % (ref 11.5–14.5)
FERRITIN SERPL-MCNC: >1676 NG/ML (ref 22–275)
FRACTIONAL SHORTENING: 27.5 % (ref 28–44)
FSP TITR PPP LA: 73 MG/DL (ref 283–506)
GAMMA GLOB SERPL ELPH-MCNC: 1.7 G/DL (ref 0.5–1.8)
GLOBULIN SER-MCNC: 3.1 G/DL (ref 2–4)
GLOBULIN SER-MCNC: 3.3 G/DL (ref 2–4)
GLOBULIN SER-MCNC: 3.5 G/DL (ref 2–4)
GLOBULIN SER-MCNC: 3.8 G/DL (ref 2–4)
GLOBULIN SER-MCNC: 3.9 G/DL (ref 2–4)
GLOBULIN SER-MCNC: 4.4 G/DL (ref 2–4)
GLOBULIN SER-MCNC: 4.7 G/DL (ref 2–4)
GLUCOSE SERPL-MCNC: 100 MG/DL (ref 82–115)
GLUCOSE SERPL-MCNC: 111 MG/DL (ref 82–115)
GLUCOSE SERPL-MCNC: 113 MG/DL (ref 82–115)
GLUCOSE SERPL-MCNC: 122 MG/DL (ref 70–105)
GLUCOSE SERPL-MCNC: 134 MG/DL (ref 70–105)
GLUCOSE SERPL-MCNC: 134 MG/DL (ref 82–115)
GLUCOSE SERPL-MCNC: 140 MG/DL (ref 70–105)
GLUCOSE SERPL-MCNC: 157 MG/DL (ref 82–115)
GLUCOSE SERPL-MCNC: 161 MG/DL (ref 70–105)
GLUCOSE SERPL-MCNC: 167 MG/DL (ref 70–105)
GLUCOSE SERPL-MCNC: 168 MG/DL (ref 70–105)
GLUCOSE SERPL-MCNC: 171 MG/DL (ref 70–105)
GLUCOSE SERPL-MCNC: 171 MG/DL (ref 70–105)
GLUCOSE SERPL-MCNC: 89 MG/DL (ref 82–115)
GLUCOSE SERPL-MCNC: 95 MG/DL (ref 82–115)
GLUCOSE UR STRIP-MCNC: NEGATIVE MG/DL
HAPTOGLOB SERPL NEPH-MCNC: <8 MG/DL (ref 40–368)
HAV IGM SER QL: NORMAL
HAV IGM SER QL: NORMAL
HBV CORE IGM SER QL: NORMAL
HBV CORE IGM SER QL: NORMAL
HBV SURFACE AG SERPL QL IA: NORMAL
HBV SURFACE AG SERPL QL IA: NORMAL
HCO3 UR-SCNC: 19.8 MMOL/L (ref 21–28)
HCO3 UR-SCNC: 27.8 MMOL/L (ref 21–28)
HCO3 UR-SCNC: 31.7 MMOL/L (ref 21–28)
HCT VFR BLD AUTO: 20.4 % (ref 40–54)
HCT VFR BLD AUTO: 20.7 % (ref 40–54)
HCT VFR BLD AUTO: 23.5 % (ref 40–54)
HCT VFR BLD AUTO: 27.9 % (ref 40–54)
HCT VFR BLD AUTO: 31 % (ref 40–54)
HCT VFR BLD AUTO: 32.2 % (ref 40–54)
HCT VFR BLD AUTO: 33 % (ref 40–54)
HCT VFR BLD AUTO: 34.3 % (ref 40–54)
HCV AB SER QL: NORMAL
HCV AB SER QL: NORMAL
HGB BLD-MCNC: 10.8 G/DL (ref 13.5–18)
HGB BLD-MCNC: 11.1 G/DL (ref 13.5–18)
HGB BLD-MCNC: 11.3 G/DL (ref 13.5–18)
HGB BLD-MCNC: 11.6 G/DL (ref 13.5–18)
HGB BLD-MCNC: 6.9 G/DL (ref 13.5–18)
HGB BLD-MCNC: 7.1 G/DL (ref 13.5–18)
HGB BLD-MCNC: 8.2 G/DL (ref 13.5–18)
HGB BLD-MCNC: 9.6 G/DL (ref 13.5–18)
IGA SER QL IFE: NORMAL
IGA SERPL-MCNC: 537 MG/DL (ref 101–645)
IGG SER QL IFE: NORMAL
IGG SERPL-MCNC: 1633 MG/DL (ref 540–1822)
IGM SER QL IFE: NORMAL
IGM SERPL-MCNC: 81 MG/DL (ref 22–240)
IMM GRANULOCYTES # BLD AUTO: 0.05 K/UL (ref 0–0.04)
IMM GRANULOCYTES # BLD AUTO: 0.05 K/UL (ref 0–0.04)
IMM GRANULOCYTES # BLD AUTO: 0.08 K/UL (ref 0–0.04)
IMM GRANULOCYTES # BLD AUTO: 0.13 K/UL (ref 0–0.04)
IMM GRANULOCYTES # BLD AUTO: 0.14 K/UL (ref 0–0.04)
IMM GRANULOCYTES # BLD AUTO: 0.18 K/UL (ref 0–0.04)
IMM GRANULOCYTES # BLD AUTO: 0.21 K/UL (ref 0–0.04)
IMM GRANULOCYTES NFR BLD: 0.9 % (ref 0–0.4)
IMM GRANULOCYTES NFR BLD: 1.3 % (ref 0–0.4)
IMM GRANULOCYTES NFR BLD: 1.7 % (ref 0–0.4)
IMM GRANULOCYTES NFR BLD: 3.1 % (ref 0–0.4)
IMM GRANULOCYTES NFR BLD: 3.3 % (ref 0–0.4)
IMM GRANULOCYTES NFR BLD: 4.2 % (ref 0–0.4)
IMM GRANULOCYTES NFR BLD: 5.1 % (ref 0–0.4)
INFLUENZA A MOLECULAR (OHS): NEGATIVE
INFLUENZA B MOLECULAR (OHS): NEGATIVE
INR BLD: 1.06
INR BLD: 1.28
INR BLD: 1.6
INR BLD: 2.22
INTERVENTRICULAR SEPTUM: 0.8 CM (ref 0.6–1.1)
IVC DIAMETER: 2.13 CM
KAPPA LC SER QL ELPH: NORMAL
KAPPA LC SER QL IFE: NORMAL
KETONES UR STRIP-SCNC: NEGATIVE MG/DL
LACTATE SERPL-SCNC: 1.9 MMOL/L (ref 0.5–2.2)
LACTATE SERPL-SCNC: 2.1 MMOL/L (ref 0.5–2.2)
LACTATE SERPL-SCNC: 4.2 MMOL/L (ref 0.5–2.2)
LAMBDA LC SER QL ELPH: NORMAL
LAMBDA LC SER QL IFE: NORMAL
LDH SERPL-CCNC: 1198 U/L (ref 125–220)
LEFT ATRIUM AREA SYSTOLIC (APICAL 4 CHAMBER): 12.95 CM2
LEFT ATRIUM SIZE: 3.6 CM
LEFT INTERNAL DIMENSION IN SYSTOLE: 3.7 CM (ref 2.1–4)
LEFT VENTRICLE DIASTOLIC VOLUME INDEX: 65.61 ML/M2
LEFT VENTRICLE DIASTOLIC VOLUME: 124 ML
LEFT VENTRICLE END DIASTOLIC VOLUME APICAL 4 CHAMBER: 91.75 ML
LEFT VENTRICLE END SYSTOLIC VOLUME APICAL 4 CHAMBER: 28.37 ML
LEFT VENTRICLE MASS INDEX: 92.9 G/M2
LEFT VENTRICLE SYSTOLIC VOLUME INDEX: 29.6 ML/M2
LEFT VENTRICLE SYSTOLIC VOLUME: 56 ML
LEFT VENTRICULAR INTERNAL DIMENSION IN DIASTOLE: 5.1 CM (ref 3.5–6)
LEFT VENTRICULAR MASS: 175.6 G
LEUKOCYTE ESTERASE UR QL STRIP: NEGATIVE
LV LATERAL E/E' RATIO: 3.3 M/S
LV SEPTAL E/E' RATIO: 6.9 M/S
LVED V (TEICH): 123.86 ML
LVES V (TEICH): 56.37 ML
LVOT MG: 3.26 MMHG
LVOT MV: 0.85 CM/S
LYMPHOCYTES # BLD AUTO: 0.18 K/UL (ref 1–4.8)
LYMPHOCYTES # BLD AUTO: 0.19 K/UL (ref 1–4.8)
LYMPHOCYTES # BLD AUTO: 0.24 K/UL (ref 1–4.8)
LYMPHOCYTES # BLD AUTO: 0.28 K/UL (ref 1–4.8)
LYMPHOCYTES # BLD AUTO: 0.5 K/UL (ref 1–4.8)
LYMPHOCYTES # BLD AUTO: 0.56 K/UL (ref 1–4.8)
LYMPHOCYTES # BLD AUTO: 0.63 K/UL (ref 1–4.8)
LYMPHOCYTES # BLD AUTO: 0.84 K/UL (ref 1–4.8)
LYMPHOCYTES NFR BLD AUTO: 11.2 % (ref 27–41)
LYMPHOCYTES NFR BLD AUTO: 11.8 % (ref 27–41)
LYMPHOCYTES NFR BLD AUTO: 13.6 % (ref 27–41)
LYMPHOCYTES NFR BLD AUTO: 13.9 % (ref 27–41)
LYMPHOCYTES NFR BLD AUTO: 4 % (ref 27–41)
LYMPHOCYTES NFR BLD AUTO: 4.6 % (ref 27–41)
LYMPHOCYTES NFR BLD AUTO: 5.6 % (ref 27–41)
LYMPHOCYTES NFR BLD AUTO: 7 % (ref 27–41)
LYMPHOCYTES NFR BLD MANUAL: 1 % (ref 27–41)
LYMPHOCYTES NFR BLD MANUAL: 16 % (ref 27–41)
LYMPHOCYTES NFR BLD MANUAL: 2 % (ref 27–41)
LYMPHOCYTES NFR BLD MANUAL: 3 % (ref 27–41)
LYMPHOCYTES NFR BLD MANUAL: 3 % (ref 27–41)
LYMPHOCYTES NFR BLD MANUAL: 4 % (ref 27–41)
LYMPHOCYTES NFR BLD MANUAL: 7 % (ref 27–41)
LYMPHOCYTES NFR BLD MANUAL: 8 % (ref 27–41)
M-COMPONENT 1, PRO ELECT, BLOOD: 0.25 G/DL
MAGNESIUM SERPL-MCNC: 1.7 MG/DL (ref 1.6–2.6)
MAGNESIUM SERPL-MCNC: 2.9 MG/DL (ref 1.6–2.6)
MAGNESIUM SERPL-MCNC: 3 MG/DL (ref 1.6–2.6)
MCH RBC QN AUTO: 32 PG (ref 27–31)
MCH RBC QN AUTO: 32.2 PG (ref 27–31)
MCH RBC QN AUTO: 32.4 PG (ref 27–31)
MCH RBC QN AUTO: 32.5 PG (ref 27–31)
MCH RBC QN AUTO: 32.6 PG (ref 27–31)
MCH RBC QN AUTO: 32.7 PG (ref 27–31)
MCH RBC QN AUTO: 32.9 PG (ref 27–31)
MCH RBC QN AUTO: 33 PG (ref 27–31)
MCHC RBC AUTO-ENTMCNC: 33.8 G/DL (ref 32–36)
MCHC RBC AUTO-ENTMCNC: 33.8 G/DL (ref 32–36)
MCHC RBC AUTO-ENTMCNC: 34.2 G/DL (ref 32–36)
MCHC RBC AUTO-ENTMCNC: 34.3 G/DL (ref 32–36)
MCHC RBC AUTO-ENTMCNC: 34.4 G/DL (ref 32–36)
MCHC RBC AUTO-ENTMCNC: 34.5 G/DL (ref 32–36)
MCHC RBC AUTO-ENTMCNC: 34.8 G/DL (ref 32–36)
MCHC RBC AUTO-ENTMCNC: 34.9 G/DL (ref 32–36)
MCV RBC AUTO: 93 FL (ref 80–96)
MCV RBC AUTO: 93.3 FL (ref 80–96)
MCV RBC AUTO: 93.9 FL (ref 80–96)
MCV RBC AUTO: 94 FL (ref 80–96)
MCV RBC AUTO: 94.5 FL (ref 80–96)
MCV RBC AUTO: 94.7 FL (ref 80–96)
MCV RBC AUTO: 97.1 FL (ref 80–96)
MCV RBC AUTO: 97.4 FL (ref 80–96)
METAMYELOCYTES NFR BLD MANUAL: 1 %
METAMYELOCYTES NFR BLD MANUAL: 3 %
MONOCYTES # BLD AUTO: 0.03 K/UL (ref 0–0.8)
MONOCYTES # BLD AUTO: 0.04 K/UL (ref 0–0.8)
MONOCYTES # BLD AUTO: 0.16 K/UL (ref 0–0.8)
MONOCYTES # BLD AUTO: 0.22 K/UL (ref 0–0.8)
MONOCYTES # BLD AUTO: 0.24 K/UL (ref 0–0.8)
MONOCYTES # BLD AUTO: 0.27 K/UL (ref 0–0.8)
MONOCYTES # BLD AUTO: 0.31 K/UL (ref 0–0.8)
MONOCYTES # BLD AUTO: 0.59 K/UL (ref 0–0.8)
MONOCYTES NFR BLD AUTO: 0.7 % (ref 2–6)
MONOCYTES NFR BLD AUTO: 1 % (ref 2–6)
MONOCYTES NFR BLD AUTO: 3 % (ref 2–6)
MONOCYTES NFR BLD AUTO: 5.5 % (ref 2–6)
MONOCYTES NFR BLD AUTO: 5.6 % (ref 2–6)
MONOCYTES NFR BLD AUTO: 5.7 % (ref 2–6)
MONOCYTES NFR BLD AUTO: 8 % (ref 2–6)
MONOCYTES NFR BLD AUTO: 9.8 % (ref 2–6)
MONOCYTES NFR BLD MANUAL: 1 % (ref 2–6)
MONOCYTES NFR BLD MANUAL: 2 % (ref 2–6)
MONOCYTES NFR BLD MANUAL: 2 % (ref 2–6)
MONOCYTES NFR BLD MANUAL: 3 % (ref 2–6)
MONOCYTES NFR BLD MANUAL: 4 % (ref 2–6)
MONOCYTES NFR BLD MANUAL: 4 % (ref 2–6)
MPC BLD CALC-MCNC: 11.3 FL (ref 9.4–12.4)
MPC BLD CALC-MCNC: 12.4 FL (ref 9.4–12.4)
MPC BLD CALC-MCNC: ABNORMAL G/DL
MV PEAK A VEL: 1 M/S
MV PEAK E VEL: 0.69 M/S
NEUTROPHILS # BLD AUTO: 3.29 K/UL (ref 1.8–7.7)
NEUTROPHILS # BLD AUTO: 3.33 K/UL (ref 1.8–7.7)
NEUTROPHILS # BLD AUTO: 3.36 K/UL (ref 1.8–7.7)
NEUTROPHILS # BLD AUTO: 3.6 K/UL (ref 1.8–7.7)
NEUTROPHILS # BLD AUTO: 3.77 K/UL (ref 1.8–7.7)
NEUTROPHILS # BLD AUTO: 4.16 K/UL (ref 1.8–7.7)
NEUTROPHILS # BLD AUTO: 4.52 K/UL (ref 1.8–7.7)
NEUTROPHILS # BLD AUTO: 4.59 K/UL (ref 1.8–7.7)
NEUTROPHILS NFR BLD AUTO: 76 % (ref 53–65)
NEUTROPHILS NFR BLD AUTO: 80.1 % (ref 53–65)
NEUTROPHILS NFR BLD AUTO: 83.9 % (ref 53–65)
NEUTROPHILS NFR BLD AUTO: 84.3 % (ref 53–65)
NEUTROPHILS NFR BLD AUTO: 84.4 % (ref 53–65)
NEUTROPHILS NFR BLD AUTO: 84.8 % (ref 53–65)
NEUTROPHILS NFR BLD AUTO: 85.8 % (ref 53–65)
NEUTROPHILS NFR BLD AUTO: 88.4 % (ref 53–65)
NEUTS BAND NFR BLD MANUAL: 1 % (ref 1–5)
NEUTS BAND NFR BLD MANUAL: 1 % (ref 1–5)
NEUTS BAND NFR BLD MANUAL: 11 % (ref 1–5)
NEUTS BAND NFR BLD MANUAL: 2 % (ref 1–5)
NEUTS BAND NFR BLD MANUAL: 28 % (ref 1–5)
NEUTS BAND NFR BLD MANUAL: 33 % (ref 1–5)
NEUTS BAND NFR BLD MANUAL: 4 % (ref 1–5)
NEUTS SEG NFR BLD MANUAL: 61 % (ref 50–62)
NEUTS SEG NFR BLD MANUAL: 61 % (ref 50–62)
NEUTS SEG NFR BLD MANUAL: 69 % (ref 50–62)
NEUTS SEG NFR BLD MANUAL: 89 % (ref 50–62)
NEUTS SEG NFR BLD MANUAL: 90 % (ref 50–62)
NEUTS SEG NFR BLD MANUAL: 93 % (ref 50–62)
NEUTS SEG NFR BLD MANUAL: 94 % (ref 50–62)
NEUTS SEG NFR BLD MANUAL: 97 % (ref 50–62)
NITRITE UR QL STRIP: NEGATIVE
NRBC # BLD AUTO: 0 X10E3/UL
NRBC # BLD AUTO: 0.02 X10E3/UL
NRBC BLD MANUAL-RTO: 1 /100 WBC
NRBC BLD MANUAL-RTO: 3 /100 WBC
NRBC BLD MANUAL-RTO: ABNORMAL %
NRBC, AUTO (.00): 0 %
NRBC, AUTO (.00): 0.5 %
NT-PROBNP SERPL-MCNC: 3081 PG/ML (ref 1–125)
OHS CV RV/LV RATIO: 0.82 CM
OHS QRS DURATION: 86 MS
OHS QTC CALCULATION: 430 MS
OVALOCYTES BLD QL SMEAR: ABNORMAL
PATH INTERP BLD-IMP: ABNORMAL
PATH INTERP BLD-IMP: NORMAL
PCO2 BLDA: 32 MMHG (ref 35–48)
PCO2 BLDA: 40 MMHG (ref 35–48)
PCO2 BLDA: 69 MMHG (ref 35–48)
PH SMN: 7.27 [PH] (ref 7.35–7.45)
PH SMN: 7.4 [PH] (ref 7.35–7.45)
PH SMN: 7.45 [PH] (ref 7.35–7.45)
PH UR STRIP: 6 PH UNITS
PHOSPHATE SERPL-MCNC: 5.3 MG/DL (ref 2.3–4.7)
PHOSPHATE SERPL-MCNC: 7.4 MG/DL (ref 2.3–4.7)
PISA TR MAX VEL: 1.4 M/S
PLATELET # BLD AUTO: 15 K/UL (ref 150–400)
PLATELET # BLD AUTO: 20 K/UL (ref 150–400)
PLATELET # BLD AUTO: 25 K/UL (ref 150–400)
PLATELET # BLD AUTO: 26 K/UL (ref 150–400)
PLATELET # BLD AUTO: 27 K/UL (ref 150–400)
PLATELET # BLD AUTO: 27 K/UL (ref 150–400)
PLATELET # BLD AUTO: 33 K/UL (ref 150–400)
PLATELET # BLD AUTO: 44 K/UL (ref 150–400)
PLATELET # BLD AUTO: 54 K/UL (ref 150–400)
PLATELET MORPHOLOGY: ABNORMAL
PLATELET MORPHOLOGY: NORMAL
PO2 BLDA: 56 MMHG (ref 83–108)
PO2 BLDA: 58 MMHG (ref 83–108)
PO2 BLDA: 69 MMHG (ref 83–108)
POC BASE EXCESS: -4.1 MMOL/L (ref -2–3)
POC BASE EXCESS: 3 MMOL/L (ref -2–3)
POC BASE EXCESS: 3.5 MMOL/L (ref -2–3)
POC SATURATED O2: 85 % (ref 95–98)
POC SATURATED O2: 89 %
POC SATURATED O2: 94 % (ref 95–98)
POLYCHROMASIA BLD QL SMEAR: ABNORMAL
POTASSIUM SERPL-SCNC: 3.9 MMOL/L (ref 3.5–5.1)
POTASSIUM SERPL-SCNC: 4 MMOL/L (ref 3.5–5.1)
POTASSIUM SERPL-SCNC: 4.1 MMOL/L (ref 3.5–5.1)
POTASSIUM SERPL-SCNC: 4.6 MMOL/L (ref 3.5–5.1)
POTASSIUM SERPL-SCNC: 5.3 MMOL/L (ref 3.5–5.1)
PROT SERPL-MCNC: 5.6 G/DL (ref 5.8–7.6)
PROT SERPL-MCNC: 5.6 G/DL (ref 5.8–7.6)
PROT SERPL-MCNC: 5.7 G/DL (ref 5.8–7.6)
PROT SERPL-MCNC: 5.7 G/DL (ref 5.8–7.6)
PROT SERPL-MCNC: 6 G/DL (ref 5.8–7.6)
PROT SERPL-MCNC: 6.1 G/DL (ref 5.8–7.6)
PROT SERPL-MCNC: 6.4 G/DL (ref 5.8–7.6)
PROT SERPL-MCNC: 6.5 G/DL (ref 5.8–7.6)
PROT UR QL STRIP: 100
PROTHROMBIN TIME: 14.4 SECONDS (ref 11.7–14.7)
PROTHROMBIN TIME: 16 SECONDS (ref 11.7–14.7)
PROTHROMBIN TIME: 18.8 SECONDS (ref 11.7–14.7)
PROTHROMBIN TIME: 24 SECONDS (ref 11.7–14.7)
PV PEAK GRADIENT: 4 MMHG
PV PEAK VELOCITY: 1.03 M/S
RA MAJOR: 3.76 CM
RA PRESSURE ESTIMATED: 8 MMHG
RBC # BLD AUTO: 2.1 M/UL (ref 4.6–6.2)
RBC # BLD AUTO: 2.19 M/UL (ref 4.6–6.2)
RBC # BLD AUTO: 2.52 M/UL (ref 4.6–6.2)
RBC # BLD AUTO: 3 M/UL (ref 4.6–6.2)
RBC # BLD AUTO: 3.3 M/UL (ref 4.6–6.2)
RBC # BLD AUTO: 3.4 M/UL (ref 4.6–6.2)
RBC # BLD AUTO: 3.51 M/UL (ref 4.6–6.2)
RBC # BLD AUTO: 3.52 M/UL (ref 4.6–6.2)
RBC # UR STRIP: ABNORMAL /UL
RBC #/AREA URNS HPF: ABNORMAL /HPF
RIGHT VENTRICLE DIASTOLIC BASEL DIMENSION: 4.2 CM
RIGHT VENTRICLE DIASTOLIC LENGTH: 4.9 CM
RIGHT VENTRICLE DIASTOLIC MID DIMENSION: 3.3 CM
RIGHT VENTRICULAR LENGTH IN DIASTOLE (APICAL 4-CHAMBER VIEW): 4.86 CM
RSV AG SPEC QL IA: NEGATIVE
RV MID DIAMA: 3.27 CM
RV TB RVSP: 9 MMHG
SARS-COV-2 RDRP RESP QL NAA+PROBE: NEGATIVE
SCHISTOCYTES BLD QL AUTO: ABNORMAL
SCHISTOCYTES BLD QL AUTO: ABNORMAL
SODIUM SERPL-SCNC: 130 MMOL/L (ref 136–145)
SODIUM SERPL-SCNC: 131 MMOL/L (ref 136–145)
SODIUM SERPL-SCNC: 133 MMOL/L (ref 136–145)
SODIUM SERPL-SCNC: 136 MMOL/L (ref 136–145)
SODIUM SERPL-SCNC: 137 MMOL/L (ref 136–145)
SODIUM SERPL-SCNC: 139 MMOL/L (ref 136–145)
SODIUM SERPL-SCNC: 140 MMOL/L (ref 136–145)
SP GR UR STRIP: 1.02
SQUAMOUS #/AREA URNS LPF: ABNORMAL /LPF
T4 FREE SERPL-MCNC: 0.6 NG/DL (ref 0.7–1.48)
TARGETS BLD QL SMEAR: ABNORMAL
TDI LATERAL: 0.21 M/S
TDI SEPTAL: 0.1 M/S
TDI: 0.16 M/S
TR MAX PG: 7 MMHG
TRICUSPID ANNULAR PLANE SYSTOLIC EXCURSION: 2.1 CM
TROPONIN I SERPL HS-MCNC: 34.5 NG/L
TROPONIN I SERPL HS-MCNC: 50.3 NG/L
TSH SERPL DL<=0.005 MIU/L-ACNC: 0.65 UIU/ML (ref 0.35–4.94)
TV REST PULMONARY ARTERY PRESSURE: 16 MMHG
UA COMPLETE W REFLEX CULTURE PNL UR: ABNORMAL
UNIT NUMBER: NORMAL
UROBILINOGEN UR STRIP-ACNC: 2 MG/DL
VWF CP ACT/NOR PPP CHRO: 30 %
WBC # BLD AUTO: 3.88 K/UL (ref 4.5–11)
WBC # BLD AUTO: 4 K/UL (ref 4.5–11)
WBC # BLD AUTO: 4.11 K/UL (ref 4.5–11)
WBC # BLD AUTO: 4.27 K/UL (ref 4.5–11)
WBC # BLD AUTO: 4.45 K/UL (ref 4.5–11)
WBC # BLD AUTO: 4.71 K/UL (ref 4.5–11)
WBC # BLD AUTO: 5.36 K/UL (ref 4.5–11)
WBC # BLD AUTO: 6.04 K/UL (ref 4.5–11)
WBC #/AREA URNS HPF: ABNORMAL /HPF
Z-SCORE OF LEFT VENTRICULAR DIMENSION IN END DIASTOLE: -0.37
Z-SCORE OF LEFT VENTRICULAR DIMENSION IN END SYSTOLE: 1

## 2025-01-01 PROCEDURE — 25000242 PHARM REV CODE 250 ALT 637 W/ HCPCS

## 2025-01-01 PROCEDURE — 36415 COLL VENOUS BLD VENIPUNCTURE: CPT

## 2025-01-01 PROCEDURE — 27000207 HC ISOLATION

## 2025-01-01 PROCEDURE — 94640 AIRWAY INHALATION TREATMENT: CPT

## 2025-01-01 PROCEDURE — 11000001 HC ACUTE MED/SURG PRIVATE ROOM

## 2025-01-01 PROCEDURE — 20000000 HC ICU ROOM

## 2025-01-01 PROCEDURE — 82248 BILIRUBIN DIRECT: CPT

## 2025-01-01 PROCEDURE — 85025 COMPLETE CBC W/AUTO DIFF WBC: CPT

## 2025-01-01 PROCEDURE — 99223 1ST HOSP IP/OBS HIGH 75: CPT | Mod: ,,, | Performed by: INTERNAL MEDICINE

## 2025-01-01 PROCEDURE — 82962 GLUCOSE BLOOD TEST: CPT

## 2025-01-01 PROCEDURE — 84484 ASSAY OF TROPONIN QUANT: CPT

## 2025-01-01 PROCEDURE — 63600175 PHARM REV CODE 636 W HCPCS: Performed by: INTERNAL MEDICINE

## 2025-01-01 PROCEDURE — 99285 EMERGENCY DEPT VISIT HI MDM: CPT | Mod: 25

## 2025-01-01 PROCEDURE — 80053 COMPREHEN METABOLIC PANEL: CPT

## 2025-01-01 PROCEDURE — 85610 PROTHROMBIN TIME: CPT | Performed by: NURSE PRACTITIONER

## 2025-01-01 PROCEDURE — 25000003 PHARM REV CODE 250

## 2025-01-01 PROCEDURE — 99233 SBSQ HOSP IP/OBS HIGH 50: CPT | Mod: ,,, | Performed by: HOSPITALIST

## 2025-01-01 PROCEDURE — 94640 AIRWAY INHALATION TREATMENT: CPT | Mod: XB

## 2025-01-01 PROCEDURE — 36600 WITHDRAWAL OF ARTERIAL BLOOD: CPT

## 2025-01-01 PROCEDURE — 31500 INSERT EMERGENCY AIRWAY: CPT

## 2025-01-01 PROCEDURE — 25000003 PHARM REV CODE 250: Performed by: SURGERY

## 2025-01-01 PROCEDURE — 27000190 HC CPAP FULL FACE MASK W/VALVE

## 2025-01-01 PROCEDURE — 63600175 PHARM REV CODE 636 W HCPCS: Mod: JZ,TB | Performed by: FAMILY MEDICINE

## 2025-01-01 PROCEDURE — 94761 N-INVAS EAR/PLS OXIMETRY MLT: CPT

## 2025-01-01 PROCEDURE — 87634 RSV DNA/RNA AMP PROBE: CPT | Performed by: NURSE PRACTITIONER

## 2025-01-01 PROCEDURE — 85730 THROMBOPLASTIN TIME PARTIAL: CPT | Performed by: SURGERY

## 2025-01-01 PROCEDURE — 36415 COLL VENOUS BLD VENIPUNCTURE: CPT | Performed by: FAMILY MEDICINE

## 2025-01-01 PROCEDURE — 36415 COLL VENOUS BLD VENIPUNCTURE: CPT | Performed by: INTERNAL MEDICINE

## 2025-01-01 PROCEDURE — 99499 UNLISTED E&M SERVICE: CPT | Mod: GF,,, | Performed by: NURSE PRACTITIONER

## 2025-01-01 PROCEDURE — 85610 PROTHROMBIN TIME: CPT | Performed by: FAMILY MEDICINE

## 2025-01-01 PROCEDURE — 25000242 PHARM REV CODE 250 ALT 637 W/ HCPCS: Performed by: NURSE PRACTITIONER

## 2025-01-01 PROCEDURE — 83735 ASSAY OF MAGNESIUM: CPT | Performed by: NURSE PRACTITIONER

## 2025-01-01 PROCEDURE — 85610 PROTHROMBIN TIME: CPT | Performed by: INTERNAL MEDICINE

## 2025-01-01 PROCEDURE — P9037 PLATE PHERES LEUKOREDU IRRAD: HCPCS | Performed by: SURGERY

## 2025-01-01 PROCEDURE — 99233 SBSQ HOSP IP/OBS HIGH 50: CPT | Mod: ,,, | Performed by: NURSE PRACTITIONER

## 2025-01-01 PROCEDURE — 99291 CRITICAL CARE FIRST HOUR: CPT | Mod: ,,, | Performed by: INTERNAL MEDICINE

## 2025-01-01 PROCEDURE — 99900035 HC TECH TIME PER 15 MIN (STAT)

## 2025-01-01 PROCEDURE — 81001 URINALYSIS AUTO W/SCOPE: CPT | Performed by: NURSE PRACTITIONER

## 2025-01-01 PROCEDURE — S5010 5% DEXTROSE AND 0.45% SALINE: HCPCS | Performed by: HOSPITALIST

## 2025-01-01 PROCEDURE — 36415 COLL VENOUS BLD VENIPUNCTURE: CPT | Performed by: SURGERY

## 2025-01-01 PROCEDURE — 84165 PROTEIN E-PHORESIS SERUM: CPT | Performed by: INTERNAL MEDICINE

## 2025-01-01 PROCEDURE — 96368 THER/DIAG CONCURRENT INF: CPT

## 2025-01-01 PROCEDURE — 82803 BLOOD GASES ANY COMBINATION: CPT

## 2025-01-01 PROCEDURE — 86334 IMMUNOFIX E-PHORESIS SERUM: CPT | Performed by: INTERNAL MEDICINE

## 2025-01-01 PROCEDURE — 63600175 PHARM REV CODE 636 W HCPCS

## 2025-01-01 PROCEDURE — 86334 IMMUNOFIX E-PHORESIS SERUM: CPT | Mod: 26,,, | Performed by: PATHOLOGY

## 2025-01-01 PROCEDURE — 85049 AUTOMATED PLATELET COUNT: CPT | Performed by: INTERNAL MEDICINE

## 2025-01-01 PROCEDURE — 85397 CLOTTING FUNCT ACTIVITY: CPT | Mod: 90 | Performed by: INTERNAL MEDICINE

## 2025-01-01 PROCEDURE — 82043 UR ALBUMIN QUANTITATIVE: CPT

## 2025-01-01 PROCEDURE — 93005 ELECTROCARDIOGRAM TRACING: CPT

## 2025-01-01 PROCEDURE — 82140 ASSAY OF AMMONIA: CPT | Performed by: FAMILY MEDICINE

## 2025-01-01 PROCEDURE — 25000003 PHARM REV CODE 250: Performed by: HOSPITALIST

## 2025-01-01 PROCEDURE — 63600175 PHARM REV CODE 636 W HCPCS: Mod: JZ,TB | Performed by: INTERNAL MEDICINE

## 2025-01-01 PROCEDURE — 93010 ELECTROCARDIOGRAM REPORT: CPT | Mod: ,,, | Performed by: INTERNAL MEDICINE

## 2025-01-01 PROCEDURE — 99233 SBSQ HOSP IP/OBS HIGH 50: CPT | Mod: ,,, | Performed by: FAMILY MEDICINE

## 2025-01-01 PROCEDURE — 87502 INFLUENZA DNA AMP PROBE: CPT | Performed by: NURSE PRACTITIONER

## 2025-01-01 PROCEDURE — 86038 ANTINUCLEAR ANTIBODIES: CPT | Performed by: INTERNAL MEDICINE

## 2025-01-01 PROCEDURE — 99232 SBSQ HOSP IP/OBS MODERATE 35: CPT | Mod: ,,,

## 2025-01-01 PROCEDURE — 25000003 PHARM REV CODE 250: Performed by: FAMILY MEDICINE

## 2025-01-01 PROCEDURE — 84100 ASSAY OF PHOSPHORUS: CPT

## 2025-01-01 PROCEDURE — 87635 SARS-COV-2 COVID-19 AMP PRB: CPT | Performed by: NURSE PRACTITIONER

## 2025-01-01 PROCEDURE — 27000221 HC OXYGEN, UP TO 24 HOURS

## 2025-01-01 PROCEDURE — 84484 ASSAY OF TROPONIN QUANT: CPT | Performed by: NURSE PRACTITIONER

## 2025-01-01 PROCEDURE — 84439 ASSAY OF FREE THYROXINE: CPT | Performed by: INTERNAL MEDICINE

## 2025-01-01 PROCEDURE — 85335 FACTOR INHIBITOR TEST: CPT | Mod: 90 | Performed by: INTERNAL MEDICINE

## 2025-01-01 PROCEDURE — 94799 UNLISTED PULMONARY SVC/PX: CPT

## 2025-01-01 PROCEDURE — 25000003 PHARM REV CODE 250: Performed by: INTERNAL MEDICINE

## 2025-01-01 PROCEDURE — 25000003 PHARM REV CODE 250: Performed by: NURSE PRACTITIONER

## 2025-01-01 PROCEDURE — 63600175 PHARM REV CODE 636 W HCPCS: Performed by: NURSE PRACTITIONER

## 2025-01-01 PROCEDURE — 80053 COMPREHEN METABOLIC PANEL: CPT | Performed by: NURSE PRACTITIONER

## 2025-01-01 PROCEDURE — 83605 ASSAY OF LACTIC ACID: CPT | Performed by: FAMILY MEDICINE

## 2025-01-01 PROCEDURE — 96365 THER/PROPH/DIAG IV INF INIT: CPT

## 2025-01-01 PROCEDURE — 85384 FIBRINOGEN ACTIVITY: CPT | Performed by: INTERNAL MEDICINE

## 2025-01-01 PROCEDURE — 83615 LACTATE (LD) (LDH) ENZYME: CPT | Performed by: FAMILY MEDICINE

## 2025-01-01 PROCEDURE — P9035 PLATELET PHERES LEUKOREDUCED: HCPCS | Performed by: INTERNAL MEDICINE

## 2025-01-01 PROCEDURE — 86618 LYME DISEASE ANTIBODY: CPT | Performed by: INTERNAL MEDICINE

## 2025-01-01 PROCEDURE — 86757 RICKETTSIA ANTIBODY: CPT | Mod: 90 | Performed by: INTERNAL MEDICINE

## 2025-01-01 PROCEDURE — 5A09357 ASSISTANCE WITH RESPIRATORY VENTILATION, LESS THAN 24 CONSECUTIVE HOURS, CONTINUOUS POSITIVE AIRWAY PRESSURE: ICD-10-PCS | Performed by: INTERNAL MEDICINE

## 2025-01-01 PROCEDURE — 36415 COLL VENOUS BLD VENIPUNCTURE: CPT | Performed by: NURSE PRACTITIONER

## 2025-01-01 PROCEDURE — 83605 ASSAY OF LACTIC ACID: CPT | Performed by: NURSE PRACTITIONER

## 2025-01-01 PROCEDURE — 80074 ACUTE HEPATITIS PANEL: CPT | Performed by: INTERNAL MEDICINE

## 2025-01-01 PROCEDURE — 82728 ASSAY OF FERRITIN: CPT | Performed by: INTERNAL MEDICINE

## 2025-01-01 PROCEDURE — 94002 VENT MGMT INPAT INIT DAY: CPT

## 2025-01-01 PROCEDURE — 30233R1 TRANSFUSION OF NONAUTOLOGOUS PLATELETS INTO PERIPHERAL VEIN, PERCUTANEOUS APPROACH: ICD-10-PCS | Performed by: INTERNAL MEDICINE

## 2025-01-01 PROCEDURE — 63600175 PHARM REV CODE 636 W HCPCS: Performed by: FAMILY MEDICINE

## 2025-01-01 PROCEDURE — 80074 ACUTE HEPATITIS PANEL: CPT | Performed by: NURSE PRACTITIONER

## 2025-01-01 PROCEDURE — 85025 COMPLETE CBC W/AUTO DIFF WBC: CPT | Performed by: NURSE PRACTITIONER

## 2025-01-01 PROCEDURE — 96361 HYDRATE IV INFUSION ADD-ON: CPT

## 2025-01-01 PROCEDURE — 84443 ASSAY THYROID STIM HORMONE: CPT | Performed by: INTERNAL MEDICINE

## 2025-01-01 PROCEDURE — 51702 INSERT TEMP BLADDER CATH: CPT

## 2025-01-01 PROCEDURE — 30233K1 TRANSFUSION OF NONAUTOLOGOUS FROZEN PLASMA INTO PERIPHERAL VEIN, PERCUTANEOUS APPROACH: ICD-10-PCS | Performed by: INTERNAL MEDICINE

## 2025-01-01 PROCEDURE — 99232 SBSQ HOSP IP/OBS MODERATE 35: CPT | Mod: GT,,, | Performed by: SURGERY

## 2025-01-01 PROCEDURE — 5A1935Z RESPIRATORY VENTILATION, LESS THAN 24 CONSECUTIVE HOURS: ICD-10-PCS | Performed by: INTERNAL MEDICINE

## 2025-01-01 PROCEDURE — 99233 SBSQ HOSP IP/OBS HIGH 50: CPT | Mod: ,,, | Performed by: INTERNAL MEDICINE

## 2025-01-01 PROCEDURE — P9017 PLASMA 1 DONOR FRZ W/IN 8 HR: HCPCS

## 2025-01-01 PROCEDURE — 85730 THROMBOPLASTIN TIME PARTIAL: CPT | Performed by: NURSE PRACTITIONER

## 2025-01-01 PROCEDURE — 0BH17EZ INSERTION OF ENDOTRACHEAL AIRWAY INTO TRACHEA, VIA NATURAL OR ARTIFICIAL OPENING: ICD-10-PCS | Performed by: ANESTHESIOLOGY

## 2025-01-01 PROCEDURE — 36430 TRANSFUSION BLD/BLD COMPNT: CPT

## 2025-01-01 PROCEDURE — 99223 1ST HOSP IP/OBS HIGH 75: CPT | Mod: ,,, | Performed by: SURGERY

## 2025-01-01 PROCEDURE — 84165 PROTEIN E-PHORESIS SERUM: CPT | Mod: 26,,, | Performed by: PATHOLOGY

## 2025-01-01 PROCEDURE — 83735 ASSAY OF MAGNESIUM: CPT

## 2025-01-01 PROCEDURE — 86617 LYME DISEASE ANTIBODY: CPT | Mod: 90 | Performed by: INTERNAL MEDICINE

## 2025-01-01 PROCEDURE — 94660 CPAP INITIATION&MGMT: CPT

## 2025-01-01 PROCEDURE — 82140 ASSAY OF AMMONIA: CPT | Performed by: INTERNAL MEDICINE

## 2025-01-01 PROCEDURE — 83010 ASSAY OF HAPTOGLOBIN QUANT: CPT | Performed by: FAMILY MEDICINE

## 2025-01-01 PROCEDURE — 30233S1 TRANSFUSION OF NONAUTOLOGOUS GLOBULIN INTO PERIPHERAL VEIN, PERCUTANEOUS APPROACH: ICD-10-PCS | Performed by: FAMILY MEDICINE

## 2025-01-01 PROCEDURE — 87077 CULTURE AEROBIC IDENTIFY: CPT | Performed by: NURSE PRACTITIONER

## 2025-01-01 PROCEDURE — 87040 BLOOD CULTURE FOR BACTERIA: CPT | Performed by: NURSE PRACTITIONER

## 2025-01-01 PROCEDURE — 83880 ASSAY OF NATRIURETIC PEPTIDE: CPT | Performed by: NURSE PRACTITIONER

## 2025-01-01 PROCEDURE — A9537 TC99M MEBROFENIN: HCPCS | Performed by: HOSPITALIST

## 2025-01-01 RX ORDER — SODIUM CHLORIDE 0.9 % (FLUSH) 0.9 %
10 SYRINGE (ML) INJECTION EVERY 12 HOURS PRN
OUTPATIENT
Start: 2025-01-01

## 2025-01-01 RX ORDER — INSULIN ASPART 100 [IU]/ML
0-10 INJECTION, SOLUTION INTRAVENOUS; SUBCUTANEOUS EVERY 6 HOURS PRN
OUTPATIENT
Start: 2025-01-01

## 2025-01-01 RX ORDER — MEROPENEM 500 MG/1
500 INJECTION, POWDER, FOR SOLUTION INTRAVENOUS
OUTPATIENT
Start: 2025-01-01

## 2025-01-01 RX ORDER — KIT FOR THE PREPARATION OF TECHNETIUM TC 99M MEBROFENIN 45 MG/10ML
5 INJECTION, POWDER, LYOPHILIZED, FOR SOLUTION INTRAVENOUS
Status: COMPLETED | OUTPATIENT
Start: 2025-01-01 | End: 2025-01-01

## 2025-01-01 RX ORDER — ACETAMINOPHEN 500 MG
1000 TABLET ORAL EVERY 8 HOURS PRN
Status: DISCONTINUED | OUTPATIENT
Start: 2025-01-01 | End: 2025-01-01 | Stop reason: HOSPADM

## 2025-01-01 RX ORDER — DIPHENHYDRAMINE HYDROCHLORIDE 50 MG/ML
50 INJECTION, SOLUTION INTRAMUSCULAR; INTRAVENOUS DAILY PRN
OUTPATIENT
Start: 2025-01-01

## 2025-01-01 RX ORDER — FAMOTIDINE 10 MG/ML
20 INJECTION, SOLUTION INTRAVENOUS DAILY PRN
Status: DISCONTINUED | OUTPATIENT
Start: 2025-01-01 | End: 2025-01-01 | Stop reason: HOSPADM

## 2025-01-01 RX ORDER — FAMOTIDINE 20 MG/1
20 TABLET, FILM COATED ORAL DAILY
Status: DISCONTINUED | OUTPATIENT
Start: 2025-01-01 | End: 2025-01-01

## 2025-01-01 RX ORDER — CEFTRIAXONE 1 G/1
1 INJECTION, POWDER, FOR SOLUTION INTRAMUSCULAR; INTRAVENOUS
Status: DISCONTINUED | OUTPATIENT
Start: 2025-01-01 | End: 2025-01-01

## 2025-01-01 RX ORDER — DOCUSATE SODIUM 100 MG/1
100 CAPSULE, LIQUID FILLED ORAL DAILY PRN
Status: DISCONTINUED | OUTPATIENT
Start: 2025-01-01 | End: 2025-01-01 | Stop reason: HOSPADM

## 2025-01-01 RX ORDER — MEROPENEM 500 MG/1
500 INJECTION, POWDER, FOR SOLUTION INTRAVENOUS
Status: DISCONTINUED | OUTPATIENT
Start: 2025-01-01 | End: 2025-01-01 | Stop reason: HOSPADM

## 2025-01-01 RX ORDER — FAMOTIDINE 20 MG/1
20 TABLET, FILM COATED ORAL
OUTPATIENT
Start: 2025-01-01

## 2025-01-01 RX ORDER — HYDROCODONE BITARTRATE AND ACETAMINOPHEN 500; 5 MG/1; MG/1
TABLET ORAL
Status: DISCONTINUED | OUTPATIENT
Start: 2025-01-01 | End: 2025-01-01 | Stop reason: HOSPADM

## 2025-01-01 RX ORDER — GLUCAGON 1 MG
1 KIT INJECTION
Status: DISCONTINUED | OUTPATIENT
Start: 2025-01-01 | End: 2025-01-01 | Stop reason: HOSPADM

## 2025-01-01 RX ORDER — METOPROLOL SUCCINATE 25 MG/1
25 TABLET, EXTENDED RELEASE ORAL DAILY
Status: DISCONTINUED | OUTPATIENT
Start: 2025-01-01 | End: 2025-01-01

## 2025-01-01 RX ORDER — PROCHLORPERAZINE EDISYLATE 5 MG/ML
5 INJECTION INTRAMUSCULAR; INTRAVENOUS EVERY 6 HOURS PRN
OUTPATIENT
Start: 2025-01-01

## 2025-01-01 RX ORDER — SODIUM CHLORIDE 9 MG/ML
INJECTION, SOLUTION INTRAVENOUS CONTINUOUS
Status: DISPENSED | OUTPATIENT
Start: 2025-01-01 | End: 2025-01-01

## 2025-01-01 RX ORDER — IPRATROPIUM BROMIDE AND ALBUTEROL SULFATE 2.5; .5 MG/3ML; MG/3ML
3 SOLUTION RESPIRATORY (INHALATION)
OUTPATIENT
Start: 2025-01-01

## 2025-01-01 RX ORDER — GLUCAGON 1 MG
1 KIT INJECTION
Status: DISCONTINUED | OUTPATIENT
Start: 2025-01-01 | End: 2025-01-01

## 2025-01-01 RX ORDER — IBUPROFEN 200 MG
24 TABLET ORAL
Status: DISCONTINUED | OUTPATIENT
Start: 2025-01-01 | End: 2025-01-01 | Stop reason: HOSPADM

## 2025-01-01 RX ORDER — PROPOFOL 10 MG/ML
INJECTION, EMULSION INTRAVENOUS
Status: COMPLETED
Start: 2025-01-01 | End: 2025-01-01

## 2025-01-01 RX ORDER — METHYLPREDNISOLONE SOD SUCC 125 MG
125 VIAL (EA) INJECTION EVERY 8 HOURS
Status: DISCONTINUED | OUTPATIENT
Start: 2025-01-01 | End: 2025-01-01

## 2025-01-01 RX ORDER — TALC
6 POWDER (GRAM) TOPICAL NIGHTLY PRN
OUTPATIENT
Start: 2025-01-01

## 2025-01-01 RX ORDER — FAMOTIDINE 20 MG/1
20 TABLET, FILM COATED ORAL DAILY PRN
Status: DISCONTINUED | OUTPATIENT
Start: 2025-01-01 | End: 2025-01-01

## 2025-01-01 RX ORDER — ZIPRASIDONE MESYLATE 20 MG/ML
10 INJECTION, POWDER, LYOPHILIZED, FOR SOLUTION INTRAMUSCULAR ONCE
Status: COMPLETED | OUTPATIENT
Start: 2025-01-01 | End: 2025-01-01

## 2025-01-01 RX ORDER — ACETAMINOPHEN 500 MG
1000 TABLET ORAL EVERY 8 HOURS PRN
OUTPATIENT
Start: 2025-01-01

## 2025-01-01 RX ORDER — CALCIUM GLUCONATE 20 MG/ML
1 INJECTION, SOLUTION INTRAVENOUS ONCE
Status: COMPLETED | OUTPATIENT
Start: 2025-01-01 | End: 2025-01-01

## 2025-01-01 RX ORDER — GLUCAGON 1 MG
1 KIT INJECTION
OUTPATIENT
Start: 2025-01-01

## 2025-01-01 RX ORDER — ALUMINUM HYDROXIDE, MAGNESIUM HYDROXIDE, AND SIMETHICONE 1200; 120; 1200 MG/30ML; MG/30ML; MG/30ML
30 SUSPENSION ORAL 4 TIMES DAILY PRN
OUTPATIENT
Start: 2025-01-01

## 2025-01-01 RX ORDER — LORAZEPAM 2 MG/ML
0.5 INJECTION INTRAMUSCULAR ONCE
Status: COMPLETED | OUTPATIENT
Start: 2025-01-01 | End: 2025-01-01

## 2025-01-01 RX ORDER — PANTOPRAZOLE SODIUM 40 MG/1
40 TABLET, DELAYED RELEASE ORAL DAILY
Status: DISCONTINUED | OUTPATIENT
Start: 2025-01-01 | End: 2025-01-01

## 2025-01-01 RX ORDER — METHYLPREDNISOLONE SOD SUCC 125 MG
125 VIAL (EA) INJECTION EVERY 6 HOURS
OUTPATIENT
Start: 2025-01-01

## 2025-01-01 RX ORDER — MEROPENEM 500 MG/1
500 INJECTION, POWDER, FOR SOLUTION INTRAVENOUS
Status: DISCONTINUED | OUTPATIENT
Start: 2025-01-01 | End: 2025-01-01

## 2025-01-01 RX ORDER — ONDANSETRON 4 MG/1
4 TABLET, ORALLY DISINTEGRATING ORAL EVERY 8 HOURS PRN
Status: DISCONTINUED | OUTPATIENT
Start: 2025-01-01 | End: 2025-01-01 | Stop reason: HOSPADM

## 2025-01-01 RX ORDER — DIPHENHYDRAMINE HCL 25 MG
50 CAPSULE ORAL DAILY PRN
Status: DISCONTINUED | OUTPATIENT
Start: 2025-01-01 | End: 2025-01-01

## 2025-01-01 RX ORDER — ONDANSETRON 4 MG/1
4 TABLET, ORALLY DISINTEGRATING ORAL EVERY 8 HOURS PRN
OUTPATIENT
Start: 2025-01-01

## 2025-01-01 RX ORDER — IBUPROFEN 200 MG
16 TABLET ORAL
Status: DISCONTINUED | OUTPATIENT
Start: 2025-01-01 | End: 2025-01-01 | Stop reason: HOSPADM

## 2025-01-01 RX ORDER — CHOLECALCIFEROL (VITAMIN D3) 25 MCG
1000 TABLET ORAL DAILY
Status: DISCONTINUED | OUTPATIENT
Start: 2025-01-01 | End: 2025-01-01 | Stop reason: HOSPADM

## 2025-01-01 RX ORDER — MUPIROCIN 20 MG/G
OINTMENT TOPICAL 2 TIMES DAILY
OUTPATIENT
Start: 2025-01-01 | End: 2025-05-16

## 2025-01-01 RX ORDER — ATORVASTATIN CALCIUM 80 MG/1
80 TABLET, FILM COATED ORAL NIGHTLY
Status: DISCONTINUED | OUTPATIENT
Start: 2025-01-01 | End: 2025-01-01

## 2025-01-01 RX ORDER — SODIUM CHLORIDE 0.9 % (FLUSH) 0.9 %
10 SYRINGE (ML) INJECTION EVERY 12 HOURS PRN
Status: DISCONTINUED | OUTPATIENT
Start: 2025-01-01 | End: 2025-01-01 | Stop reason: HOSPADM

## 2025-01-01 RX ORDER — TALC
6 POWDER (GRAM) TOPICAL NIGHTLY PRN
Status: DISCONTINUED | OUTPATIENT
Start: 2025-01-01 | End: 2025-01-01 | Stop reason: HOSPADM

## 2025-01-01 RX ORDER — ALUMINUM HYDROXIDE, MAGNESIUM HYDROXIDE, AND SIMETHICONE 1200; 120; 1200 MG/30ML; MG/30ML; MG/30ML
30 SUSPENSION ORAL 4 TIMES DAILY PRN
Status: DISCONTINUED | OUTPATIENT
Start: 2025-01-01 | End: 2025-01-01 | Stop reason: HOSPADM

## 2025-01-01 RX ORDER — ACETAMINOPHEN 650 MG/1
650 SUPPOSITORY RECTAL EVERY 6 HOURS PRN
Status: DISCONTINUED | OUTPATIENT
Start: 2025-01-01 | End: 2025-01-01 | Stop reason: HOSPADM

## 2025-01-01 RX ORDER — DOCUSATE SODIUM 100 MG/1
100 CAPSULE, LIQUID FILLED ORAL DAILY PRN
OUTPATIENT
Start: 2025-01-01

## 2025-01-01 RX ORDER — LOSARTAN POTASSIUM 100 MG/1
100 TABLET ORAL DAILY
Status: DISCONTINUED | OUTPATIENT
Start: 2025-01-01 | End: 2025-01-01

## 2025-01-01 RX ORDER — PROPOFOL 10 MG/ML
0-50 INJECTION, EMULSION INTRAVENOUS CONTINUOUS
Status: DISCONTINUED | OUTPATIENT
Start: 2025-01-01 | End: 2025-01-01 | Stop reason: HOSPADM

## 2025-01-01 RX ORDER — FAMOTIDINE 10 MG/ML
20 INJECTION, SOLUTION INTRAVENOUS DAILY PRN
OUTPATIENT
Start: 2025-01-01

## 2025-01-01 RX ORDER — NALOXONE HCL 0.4 MG/ML
0.02 VIAL (ML) INJECTION
OUTPATIENT
Start: 2025-01-01

## 2025-01-01 RX ORDER — IBUPROFEN 200 MG
24 TABLET ORAL
OUTPATIENT
Start: 2025-01-01

## 2025-01-01 RX ORDER — MUPIROCIN 20 MG/G
OINTMENT TOPICAL 2 TIMES DAILY
Status: DISCONTINUED | OUTPATIENT
Start: 2025-01-01 | End: 2025-01-01 | Stop reason: HOSPADM

## 2025-01-01 RX ORDER — ALPRAZOLAM 0.5 MG/1
0.5 TABLET ORAL 3 TIMES DAILY PRN
OUTPATIENT
Start: 2025-01-01

## 2025-01-01 RX ORDER — NALOXONE HCL 0.4 MG/ML
0.02 VIAL (ML) INJECTION
Status: DISCONTINUED | OUTPATIENT
Start: 2025-01-01 | End: 2025-01-01 | Stop reason: HOSPADM

## 2025-01-01 RX ORDER — IBUPROFEN 200 MG
16 TABLET ORAL
OUTPATIENT
Start: 2025-01-01

## 2025-01-01 RX ORDER — IPRATROPIUM BROMIDE AND ALBUTEROL SULFATE 2.5; .5 MG/3ML; MG/3ML
3 SOLUTION RESPIRATORY (INHALATION)
Status: DISCONTINUED | OUTPATIENT
Start: 2025-01-01 | End: 2025-01-01 | Stop reason: HOSPADM

## 2025-01-01 RX ORDER — CHOLECALCIFEROL (VITAMIN D3) 25 MCG
1000 TABLET ORAL DAILY
OUTPATIENT
Start: 2025-01-01

## 2025-01-01 RX ORDER — INSULIN ASPART 100 [IU]/ML
0-10 INJECTION, SOLUTION INTRAVENOUS; SUBCUTANEOUS EVERY 6 HOURS PRN
Status: DISCONTINUED | OUTPATIENT
Start: 2025-01-01 | End: 2025-01-01 | Stop reason: HOSPADM

## 2025-01-01 RX ORDER — DEXMEDETOMIDINE HYDROCHLORIDE 4 UG/ML
0-1.4 INJECTION, SOLUTION INTRAVENOUS CONTINUOUS
Status: DISCONTINUED | OUTPATIENT
Start: 2025-01-01 | End: 2025-01-01

## 2025-01-01 RX ORDER — ACETAMINOPHEN 325 MG/1
650 TABLET ORAL EVERY 4 HOURS PRN
Status: DISCONTINUED | OUTPATIENT
Start: 2025-01-01 | End: 2025-01-01

## 2025-01-01 RX ORDER — FAMOTIDINE 20 MG/1
20 TABLET, FILM COATED ORAL
Status: DISCONTINUED | OUTPATIENT
Start: 2025-01-01 | End: 2025-01-01 | Stop reason: HOSPADM

## 2025-01-01 RX ORDER — ACETAMINOPHEN 650 MG/1
650 SUPPOSITORY RECTAL EVERY 6 HOURS PRN
OUTPATIENT
Start: 2025-01-01

## 2025-01-01 RX ORDER — IPRATROPIUM BROMIDE AND ALBUTEROL SULFATE 2.5; .5 MG/3ML; MG/3ML
3 SOLUTION RESPIRATORY (INHALATION)
Status: COMPLETED | OUTPATIENT
Start: 2025-01-01 | End: 2025-01-01

## 2025-01-01 RX ORDER — METHYLPREDNISOLONE SOD SUCC 125 MG
125 VIAL (EA) INJECTION EVERY 6 HOURS
Status: DISCONTINUED | OUTPATIENT
Start: 2025-01-01 | End: 2025-01-01 | Stop reason: HOSPADM

## 2025-01-01 RX ORDER — METOPROLOL TARTRATE 25 MG/1
12.5 TABLET ORAL 2 TIMES DAILY
OUTPATIENT
Start: 2025-01-01

## 2025-01-01 RX ORDER — METOPROLOL TARTRATE 25 MG/1
12.5 TABLET ORAL 2 TIMES DAILY
Status: DISCONTINUED | OUTPATIENT
Start: 2025-01-01 | End: 2025-01-01 | Stop reason: HOSPADM

## 2025-01-01 RX ORDER — PROCHLORPERAZINE EDISYLATE 5 MG/ML
5 INJECTION INTRAMUSCULAR; INTRAVENOUS EVERY 6 HOURS PRN
Status: DISCONTINUED | OUTPATIENT
Start: 2025-01-01 | End: 2025-01-01 | Stop reason: HOSPADM

## 2025-01-01 RX ORDER — ALPRAZOLAM 0.5 MG/1
0.5 TABLET ORAL 3 TIMES DAILY PRN
Status: DISCONTINUED | OUTPATIENT
Start: 2025-01-01 | End: 2025-01-01 | Stop reason: HOSPADM

## 2025-01-01 RX ORDER — DIPHENHYDRAMINE HYDROCHLORIDE 50 MG/ML
50 INJECTION, SOLUTION INTRAMUSCULAR; INTRAVENOUS DAILY PRN
Status: DISCONTINUED | OUTPATIENT
Start: 2025-01-01 | End: 2025-01-01 | Stop reason: HOSPADM

## 2025-01-01 RX ORDER — SODIUM CHLORIDE 9 MG/ML
INJECTION, SOLUTION INTRAVENOUS CONTINUOUS
Status: DISCONTINUED | OUTPATIENT
Start: 2025-01-01 | End: 2025-01-01

## 2025-01-01 RX ADMIN — SODIUM CHLORIDE: 0.9 INJECTION, SOLUTION INTRAVENOUS at 03:05

## 2025-01-01 RX ADMIN — MUPIROCIN: 20 OINTMENT TOPICAL at 09:05

## 2025-01-01 RX ADMIN — Medication 1000 UNITS: at 01:05

## 2025-01-01 RX ADMIN — PIPERACILLIN AND TAZOBACTAM 4.5 G: 4; .5 INJECTION, POWDER, FOR SOLUTION INTRAVENOUS; PARENTERAL at 01:05

## 2025-01-01 RX ADMIN — HUMAN IMMUNOGLOBULIN G 30 G: 10 LIQUID INTRAVENOUS at 04:05

## 2025-01-01 RX ADMIN — IPRATROPIUM BROMIDE AND ALBUTEROL SULFATE 3 ML: 2.5; .5 SOLUTION RESPIRATORY (INHALATION) at 07:05

## 2025-01-01 RX ADMIN — METHYLPREDNISOLONE SODIUM SUCCINATE 125 MG: 125 INJECTION, POWDER, FOR SOLUTION INTRAMUSCULAR; INTRAVENOUS at 05:05

## 2025-01-01 RX ADMIN — DEXMEDETOMIDINE HYDROCHLORIDE 0.4 MCG/KG/HR: 4 INJECTION, SOLUTION INTRAVENOUS at 10:05

## 2025-01-01 RX ADMIN — PIPERACILLIN AND TAZOBACTAM 4.5 G: 4; .5 INJECTION, POWDER, LYOPHILIZED, FOR SOLUTION INTRAVENOUS; PARENTERAL at 05:05

## 2025-01-01 RX ADMIN — MEROPENEM 500 MG: 500 INJECTION, POWDER, FOR SOLUTION INTRAVENOUS at 09:05

## 2025-01-01 RX ADMIN — SODIUM CHLORIDE: 9 INJECTION, SOLUTION INTRAVENOUS at 05:05

## 2025-01-01 RX ADMIN — SODIUM BICARBONATE: 84 INJECTION, SOLUTION INTRAVENOUS at 10:05

## 2025-01-01 RX ADMIN — IPRATROPIUM BROMIDE AND ALBUTEROL SULFATE 3 ML: 2.5; .5 SOLUTION RESPIRATORY (INHALATION) at 08:05

## 2025-01-01 RX ADMIN — SODIUM CHLORIDE: 9 INJECTION, SOLUTION INTRAVENOUS at 01:05

## 2025-01-01 RX ADMIN — METHYLPREDNISOLONE SODIUM SUCCINATE 125 MG: 125 INJECTION, POWDER, FOR SOLUTION INTRAMUSCULAR; INTRAVENOUS at 06:05

## 2025-01-01 RX ADMIN — MEROPENEM 500 MG: 500 INJECTION, POWDER, FOR SOLUTION INTRAVENOUS at 10:05

## 2025-01-01 RX ADMIN — ZIPRASIDONE MESYLATE 10 MG: 20 INJECTION, POWDER, LYOPHILIZED, FOR SOLUTION INTRAMUSCULAR at 12:05

## 2025-01-01 RX ADMIN — ACETAMINOPHEN 1000 MG: 500 TABLET ORAL at 01:05

## 2025-01-01 RX ADMIN — LORAZEPAM 0.5 MG: 2 INJECTION INTRAMUSCULAR; INTRAVENOUS at 02:05

## 2025-01-01 RX ADMIN — HUMAN IMMUNOGLOBULIN G 30 G: 10 LIQUID INTRAVENOUS at 03:05

## 2025-01-01 RX ADMIN — SODIUM BICARBONATE: 84 INJECTION, SOLUTION INTRAVENOUS at 12:05

## 2025-01-01 RX ADMIN — ACETAMINOPHEN 650 MG: 325 TABLET ORAL at 05:05

## 2025-01-01 RX ADMIN — PROPOFOL 5 MCG/KG/MIN: 10 INJECTION, EMULSION INTRAVENOUS at 11:05

## 2025-01-01 RX ADMIN — METHYLPREDNISOLONE SODIUM SUCCINATE 125 MG: 125 INJECTION, POWDER, FOR SOLUTION INTRAMUSCULAR; INTRAVENOUS at 11:05

## 2025-01-01 RX ADMIN — INSULIN ASPART 1 UNITS: 100 INJECTION, SOLUTION INTRAVENOUS; SUBCUTANEOUS at 12:05

## 2025-01-01 RX ADMIN — DOXYCYCLINE 100 MG: 100 INJECTION, POWDER, LYOPHILIZED, FOR SOLUTION INTRAVENOUS at 08:05

## 2025-01-01 RX ADMIN — METHYLPREDNISOLONE SODIUM SUCCINATE 125 MG: 125 INJECTION, POWDER, FOR SOLUTION INTRAMUSCULAR; INTRAVENOUS at 09:05

## 2025-01-01 RX ADMIN — DIPHENHYDRAMINE HYDROCHLORIDE 50 MG: 25 CAPSULE ORAL at 11:05

## 2025-01-01 RX ADMIN — ACETAMINOPHEN 1000 MG: 500 TABLET ORAL at 09:05

## 2025-01-01 RX ADMIN — HUMAN IMMUNOGLOBULIN G 30 G: 10 LIQUID INTRAVENOUS at 12:05

## 2025-01-01 RX ADMIN — PANTOPRAZOLE SODIUM 40 MG: 40 TABLET, DELAYED RELEASE ORAL at 09:05

## 2025-01-01 RX ADMIN — MUPIROCIN: 20 OINTMENT TOPICAL at 10:05

## 2025-01-01 RX ADMIN — IPRATROPIUM BROMIDE AND ALBUTEROL SULFATE 3 ML: 2.5; .5 SOLUTION RESPIRATORY (INHALATION) at 02:05

## 2025-01-01 RX ADMIN — FAMOTIDINE 20 MG: 10 INJECTION, SOLUTION INTRAVENOUS at 03:05

## 2025-01-01 RX ADMIN — PANTOPRAZOLE SODIUM 40 MG: 40 TABLET, DELAYED RELEASE ORAL at 08:05

## 2025-01-01 RX ADMIN — SODIUM CHLORIDE, POTASSIUM CHLORIDE, SODIUM LACTATE AND CALCIUM CHLORIDE 1000 ML: 600; 310; 30; 20 INJECTION, SOLUTION INTRAVENOUS at 03:05

## 2025-01-01 RX ADMIN — SODIUM CHLORIDE: 9 INJECTION, SOLUTION INTRAVENOUS at 10:05

## 2025-01-01 RX ADMIN — IPRATROPIUM BROMIDE AND ALBUTEROL SULFATE 3 ML: 2.5; .5 SOLUTION RESPIRATORY (INHALATION) at 01:05

## 2025-01-01 RX ADMIN — INSULIN ASPART 2 UNITS: 100 INJECTION, SOLUTION INTRAVENOUS; SUBCUTANEOUS at 06:05

## 2025-01-01 RX ADMIN — ATORVASTATIN CALCIUM 80 MG: 80 TABLET, FILM COATED ORAL at 09:05

## 2025-01-01 RX ADMIN — SODIUM BICARBONATE: 84 INJECTION, SOLUTION INTRAVENOUS at 09:05

## 2025-01-01 RX ADMIN — METHYLPREDNISOLONE SODIUM SUCCINATE 125 MG: 125 INJECTION, POWDER, FOR SOLUTION INTRAMUSCULAR; INTRAVENOUS at 12:05

## 2025-01-01 RX ADMIN — Medication 1000 UNITS: at 08:05

## 2025-01-01 RX ADMIN — METOPROLOL SUCCINATE 25 MG: 25 TABLET, EXTENDED RELEASE ORAL at 08:05

## 2025-01-01 RX ADMIN — ACETAMINOPHEN 650 MG: 325 TABLET ORAL at 04:05

## 2025-01-01 RX ADMIN — CALCIUM GLUCONATE 1 G: 20 INJECTION, SOLUTION INTRAVENOUS at 06:05

## 2025-01-01 RX ADMIN — METOPROLOL TARTRATE 12.5 MG: 25 TABLET, FILM COATED ORAL at 01:05

## 2025-01-01 RX ADMIN — SODIUM CHLORIDE, POTASSIUM CHLORIDE, SODIUM LACTATE AND CALCIUM CHLORIDE 1000 ML: 600; 310; 30; 20 INJECTION, SOLUTION INTRAVENOUS at 04:05

## 2025-01-01 RX ADMIN — METOPROLOL SUCCINATE 25 MG: 25 TABLET, EXTENDED RELEASE ORAL at 09:05

## 2025-01-01 RX ADMIN — DEXMEDETOMIDINE HYDROCHLORIDE 0.2 MCG/KG/HR: 4 INJECTION, SOLUTION INTRAVENOUS at 10:05

## 2025-01-01 RX ADMIN — FAMOTIDINE 20 MG: 10 INJECTION, SOLUTION INTRAVENOUS at 01:05

## 2025-01-01 RX ADMIN — ONDANSETRON 4 MG: 4 TABLET, ORALLY DISINTEGRATING ORAL at 09:05

## 2025-01-01 RX ADMIN — MEROPENEM 500 MG: 500 INJECTION, POWDER, FOR SOLUTION INTRAVENOUS at 08:05

## 2025-01-01 RX ADMIN — ACETAMINOPHEN 1000 MG: 500 TABLET ORAL at 03:05

## 2025-01-01 RX ADMIN — METHYLPREDNISOLONE SODIUM SUCCINATE 125 MG: 125 INJECTION, POWDER, FOR SOLUTION INTRAMUSCULAR; INTRAVENOUS at 02:05

## 2025-01-01 RX ADMIN — METHYLPREDNISOLONE SODIUM SUCCINATE 125 MG: 125 INJECTION, POWDER, FOR SOLUTION INTRAMUSCULAR; INTRAVENOUS at 10:05

## 2025-01-01 RX ADMIN — MUPIROCIN: 20 OINTMENT TOPICAL at 08:05

## 2025-01-01 RX ADMIN — MEBROFENIN 5 MILLICURIE: 45 INJECTION, POWDER, LYOPHILIZED, FOR SOLUTION INTRAVENOUS at 11:05

## 2025-01-01 RX ADMIN — ONDANSETRON 4 MG: 4 TABLET, ORALLY DISINTEGRATING ORAL at 05:05

## 2025-01-01 RX ADMIN — ALPRAZOLAM 0.5 MG: 0.5 TABLET ORAL at 03:05

## 2025-01-01 RX ADMIN — SODIUM CHLORIDE: 9 INJECTION, SOLUTION INTRAVENOUS at 04:05

## 2025-01-01 RX ADMIN — ALPRAZOLAM 0.5 MG: 0.5 TABLET ORAL at 09:05

## 2025-01-01 RX ADMIN — PIPERACILLIN AND TAZOBACTAM 4.5 G: 4; .5 INJECTION, POWDER, FOR SOLUTION INTRAVENOUS; PARENTERAL at 06:05

## 2025-01-01 RX ADMIN — METHYLPREDNISOLONE SODIUM SUCCINATE 125 MG: 125 INJECTION, POWDER, FOR SOLUTION INTRAMUSCULAR; INTRAVENOUS at 03:05

## 2025-01-01 RX ADMIN — SODIUM CHLORIDE: 9 INJECTION, SOLUTION INTRAVENOUS at 03:05

## 2025-01-01 RX ADMIN — ACETAMINOPHEN 650 MG: 325 TABLET ORAL at 09:05

## 2025-01-01 RX ADMIN — AZITHROMYCIN MONOHYDRATE 500 MG: 500 INJECTION, POWDER, LYOPHILIZED, FOR SOLUTION INTRAVENOUS at 05:05

## 2025-01-01 RX ADMIN — IPRATROPIUM BROMIDE AND ALBUTEROL SULFATE 3 ML: 2.5; .5 SOLUTION RESPIRATORY (INHALATION) at 03:05

## 2025-01-01 RX ADMIN — SODIUM CHLORIDE: 9 INJECTION, SOLUTION INTRAVENOUS at 09:05

## 2025-01-01 RX ADMIN — FAMOTIDINE 20 MG: 20 TABLET, FILM COATED ORAL at 11:05

## 2025-01-01 RX ADMIN — DIPHENHYDRAMINE HYDROCHLORIDE 50 MG: 50 INJECTION, SOLUTION INTRAMUSCULAR; INTRAVENOUS at 03:05

## 2025-01-01 RX ADMIN — Medication 1000 UNITS: at 09:05

## 2025-01-01 RX ADMIN — SODIUM CHLORIDE: 0.9 INJECTION, SOLUTION INTRAVENOUS at 09:05

## 2025-01-14 ENCOUNTER — OFFICE VISIT (OUTPATIENT)
Dept: GASTROENTEROLOGY | Facility: CLINIC | Age: 70
End: 2025-01-14
Payer: MEDICARE

## 2025-01-14 VITALS
OXYGEN SATURATION: 94 % | SYSTOLIC BLOOD PRESSURE: 183 MMHG | BODY MASS INDEX: 18.85 KG/M2 | HEART RATE: 75 BPM | HEIGHT: 75 IN | WEIGHT: 151.63 LBS | DIASTOLIC BLOOD PRESSURE: 83 MMHG

## 2025-01-14 DIAGNOSIS — R63.4 WEIGHT LOSS: ICD-10-CM

## 2025-01-14 DIAGNOSIS — K90.0 CELIAC DISEASE: ICD-10-CM

## 2025-01-14 DIAGNOSIS — L13.0 DERMATITIS HERPETIFORMIS: Primary | ICD-10-CM

## 2025-01-14 PROCEDURE — 3008F BODY MASS INDEX DOCD: CPT | Mod: CPTII,,,

## 2025-01-14 PROCEDURE — 99999 PR PBB SHADOW E&M-EST. PATIENT-LVL IV: CPT | Mod: PBBFAC,,,

## 2025-01-14 PROCEDURE — 99214 OFFICE O/P EST MOD 30 MIN: CPT | Mod: PBBFAC

## 2025-01-14 PROCEDURE — 3079F DIAST BP 80-89 MM HG: CPT | Mod: CPTII,,,

## 2025-01-14 PROCEDURE — 1159F MED LIST DOCD IN RCRD: CPT | Mod: CPTII,,,

## 2025-01-14 PROCEDURE — 4010F ACE/ARB THERAPY RXD/TAKEN: CPT | Mod: CPTII,,,

## 2025-01-14 PROCEDURE — 99214 OFFICE O/P EST MOD 30 MIN: CPT | Mod: S$PBB,,,

## 2025-01-14 PROCEDURE — 3288F FALL RISK ASSESSMENT DOCD: CPT | Mod: CPTII,,,

## 2025-01-14 PROCEDURE — 1101F PT FALLS ASSESS-DOCD LE1/YR: CPT | Mod: CPTII,,,

## 2025-01-14 PROCEDURE — 3077F SYST BP >= 140 MM HG: CPT | Mod: CPTII,,,

## 2025-01-14 RX ORDER — POLYETHYLENE GLYCOL 3350, SODIUM SULFATE ANHYDROUS, SODIUM BICARBONATE, SODIUM CHLORIDE, POTASSIUM CHLORIDE 236; 22.74; 6.74; 5.86; 2.97 G/4L; G/4L; G/4L; G/4L; G/4L
4 POWDER, FOR SOLUTION ORAL ONCE
Qty: 4000 ML | Refills: 0 | Status: SHIPPED | OUTPATIENT
Start: 2025-01-14 | End: 2025-01-14

## 2025-01-14 NOTE — PROGRESS NOTES
Gastroenterology Clinic Note    Patient ID: [id number]   Referring MD: No ref. provider found   Chief Complaint:   Chief Complaint   Patient presents with    Follow-up       History of Present Illness   Bhaskar Patel is an 69 y.o. 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 Patient reports a new onset rash that occurred around March of this year. He was treated twice for shingles. The rash would improve temporarily but then return. He had recent visit with Dr. Jenkins and was diagnosed with dermatitis herpetiformis. He had abnormal gliadin on recent labs. Dr. Jenkins started patient on Dapsone and he reports today that the rash has cleared up. He denies any complaints or problems at present. He denies any abdominal pain, diarrhea, constipation, hematochezia or melena. He denies any problems with toelrating his diet. Does report occasional gas but nothing significant. He denies any known history of celiac disease. He denies any FMH of CRC or prior endoscopy. Did have negative cologuard in October of 2020. He is a current smoker. Drinks alcohol on rare occasion.    Previous workup:  EGD    No prior colonoscopy.  No family history of CRC reported.    Interval  - doing well overall at follow-up  - he is still following with Dr. Jenkins; current treatment Dapsone and TAC 0.1   - EGD with Dr. Alex who recommended MRI Enterography for small bowel evaluation along with colonoscopy for weight loss; patient wishes to schedule colonoscopy this summer; declined MRI enterography; states he is feeling good     Review of Systems   Constitutional:  Negative for weight loss.   Gastrointestinal:  Negative for abdominal pain, blood in stool, constipation, diarrhea, heartburn, melena, nausea and vomiting.       Past Medical History      Past Medical History:   Diagnosis Date    GERD (gastroesophageal reflux disease)     Hypertension        Past Surgical History     Past Surgical History:   Procedure Laterality Date    CAROTID  ENDARTERECTOMY Left 5/16/2022    Procedure: ENDARTERECTOMY-CAROTID;  Surgeon: Karen Spencer MD;  Location: [facility] OR;  Service: General;  Laterality: Left;    CORONARY ARTERY BYPASS GRAFT      HERNIA REPAIR      LEFT HEART CATHETERIZATION Left 9/15/2021    Procedure: Left heart cath;  Surgeon: Quoc Vo DO;  Location: [facility] CATH LAB;  Service: Cardiology;  Laterality: Left;       Allergies   Review of patient's allergies indicates:  No Known Allergies    Immunization History     Immunization History   Administered Date(s) Administered    COVID-19, MRNA, LN-S, PF (MODERNA FULL 0.5 ML DOSE) 03/15/2021, 04/12/2021    Influenza (FLUAD) - Quadrivalent - Adjuvanted - PF *Preferred* (65+) 10/24/2022, 10/11/2023    Influenza - Quadrivalent - High Dose - PF (65 years and older) 10/05/2020, 10/06/2021    Influenza - Trivalent - Fluad - Adjuvanted - PF (65 years and older 12/17/2024    Pneumococcal Conjugate - 13 Valent 09/30/2019    Pneumococcal Polysaccharide - 23 Valent 11/09/2020       Past Family History      Family History   Problem Relation Name Age of Onset    Alzheimer's disease Mother      Polycystic kidney disease Mother      Hypertension Father      Heart disease Father      Cancer Sister      Heart disease Sister      Hypertension Sister         Past Social History      Social History     Socioeconomic History    Marital status:    Occupational History    Occupation: retired   Tobacco Use    Smoking status: Every Day     Current packs/day: 0.50     Average packs/day: 0.5 packs/day for 54.1 years (27.0 ttl pk-yrs)     Types: Cigarettes     Start date: 1971     Passive exposure: Current    Smokeless tobacco: Never    Tobacco comments:     already did [place] tobacco quit[place]. not interested in trying again. states he will quit on his own   Substance and Sexual Activity    Alcohol use: Not Currently    Drug use: Not Currently     Types: Marijuana    Sexual activity: Not Currently     Social Drivers  of Health     Financial Resource Strain: Low Risk  (12/17/2024)    Overall Financial Resource Strain (CARDIA)     Difficulty of Paying Living Expenses: Not hard at all   Food Insecurity: No Food Insecurity (12/17/2024)    Hunger Vital Sign     Worried About Running Out of Food in the Last Year: Never true     Ran Out of Food in the Last Year: Never true   Transportation Needs: No Transportation Needs (12/17/2024)    PRAPARE - Transportation     Lack of Transportation (Medical): No     Lack of Transportation (Non-Medical): No   Physical Activity: Sufficiently Active (12/17/2024)    Exercise Vital Sign     Days of Exercise per Week: 7 days     Minutes of Exercise per Session: 30 min   Stress: No Stress Concern Present (12/17/2024)    [employer] [place] [employer] - Occupational Stress Questionnaire     Feeling of Stress : Not at all   Housing Stability: Low Risk  (12/17/2024)    Housing Stability Vital Sign     Unable to Pay for Housing in the Last Year: No     Homeless in the Last Year: No       Current Medications     Outpatient Medications Marked as Taking for the 1/14/25 encounter (Office Visit) with Radha Chávez FNP   Medication Sig Dispense Refill    aspirin (ECOTRIN) 81 MG EC tablet Take 81 mg by mouth once daily.      atorvastatin (LIPITOR) 80 MG tablet Take 1 tablet (80 mg total) by mouth every evening. 90 tablet 1    dapsone 100 MG Tab TAKE ONE TABLET BY MOUTH DAILY 90 tablet 1    docusate sodium (COLACE) 100 MG capsule Take 100 mg by mouth daily as needed.       losartan (COZAAR) 50 MG tablet Take 1 tablet (50 mg total) by mouth once daily. 90 tablet 1    metoprolol succinate (TOPROL-XL) 25 MG 24 hr tablet Take 1 tablet (25 mg total) by mouth once daily. 90 tablet 1    omeprazole (PRILOSEC) 20 MG capsule Take 1 capsule (20 mg total) by mouth once daily. 90 capsule 1    triamcinolone acetonide 0.1% (KENALOG) 0.1 % cream Apply to AA on body BID PRN flares tapering with improvement 454 g 1     "    I have reviewed the current medications, allergies, vital signs, past medical and surgical history, family medical history, and social history for this encounter and agree with all findings.    OBJECTIVE    Physical Exam    BP (!) 183/83   Pulse 75   Ht 6' 3" (1.905 m)   Wt 68.8 kg (151 lb 9.6 oz)   SpO2 (!) 94%   BMI 18.95 kg/m²   GEN: Well appearing, cooperative, NAD  NECK: Supple, no LAD  CV: Normal rate  RESP: Unlabored  ABD: ND, NT, soft, no guarding  EXT: No clubbing, cyanosis, or edema  SKIN: Warm and dry  NEURO: AAO x4.     LABS    CBC (with or without Differential):   Lab Results   Component Value Date    WBC 8.22 10/17/2024    HGB 13.2 (L) 10/17/2024    HCT 40.9 10/17/2024    .1 (H) 10/17/2024    MCH 33.6 (H) 10/17/2024    MCHC 32.3 10/17/2024    RDW 13.8 10/17/2024     (L) 10/17/2024    MPV 12.6 (H) 10/17/2024    NEUTOPHILPCT 67.4 (H) 10/17/2024    DIFFTYPE Auto 10/17/2024     BMP/CMP:   Lab Results   Component Value Date     10/17/2024    K 4.2 10/17/2024     (H) 10/17/2024    CO2 26 10/17/2024    BUN 18 10/17/2024    CREATININE 1.21 10/17/2024     10/17/2024    CALCIUM 9.1 10/17/2024    ALBUMIN 3.7 10/17/2024    AST 20 10/17/2024    ALT 19 10/17/2024    ALKPHOS 92 10/17/2024        IMAGING  No pertinent imaging available.    ASSESSMENT  Bhaskar Patel is a 69 y.o. WM with history of COPD, hypertension, CAD, hyperlipidemia, carotid stenosis, GERD, and celiac disease who is referred for follow-up.    1. Dermatitis herpetiformis    2. Weight loss    3. Celiac disease           PLAN    Labs today  Schedule colonoscopy for CRC screening  Offered MR enterography, patient declined  Return to GI clinic for follow-up in 1 year, sooner as needed    There are no Patient Instructions on file for this visit.      Orders Placed This Encounter   Procedures    CBC Auto Differential     Standing Status:   Future     Standing Expiration Date:   4/14/2026    Comprehensive " Metabolic Panel     Standing Status:   Future     Standing Expiration Date:   4/14/2026    Iron and TIBC     Standing Status:   Future     Standing Expiration Date:   4/14/2026    Ferritin     Standing Status:   Future     Standing Expiration Date:   3/14/2026    Vitamin B12     Standing Status:   Future     Standing Expiration Date:   3/14/2026    Folate     Standing Status:   Future     Standing Expiration Date:   3/14/2026    Vitamin D     Standing Status:   Future     Standing Expiration Date:   4/14/2026    IgA     Standing Status:   Future     Standing Expiration Date:   4/14/2026         The risks and benefits of my recommendations, as well as other treatment options were discussed with the patient today. All questions were answered.    30 minutes of total time spent on the encounter, which includes face to face time and non-face to face time preparing to see the patient (eg, review of tests), obtaining and/or reviewing separately obtained history, documenting clinical information in the electronic or other health record, Independently interpreting results (not separately reported) and communicating results to the patient/family/caregiver, or care coordination (not separately reported).        Rahda Chávez, DEMARCOP/ACNP  [facility]

## 2025-02-28 DIAGNOSIS — I10 ESSENTIAL HYPERTENSION: ICD-10-CM

## 2025-02-28 NOTE — TELEPHONE ENCOUNTER
----- Message from Sabrina sent at 2/28/2025  1:29 PM CST -----  losartan (COZAAR) 50 MG [facility]. - [facility] [place] - [address] [address] [facility] [id number] [facility] [address] [place] [address]Bfzvf: [phone] Fax: [phone] requests refill for Patient

## 2025-03-03 ENCOUNTER — OFFICE VISIT (OUTPATIENT)
Dept: DERMATOLOGY | Facility: CLINIC | Age: 70
End: 2025-03-03
Payer: MEDICARE

## 2025-03-03 DIAGNOSIS — Z79.899 HIGH RISK MEDICATION USE: ICD-10-CM

## 2025-03-03 DIAGNOSIS — L13.0 DERMATITIS HERPETIFORMIS: Primary | ICD-10-CM

## 2025-03-03 RX ORDER — TRIAMCINOLONE ACETONIDE 1 MG/G
CREAM TOPICAL
Qty: 454 G | Refills: 1 | Status: SHIPPED | OUTPATIENT
Start: 2025-03-03

## 2025-03-03 RX ORDER — DAPSONE 100 MG/1
100 TABLET ORAL DAILY
Qty: 90 TABLET | Refills: 1 | Status: SHIPPED | OUTPATIENT
Start: 2025-03-03

## 2025-03-03 RX ORDER — LOSARTAN POTASSIUM 50 MG/1
50 TABLET ORAL DAILY
Qty: 30 TABLET | Refills: 0 | Status: SHIPPED | OUTPATIENT
Start: 2025-03-03

## 2025-03-03 NOTE — PROGRESS NOTES
[facility]   Natali Jenkins MD    Patient Name: Bhaskar Patel  Patient YOB: 1955   Date of Service: 3/3/25    CC: Follow-up Dermatitis Herpetiforms    HPI: Bhaskar Patel is a 70 y.o. male here today for follow-up of dermatitis herpetiforms, last seen 8/29/2024.  Previous treatments include Dapsone and TAC 0.1.  Overall, the dermatitis herpetiformis is improved.  Treatment plan was followed as directed.    Past Medical History:   Diagnosis Date    GERD (gastroesophageal reflux disease)     Hypertension      Past Surgical History:   Procedure Laterality Date    CAROTID ENDARTERECTOMY Left 5/16/2022    Procedure: ENDARTERECTOMY-CAROTID;  Surgeon: Karen Spencer MD;  Location: [facility] OR;  Service: General;  Laterality: Left;    CORONARY ARTERY BYPASS GRAFT      HERNIA REPAIR      LEFT HEART CATHETERIZATION Left 9/15/2021    Procedure: Left heart cath;  Surgeon: Quoc Vo DO;  Location: [facility] CATH LAB;  Service: Cardiology;  Laterality: Left;     Review of patient's allergies indicates:  No Known Allergies    Current Outpatient Medications:     aspirin (ECOTRIN) 81 MG EC tablet, Take 81 mg by mouth once daily., Disp: , Rfl:     atorvastatin (LIPITOR) 80 MG tablet, Take 1 tablet (80 mg total) by mouth every evening., Disp: 90 tablet, Rfl: 1    dapsone 100 MG Tab, Take 1 tablet (100 mg total) by mouth once daily., Disp: 90 tablet, Rfl: 1    docusate sodium (COLACE) 100 MG capsule, Take 100 mg by mouth daily as needed. , Disp: , Rfl:     losartan (COZAAR) 50 MG tablet, Take 1 tablet (50 mg total) by mouth once daily., Disp: 30 tablet, Rfl: 0    metoprolol succinate (TOPROL-XL) 25 MG 24 hr tablet, Take 1 tablet (25 mg total) by mouth once daily., Disp: 90 tablet, Rfl: 1    omeprazole (PRILOSEC) 20 MG capsule, Take 1 capsule (20 mg total) by mouth once daily., Disp: 90 capsule, Rfl: 1    triamcinolone acetonide 0.1% (KENALOG) 0.1 % cream, Apply to AA on body BID PRN flares tapering with  improvement, Disp: 454 g, Rfl: 1    ROS: A focused review of systems was obtained and negative.     Exam: A focused skin exam was performed. All areas examined were normal except as mentioned in the assessment and plan below.  General Appearance of the patient is well developed and well nourished.  Orientation: alert and oriented x 3.  Mood and affect: pleasant.    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 A diagnosis of High risk medication use was also pertinent to this visit.    Plan:   Medications Ordered This Encounter   Medications    dapsone 100 MG Tab     Sig: Take 1 tablet (100 mg total) by mouth once daily.     Dispense:  90 tablet     Refill:  1     This prescription was filled on 10/9/2024. Any refills authorized will be placed on file.    triamcinolone acetonide 0.1% (KENALOG) 0.1 % cream     Sig: Apply to AA on body BID PRN flares tapering with improvement     Dispense:  454 g     Refill:  1       Dermatitis Herpetiformis  - clears  Status: Well Controlled    Plan: Counseling.  I counseled the patient regarding the following:  Contact office if: Rash fails to resolve despite treatment.  - Will refill Dapsone and TAC 0.1  - will coordinate FU with GI for monitoring     High Risk Medication Monitoring (Z79.899) : The risks and benefits of the medication were reviewed in full with the patient. Should any side effects occur, the patient will stop the medication and contact me immediately.    Dapsone Counseling- I discussed with the patient the risks of dapsone including but not limited to hemolytic  anemia, agranulocytosis, rashes, methemoglobinemia, kidney failure, peripheral neuropathy, headaches, GI  upset, and liver toxicity. Patients who start dapsone require monitoring including baseline LFTs and weekly CBCs  for the first month, then every month thereafter. The patient verbalized understanding of the proper use and  possible adverse effects of dapsone. All of the  patient's questions and concerns were addressed.    - instructed pt to have labs from Radha Chávez drawn    Follow up in about 6 months (around 9/3/2025) for Dermatitis Herpetiformis.    Natali Jenkins MD

## 2025-03-06 ENCOUNTER — RESULTS FOLLOW-UP (OUTPATIENT)
Dept: GASTROENTEROLOGY | Facility: CLINIC | Age: 70
End: 2025-03-06
Payer: MEDICARE

## 2025-03-06 DIAGNOSIS — E55.9 VITAMIN D DEFICIENCY: Primary | ICD-10-CM

## 2025-03-06 RX ORDER — ERGOCALCIFEROL 1.25 MG/1
50000 CAPSULE ORAL
Qty: 8 CAPSULE | Refills: 0 | Status: SHIPPED | OUTPATIENT
Start: 2025-03-06 | End: 2025-04-25

## 2025-03-07 NOTE — TELEPHONE ENCOUNTER
----- Message from BLANCA Dewey sent at 3/6/2025  4:26 PM CST -----  He is Vit D deficient. Otherwise labs are ok. Rx sent to pharmacy.  ----- Message -----  From: Lab, Background User  Sent: 3/3/2025   4:03 PM CST  To: BLANCA Dewey

## 2025-04-04 DIAGNOSIS — I10 ESSENTIAL HYPERTENSION: ICD-10-CM

## 2025-04-04 RX ORDER — LOSARTAN POTASSIUM 50 MG/1
50 TABLET ORAL DAILY
Qty: 20 TABLET | Refills: 0 | Status: CANCELLED | OUTPATIENT
Start: 2025-04-04

## 2025-04-04 RX ORDER — METOPROLOL SUCCINATE 25 MG/1
25 TABLET, EXTENDED RELEASE ORAL DAILY
Qty: 20 TABLET | Refills: 0 | Status: CANCELLED | OUTPATIENT
Start: 2025-04-04

## 2025-04-07 DIAGNOSIS — K21.9 GASTROESOPHAGEAL REFLUX DISEASE, UNSPECIFIED WHETHER ESOPHAGITIS PRESENT: ICD-10-CM

## 2025-04-07 DIAGNOSIS — I10 ESSENTIAL HYPERTENSION: ICD-10-CM

## 2025-04-07 RX ORDER — LOSARTAN POTASSIUM 50 MG/1
50 TABLET ORAL DAILY
Qty: 30 TABLET | Refills: 0 | Status: SHIPPED | OUTPATIENT
Start: 2025-04-07

## 2025-04-07 RX ORDER — OMEPRAZOLE 20 MG/1
20 CAPSULE, DELAYED RELEASE ORAL DAILY
Qty: 90 CAPSULE | Refills: 1 | Status: SHIPPED | OUTPATIENT
Start: 2025-04-07

## 2025-04-07 RX ORDER — METOPROLOL SUCCINATE 25 MG/1
25 TABLET, EXTENDED RELEASE ORAL DAILY
Qty: 90 TABLET | Refills: 1 | Status: SHIPPED | OUTPATIENT
Start: 2025-04-07

## 2025-04-21 ENCOUNTER — OFFICE VISIT (OUTPATIENT)
Dept: FAMILY MEDICINE | Facility: CLINIC | Age: 70
End: 2025-04-21
Payer: MEDICARE

## 2025-04-21 DIAGNOSIS — K21.9 GASTROESOPHAGEAL REFLUX DISEASE, UNSPECIFIED WHETHER ESOPHAGITIS PRESENT: ICD-10-CM

## 2025-04-21 DIAGNOSIS — E78.5 HYPERLIPIDEMIA, UNSPECIFIED HYPERLIPIDEMIA TYPE: ICD-10-CM

## 2025-04-21 DIAGNOSIS — L13.0 DERMATITIS HERPETIFORMIS: ICD-10-CM

## 2025-04-21 DIAGNOSIS — E55.9 VITAMIN D DEFICIENCY: ICD-10-CM

## 2025-04-21 DIAGNOSIS — I10 ESSENTIAL HYPERTENSION: Primary | ICD-10-CM

## 2025-04-21 PROCEDURE — 4010F ACE/ARB THERAPY RXD/TAKEN: CPT | Mod: ,,,

## 2025-04-21 PROCEDURE — 3080F DIAST BP >= 90 MM HG: CPT | Mod: ,,,

## 2025-04-21 PROCEDURE — 99213 OFFICE O/P EST LOW 20 MIN: CPT | Mod: ,,,

## 2025-04-21 PROCEDURE — 1160F RVW MEDS BY RX/DR IN RCRD: CPT | Mod: ,,,

## 2025-04-21 PROCEDURE — 3288F FALL RISK ASSESSMENT DOCD: CPT | Mod: ,,,

## 2025-04-21 PROCEDURE — 3008F BODY MASS INDEX DOCD: CPT | Mod: ,,,

## 2025-04-21 PROCEDURE — 1159F MED LIST DOCD IN RCRD: CPT | Mod: ,,,

## 2025-04-21 PROCEDURE — 3077F SYST BP >= 140 MM HG: CPT | Mod: ,,,

## 2025-04-21 PROCEDURE — 1101F PT FALLS ASSESS-DOCD LE1/YR: CPT | Mod: ,,,

## 2025-04-21 RX ORDER — ATORVASTATIN CALCIUM 80 MG/1
80 TABLET, FILM COATED ORAL NIGHTLY
Qty: 90 TABLET | Refills: 1 | Status: SHIPPED | OUTPATIENT
Start: 2025-04-21

## 2025-04-21 RX ORDER — TRIAMCINOLONE ACETONIDE 1 MG/G
CREAM TOPICAL
Qty: 454 G | Refills: 1 | Status: SHIPPED | OUTPATIENT
Start: 2025-04-21

## 2025-04-21 RX ORDER — LOSARTAN POTASSIUM 50 MG/1
50 TABLET ORAL DAILY
Qty: 30 TABLET | Refills: 0 | Status: CANCELLED | OUTPATIENT
Start: 2025-04-21

## 2025-04-21 RX ORDER — DAPSONE 100 MG/1
100 TABLET ORAL DAILY
Qty: 90 TABLET | Refills: 1 | Status: SHIPPED | OUTPATIENT
Start: 2025-04-21

## 2025-04-21 RX ORDER — ERGOCALCIFEROL 1.25 MG/1
50000 CAPSULE ORAL
Qty: 8 CAPSULE | Refills: 0 | Status: SHIPPED | OUTPATIENT
Start: 2025-04-21 | End: 2025-06-10

## 2025-04-21 RX ORDER — METOPROLOL SUCCINATE 25 MG/1
25 TABLET, EXTENDED RELEASE ORAL DAILY
Qty: 90 TABLET | Refills: 1 | Status: SHIPPED | OUTPATIENT
Start: 2025-04-21

## 2025-04-21 RX ORDER — LOSARTAN POTASSIUM 100 MG/1
100 TABLET ORAL DAILY
Qty: 90 TABLET | Refills: 1 | Status: SHIPPED | OUTPATIENT
Start: 2025-04-21 | End: 2026-04-21

## 2025-04-21 RX ORDER — OMEPRAZOLE 20 MG/1
20 CAPSULE, DELAYED RELEASE ORAL DAILY
Qty: 90 CAPSULE | Refills: 1 | Status: SHIPPED | OUTPATIENT
Start: 2025-04-21

## 2025-04-21 NOTE — ASSESSMENT & PLAN NOTE
Blood pressure is elevated today. Will increase losartan to 100 mg daily. He is going to return a blood pressure log Friday after increasing Losartan. Decrease salt intake.   -Low salt, low sodium diet, less fried foods, more baked foods, more green leafy vegetables, more fruits, less bread  -CBC, CMP was done in March with Saira, will repeat at 3 month visit.   -Exercise at least 30-45 minutes a day  -Follow up in 3 months.      12/17/2024  -Increased Losartan to 50 mg from 25mg daily on last visit   Blood pressure controlled. Continue current medication, is effective at this time. Return to the clinic as needed.   -Low salt, low sodium diet, less fried foods, more baked foods, more green leafy vegetables, more fruits, less bread  -CBC, CMP today. Will call with results.   -Exercise at least 30-45 minutes a day  -Follow up in 6 months.

## 2025-04-21 NOTE — PROGRESS NOTES
HPI:   Bhaskar Patel is a pleasant 70 y.o. patient who reports to clinic with complaints of medication refill and lab work. He has a past medical history of Hypertension, Hyperlipidemia, GERD and a gluten allergy. He sees Dr. Jenkins in [place]. He breaks out in a rash sometimes, and it was worked up and found he has a gluten allergy.He has stopped gluten and doesn't get the rash now. He denies any other issues going on. He denies any shortness of breath or chest pain. He sees Dr. Chávez for Gastro and he has done a cologaurd test at home prior to that.  But he is going to have a C scope done on July the 14th with GI. is vitamin D was low at 9.7 a month ago and he has started taking Vitamin D.                  Past Medical History:   Diagnosis Date    GERD (gastroesophageal reflux disease)     Hypertension        PAST SURGICAL HISTORY:   Past Surgical History:   Procedure Laterality Date    CAROTID ENDARTERECTOMY Left 5/16/2022    Procedure: ENDARTERECTOMY-CAROTID;  Surgeon: Karen Spencer MD;  Location: [facility] OR;  Service: General;  Laterality: Left;    CORONARY ARTERY BYPASS GRAFT      HERNIA REPAIR      LEFT HEART CATHETERIZATION Left 9/15/2021    Procedure: Left heart cath;  Surgeon: Quoc Vo DO;  Location: [facility] CATH LAB;  Service: Cardiology;  Laterality: Left;       MEDICATIONS:  Current Medications[1]    ALLERGIES:   Review of patient's allergies indicates:  No Known Allergies      Review of Systems   Constitutional: Negative.  Negative for activity change, chills and fever.   HENT: Negative.  Negative for drooling and nosebleeds.    Eyes: Negative.    Respiratory: Negative.  Negative for chest tightness.    Cardiovascular: Negative.  Negative for chest pain and palpitations.   Gastrointestinal: Negative.    Endocrine: Negative.    Genitourinary: Negative.  Negative for difficulty urinating.   Musculoskeletal: Negative.    Integumentary:  Negative.   Allergic/Immunologic: Negative.   "  Neurological: Negative.  Negative for dizziness.   Hematological: Negative.    Psychiatric/Behavioral: Negative.     All other systems reviewed and are negative.         Physical Exam  Constitutional:       General: He is not in acute distress.     Appearance: Normal appearance. He is well-developed. He is not ill-appearing.   HENT:      Head: Normocephalic and atraumatic.      Right Ear: Tympanic membrane normal.      Left Ear: Tympanic membrane normal.      Nose: Nose normal.      Mouth/Throat:      Mouth: Mucous membranes are moist.      Pharynx: Oropharynx is clear. No posterior oropharyngeal erythema.   Cardiovascular:      Rate and Rhythm: Normal rate and regular rhythm.      Pulses: Normal pulses.      Heart sounds: Normal heart sounds.   Pulmonary:      Effort: Pulmonary effort is normal. No accessory muscle usage or respiratory distress.      Breath sounds: Normal breath sounds.   Abdominal:      General: Abdomen is flat. Bowel sounds are normal. There is no distension.      Palpations: Abdomen is soft.      Tenderness: There is no abdominal tenderness.   Musculoskeletal:         General: Normal range of motion.      Cervical back: Normal range of motion.   Skin:     General: Skin is warm and dry.      Capillary Refill: Capillary refill takes less than 2 seconds.   Neurological:      Mental Status: He is alert and oriented to person, place, and time. Mental status is at baseline.   Psychiatric:         Mood and Affect: Mood normal.         Speech: Speech normal.         Behavior: Behavior normal. Behavior is cooperative.         Thought Content: Thought content normal.          VITAL SIGNS:   BP (!) 172/100   Pulse (!) 58   Temp 98 °F (36.7 °C) (Oral)   Resp 16   Ht 6' 3" (1.905 m)   Wt 66.2 kg (146 lb)   SpO2 95%   BMI 18.25 kg/m²       ASSESSMENT/PLAN  1. Essential hypertension  Assessment & Plan:  Blood pressure is elevated today. Will increase losartan to 100 mg daily. He is going to return a " blood pressure log Friday after increasing Losartan. Decrease salt intake.   -Low salt, low sodium diet, less fried foods, more baked foods, more green leafy vegetables, more fruits, less bread  -CBC, CMP was done in March with Saira, will repeat at 3 month visit.   -Exercise at least 30-45 minutes a day  -Follow up in 3 months.      12/17/2024  -Increased Losartan to 50 mg from 25mg daily on last visit   Blood pressure controlled. Continue current medication, is effective at this time. Return to the clinic as needed.   -Low salt, low sodium diet, less fried foods, more baked foods, more green leafy vegetables, more fruits, less bread  -CBC, CMP today. Will call with results.   -Exercise at least 30-45 minutes a day  -Follow up in 6 months.       Orders:  -     metoprolol succinate (TOPROL-XL) 25 MG 24 hr tablet; Take 1 tablet (25 mg total) by mouth once daily.  Dispense: 90 tablet; Refill: 1  -     Cancel: CBC Auto Differential; Future; Expected date: 04/21/2025  -     Cancel: Comprehensive Metabolic Panel; Future; Expected date: 04/21/2025  -     losartan (COZAAR) 100 MG tablet; Take 1 tablet (100 mg total) by mouth once daily.  Dispense: 90 tablet; Refill: 1    2. Dermatitis herpetiformis  Assessment & Plan:  Continue current medication, is effective at this time. Return to the clinic as needed.   Sees Dr. Jenkins    Orders:  -     triamcinolone acetonide 0.1% (KENALOG) 0.1 % cream; Apply to AA on body BID PRN flares tapering with improvement  Dispense: 454 g; Refill: 1  -     dapsone 100 MG Tab; Take 1 tablet (100 mg total) by mouth once daily.  Dispense: 90 tablet; Refill: 1    3. Gastroesophageal reflux disease, unspecified whether esophagitis present  Assessment & Plan:  -Avoid spicy, greasy foods  -Avoid caffeine, citric acid, chocolate, peppermint, and carbonated drinks  -Do not lay down within 3 hours of eating  -Exercise 150 minutes per week  -Increase fluid to 64 ounces daily  -Avoid antiinflammatory  medications such as motrin, advil, aleve, ibuprofen, and BC powder  Continue current medication, is effective at this time. Return to the clinic as needed.       Orders:  -     omeprazole (PRILOSEC) 20 MG capsule; Take 1 capsule (20 mg total) by mouth once daily.  Dispense: 90 capsule; Refill: 1    4. Vitamin D deficiency  Assessment & Plan:  Vit d was 9.7 one month ago. He was sent in Vit D in March.     Orders:  -     ergocalciferol (ERGOCALCIFEROL) 50,000 unit Cap; Take 1 capsule (50,000 Units total) by mouth every 7 days. for 8 doses  Dispense: 8 capsule; Refill: 0    5. Hyperlipidemia, unspecified hyperlipidemia type  Assessment & Plan:  Hyperlipidemia is controlled.Current medical regimen is effective.   Low fat, low chol diet, less fried foods, more baked foods, more vegetables.   Exercise daily  Take medications as ordered for hyperlipidemia  Return to the clinic as needed         Orders:  -     atorvastatin (LIPITOR) 80 MG tablet; Take 1 tablet (80 mg total) by mouth every evening.  Dispense: 90 tablet; Refill: 1  -     Cancel: CBC Auto Differential; Future; Expected date: 04/21/2025  -     Cancel: Comprehensive Metabolic Panel; Future; Expected date: 04/21/2025             There are no Patient Instructions on file for this visit.  No orders of the defined types were placed in this encounter.               [1]   Current Outpatient Medications:     aspirin (ECOTRIN) 81 MG EC tablet, Take 81 mg by mouth once daily., Disp: , Rfl:     docusate sodium (COLACE) 100 MG capsule, Take 100 mg by mouth daily as needed. , Disp: , Rfl:     atorvastatin (LIPITOR) 80 MG tablet, Take 1 tablet (80 mg total) by mouth every evening., Disp: 90 tablet, Rfl: 1    dapsone 100 MG Tab, Take 1 tablet (100 mg total) by mouth once daily., Disp: 90 tablet, Rfl: 1    ergocalciferol (ERGOCALCIFEROL) 50,000 unit Cap, Take 1 capsule (50,000 Units total) by mouth every 7 days. for 8 doses, Disp: 8 capsule, Rfl: 0    losartan (COZAAR) 100 MG  tablet, Take 1 tablet (100 mg total) by mouth once daily., Disp: 90 tablet, Rfl: 1    metoprolol succinate (TOPROL-XL) 25 MG 24 hr tablet, Take 1 tablet (25 mg total) by mouth once daily., Disp: 90 tablet, Rfl: 1    omeprazole (PRILOSEC) 20 MG capsule, Take 1 capsule (20 mg total) by mouth once daily., Disp: 90 capsule, Rfl: 1    triamcinolone acetonide 0.1% (KENALOG) 0.1 % cream, Apply to AA on body BID PRN flares tapering with improvement, Disp: 454 g, Rfl: 1     Calcipotriene Counseling:  I discussed with the patient the risks of calcipotriene including but not limited to erythema, scaling, itching, and irritation.

## 2025-04-21 NOTE — ASSESSMENT & PLAN NOTE
Continue current medication, is effective at this time. Return to the clinic as needed.   Sees Dr. Jenkins

## 2025-04-25 ENCOUNTER — CLINICAL SUPPORT (OUTPATIENT)
Dept: FAMILY MEDICINE | Facility: CLINIC | Age: 70
End: 2025-04-25
Payer: MEDICARE

## 2025-04-25 VITALS
SYSTOLIC BLOOD PRESSURE: 128 MMHG | DIASTOLIC BLOOD PRESSURE: 69 MMHG | SYSTOLIC BLOOD PRESSURE: 128 MMHG | BODY MASS INDEX: 18.15 KG/M2 | DIASTOLIC BLOOD PRESSURE: 69 MMHG | WEIGHT: 146 LBS | OXYGEN SATURATION: 95 % | HEIGHT: 75 IN | HEART RATE: 58 BPM | RESPIRATION RATE: 16 BRPM | TEMPERATURE: 98 F

## 2025-04-25 DIAGNOSIS — I10 ESSENTIAL HYPERTENSION: Primary | ICD-10-CM

## 2025-04-29 ENCOUNTER — TELEPHONE (OUTPATIENT)
Dept: FAMILY MEDICINE | Facility: CLINIC | Age: 70
End: 2025-04-29
Payer: MEDICARE

## 2025-05-05 ENCOUNTER — HOSPITAL ENCOUNTER (EMERGENCY)
Facility: HOSPITAL | Age: 70
Discharge: HOME | End: 2025-05-05
Payer: MEDICARE

## 2025-05-05 VITALS
HEIGHT: 75 IN | DIASTOLIC BLOOD PRESSURE: 52 MMHG | WEIGHT: 150 LBS | RESPIRATION RATE: 16 BRPM | BODY MASS INDEX: 18.65 KG/M2 | OXYGEN SATURATION: 91 % | HEART RATE: 69 BPM | TEMPERATURE: 99 F | SYSTOLIC BLOOD PRESSURE: 103 MMHG

## 2025-05-05 DIAGNOSIS — R50.9 FEVER, UNSPECIFIED FEVER CAUSE: Primary | ICD-10-CM

## 2025-05-05 DIAGNOSIS — E86.0 DEHYDRATION: ICD-10-CM

## 2025-05-05 DIAGNOSIS — R11.0 NAUSEA: ICD-10-CM

## 2025-05-05 LAB
ALBUMIN SERPL BCP-MCNC: 3.7 G/DL (ref 3.4–4.8)
ALBUMIN/GLOB SERPL: 0.9 {RATIO}
ALP SERPL-CCNC: 198 U/L (ref 40–150)
ALT SERPL W P-5'-P-CCNC: 130 U/L
AMORPH PHOS CRY #/AREA URNS LPF: ABNORMAL /LPF
AMYLASE SERPL-CCNC: 31 U/L (ref 20–160)
ANION GAP SERPL CALCULATED.3IONS-SCNC: 16 MMOL/L (ref 7–16)
AST SERPL W P-5'-P-CCNC: 144 U/L (ref 11–45)
BACTERIA #/AREA URNS HPF: ABNORMAL /HPF
BASOPHILS # BLD AUTO: 0.02 K/UL (ref 0–0.2)
BASOPHILS NFR BLD AUTO: 0.5 % (ref 0–1)
BILIRUB SERPL-MCNC: 1.3 MG/DL
BILIRUB UR QL STRIP: ABNORMAL
BUN SERPL-MCNC: 30 MG/DL (ref 8–26)
BUN/CREAT SERPL: 19 (ref 6–20)
CALCIUM SERPL-MCNC: 8.6 MG/DL (ref 8.8–10)
CHLORIDE SERPL-SCNC: 98 MMOL/L (ref 98–107)
CLARITY UR: ABNORMAL
CO2 SERPL-SCNC: 22 MMOL/L (ref 23–31)
COLOR UR: ABNORMAL
CREAT SERPL-MCNC: 1.54 MG/DL (ref 0.72–1.25)
DIFFERENTIAL METHOD BLD: ABNORMAL
EGFR (NO RACE VARIABLE) (RUSH/TITUS): 48 ML/MIN/1.73M2
EOSINOPHIL # BLD AUTO: 0 K/UL (ref 0–0.5)
EOSINOPHIL NFR BLD AUTO: 0 % (ref 1–4)
ERYTHROCYTE [DISTWIDTH] IN BLOOD BY AUTOMATED COUNT: 13.2 % (ref 11.5–14.5)
FINE GRAN CASTS #/AREA URNS LPF: ABNORMAL /LPF
GLOBULIN SER-MCNC: 4.1 G/DL (ref 2–4)
GLUCOSE SERPL-MCNC: 120 MG/DL (ref 82–115)
GLUCOSE UR STRIP-MCNC: NEGATIVE MG/DL
HCT VFR BLD AUTO: 43.1 % (ref 40–54)
HGB BLD-MCNC: 14.3 G/DL (ref 13.5–18)
INFLUENZA A MOLECULAR (OHS): NEGATIVE
INFLUENZA B MOLECULAR (OHS): NEGATIVE
KETONES UR STRIP-SCNC: NEGATIVE MG/DL
LACTATE SERPL-SCNC: 1.4 MMOL/L (ref 0.5–2.2)
LACTATE SERPL-SCNC: 3 MMOL/L (ref 0.5–2.2)
LEUKOCYTE ESTERASE UR QL STRIP: NEGATIVE
LIPASE SERPL-CCNC: 21 U/L
LYMPHOCYTES # BLD AUTO: 0.42 K/UL (ref 1–4.8)
LYMPHOCYTES NFR BLD AUTO: 9.6 % (ref 27–41)
MAGNESIUM SERPL-MCNC: 1.6 MG/DL (ref 1.6–2.6)
MCH RBC QN AUTO: 33.4 PG (ref 27–31)
MCHC RBC AUTO-ENTMCNC: 33.2 G/DL (ref 32–36)
MCV RBC AUTO: 100.7 FL (ref 80–96)
MONOCYTES # BLD AUTO: 0.25 K/UL (ref 0–0.8)
MONOCYTES NFR BLD AUTO: 5.7 % (ref 2–6)
MPC BLD CALC-MCNC: ABNORMAL G/DL
NEUTROPHILS # BLD AUTO: 3.69 K/UL (ref 1.8–7.7)
NEUTROPHILS NFR BLD AUTO: 84.2 % (ref 53–65)
NITRITE UR QL STRIP: NEGATIVE
PH UR STRIP: 6 PH UNITS
PLATELET # BLD AUTO: 46 K/UL (ref 150–400)
PLATELET MORPHOLOGY: ABNORMAL
POTASSIUM SERPL-SCNC: 4.3 MMOL/L (ref 3.5–5.1)
PROT SERPL-MCNC: 7.8 G/DL (ref 5.8–7.6)
PROT UR QL STRIP: 100
RBC # BLD AUTO: 4.28 M/UL (ref 4.6–6.2)
RBC # UR STRIP: ABNORMAL /UL
RBC #/AREA URNS HPF: ABNORMAL /HPF
SARS-COV-2 RDRP RESP QL NAA+PROBE: NEGATIVE
SODIUM SERPL-SCNC: 132 MMOL/L (ref 136–145)
SP GR UR STRIP: >=1.03
SQUAMOUS #/AREA URNS LPF: ABNORMAL /LPF
UROBILINOGEN UR STRIP-ACNC: 2 MG/DL
WBC # BLD AUTO: 4.38 K/UL (ref 4.5–11)
WBC #/AREA URNS HPF: ABNORMAL /HPF

## 2025-05-05 PROCEDURE — 83735 ASSAY OF MAGNESIUM: CPT | Performed by: NURSE PRACTITIONER

## 2025-05-05 PROCEDURE — 25000003 PHARM REV CODE 250: Performed by: NURSE PRACTITIONER

## 2025-05-05 PROCEDURE — 82150 ASSAY OF AMYLASE: CPT | Performed by: NURSE PRACTITIONER

## 2025-05-05 PROCEDURE — 87502 INFLUENZA DNA AMP PROBE: CPT | Performed by: NURSE PRACTITIONER

## 2025-05-05 PROCEDURE — 83605 ASSAY OF LACTIC ACID: CPT | Performed by: NURSE PRACTITIONER

## 2025-05-05 PROCEDURE — 63600175 PHARM REV CODE 636 W HCPCS: Performed by: NURSE PRACTITIONER

## 2025-05-05 PROCEDURE — 36415 COLL VENOUS BLD VENIPUNCTURE: CPT | Performed by: NURSE PRACTITIONER

## 2025-05-05 PROCEDURE — 87040 BLOOD CULTURE FOR BACTERIA: CPT | Performed by: NURSE PRACTITIONER

## 2025-05-05 PROCEDURE — 96374 THER/PROPH/DIAG INJ IV PUSH: CPT

## 2025-05-05 PROCEDURE — 80053 COMPREHEN METABOLIC PANEL: CPT | Performed by: NURSE PRACTITIONER

## 2025-05-05 PROCEDURE — 99284 EMERGENCY DEPT VISIT MOD MDM: CPT | Mod: ,,, | Performed by: NURSE PRACTITIONER

## 2025-05-05 PROCEDURE — 87635 SARS-COV-2 COVID-19 AMP PRB: CPT | Performed by: NURSE PRACTITIONER

## 2025-05-05 PROCEDURE — 85025 COMPLETE CBC W/AUTO DIFF WBC: CPT | Performed by: NURSE PRACTITIONER

## 2025-05-05 PROCEDURE — 81003 URINALYSIS AUTO W/O SCOPE: CPT | Performed by: NURSE PRACTITIONER

## 2025-05-05 PROCEDURE — 99285 EMERGENCY DEPT VISIT HI MDM: CPT | Mod: 25

## 2025-05-05 PROCEDURE — 96361 HYDRATE IV INFUSION ADD-ON: CPT

## 2025-05-05 PROCEDURE — 83690 ASSAY OF LIPASE: CPT | Performed by: NURSE PRACTITIONER

## 2025-05-05 RX ORDER — ACETAMINOPHEN 500 MG
1000 TABLET ORAL
Status: COMPLETED | OUTPATIENT
Start: 2025-05-05 | End: 2025-05-05

## 2025-05-05 RX ORDER — ONDANSETRON HYDROCHLORIDE 2 MG/ML
4 INJECTION, SOLUTION INTRAVENOUS
Status: COMPLETED | OUTPATIENT
Start: 2025-05-05 | End: 2025-05-05

## 2025-05-05 RX ORDER — ONDANSETRON 4 MG/1
4 TABLET, ORALLY DISINTEGRATING ORAL EVERY 8 HOURS PRN
Qty: 15 TABLET | Refills: 0 | Status: ON HOLD | OUTPATIENT
Start: 2025-05-05

## 2025-05-05 RX ADMIN — ONDANSETRON 4 MG: 2 INJECTION INTRAMUSCULAR; INTRAVENOUS at 03:05

## 2025-05-05 RX ADMIN — SODIUM CHLORIDE 1000 ML: 9 INJECTION, SOLUTION INTRAVENOUS at 03:05

## 2025-05-05 RX ADMIN — ACETAMINOPHEN 1000 MG: 500 TABLET ORAL at 01:05

## 2025-05-05 NOTE — ED TRIAGE NOTES
Received with noted complaint. Patient voices fever and chills began about 3 days PTA. Last ibuprofen taken at 0730 this date

## 2025-05-05 NOTE — DISCHARGE INSTRUCTIONS
Hydrate well at home.  Take over the counter meds for pain.  If you have ANY concerning or worsening of your symptoms, please return to the ER.  Call your family practitioner and let them know about your ER visit.  Also, we have drawn blood cultures on you, but they will not results for a few days.  If they are abnormal and we need to treat you, we will call you.

## 2025-05-05 NOTE — ED PROVIDER NOTES
Encounter Date: 5/5/2025       History     Chief Complaint   Patient presents with    Fever    Chills    Nausea     Pt reports on Friday he developed nausea.  He reports he developed chills and feeling feverish; however, he did not check his temperature.  Denies URI symptoms, cough, abd pain, urinary symptoms, HA and body aches.     The history is provided by the patient.     Review of patient's allergies indicates:  No Known Allergies  Past Medical History:   Diagnosis Date    GERD (gastroesophageal reflux disease)     Hypertension      Past Surgical History:   Procedure Laterality Date    CAROTID ENDARTERECTOMY Left 5/16/2022    Procedure: ENDARTERECTOMY-CAROTID;  Surgeon: Karen Spencer MD;  Location: [facility] OR;  Service: General;  Laterality: Left;    CORONARY ARTERY BYPASS GRAFT      HERNIA REPAIR      LEFT HEART CATHETERIZATION Left 9/15/2021    Procedure: Left heart cath;  Surgeon: Quoc Vo DO;  Location: [facility] CATH LAB;  Service: Cardiology;  Laterality: Left;     Family History   Problem Relation Name Age of Onset    Alzheimer's disease Mother      Polycystic kidney disease Mother      Hypertension Father      Heart disease Father      Cancer Sister      Heart disease Sister      Hypertension Sister       Social History[1]  Review of Systems   Constitutional:  Positive for chills and fever.   HENT:  Negative for sore throat.    Respiratory:  Negative for cough, shortness of breath and wheezing.    Cardiovascular:  Negative for chest pain.   Gastrointestinal:  Positive for nausea. Negative for abdominal pain, diarrhea and vomiting.   Genitourinary:  Negative for decreased urine volume, difficulty urinating, dysuria, flank pain, frequency, hematuria and urgency.   Musculoskeletal:  Negative for back pain.   Skin:  Negative for rash.   Neurological:  Negative for weakness.   Hematological:  Does not bruise/bleed easily.       Physical Exam     Initial Vitals [05/05/25 1303]   BP Pulse Resp Temp  SpO2   113/61 98 16 98 °F (36.7 °C) (!) 92 %      MAP       --         Physical Exam    Nursing note and vitals reviewed.  Constitutional: He appears well-developed and well-nourished. He is not diaphoretic. He is cooperative.  Non-toxic appearance. He has a sickly appearance. He appears ill. No distress. He is not intubated.   HENT:   Head: Normocephalic and atraumatic.   Nose: Nose normal. Mouth/Throat: Uvula is midline.   Eyes: Pupils are equal, round, and reactive to light.   Neck: Neck supple.   Cardiovascular:  Normal rate, regular rhythm, normal heart sounds and intact distal pulses.     Exam reveals no gallop and no friction rub.       No murmur heard.  Pulmonary/Chest: Effort normal and breath sounds normal. No accessory muscle usage. No apnea, no tachypnea and no bradypnea. He is not intubated. No respiratory distress. He has no decreased breath sounds. He has no wheezes. He has no rhonchi. He has no rales. He exhibits no tenderness.   Abdominal: Abdomen is soft and flat. There is no abdominal tenderness.   Musculoskeletal:      Cervical back: Neck supple.     Neurological: He is alert and oriented to person, place, and time. GCS score is 15. GCS eye subscore is 4. GCS verbal subscore is 5. GCS motor subscore is 6.   Skin: Skin is warm and dry. Capillary refill takes less than 2 seconds.   Psychiatric: He has a normal mood and affect.         Medical Screening Exam   See Full Note    ED Course   Procedures  Labs Reviewed   COMPREHENSIVE METABOLIC PANEL - Abnormal       Result Value    Sodium 132 (*)     Potassium 4.3      Chloride 98      CO2 22 (*)     Anion Gap 16      Glucose 120 (*)     BUN 30 (*)     Creatinine 1.54 (*)     BUN/Creatinine Ratio 19      Calcium 8.6 (*)     Total Protein 7.8 (*)     Albumin 3.7      Globulin 4.1 (*)     A/G Ratio 0.9      Bilirubin, Total 1.3      Alk Phos 198 (*)      (*)      (*)     eGFR 48 (*)    LACTIC ACID, PLASMA - Abnormal    Lactic Acid 3.0 (*)     URINALYSIS, REFLEX TO URINE CULTURE - Abnormal    Color, UA Dark Yellow      Clarity, UA Slightly Cloudy      pH, UA 6.0      Leukocytes, UA Negative      Nitrites, UA Negative      Protein,  (*)     Glucose, UA Negative      Ketones, UA Negative      Urobilinogen, UA 2.0 (*)     Bilirubin, UA Small (*)     Blood, UA Moderate (*)     Specific Gravity, UA >=1.030 (*)    CBC WITH DIFFERENTIAL - Abnormal    WBC 4.38 (*)     RBC 4.28 (*)     Hemoglobin 14.3      Hematocrit 43.1      .7 (*)     MCH 33.4 (*)     MCHC 33.2      RDW 13.2      Platelet Count 46 (*)     MPV        Neutrophils % 84.2 (*)     Lymphocytes % 9.6 (*)     Neutrophils, Abs 3.69      Lymphocytes, Absolute 0.42 (*)     Diff Type Scan Smear      Monocytes % 5.7      Eosinophils % 0.0 (*)     Basophils % 0.5      Monocytes, Absolute 0.25      Eosinophils, Absolute 0.00      Basophils, Absolute 0.02     CBC MORPHOLOGY - Abnormal    Platelet Morphology Decreased (*)    URINALYSIS, MICROSCOPIC - Abnormal    WBC, UA 0-5      RBC, UA 3-5 (*)     Bacteria, UA Occasional (*)     Squamous Epithelial Cells, UA Occasional (*)     Fine Granular Casts, UA 5-10 (*)     Amorphous Crystals, UA Many (*)    INFLUENZA A & B BY MOLECULAR - Normal    INFLUENZA A MOLECULAR Negative      INFLUENZA B MOLECULAR  Negative     MAGNESIUM - Normal    Magnesium 1.6     SARS-COV-2 RNA AMPLIFICATION, QUAL - Normal    SARS COV-2 Molecular Negative      Narrative:     Negative SARS-CoV results should not be used as the sole basis for treatment or patient management decisions; negative results should be considered in the context of a patient's recent exposures, history and the presene of clinical signs and symptoms consistent with COVID-19.  Negative results should be treated as presumptive and confirmed by molecular assay, if necessary for patient management.   LACTIC ACID, PLASMA - Normal    Lactic Acid 1.4     AMYLASE - Normal    Amylase 31     LIPASE - Normal     Lipase 21     CULTURE, BLOOD   CULTURE, BLOOD   CBC W/ AUTO DIFFERENTIAL    Narrative:     The following orders were created for panel order CBC auto differential.  Procedure                               Abnormality         Status                     ---------                               -----------         ------                     CBC with Differential[[id number]]       Abnormal            Final result                 Please view results for these tests on the individual orders.          Imaging Results              US Abdomen Limited (Final result)  Result time 05/05/25 15:27:35      Final result by Ian Ashley MD (05/05/25 15:27:35)                   Impression:      1. No acute sonographic abnormality  2. Hepatic steatosis.  3. Mild splenomegaly.      Electronically signed by: Ian Ashley  Date:    05/05/2025  Time:    15:27               Narrative:    EXAMINATION:  US ABDOMEN LIMITED    CLINICAL HISTORY:  nausea, elevated liver enzymes, fever;    TECHNIQUE:  Limited abdominal ultrasound.    COMPARISON:  None.    FINDINGS:  Liver: Normal in size, measuring 16.9 cm. Homogeneous echotexture.  Mild diffuse fatty infiltration.  No focal hepatic lesions.    Gallbladder: No calculi, wall thickening, or pericholecystic fluid.  No sonographic Dumont's sign.  Suboptimal far field large field-of-view for near field object.    Biliary system: The common duct is not dilated, measuring 2.5 mm.  No intrahepatic ductal dilatation.    The spleen is mildly enlarged measuring 13.2 cm.    Miscellaneous: No upper abdominal ascites.    The pancreas is not well visualized.    Inferior vena cava is patent.                                       X-Ray Chest AP Portable (Final result)  Result time 05/05/25 14:06:03      Final result by Lucy Harkins MD (05/05/25 14:06:03)                   Impression:      No acute cardiopulmonary disease.      Electronically signed by: Lucy  Torin  Date:    05/05/2025  Time:    14:06               Narrative:    EXAMINATION:  XR CHEST AP PORTABLE    CLINICAL HISTORY:  fever of unknown origin;    TECHNIQUE:  Single frontal view of the chest was performed.    COMPARISON:  12/16/2024 and additional priors    FINDINGS:  Emphysema is again seen.  The lungs are clear.  No pleural effusion.  Heart size is within normal limits.                                       Medications   acetaminophen tablet 1,000 mg (1,000 mg Oral Given 5/5/25 1322)   sodium chloride 0.9% bolus 1,000 mL 1,000 mL (0 mLs Intravenous Stopped 5/5/25 1600)   ondansetron injection 4 mg (4 mg Intravenous Given 5/5/25 1501)     Medical Decision Making  Amount and/or Complexity of Data Reviewed  Labs: ordered. Decision-making details documented in ED Course.  Radiology: ordered.    Risk  OTC drugs.  Prescription drug management.               ED Course as of 05/05/25 1617   Mon May 05, 2025   1321 Temp(!): 102.1 °F (38.9 °C)  Lab ordered and tylenol 1gm PO.  Last had ibuprofen at 800AM. [AG]   1359 WBC(!): 4.38 [AG]   1359 Neutrophils Relative(!): 84.2 [AG]   1411 Sodium(!): 132 [AG]   1412 CO2(!): 22 [AG]   1412 Glucose(!): 120 [AG]   1412 BUN(!): 30 [AG]   1412 Creatinine(!): 1.54 [AG]   1412 ALP(!): 198 [AG]   1412 ALT(!): 130 [AG]   1412 AST(!): 144 [AG]   1412 eGFR(!): 48 [AG]   1412 Lactic Acid Level(!): 3.0 [AG]   1415 With the impaired renal function, will get RUQ ABD US to rule out acute cholecystitis.  [AG]   1417 IVFs ordered for dehydration and elevated lactic acid.  IV zofran ordered for his nausea.  [AG]   1427 Amylase Level: 31 [AG]   1427 Lipase: 21 [AG]   1533 Re-exam of abd.  No abd pain, no abd tenderness. Patient is dehydrated with unexplained fever.  Repeat lactic acid after the initial on is pending.   [AG]   1533 Will get telemed consult to see if they recommend any additional diagnostics and disposition.  [AG]   1549 Lactic Acid Level: 1.4 [AG]   1551 Pt reports he  feels much better than when he came in.  Nausea resolved.  Fever improved.  [AG]   1612 Telemed with Dr Awan at [facility] ER. He agrees with the plan.  Will dc home, blood cultures pending.  He will follow-up with PCP.  I explained we would call him with abnormal blood cultures.  He was given strict return precautions.  [AG]      ED Course User Index  [AG] Aliyah Martinez FNP                           Clinical Impression:   Final diagnoses:  [R50.9] Fever, unspecified fever cause (Primary)  [R11.0] Nausea  [E86.0] Dehydration        ED Disposition Condition    Discharge Stable          ED Prescriptions       Medication Sig Dispense Start Date End Date Auth. Provider    ondansetron (ZOFRAN-ODT) 4 MG TbDL Take 1 tablet (4 mg total) by mouth every 8 (eight) hours as needed (nausea). 15 tablet 5/5/2025 -- Aliyah Martinez FNP          Follow-up Information       Follow up With Specialties Details Why Contact Info    Crys Jackson FNP Family Medicine Schedule an appointment as soon as possible for a visit in 3 days  [address] [place] [address] [place] [address]  [phone]                 [1]   Social History  Tobacco Use    Smoking status: Every Day     Current packs/day: 0.50     Average packs/day: 0.5 packs/day for 54.3 years (27.2 ttl pk-yrs)     Types: Cigarettes     Start date: 1971     Passive exposure: Current    Smokeless tobacco: Never    Tobacco comments:     already did [place] tobacco quitline. not interested in trying again. states he will quit on his own   Substance Use Topics    Alcohol use: Not Currently    Drug use: Not Currently     Types: Marijuana        Aliyah Martinez FNP  05/05/25 1617

## 2025-05-07 LAB — BACTERIA BLD CULT: NORMAL

## 2025-05-08 ENCOUNTER — OFFICE VISIT (OUTPATIENT)
Dept: FAMILY MEDICINE | Facility: CLINIC | Age: 70
End: 2025-05-08
Payer: MEDICARE

## 2025-05-08 VITALS
HEIGHT: 75 IN | HEART RATE: 107 BPM | RESPIRATION RATE: 20 BRPM | TEMPERATURE: 100 F | BODY MASS INDEX: 17.61 KG/M2 | WEIGHT: 141.63 LBS | DIASTOLIC BLOOD PRESSURE: 68 MMHG | OXYGEN SATURATION: 89 % | SYSTOLIC BLOOD PRESSURE: 115 MMHG

## 2025-05-08 DIAGNOSIS — R11.0 NAUSEA: ICD-10-CM

## 2025-05-08 DIAGNOSIS — R63.6 UNDERWEIGHT (BMI < 18.5): ICD-10-CM

## 2025-05-08 DIAGNOSIS — R00.0 TACHYCARDIA: ICD-10-CM

## 2025-05-08 DIAGNOSIS — R31.9 HEMATURIA, UNSPECIFIED TYPE: ICD-10-CM

## 2025-05-08 DIAGNOSIS — R79.81 LOW O2 SATURATION: ICD-10-CM

## 2025-05-08 DIAGNOSIS — R50.9 FEVER AND CHILLS: Primary | ICD-10-CM

## 2025-05-08 LAB
EKG 12-LEAD: 121
PR INTERVAL: 126
PRT AXES: ABNORMAL
QRS DURATION: 77
QT/QTC: ABNORMAL
VENTRICULAR RATE: 105

## 2025-05-08 RX ORDER — TRIPROLIDINE/PSEUDOEPHEDRINE 2.5MG-60MG
600 TABLET ORAL
Status: COMPLETED | OUTPATIENT
Start: 2025-05-08 | End: 2025-05-08

## 2025-05-08 RX ORDER — SULFAMETHOXAZOLE AND TRIMETHOPRIM 800; 160 MG/1; MG/1
1 TABLET ORAL 2 TIMES DAILY
Qty: 14 TABLET | Refills: 0 | Status: CANCELLED | OUTPATIENT
Start: 2025-05-08 | End: 2025-05-15

## 2025-05-08 RX ADMIN — Medication 600 MG: at 01:05

## 2025-05-08 NOTE — PROGRESS NOTES
[facility]    Brenda Walton, AGPCNP-BC [facility] [address] [place] [address]  [phone]          PATIENT NAME: Bhaskar Patel  : 1955  DATE: 25  MRN: [id number]          Reason for Visit        Chief Complaint   Patient presents with    Nausea     Pt presents to clinic with bad nausea, fever and he states dehydration. Pt went to the ED Monday and had labs. Pt states he has not been able to eat much and states his stomach has been bothering him        History of Present Illness        CHIEF COMPLAINT:  Patient presents today for follow up after recent emergency room visit with fever    HISTORY OF PRESENT ILLNESS:  He was seen in the ER on Monday where he received IV fluids for dehydration and Zofran for nausea. After returning home, he developed inability to keep food down, fever, and elevated heart rate attributed to potential dehydration and infection. He currently reports fever, generalized malaise, poor appetite, and tremors. He denies urinary frequency, urgency, or dysuria. His sister reports him falling and hitting his head last night.    ROS:  General: +fever, + chills, -fatigue, -weight gain, -weight loss, +loss of appetite, +recent head injury  Eyes: -vision changes, -redness, -discharge  ENT: -ear pain, -nasal congestion, -sore throat  Cardiovascular: -chest pain, -palpitations, -lower extremity edema  Respiratory: -cough, -shortness of breath  Gastrointestinal: -abdominal pain, +nausea, +vomiting, -diarrhea, -constipation, -blood in stool  Genitourinary: -dysuria, -hematuria, -frequency  Musculoskeletal: -joint pain, -muscle pain  Skin: -rash, -lesion  Neurological: -headache, -dizziness, -numbness, -tingling, +tremors  Psychiatric: -anxiety, -depression, -sleep difficulty          MEDICAL / SURGICAL / SOCIAL HISTORY     Past Medical History:   Diagnosis Date    GERD (gastroesophageal reflux  disease)     Hypertension        Past Surgical History:   Procedure Laterality Date    CAROTID ENDARTERECTOMY Left 5/16/2022    Procedure: ENDARTERECTOMY-CAROTID;  Surgeon: Karen Spencer MD;  Location: [facility] OR;  Service: General;  Laterality: Left;    CORONARY ARTERY BYPASS GRAFT      HERNIA REPAIR      LEFT HEART CATHETERIZATION Left 9/15/2021    Procedure: Left heart cath;  Surgeon: Quoc Vo DO;  Location: [facility] CATH LAB;  Service: Cardiology;  Laterality: Left;       Social History     Tobacco Use    Smoking status: Every Day     Current packs/day: 0.50     Average packs/day: 0.5 packs/day for 54.3 years (27.2 ttl pk-yrs)     Types: Cigarettes     Start date: 1971     Passive exposure: Current    Smokeless tobacco: Never    Tobacco comments:     already did [place] tobacco quitline. not interested in trying again. states he will quit on his own   Substance Use Topics    Alcohol use: Not Currently    Drug use: Not Currently     Types: Marijuana         I personally reviewed all past medical, surgical, and social.     MEDICATIONS / ALLERGIES / HM     Current Outpatient Medications   Medication Sig Dispense Refill    aspirin (ECOTRIN) 81 MG EC tablet Take 81 mg by mouth once daily.      atorvastatin (LIPITOR) 80 MG tablet Take 1 tablet (80 mg total) by mouth every evening. 90 tablet 1    dapsone 100 MG Tab Take 1 tablet (100 mg total) by mouth once daily. 90 tablet 1    docusate sodium (COLACE) 100 MG capsule Take 100 mg by mouth daily as needed.       ergocalciferol (ERGOCALCIFEROL) 50,000 unit Cap Take 1 capsule (50,000 Units total) by mouth every 7 days. for 8 doses 8 capsule 0    losartan (COZAAR) 100 MG tablet Take 1 tablet (100 mg total) by mouth once daily. 90 tablet 1    metoprolol succinate (TOPROL-XL) 25 MG 24 hr tablet Take 1 tablet (25 mg total) by mouth once daily. 90 tablet 1    omeprazole (PRILOSEC) 20 MG capsule Take 1 capsule (20 mg total) by mouth once daily. 90 capsule 1     "ondansetron (ZOFRAN-ODT) 4 MG TbDL Take 1 tablet (4 mg total) by mouth every 8 (eight) hours as needed (nausea). 15 tablet 0    triamcinolone acetonide 0.1% (KENALOG) 0.1 % cream Apply to AA on body BID PRN flares tapering with improvement 454 g 1     No current facility-administered medications for this visit.       Review of patient's allergies indicates:  No Known Allergies    Immunization History   Administered Date(s) Administered    COVID-19, MRNA, LN-S, PF (MODERNA FULL 0.5 ML DOSE) 03/15/2021, 04/12/2021    Influenza (FLUAD) - Quadrivalent - Adjuvanted - PF *Preferred* (65+) 10/24/2022, 10/11/2023    Influenza - Quadrivalent - High Dose - PF (65 years and older) 10/05/2020, 10/06/2021    Influenza - Quadrivalent - PF *Preferred* (6 months and older) 09/30/2019    Influenza - Trivalent - Fluad - Adjuvanted - PF (65 years and older 12/17/2024    MMR 05/24/2006    Pneumococcal Conjugate - 13 Valent 09/30/2019    Pneumococcal Polysaccharide - 23 Valent 11/09/2020        Health Maintenance   Topic Date Due    TETANUS VACCINE  Never done    Shingles Vaccine (1 of 2) Never done    RSV Vaccine (Age 60+ and Pregnant patients) (1 - Risk 60-74 years 1-dose series) Never done    Colorectal Cancer Screening  10/20/2023    COVID-19 Vaccine (3 - 2024-25 season) 09/01/2024    PROSTATE-SPECIFIC ANTIGEN  10/17/2025    Lipid Panel  10/17/2025    LDCT Lung Screen  12/16/2025    High Dose Statin  05/08/2026    Aspirin/Antiplatelet Therapy  05/08/2026    Hepatitis C Screening  Completed    Influenza Vaccine  Completed    Pneumococcal Vaccines (Age 50+)  Completed    Abdominal Aortic Aneurysm Screening  Completed        Physical Exam      Vital Signs  Temp: 99.9 °F (37.7 °C)  Temp Source: Oral  Pulse: (!) 120  Resp: 20  SpO2: (!) 89 %  BP: 115/68  Pain Score:   4  Pain Loc: Abdomen  Height and Weight  Height: 6' 3" (190.5 cm)  Weight: 64.2 kg (141 lb 9.6 oz)  BSA (Calculated - sq m): 1.84 sq meters  BMI (Calculated): 17.7  Weight " "in (lb) to have BMI = 25: 199.6]    Physical Exam  Constitutional:       Appearance: He is underweight. He is ill-appearing and diaphoretic.   HENT:      Head: Normocephalic and atraumatic.      Right Ear: External ear normal.      Left Ear: External ear normal.   Cardiovascular:      Rate and Rhythm: Regular rhythm. Tachycardia present.      Pulses: Normal pulses.      Heart sounds: Normal heart sounds.   Pulmonary:      Effort: Pulmonary effort is normal.      Breath sounds: Normal breath sounds.   Neurological:      Mental Status: He is alert and oriented to person, place, and time.   Psychiatric:         Behavior: Behavior is cooperative.          Laboratory:    Lab Results   Component Value Date     (H) 05/05/2025     (L) 05/05/2025    K 4.3 05/05/2025    CL 98 05/05/2025    CO2 22 (L) 05/05/2025    BUN 30 (H) 05/05/2025    CREATININE 1.54 (H) 05/05/2025    CALCIUM 8.6 (L) 05/05/2025    PROT 7.8 (H) 05/05/2025    ALBUMIN 3.7 05/05/2025    BILITOT 1.3 05/05/2025    ALKPHOS 198 (H) 05/05/2025     (H) 05/05/2025     (H) 05/05/2025    ANIONGAP 16 05/05/2025    ESTGFRAFRICA 109 04/20/2020    EGFRNONAA 70 05/12/2022       Lab Results   Component Value Date    WBC 4.38 (L) 05/05/2025    RBC 4.28 (L) 05/05/2025    HGB 14.3 05/05/2025    HCT 43.1 05/05/2025    .7 (H) 05/05/2025    RDW 13.2 05/05/2025    PLT 46 (L) 05/05/2025        Lab Results   Component Value Date    CHOL 159 10/17/2024    TRIG 72 10/17/2024    HDL 68 (H) 10/17/2024    LDLCALC 77 10/17/2024       No results found for: "TSH"    No results found for: "HGBA1C", "ESTIMATEDAVG"     Lab Results   Component Value Date    SVAYPAIF16 516 03/03/2025       Lab Results   Component Value Date    [id number] 9.7 (L) 03/03/2025       Lab Results   Component Value Date    PSA 0.778 10/17/2024    PSA 0.596 11/09/2020         Point Of Care Testing:    Nitrites, UA   Date Value Ref Range Status   05/05/2025 Negative Negative Final "     Urobilinogen, UA   Date Value Ref Range Status   05/05/2025 2.0 (A) 0.2, 1.0, Normal mg/dL Final     pH, UA   Date Value Ref Range Status   05/05/2025 6.0 5.0 to 8.0 pH Units Final     Specific Gravity, UA   Date Value Ref Range Status   05/05/2025 >=1.030 (A) <=1.005, 1.010, 1.015, 1.020, 1.025, 1.030 Final     Ketones, UA   Date Value Ref Range Status   05/05/2025 Negative Negative mg/dL Final       Lab Results   Component Value Date    [id number] Negative 05/12/2022       Assessment/Plan     Fever and chills  -     ibuprofen 20 mg/mL oral liquid 600 mg    Hematuria, unspecified type    Tachycardia  -     POCT EKG 12-LEAD (Manually Resulted by Ordering Provider)        Assessment & Plan      IMPRESSION:  - Reviewed ER records from Monday, noting hematuria on urinalysis  - Evaluated urinalysis results showing abnormalities including protein, blood, and bacteria.  - Blood count indicating infection (elevated neutrophils, low lymphocytes).  - Ruled out gallbladder issues, as scan showed no calculus or wall thickening.  - Consulted with Dr. Keene due to patient's ill appearance.  - Determined patient has fever of unknown etiology requiring hospital admission.    FOLLOW-UP:  - Sent to ED via POV for further evaluation and admission.          Future Appointments   Date Time Provider Department Center   5/22/2025 11:15 AM Quoc Vo DO RNSBC CARD [facility]   7/14/2025  7:00 AM [facility] GI ROOM 01 RAS ENDO [facility]   7/21/2025  9:00 AM Crys Jackson FNP [facility]   9/3/2025  1:00 PM Natali Jenkins MD RCFDC DERM [place]   10/31/2025 10:30 AM [facility] VAS US1 [facility] VASCUS [facility]   10/31/2025 11:00 AM Lynda Krueger ACNP [facility] VSCSRG [facility]   12/8/2025 11:00 AM AWV NURSE, [facility] FAMILY MEDICINE [facility]   1/14/2026  1:00 PM Radha Chávez FNP [facility] GASTR [facility]       Workup results were reviewed and all questions were answered. Diagnosis and  treatment options were discussed and the patient  is amenable with the overall treatment plan. Verbal and written discharge instructions were given including to return to clinic/ED with any acute worsening of symptoms or failure of symptoms to improve. The reasons for return to the clinic/ED were explained in lay terms. No further intervention is warranted at this time. The patient agrees with the plan, expresses understanding, is hemodynamically stable and in no acute distress.     All questions answered to desired level of satisfaction    This note was generated with the assistance of ambient listening technology. Verbal consent was obtained by the patient and accompanying visitor(s) for the recording of patient appointment to facilitate this note. I attest to having reviewed and edited the generated note for accuracy, though some syntax or spelling errors may persist. Please contact the author of this note for any clarification.             ZIGGY Conde-BC  [facility]

## 2025-05-08 NOTE — ED TRIAGE NOTES
PT ARRIVED FROM CLINIC FOR EVAL DUE TO FEVER THAT WAS TREAT AT CLINIC, WEAKNESS, HYPOXIA, SOB, AND COUGH THAT HAS BEEN GOING ON FOR 7-8 DAYS. TEMP ON ARRIVAL WAS 99.1, PT WAS CLAMMY AND SWEATY, STATES FEVER BROKE AT CLINIC. DR SOTELO CALLED REPORT AND WANTS PT TO HAVE A CT CHEST, ABD, AND PELVIS AND POSSIBLE ADMIT TO FLOOR IF STABLE.

## 2025-05-08 NOTE — ED PROVIDER NOTES
Encounter Date: 5/8/2025       History     Chief Complaint   Patient presents with    Shortness of Breath    Weakness    Cough    Hypoxia    Fever     71 y/o male with PMHx/PSHx of HTN, GERD, CAD (cardiac bypass), and left carotid endarterectomy arrived to the ED via POV from [facility] for further evaluation of fever, hypoxia and cough that has been ongoing for the past 7-8 days. Had Temp 102.2 F at clinic today that was treated with Tylenol. Temp upon arrival tot he ED was 99.9 F. Reports occasional productive cough. Decreased appetite and has not been drinking well. Denies pain, nausea, vomiting or diarrhea. Patient smokes a pack of cigarettes a day. He is followed by Dr. Vo cardiology. Next visit is scheduled for 05/25.     Patient was seen here in the ED on Monday on 05/05/2025 for same symptoms. He was treated for Fever from unknown origin with nausea and vomiting. Labs that day FLU/COVID swabs were negative. WBC 4.38, PLT 46, , , , Lactic was elevated, but improved after fluids. US of upper abdomen revealed hepatic steatosis, mild splenomegaly that measured 13.2 cm, bile duct 2.45 cm. GB was negative for calculi, wall thickening or pericholecystic fluid. Symptoms improved, he was discharged home for follow up with PCP this week.    The history is provided by the patient.     Review of patient's allergies indicates:  No Known Allergies  Past Medical History:   Diagnosis Date    GERD (gastroesophageal reflux disease)     Hypertension      Past Surgical History:   Procedure Laterality Date    CAROTID ENDARTERECTOMY Left 5/16/2022    Procedure: ENDARTERECTOMY-CAROTID;  Surgeon: Karen Spencer MD;  Location: [facility] OR;  Service: General;  Laterality: Left;    CORONARY ARTERY BYPASS GRAFT      HERNIA REPAIR      LEFT HEART CATHETERIZATION Left 9/15/2021    Procedure: Left heart cath;  Surgeon: Quoc Vo DO;  Location: [facility] CATH LAB;  Service: Cardiology;   Laterality: Left;     Family History   Problem Relation Name Age of Onset    Alzheimer's disease Mother      Polycystic kidney disease Mother      Hypertension Father      Heart disease Father      Cancer Sister      Heart disease Sister      Hypertension Sister       Social History[1]  Review of Systems   Constitutional:  Positive for activity change (decrease appetite), appetite change (decreased), chills, fatigue and fever.   HENT:  Negative for congestion, ear discharge, ear pain, sinus pressure, sinus pain, sore throat and trouble swallowing.    Eyes:  Negative for pain, redness and visual disturbance.   Respiratory:  Positive for cough and shortness of breath. Negative for chest tightness and wheezing.    Cardiovascular:  Negative for chest pain, palpitations and leg swelling.   Gastrointestinal:  Negative for abdominal pain, constipation, diarrhea, nausea and vomiting.   Genitourinary:  Negative for decreased urine volume, difficulty urinating, dysuria, frequency and hematuria.   Musculoskeletal:  Positive for gait problem (unsteady due to generalized weakness). Negative for arthralgias, back pain, neck pain and neck stiffness.   Skin: Negative.    Neurological:  Positive for weakness (generalized). Negative for dizziness, syncope, facial asymmetry, speech difficulty, light-headedness and headaches.   Hematological: Negative.    Psychiatric/Behavioral: Negative.         Physical Exam     Initial Vitals [05/08/25 1509]   BP Pulse Resp Temp SpO2   (!) 101/51 97 (!) 25 99.1 °F (37.3 °C) (!) 87 %      MAP       --         Physical Exam    Nursing note and vitals reviewed.  Constitutional: He appears well-developed and well-nourished. He is not diaphoretic. He is cooperative. He appears toxic. He has a sickly appearance. He appears ill. No distress.   HENT:   Head: Normocephalic and atraumatic.   Right Ear: Tympanic membrane, external ear and ear canal normal.   Left Ear: Tympanic membrane, external ear and ear  canal normal.   Nose: Nose normal. Mouth/Throat: Uvula is midline and oropharynx is clear and moist. Mucous membranes are dry.   Eyes: Conjunctivae, EOM and lids are normal. Pupils are equal, round, and reactive to light.   Neck: Neck supple.   Normal range of motion.   Full passive range of motion without pain.     Cardiovascular:  Normal rate, regular rhythm, normal heart sounds, intact distal pulses and normal pulses.           No edema to BLE   Pulmonary/Chest: No accessory muscle usage. No tachypnea. He is in respiratory distress. He has decreased breath sounds in the right middle field and the right lower field. He has no wheezes. He has no rhonchi. He has no rales.   Abdominal: Abdomen is scaphoid and soft. Bowel sounds are normal. There is no abdominal tenderness.   Musculoskeletal:         General: Normal range of motion.      Cervical back: Full passive range of motion without pain, normal range of motion and neck supple.     Lymphadenopathy:     He has no cervical adenopathy.   Neurological: He is alert and oriented to person, place, and time. He exhibits abnormal muscle tone. Gait (Unsteady at times due to generalized weakness) abnormal. GCS eye subscore is 4. GCS verbal subscore is 5. GCS motor subscore is 6.   Skin: Skin is warm, dry and intact. Capillary refill takes 2 to 3 seconds.   Psychiatric: He has a normal mood and affect. His speech is normal and behavior is normal. Judgment normal. Cognition and memory are normal.         Medical Screening Exam   See Full Note    ED Course   Procedures  Labs Reviewed   COMPREHENSIVE METABOLIC PANEL - Abnormal       Result Value    Sodium 130 (*)     Potassium 4.1      Chloride 98      CO2 19 (*)     Anion Gap 17 (*)     Glucose 111      BUN 56 (*)     Creatinine 3.04 (*)     BUN/Creatinine Ratio 18      Calcium 7.8 (*)     Total Protein 6.4      Albumin 2.9 (*)     Globulin 3.5      A/G Ratio 0.8      Bilirubin, Total 1.4      Alk Phos 346 (*)      (*)       (*)     eGFR 21 (*)    LACTIC ACID, PLASMA - Abnormal    Lactic Acid 4.2 (*)    URINALYSIS, REFLEX TO URINE CULTURE - Abnormal    Color, UA Yellow      Clarity, UA Cloudy      pH, UA 6.0      Leukocytes, UA Negative      Nitrites, UA Negative      Protein,  (*)     Glucose, UA Negative      Ketones, UA Negative      Urobilinogen, UA 2.0 (*)     Bilirubin, UA Negative      Blood, UA Large (*)     Specific Gravity, UA 1.025     NT-PRO NATRIURETIC PEPTIDE - Abnormal    ProBNP 3,081 (*)    TROPONIN I - Abnormal    Troponin I High Sensitivity 50.3 (*)    CBC WITH DIFFERENTIAL - Abnormal    WBC 6.04      RBC 3.52 (*)     Hemoglobin 11.6 (*)     Hematocrit 34.3 (*)     MCV 97.4 (*)     MCH 33.0 (*)     MCHC 33.8      RDW 13.2      Platelet Count 25 (*)     MPV        Neutrophils % 76.0 (*)     Lymphocytes % 13.9 (*)     Neutrophils, Abs 4.59      Lymphocytes, Absolute 0.84 (*)     Diff Type Manual      Monocytes % 9.8 (*)     Eosinophils % 0.0 (*)     Basophils % 0.3      Monocytes, Absolute 0.59      Eosinophils, Absolute 0.00      Basophils, Absolute 0.02     MANUAL DIFFERENTIAL - Abnormal    Segmented Neutrophils, Man % 69 (*)     Bands, Man % 11 (*)     Lymphocytes, Man % 16 (*)     Monocytes, Man % 4      nRBC, Manual       APTT - Abnormal    PTT 46.7 (*)    URINALYSIS, MICROSCOPIC - Abnormal    WBC, UA 5-10 (*)     RBC, UA 0-3      Bacteria, UA Many (*)     Squamous Epithelial Cells, UA Few (*)     Coarse Granular Casts, UA 5-10 (*)    INFLUENZA A & B BY MOLECULAR - Normal    INFLUENZA A MOLECULAR Negative      INFLUENZA B MOLECULAR  Negative     MAGNESIUM - Normal    Magnesium 1.7     SARS-COV-2 RNA AMPLIFICATION, QUAL - Normal    SARS COV-2 Molecular Negative      Narrative:     Negative SARS-CoV results should not be used as the sole basis for treatment or patient management decisions; negative results should be considered in the context of a patient's recent exposures, history and the presene of  clinical signs and symptoms consistent with COVID-19.  Negative results should be treated as presumptive and confirmed by molecular assay, if necessary for patient management.   RSV, RAPID AG BY MOLECULAR METHOD - Normal    RSV, RAPID BY MOLECULAR METHOD Negative     LACTIC ACID, PLASMA - Normal    Lactic Acid 1.9     PROTIME-INR - Normal    PT 14.4      INR 1.06     CULTURE, BLOOD   CULTURE, BLOOD   CULTURE, URINE   CBC W/ AUTO DIFFERENTIAL    Narrative:     The following orders were created for panel order CBC auto differential.  Procedure                               Abnormality         Status                     ---------                               -----------         ------                     CBC with Differential[[id number]]       Abnormal            Final result               Manual Differential[[id number]]         Abnormal            Final result                 Please view results for these tests on the individual orders.   HEPATITIS PANEL, ACUTE          Imaging Results              X-Ray Chest AP Portable (Final result)  Result time 05/08/25 16:15:20      Final result by Earline Koch MD (05/08/25 16:15:20)                   Impression:      Diffuse emphysematous changes with no acute pulmonary process    Prior CABG      Electronically signed by: Earline Koch  Date:    05/08/2025  Time:    16:15               Narrative:    EXAMINATION:  XR CHEST AP PORTABLE    CLINICAL HISTORY:  Sepsis;    FINDINGS:  Portable chest at 15:59 hours is compared to 05/05/2025 shows normal cardiomediastinal silhouette.    There are diffuse emphysematous changes.  There are no confluent infiltrates or pleural effusions.  Pulmonary vasculature is normal. No acute osseous abnormality.                                       Medications   albuterol-ipratropium 2.5 mg-0.5 mg/3 mL nebulizer solution 3 mL (3 mLs Nebulization Given 5/8/25 1520)   lactated ringers bolus 1,000 mL (0 mLs Intravenous Stopped 5/8/25 1639)    lactated ringers bolus 1,000 mL (0 mLs Intravenous Stopped 5/8/25 1741)   piperacillin-tazobactam (ZOSYN) 4.5 g in D5W 100 mL IVPB (MB+) (0 g Intravenous Stopped 5/8/25 1811)   azithromycin (ZITHROMAX) 500 mg in 0.9% NaCl 250 mL IVPB (admixture device) (0 mg Intravenous Stopped 5/8/25 1840)     Medical Decision Making  69 y/o male with PMHx/PSHx of HTN, GERD, CAD (cardiac bypass), and left carotid endarterectomy arrived to the ED via POV from [facility] for further evaluation of fever, hypoxia and cough that has been ongoing for the past 7-8 days. Had Temp 102.2 F at clinic today that was treated with Tylenol. Temp upon arrival tot he ED was 99.9 F. Reports occasional productive cough. Decreased appetite and has not been drinking well. Denies pain, nausea, vomiting or diarrhea. Patient smokes a pack of cigarettes a day. He is followed by Dr. Vo cardiology. Next visit is scheduled for 05/25.     Patient was seen here in the ED on Monday on 05/05/2025 for same symptoms. He was treated for Fever from unknown origin with nausea and vomiting. Labs that day FLU/COVID swabs were negative. WBC 4.38, PLT 46, , , , Lactic was elevated, but improved after fluids. US of upper abdomen revealed hepatic steatosis, mild splenomegaly that measured 13.2 cm, bile duct 2.45 cm. GB was negative for calculi, wall thickening or pericholecystic fluid. Symptoms improved, he was discharged home for follow up with PCP this week.      Patient's sat 88-89% on 4L/NC. ABGs obtained. pH 7.40, pCO2 32, pO2 56, HCO3- 19.8, SO2 89%, placed on venti mask at 50%. Sats up to 96%. EKG: NSR with HR 90 bpm.  16:45 Discussed patient's case with Dr. Melendez, hospitalist at [facility]. Will start Zosyn and Azithromycin to possible cover lower respiratory infection.  Bed accepted to room 549.     Amount and/or Complexity of Data Reviewed  Labs: ordered. Decision-making details documented in ED Course.     Details: WBC today  is 6.04, H&H 11.6/34.3, PLT 25 with previous  in March 2025. BUN/Cr 56/3.04 with previous 16/1.18. Today , ,  with previous , AST 19 and ALT 11.   BNP 3,081, Troponin 50.3, Lactic 4.2  Radiology: ordered. Decision-making details documented in ED Course.  Discussion of management or test interpretation with external provider(s): 15:21 PFC order placed.     Admission/Transfer MDM  I discussed the patient presentation labs, EKG and CXR with Dr. Melendez at [facility]. Discussed lab/radiology findings and need for transfer to higher level of care with patient and his sister. They agreed to treatment plan.   17:21 Dr. Melendez accepted patient to [facility]. Bed placement pending.  19:15 Call to [facility]. Bed placement pending.    Risk  Prescription drug management.      Additional MDM:   Sepsis:   This patient does have evidence of infective focus  My overall impression is septic shock due to lactate > 4 and SBP < 90.  Source: Respiratory  Antibiotics given- Antibiotics     Patient Encounter Information Not Found      Latest lactate reviewed- 4.2  Organ dysfunction indicated by Acute kidney injury, Acute liver injury, Acute respiratory failure, and Thrombocytopenia     Fluid challenge Actual Body Weight- The patient's actual body weight will be used to calculate the 30 ml/kg fluid bolus.     Post- resuscitation assessment Yes - I attest a sepsis perfusion exam was performed within 6 hours of sepsis, severe sepsis, or septic shock presentation, following fluid resuscitation.      Will Not start Pressors- Levophed for MAP of 65  Source control achieved by: Fluids, Zosyn and Azithromycin                           Medical Decision Making:   Clinical Tests:   Sepsis Perfusion Assessment: "I attest a sepsis perfusion exam was performed within 6 hours of sepsis, severe sepsis, or septic shock presentation, following fluid resuscitation."             Clinical Impression:   Final diagnoses:  [R09.02]  Hypoxia  [A41.9] Sepsis, due to unspecified organism, unspecified whether acute organ dysfunction present (Primary)  [R79.89] Elevated troponin  [N17.9, N18.4] Acute renal failure superimposed on stage 4 chronic kidney disease, unspecified acute renal failure type  [D69.6] Thrombocytopenia  [N39.0] Urinary tract infection without hematuria, site unspecified        ED Disposition Condition    Transfer to Another Facility Stable                  Joselyn Copeland FNP  05/08/25 5164         [1]   Social History  Tobacco Use    Smoking status: Every Day     Current packs/day: 0.50     Average packs/day: 0.5 packs/day for 54.3 years (27.2 ttl pk-yrs)     Types: Cigarettes     Start date: 1971     Passive exposure: Current    Smokeless tobacco: Never    Tobacco comments:     already did [place] tobacco quitline. not interested in trying again. states he will quit on his own   Substance Use Topics    Alcohol use: Not Currently    Drug use: Not Currently     Types: Marijuana        Joselyn Copeland FNP  05/08/25 6515

## 2025-05-09 PROBLEM — D69.6 THROMBOCYTOPENIA: Status: ACTIVE | Noted: 2025-05-09

## 2025-05-09 PROBLEM — N17.9 AKI (ACUTE KIDNEY INJURY): Status: ACTIVE | Noted: 2025-01-01

## 2025-05-09 PROBLEM — R79.89 ELEVATED TROPONIN: Status: ACTIVE | Noted: 2025-05-09

## 2025-05-09 PROBLEM — E43 SEVERE PROTEIN-CALORIE MALNUTRITION: Status: ACTIVE | Noted: 2025-05-09

## 2025-05-09 PROBLEM — R79.89 ELEVATED TROPONIN: Status: RESOLVED | Noted: 2025-05-09 | Resolved: 2025-05-09

## 2025-05-09 PROBLEM — J96.01 ACUTE HYPOXEMIC RESPIRATORY FAILURE: Status: ACTIVE | Noted: 2025-05-09

## 2025-05-09 PROBLEM — K76.9 ACUTE LIVER DISEASE: Status: ACTIVE | Noted: 2025-01-01

## 2025-05-09 PROBLEM — A41.9 SEPSIS: Status: ACTIVE | Noted: 2025-05-09

## 2025-05-09 PROBLEM — J18.9 PNEUMONIA: Status: ACTIVE | Noted: 2025-01-01

## 2025-05-09 NOTE — ASSESSMENT & PLAN NOTE
Patient with known CAD s/p CABG, which is controlled Will continue Statin and monitor for S/Sx of angina/ACS. Continue to monitor on telemetry. ASA on hold given thrombocytopenia.

## 2025-05-09 NOTE — ASSESSMENT & PLAN NOTE
The likely etiology of thrombocytopenia is sepsis. The patients 3 most recent labs are listed below.  Recent Labs     05/08/25  1522   PLT 25*     Plan  - Will transfuse if platelet count is <20k.

## 2025-05-09 NOTE — SUBJECTIVE & OBJECTIVE
Past Medical History:   Diagnosis Date    GERD (gastroesophageal reflux disease)     Hypertension        Past Surgical History:   Procedure Laterality Date    CAROTID ENDARTERECTOMY Left 5/16/2022    Procedure: ENDARTERECTOMY-CAROTID;  Surgeon: Karen Spencer MD;  Location: [facility] OR;  Service: General;  Laterality: Left;    CORONARY ARTERY BYPASS GRAFT      HERNIA REPAIR      LEFT HEART CATHETERIZATION Left 9/15/2021    Procedure: Left heart cath;  Surgeon: Quoc Vo DO;  Location: [facility] CATH LAB;  Service: Cardiology;  Laterality: Left;       Review of patient's allergies indicates:  No Known Allergies    Current Facility-Administered Medications on File Prior to Encounter   Medication    [COMPLETED] albuterol-ipratropium 2.5 mg-0.5 mg/3 mL nebulizer solution 3 mL    [COMPLETED] azithromycin (ZITHROMAX) 500 mg in 0.9% NaCl 250 mL IVPB (admixture device)    [COMPLETED] lactated ringers bolus 1,000 mL    [COMPLETED] lactated ringers bolus 1,000 mL    [COMPLETED] piperacillin-tazobactam (ZOSYN) 4.5 g in D5W 100 mL IVPB (MB+)     Current Outpatient Medications on File Prior to Encounter   Medication Sig    aspirin (ECOTRIN) 81 MG EC tablet Take 81 mg by mouth once daily.    atorvastatin (LIPITOR) 80 MG tablet Take 1 tablet (80 mg total) by mouth every evening.    dapsone 100 MG Tab Take 1 tablet (100 mg total) by mouth once daily.    docusate sodium (COLACE) 100 MG capsule Take 100 mg by mouth daily as needed.     ergocalciferol (ERGOCALCIFEROL) 50,000 unit Cap Take 1 capsule (50,000 Units total) by mouth every 7 days. for 8 doses    losartan (COZAAR) 100 MG tablet Take 1 tablet (100 mg total) by mouth once daily.    metoprolol succinate (TOPROL-XL) 25 MG 24 hr tablet Take 1 tablet (25 mg total) by mouth once daily.    omeprazole (PRILOSEC) 20 MG capsule Take 1 capsule (20 mg total) by mouth once daily.    ondansetron (ZOFRAN-ODT) 4 MG TbDL Take 1 tablet (4 mg total) by mouth every 8 (eight) hours as  needed (nausea).    triamcinolone acetonide 0.1% (KENALOG) 0.1 % cream Apply to AA on body BID PRN flares tapering with improvement     Family History       Problem Relation (Age of Onset)    Alzheimer's disease Mother    Cancer Sister    Heart disease Father, Sister    Hypertension Father, Sister    Polycystic kidney disease Mother          Tobacco Use    Smoking status: Every Day     Current packs/day: 0.50     Average packs/day: 0.5 packs/day for 54.4 years (27.2 ttl pk-yrs)     Types: Cigarettes     Start date: 1971     Passive exposure: Current    Smokeless tobacco: Never    Tobacco comments:     already did [place] tobacco quitline. not interested in trying again. states he will quit on his own   Substance and Sexual Activity    Alcohol use: Not Currently    Drug use: Not Currently     Types: Marijuana    Sexual activity: Not Currently     Review of Systems   Constitutional:  Positive for appetite change, fatigue and fever.   HENT:  Negative for congestion, ear pain, rhinorrhea, sneezing and sore throat.    Eyes:  Negative for visual disturbance.   Respiratory:  Positive for cough and shortness of breath. Negative for chest tightness.    Cardiovascular:  Negative for chest pain and palpitations.   Gastrointestinal:  Positive for abdominal pain, constipation and nausea. Negative for diarrhea and vomiting.   Genitourinary:  Negative for dysuria, flank pain and hematuria.   Musculoskeletal:  Positive for arthralgias. Negative for myalgias.   Skin:  Negative for rash.   Neurological:  Positive for weakness and light-headedness. Negative for dizziness and headaches.   Psychiatric/Behavioral:  Negative for confusion.      Objective:     Vital Signs (Most Recent):    Vital Signs (24h Range):  Temp:  [99.1 °F (37.3 °C)-102.2 °F (39 °C)] 99.1 °F (37.3 °C)  Pulse:  [] 75  Resp:  [18-25] 18  SpO2:  [82 %-99 %] 97 %  BP: (101-148)/() 148/60        There is no height or weight on file to calculate BMI.     Physical  Exam  Constitutional:       Appearance: He is underweight.   HENT:      Head: Normocephalic and atraumatic.   Eyes:      General: No scleral icterus.     Extraocular Movements: Extraocular movements intact and EOM normal.      Conjunctiva/sclera: Conjunctivae normal.      Pupils: Pupils are equal, round, and reactive to light.   Cardiovascular:      Rate and Rhythm: Regular rhythm. Tachycardia present.   Pulmonary:      Effort: Tachypnea present. No prolonged expiration or respiratory distress.      Breath sounds: Decreased air movement present. No wheezing or rhonchi.   Abdominal:      General: Bowel sounds are normal. There is no distension.      Palpations: Abdomen is soft.      Tenderness: There is no abdominal tenderness. There is no guarding.   Musculoskeletal:      Right lower leg: No edema.      Left lower leg: No edema.   Skin:     General: Skin is warm and dry.      Coloration: Skin is not jaundiced.   Neurological:      General: No focal deficit present.      Mental Status: He is alert.   Psychiatric:         Mood and Affect: Mood normal.              CRANIAL NERVES     CN III, IV, VI   Pupils are equal, round, and reactive to light.  Extraocular motions are normal.   Nystagmus: none     CN V   Right facial sensation deficit: none  Left facial sensation deficit: none    CN XI   Right sternocleidomastoid strength: normal  Left sternocleidomastoid strength: normal    CN XII   Tongue: not atrophic  Tongue deviation: none       Significant Labs: All pertinent labs within the past 24 hours have been reviewed.    Significant Imaging: I have reviewed all pertinent imaging results/findings within the past 24 hours.

## 2025-05-09 NOTE — PLAN OF CARE
[facility]  Initial Discharge Assessment       Primary Care Provider: Crys Jackson FNP    Admission Diagnosis: Sepsis [A41.9]    Admission Date: 5/8/2025  Expected Discharge Date:     Transition of Care Barriers: None    Payor: [place] MCARE / Plan: [facility] MEDICARE COMPLETE / Product Type: Medicare Advantage /     Extended Emergency Contact Information  Primary Emergency Contact: Serina Quintana  Mobile Phone: [phone]  Relation: Daughter   needed? No  Secondary Emergency Contact: Luisa Anderson  Mobile Phone: [phone]  Relation: Sister  Preferred language: English   needed? No    Discharge Plan A: Home  Discharge Plan B: Home Health, Long-term acute care facility (LTAC), Rehab, Skilled Nursing Facility      [email or web address] [place] - [address] [address]  [address]  [facility] [address]  Phone: [phone] Fax: [phone]      Initial Assessment (most recent)       Adult Discharge Assessment - 05/09/25 1005          Discharge Assessment    Assessment Type Discharge Planning Assessment     Source of Information patient     People in Home alone     Facility Arrived From: Home     Do you expect to return to your current living situation? Yes     Do you have help at home or someone to help you manage your care at home? No     Prior to hospitilization cognitive status: Unable to Assess     Current cognitive status: Alert/Oriented     Walking or Climbing Stairs Difficulty no     Dressing/Bathing Difficulty no     Home Accessibility stairs to enter home     Number of Stairs, Main Entrance three     Equipment Currently Used at Home nebulizer     Readmission within 30 days? No     Patient currently being followed by outpatient case management? No     Do you currently have service(s) that help you manage your care at home? No     Do you take prescription medications? Yes     Do you have prescription coverage? Yes      Coverage [facility] Medicare     Do you have any problems affording any of your prescribed medications? No     Is the patient taking medications as prescribed? yes     Who is going to help you get home at discharge? Serina Quintana - daughter - [phone]     How do you get to doctors appointments? car, drives self;family or friend will provide     Are you on dialysis? No     Do you take coumadin? No     Discharge Plan A Home     Discharge Plan B Home Health;Long-term acute care facility (LTAC);Rehab;Skilled Nursing Facility     DME Needed Upon Discharge  none     Discharge Plan discussed with: Patient     Transition of Care Barriers None        Physical Activity    On average, how many days per week do you engage in moderate to strenuous exercise (like a brisk walk)? 0 days     On average, how many minutes do you engage in exercise at this level? 0 min        Financial Resource Strain    How hard is it for you to pay for the very basics like food, housing, medical care, and heating? Not very hard        Housing Stability    In the last 12 months, was there a time when you were not able to pay the mortgage or rent on time? No     At any time in the past 12 months, were you homeless or living in a shelter (including now)? No        Transportation Needs    In the past 12 months, has lack of transportation kept you from medical appointments or from getting medications? No     In the past 12 months, has lack of transportation kept you from meetings, work, or from getting things needed for daily living? No        Food Insecurity    Within the past 12 months, you worried that your food would run out before you got the money to buy more. Never true     Within the past 12 months, the food you bought just didn't last and you didn't have money to get more. Never true        Stress    Do you feel stress - tense, restless, nervous, or anxious, or unable to sleep at night because your mind is troubled all the time - these days? Not  at all        Social Isolation    How often do you feel lonely or isolated from those around you?  Never        Alcohol Use    Q1: How often do you have a drink containing alcohol? Never     Q2: How many drinks containing alcohol do you have on a typical day when you are drinking? Patient does not drink     Q3: How often do you have six or more drinks on one occasion? Never        Utilities    In the past 12 months has the electric, gas, oil, or water company threatened to shut off services in your home? No        Health Literacy    How often do you need to have someone help you when you read instructions, pamphlets, or other written material from your doctor or pharmacy? Sometimes                      CM at bedside to complete initial discharge assessment.  Patient reports that he lives at home alone.  Is not current with HH and uses a nebulizer at home.  Patient plans to return home upon discharge.  IM obtained and SDOH question completed.  CM will continue to follow for DC needs as they arise.

## 2025-05-09 NOTE — ASSESSMENT & PLAN NOTE
Dangers of cigarette smoking were reviewed with patient in detail. Patient was Referred to Tobacco Cessation Program. Nicotine replacement options were discussed. Nicotine replacement was discussed- will be prescribed.

## 2025-05-09 NOTE — ASSESSMENT & PLAN NOTE
Abnormal liver enzymes in the setting of sepsis  , , , PTT 46.7, PC 25  U/S of abd reveals mild splenomegaly and hepatic steatosis   Monitor COMP

## 2025-05-09 NOTE — ASSESSMENT & PLAN NOTE
This patient does have evidence of infective focus  My overall impression is sepsis.  Source: Respiratory  Antibiotics given-   Antibiotics (72h ago, onward)      Start     Stop Route Frequency Ordered    05/09/25 1800  azithromycin (ZITHROMAX) 500 mg in 0.9% NaCl 250 mL IVPB         -- IV Every 24 hours (non-standard times) 05/09/25 0224    05/09/25 0600  piperacillin-tazobactam (ZOSYN) 4.5 g in D5W 100 mL IVPB (MB+)         -- IV Every 8 hours (non-standard times) 05/09/25 0221          Latest lactate reviewed-  Recent Labs   Lab 05/08/25  1918   LACTATE 1.9     Organ dysfunction indicated by Acute kidney injury, Acute liver injury, and Thrombocytopenia     Fluid challenge Actual Body Weight- The patient's actual body weight will be used to calculate the 30 ml/kg fluid bolus.     Post- resuscitation assessment Yes - I attest a sepsis perfusion exam was performed within 6 hours of sepsis, severe sepsis, or septic shock presentation, following fluid resuscitation.      Will Not start Pressors- Levophed for MAP of 65  Source control achieved by: IV zosyn, IV zithromax

## 2025-05-09 NOTE — ASSESSMENT & PLAN NOTE
Patient has a diagnosis of pneumonia. The cause of the pneumonia is suspected to be bacterial in etiology but organism is not known. The pneumonia is stable. The patient has the following signs/symptoms of pneumonia: persistent hypoxia  and shortness of breath. The patient does have a current oxygen requirement and the patient does not have a home oxygen requirement. I have reviewed the pertinent imaging. The following cultures have been collected: Blood cultures The culture results are listed below.     Current antimicrobial regimen consists of the antibiotics listed below. Will monitor patient closely and continue current treatment plan unchanged.    Antibiotics (From admission, onward)      Start     Stop Route Frequency Ordered    05/09/25 1800  azithromycin (ZITHROMAX) 500 mg in 0.9% NaCl 250 mL IVPB         -- IV Every 24 hours (non-standard times) 05/09/25 0224    05/09/25 0600  piperacillin-tazobactam (ZOSYN) 4.5 g in D5W 100 mL IVPB (MB+)         -- IV Every 8 hours (non-standard times) 05/09/25 0221            Microbiology Results (last 7 days)       ** No results found for the last 168 hours. **

## 2025-05-09 NOTE — H&P
[facility]  History & Physical    Patient Name: Bhaskar Patel  MRN: [id number]  Patient Class: IP- Inpatient  Admission Date: 5/8/2025  Attending Physician: Estela Melendez DO   Primary Care Provider: Crys Jackson FNP         Patient information was obtained from patient, relative(s), past medical records, and ER records.     Subjective:     Principal Problem:<principal problem not specified>    Chief Complaint:   Chief Complaint   Patient presents with    Nausea        HPI: Patient is a 71 y/o male with PMHx/PSHx of HTN, GERD, CAD (cardiac bypass), and left carotid endarterectomy who was transferred from [facility] to [facility] for nausea, abdominal pain and decrease PO intake. Pt states he developed decrease PO intake 1 week ago and later developed nausea and generalized abdominal pain. He describes his abdominal pain as 5/10, achy, intermittent without any radiation. He denies vomiting, diarrhea, congestion, rhinorrhea, sore throat, chest pain or chest tightness but reports intermittent cough, SOB, and fever. He denies any h/o COPD or PNA in the past. He denies any sick contacts. He was evaluated in the ER for nausea on 5/05 and was treated with zofran and IV fluids at that time. Of note, pt lives alone and ambulates with the use of any assistive device. He is able to do all ADLs. He has  60-pack-yr smoking hx and previously drank multiple beers on his days off quitting 2 yrs ago.     In the ED at [facility], his vitals were /51, , RR 25, T 102.2 F, SpO2 87%. Labs were significant lactate 4.2-->1.9 PC 25, Na 130, BUN/CR 56/3.04, , , , PTT 46.7, Albumin 2.9, Ca 7.8, Trop 50.3. Negative for COVID/influenza. EKG reveals NSR. CXR reveals diffuse emphysematous changes with no acute pulmonary process. He was treated with 2L bolus of NS, IV Zosyn 4.5 g x1, IV zithromax 500 mg x1, and duonebs x1.  Given the severity of his  symptoms he has been admitted to [facility] to the family medicine service under the direct supervision of Dr. Albert Diego for the continue care and medical management of this patient.             Past Medical History:   Diagnosis Date    GERD (gastroesophageal reflux disease)     Hypertension        Past Surgical History:   Procedure Laterality Date    CAROTID ENDARTERECTOMY Left 5/16/2022    Procedure: ENDARTERECTOMY-CAROTID;  Surgeon: Karen Spencer MD;  Location: [facility] OR;  Service: General;  Laterality: Left;    CORONARY ARTERY BYPASS GRAFT      HERNIA REPAIR      LEFT HEART CATHETERIZATION Left 9/15/2021    Procedure: Left heart cath;  Surgeon: Quoc Vo DO;  Location: [facility] CATH LAB;  Service: Cardiology;  Laterality: Left;       Review of patient's allergies indicates:  No Known Allergies    Current Facility-Administered Medications on File Prior to Encounter   Medication    [COMPLETED] albuterol-ipratropium 2.5 mg-0.5 mg/3 mL nebulizer solution 3 mL    [COMPLETED] azithromycin (ZITHROMAX) 500 mg in 0.9% NaCl 250 mL IVPB (admixture device)    [COMPLETED] lactated ringers bolus 1,000 mL    [COMPLETED] lactated ringers bolus 1,000 mL    [COMPLETED] piperacillin-tazobactam (ZOSYN) 4.5 g in D5W 100 mL IVPB (MB+)     Current Outpatient Medications on File Prior to Encounter   Medication Sig    aspirin (ECOTRIN) 81 MG EC tablet Take 81 mg by mouth once daily.    atorvastatin (LIPITOR) 80 MG tablet Take 1 tablet (80 mg total) by mouth every evening.    dapsone 100 MG Tab Take 1 tablet (100 mg total) by mouth once daily.    docusate sodium (COLACE) 100 MG capsule Take 100 mg by mouth daily as needed.     ergocalciferol (ERGOCALCIFEROL) 50,000 unit Cap Take 1 capsule (50,000 Units total) by mouth every 7 days. for 8 doses    losartan (COZAAR) 100 MG tablet Take 1 tablet (100 mg total) by mouth once daily.    metoprolol succinate (TOPROL-XL) 25 MG 24 hr tablet Take 1 tablet (25 mg total) by mouth once daily.     omeprazole (PRILOSEC) 20 MG capsule Take 1 capsule (20 mg total) by mouth once daily.    ondansetron (ZOFRAN-ODT) 4 MG TbDL Take 1 tablet (4 mg total) by mouth every 8 (eight) hours as needed (nausea).    triamcinolone acetonide 0.1% (KENALOG) 0.1 % cream Apply to AA on body BID PRN flares tapering with improvement     Family History       Problem Relation (Age of Onset)    Alzheimer's disease Mother    Cancer Sister    Heart disease Father, Sister    Hypertension Father, Sister    Polycystic kidney disease Mother          Tobacco Use    Smoking status: Every Day     Current packs/day: 0.50     Average packs/day: 0.5 packs/day for 54.4 years (27.2 ttl pk-yrs)     Types: Cigarettes     Start date: 1971     Passive exposure: Current    Smokeless tobacco: Never    Tobacco comments:     already did [place] tobacco quitline. not interested in trying again. states he will quit on his own   Substance and Sexual Activity    Alcohol use: Not Currently    Drug use: Not Currently     Types: Marijuana    Sexual activity: Not Currently     Review of Systems   Constitutional:  Positive for appetite change, fatigue and fever.   HENT:  Negative for congestion, ear pain, rhinorrhea, sneezing and sore throat.    Eyes:  Negative for visual disturbance.   Respiratory:  Positive for cough and shortness of breath. Negative for chest tightness.    Cardiovascular:  Negative for chest pain and palpitations.   Gastrointestinal:  Positive for abdominal pain, constipation and nausea. Negative for diarrhea and vomiting.   Genitourinary:  Negative for dysuria, flank pain and hematuria.   Musculoskeletal:  Positive for arthralgias. Negative for myalgias.   Skin:  Negative for rash.   Neurological:  Positive for weakness and light-headedness. Negative for dizziness and headaches.   Psychiatric/Behavioral:  Negative for confusion.      Objective:     Vital Signs (Most Recent):    Vital Signs (24h Range):  Temp:  [99.1 °F (37.3 °C)-102.2 °F (39 °C)] 99.1  °F (37.3 °C)  Pulse:  [] 75  Resp:  [18-25] 18  SpO2:  [82 %-99 %] 97 %  BP: (101-148)/() 148/60        There is no height or weight on file to calculate BMI.     Physical Exam  Constitutional:       Appearance: He is underweight.   HENT:      Head: Normocephalic and atraumatic.   Eyes:      General: No scleral icterus.     Extraocular Movements: Extraocular movements intact and EOM normal.      Conjunctiva/sclera: Conjunctivae normal.      Pupils: Pupils are equal, round, and reactive to light.   Cardiovascular:      Rate and Rhythm: Regular rhythm. Tachycardia present.   Pulmonary:      Effort: Tachypnea present. No prolonged expiration or respiratory distress.      Breath sounds: Decreased air movement present. No wheezing or rhonchi.   Abdominal:      General: Bowel sounds are normal. There is no distension.      Palpations: Abdomen is soft.      Tenderness: There is no abdominal tenderness. There is no guarding.   Musculoskeletal:      Right lower leg: No edema.      Left lower leg: No edema.   Skin:     General: Skin is warm and dry.      Coloration: Skin is not jaundiced.   Neurological:      General: No focal deficit present.      Mental Status: He is alert.   Psychiatric:         Mood and Affect: Mood normal.              CRANIAL NERVES     CN III, IV, VI   Pupils are equal, round, and reactive to light.  Extraocular motions are normal.   Nystagmus: none     CN V   Right facial sensation deficit: none  Left facial sensation deficit: none    CN XI   Right sternocleidomastoid strength: normal  Left sternocleidomastoid strength: normal    CN XII   Tongue: not atrophic  Tongue deviation: none       Significant Labs: All pertinent labs within the past 24 hours have been reviewed.    Significant Imaging: I have reviewed all pertinent imaging results/findings within the past 24 hours.  Assessment/Plan:     Assessment & Plan  Sepsis  This patient does have evidence of infective focus  My overall  impression is sepsis.  Source: Respiratory  Antibiotics given-   Antibiotics (72h ago, onward)      Start     Stop Route Frequency Ordered    05/09/25 1800  azithromycin (ZITHROMAX) 500 mg in 0.9% NaCl 250 mL IVPB         -- IV Every 24 hours (non-standard times) 05/09/25 0224    05/09/25 0600  piperacillin-tazobactam (ZOSYN) 4.5 g in D5W 100 mL IVPB (MB+)         -- IV Every 8 hours (non-standard times) 05/09/25 0221          Latest lactate reviewed-  Recent Labs   Lab 05/08/25  1918   LACTATE 1.9     Organ dysfunction indicated by Acute kidney injury, Acute liver injury, and Thrombocytopenia     Fluid challenge Actual Body Weight- The patient's actual body weight will be used to calculate the 30 ml/kg fluid bolus.     Post- resuscitation assessment Yes - I attest a sepsis perfusion exam was performed within 6 hours of sepsis, severe sepsis, or septic shock presentation, following fluid resuscitation.      Will Not start Pressors- Levophed for MAP of 65  Source control achieved by: IV zosyn, IV zithromax   Acute hypoxemic respiratory failure  Patient with Hypoxic Respiratory failure which is Acute.  he is not on home oxygen. Supplemental oxygen was provided and noted- Oxygen Concentration (%):  [50] 50  Signs/symptoms of respiratory failure include- tachypnea. Contributing diagnoses includes - Pneumonia Labs and images were reviewed. Patient Has recent ABG, which has been reviewed. Will treat underlying causes and adjust management of respiratory failure as follows- venturi mask O2 @15/L, duonebs and continuous O2 monitoring.   Pneumonia  Patient has a diagnosis of pneumonia. The cause of the pneumonia is suspected to be bacterial in etiology but organism is not known. The pneumonia is stable. The patient has the following signs/symptoms of pneumonia: persistent hypoxia  and shortness of breath. The patient does have a current oxygen requirement and the patient does not have a home oxygen requirement. I have  reviewed the pertinent imaging. The following cultures have been collected: Blood cultures The culture results are listed below.     Current antimicrobial regimen consists of the antibiotics listed below. Will monitor patient closely and continue current treatment plan unchanged.    Antibiotics (From admission, onward)      Start     Stop Route Frequency Ordered    05/09/25 1800  azithromycin (ZITHROMAX) 500 mg in 0.9% NaCl 250 mL IVPB         -- IV Every 24 hours (non-standard times) 05/09/25 0224    05/09/25 0600  piperacillin-tazobactam (ZOSYN) 4.5 g in D5W 100 mL IVPB (MB+)         -- IV Every 8 hours (non-standard times) 05/09/25 0221            Microbiology Results (last 7 days)       ** No results found for the last 168 hours. **          Elevated troponin  ACS vs demand ischemia   No chest pain but reports SOB  EKG reveals NSR  Troponin 50.1-->trending troponins  -cardiac monitoring     GEORGE (acute kidney injury)  GEORGE is likely due to acute renal dysfunction due to sepsis. Baseline creatinine is 1.18. Most recent creatinine and eGFR are listed below.  Recent Labs     05/08/25  1522   CREATININE 3.04*   EGFRNORACEVR 21*      Plan  - GEORGE is worsening. Will adjust treatment as follows: continuous IV fluids  - Avoid nephrotoxins and renally dose meds for GFR listed above  - Monitor urine output, serial BMP, and adjust therapy as needed  -   Essential hypertension  Patient's blood pressure range in the last 24 hours was: BP  Min: 101/51  Max: 148/60.The patient's inpatient anti-hypertensive regimen is listed below:  Current Antihypertensives  metoprolol succinate (TOPROL-XL) 24 hr tablet 25 mg, Daily, Oral    Plan  - BP is controlled, no changes needed to their regimen  - Losartan on hold 2/2 to GEORGE  Coronary artery disease involving native coronary artery of native heart without angina pectoris  Patient with known CAD s/p CABG, which is controlled Will continue Statin and monitor for S/Sx of angina/ACS. Continue  to monitor on telemetry. ASA on hold given thrombocytopenia.   Gastroesophageal reflux disease  Continue protonix 40 mg     Cigarette nicotine dependence without complication  Dangers of cigarette smoking were reviewed with patient in detail. Patient was Referred to Tobacco Cessation Program. Nicotine replacement options were discussed. Nicotine replacement was discussed- will be prescribed.  Thrombocytopenia  The likely etiology of thrombocytopenia is sepsis. The patients 3 most recent labs are listed below.  Recent Labs     05/08/25  1522   PLT 25*     Plan  - Will transfuse if platelet count is <20k.    Acute liver disease  Abnormal liver enzymes in the setting of sepsis  , , , PTT 46.7, PC 25  U/S of abd reveals mild splenomegaly and hepatic steatosis   Monitor COMP    VTE Risk Mitigation (From admission, onward)           Ordered     IP VTE HIGH RISK PATIENT  Once         05/09/25 0149     Place sequential compression device  Until discontinued         05/09/25 0149     Reason for No Pharmacological VTE Prophylaxis  Once        Question:  Reasons:  Answer:  Thrombocytopenia    05/09/25 0149                                    Tea Bustamante MD  Department of Hospital Medicine  [facility] - [address] [place] [address]

## 2025-05-09 NOTE — ASSESSMENT & PLAN NOTE
ACS vs demand ischemia   No chest pain but reports SOB  EKG reveals NSR  Troponin 50.1-->trending troponins  -cardiac monitoring

## 2025-05-09 NOTE — PROGRESS NOTES
[facility] - [facility] Medical Telemetry  Hematology/Oncology  Consult Note    Patient Name: Bhaskar Patel  MRN: [id number]  Admission Date: 5/8/2025  Hospital Length of Stay: 1 days  Code Status: Full Code   Attending Provider: Estela Melendez DO  Consulting Provider: Oliver Samson MD  Primary Care Physician: Crys Jackson FNP  Principal Problem:Sepsis    Consults  Subjective:     HPI: I have dictated this portion once in detail and it disappeared. I was asked to see this patient who presented with abdominal pain and nausea and weakness and probable sepsis.  He has been started on Piperacillin and I think it needs to be stopped. It is one of the most common antibiotics causing thrombocytopenia. vancomycin is another common cause. So is quinine, sulfonamide, and ampicillin. I recommend using.Merrem instead.  He has had no upper or lower G.I. bleeding reported and no hematuria or other G.I. blood loss or blood loss from bleeding. He has no petechiae.     Oncology Treatment Plan:   [No matching plan found]    Medications:  Continuous Infusions:   0.9% NaCl   Intravenous Continuous 75 mL/hr at 05/09/25 0300 New Bag at 05/09/25 0300     Scheduled Meds:   albuterol-ipratropium  3 mL Nebulization Q6H WAKE    atorvastatin  80 mg Oral QHS    azithromycin  500 mg Intravenous Q24H    metoprolol succinate  25 mg Oral Daily    pantoprazole  40 mg Oral Daily    piperacillin-tazobactam (Zosyn) IV (PEDS and ADULTS) (extended infusion is not appropriate)  4.5 g Intravenous Q8H     PRN Meds:    Current Facility-Administered Medications:     acetaminophen, 1,000 mg, Oral, Q8H PRN    acetaminophen, 650 mg, Oral, Q4H PRN    aluminum-magnesium hydroxide-simethicone, 30 mL, Oral, QID PRN    dextrose 50%, 12.5 g, Intravenous, PRN    dextrose 50%, 25 g, Intravenous, PRN    docusate sodium, 100 mg, Oral, Daily PRN    glucagon (human recombinant), 1 mg, Intramuscular, PRN    glucose, 16 g, Oral, PRN     glucose, 24 g, Oral, PRN    melatonin, 6 mg, Oral, Nightly PRN    naloxone, 0.02 mg, Intravenous, PRN    ondansetron, 4 mg, Oral, Q8H PRN    prochlorperazine, 5 mg, Intravenous, Q6H PRN    sodium chloride 0.9%, 10 mL, Intravenous, Q12H PRN       Review of patient's allergies indicates:  No Known Allergies     Past Medical History:   Diagnosis Date    GERD (gastroesophageal reflux disease)     Hypertension      Past Surgical History:   Procedure Laterality Date    CAROTID ENDARTERECTOMY Left 5/16/2022    Procedure: ENDARTERECTOMY-CAROTID;  Surgeon: Karen Spencer MD;  Location: [facility] OR;  Service: General;  Laterality: Left;    CORONARY ARTERY BYPASS GRAFT      HERNIA REPAIR      LEFT HEART CATHETERIZATION Left 9/15/2021    Procedure: Left heart cath;  Surgeon: Quoc Vo DO;  Location: [facility] CATH LAB;  Service: Cardiology;  Laterality: Left;     Family History     Problem Relation (Age of Onset)    Alzheimer's disease Mother    Cancer Sister    Heart disease Father, Sister    Hypertension Father, Sister    Polycystic kidney disease Mother        Tobacco Use    Smoking status: Every Day     Current packs/day: 0.50     Average packs/day: 0.5 packs/day for 54.4 years (27.2 ttl pk-yrs)     Types: Cigarettes     Start date: 1971     Passive exposure: Current    Smokeless tobacco: Never    Tobacco comments:     already did [place] tobacco quitline. not interested in trying again. states he will quit on his own   Substance and Sexual Activity    Alcohol use: Not Currently    Drug use: Not Currently     Types: Marijuana    Sexual activity: Not Currently       Review of Systems  Objective:     Vital Signs (Most Recent):  Temp: 100.3 °F (37.9 °C) (05/09/25 1021)  Pulse: 109 (05/09/25 1021)  Resp: 20 (05/09/25 1021)  BP: (!) 171/83 (05/09/25 1021)  SpO2: 95 % (05/09/25 1021)   Vital Signs (24h Range):  Temp:  [97.6 °F (36.4 °C)-102.2 °F (39 °C)] 100.3 °F (37.9 °C)  Pulse:  [] 109  Resp:  [16-25]  20  SpO2:  [82 %-99 %] 95 %  BP: (101-171)/() 171/83       Weight: 64.2 kg (141 lb 9.6 oz)  Body mass index is 17.7 kg/m².  Body surface area is 1.84 meters squared.      Intake/Output Summary (Last 24 hours) at 5/9/2025 1209  Last data filed at 5/9/2025 0410  Gross per 24 hour   Intake --   Output 300 ml   Net -300 ml       Physical Exam    Significant Labs:   None    Diagnostic Results:  None    Assessment/Plan:     Active Diagnoses:    Diagnosis Date Noted POA    PRINCIPAL PROBLEM:  Sepsis [A41.9] 05/09/2025 Yes    Thrombocytopenia [D69.6] 05/09/2025 Yes    Acute hypoxemic respiratory failure [J96.01] 05/09/2025 Yes    Pneumonia [J18.9] 05/09/2025 Yes    GEORGE (acute kidney injury) [N17.9] 05/09/2025 Yes    Elevated troponin [R79.89] 05/09/2025 Yes    Acute liver disease [K76.9] 05/09/2025 Yes    Severe protein-calorie malnutrition [E43] 05/09/2025 Yes    Gastroesophageal reflux disease [K21.9] 10/06/2021 Yes    Cigarette nicotine dependence without complication [F17.210] 10/06/2021 Yes    Coronary artery disease involving native coronary artery of native heart without angina pectoris [I25.10] 09/01/2021 Yes    Essential hypertension [I10] 09/01/2021 Yes      Problems Resolved During this Admission:       on physical examination, he appears chronically and acutely ill.  eyes: he has no lids and conjunctivae.  ENT: he has a white coating on his tongue. He is edentulous. His trachea is midline. No neck masses.  Lungs are clear with course breath sounds, and a normal phase of expiration.  Cardiovascular: his heart is regular and rhythm was slightly rapid rate. No clubbing or cyanosis.   Abdominal examination is normal.  He has no neurologic deficits.  He appears to be oriented and alert.  Skin examination reveals no  petechiae. No significant bruising.  He appears to be fully oriented and alert.        Thank you for your consult. I will continue to follow him through his electronic record. Please  call me if you want to discuss his case.  Please CHANGE HIS ANTIBIOTIC.     Oliver Samson MD  Hematology/Oncology  [facility] - [address] [place] [address]

## 2025-05-09 NOTE — ASSESSMENT & PLAN NOTE
Patient's blood pressure range in the last 24 hours was: BP  Min: 101/51  Max: 148/60.The patient's inpatient anti-hypertensive regimen is listed below:  Current Antihypertensives  metoprolol succinate (TOPROL-XL) 24 hr tablet 25 mg, Daily, Oral    Plan  - BP is controlled, no changes needed to their regimen  - Losartan on hold 2/2 to GEORGE

## 2025-05-09 NOTE — ASSESSMENT & PLAN NOTE
GEORGE is likely due to acute renal dysfunction due to sepsis. Baseline creatinine is 1.18. Most recent creatinine and eGFR are listed below.  Recent Labs     05/08/25  1522   CREATININE 3.04*   EGFRNORACEVR 21*      Plan  - GEORGE is worsening. Will adjust treatment as follows: continuous IV fluids  - Avoid nephrotoxins and renally dose meds for GFR listed above  - Monitor urine output, serial BMP, and adjust therapy as needed  -

## 2025-05-09 NOTE — HPI
Patient is a 69 y/o male with PMHx/PSHx of HTN, GERD, CAD (cardiac bypass), and left carotid endarterectomy who was transferred from [facility] to [facility] for nausea, abdominal pain and decrease PO intake. Pt states he developed decrease PO intake 1 week ago and later developed nausea and generalized abdominal pain. He describes his abdominal pain as 5/10, achy, intermittent without any radiation. He denies vomiting, diarrhea, congestion, rhinorrhea, sore throat, chest pain or chest tightness but reports intermittent cough, SOB, and fever. He denies any h/o COPD or PNA in the past. He denies any sick contacts. He was evaluated in the ER for nausea on 5/05 and was treated with zofran and IV fluids at that time. Of note, pt lives alone and ambulates with the use of any assistive device. He is able to do all ADLs. He has  60-pack-yr smoking hx and previous drank multiple beers on his days off quitting 2 yrs ago.     In the ED at [facility], his  vitals were /51, , RR 25, T 102.2 F, SpO2 87%. Labs were significant lactate 4.2-->1.9 PC 25, Na 130, BUN/CR 56/3.04, , , , PTT 46.7, Albumin 2.9, Ca 7.8, Trop 50.3. Negative for COVID/influenza. EKG reveals NSR. CXR reveals Diffuse emphysematous changes with no acute pulmonary process. He was treated with 2L bolus of NS, IV Zosyn 4.5 g x1, IV zithromax 500 mg x1, and duonebs x1.  Given the severity of his symptoms he has been admitted to [facility] to the family medicine service under the direct supervision of Dr. Albert Diego for the continue care and medical management of this patient.

## 2025-05-09 NOTE — PROGRESS NOTES
[facility] - [facility] Tele[facility]  Wound Care    Patient Name:  Bhaskar Patel   MRN:  [id number]  Date: 5/9/2025  Diagnosis: Sepsis    History:     Past Medical History:   Diagnosis Date    GERD (gastroesophageal reflux disease)     Hypertension        Social History[1]    Precautions:     Allergies as of 05/08/2025    (No Known Allergies)       WO Assessment Details/Treatment     Narrative: Seen patient for initiation of preventative skin care measures    Patient in bed, Alert. Family present. States has no skin issues voiced. Has abrasion to Right inner facial noted.   Ramone score 16    Consult wound care for any skin issues         05/09/2025         [1]   Social History  Socioeconomic History    Marital status:    Occupational History    Occupation: retired   Tobacco Use    Smoking status: Every Day     Current packs/day: 0.50     Average packs/day: 0.5 packs/day for 54.4 years (27.2 ttl pk-yrs)     Types: Cigarettes     Start date: 1971     Passive exposure: Current    Smokeless tobacco: Never    Tobacco comments:     already did [place] tobacco quitline. not interested in trying again. states he will quit on his own   Substance and Sexual Activity    Alcohol use: Not Currently    Drug use: Not Currently     Types: Marijuana    Sexual activity: Not Currently     Social Drivers of Health     Financial Resource Strain: Low Risk  (5/9/2025)    Overall Financial Resource Strain (CARDIA)     Difficulty of Paying Living Expenses: Not very hard   Food Insecurity: No Food Insecurity (5/9/2025)    Hunger Vital Sign     Worried About Running Out of Food in the Last Year: Never true     Ran Out of Food in the Last Year: Never true   Transportation Needs: No Transportation Needs (5/9/2025)    PRAPARE - Transportation     Lack of Transportation (Medical): No     Lack of Transportation (Non-Medical): No   Physical Activity: Inactive (5/9/2025)    Exercise Vital Sign     Days of Exercise per Week: 0 days      Minutes of Exercise per Session: 0 min   Stress: No Stress Concern Present (5/9/2025)    [employer] - Occupational Stress Questionnaire     Feeling of Stress : Not at all   Housing Stability: Low Risk  (5/9/2025)    Housing Stability Vital Sign     Unable to Pay for Housing in the Last Year: No     Homeless in the Last Year: No

## 2025-05-09 NOTE — ASSESSMENT & PLAN NOTE
Patient with Hypoxic Respiratory failure which is Acute.  he is not on home oxygen. Supplemental oxygen was provided and noted- Oxygen Concentration (%):  [50] 50  Signs/symptoms of respiratory failure include- tachypnea. Contributing diagnoses includes - Pneumonia Labs and images were reviewed. Patient Has recent ABG, which has been reviewed. Will treat underlying causes and adjust management of respiratory failure as follows- venturi mask O2 @15/L, duonebs and continuous O2 monitoring.

## 2025-05-09 NOTE — ED NOTES
Pt approved and requested daughters number [phone] for activation code for [employer] to be sent via text. Pt being transferred to [facility] 549.

## 2025-05-09 NOTE — PROGRESS NOTES
[facility] - [facility] Tele[facility]  Adult Nutrition  First Assessment Note         Reason for Assessment  Reason For Assessment: identified at risk by screening criteria (MST2)        Assessment and Plan    Patient is a 71yo male admitted 5/8 for sepsis. He was identified at risk by screening criteria with MST2. He endorsed 2-13 pound weight loss and poor PO intakes.     Patient is 64.2kg with a BMI of 17.70 and is severely underweight per geriatric standards. Chart review reveals a >5% significant unintentional weight loss x 1 month and patient endorses poor PO intakes <50% for greater than one week. He also has severe fat and muscle depletion to orbital, upper arm, clavicle and temple regions.     Per ASPEN guidelines, patient meets criteria for severe protein-calorie malnutrition secondary to chronic catabolic illness as evidenced by >5% significant unintentional weight loss x 1 month, PO intakes <50% for greater than one week, and severe fat and muscle depletion to upper arm, orbital, clavicle and temple regions.     Last Bowel Movement: 05/06/25    Medications/labs reviewed. RD following.    Learning Needs/Social Determinants of Health    Learning Assessment       05/09/2025 0220 [facility] (5/8/2025 - Present)   Created by Serina Gonzalez RN - RN (Nurse) Status: Complete                 PRIMARY LEARNER     Primary Learner Name:  Bhaskar  - 05/09/2025 0220    Comment: Bhaskar Patel     Does the primary learner have any barriers to learning?:  No Barriers JW - 05/09/2025 0220    What is the preferred language of the primary learner?:  English JW - 05/09/2025 0220    Is an  required?:  No JW - 05/09/2025 0220    How does the primary learner prefer to learn new concepts?:  Listening JW - 05/09/2025 0220    How often do you need to have someone help you read instructions, pamphlets, or written material from your doctor or pharmacy?:  Never JW - 05/09/2025  0220        CO-LEARNER #1     No question answered        CO-LEARNER #2     No question answered        SPECIAL TOPICS     Are there any special topics the patient/guardian would like to review?:  Oxygen reasoning to keep on JW - 05/09/2025 0220        ANSWERED BY:     -:  Other JW - 05/09/2025 0220        Comments     Patient requires additional time to answer and all answers do not need to have the verbiage in     above college level format. Instead of using scientific verbiage, use more common nouns/prounouns     and verbs.        Edit History       Gonzalez, DAWSON Smalls - RN (Nurse)   05/09/2025 0220                             Social Drivers of Health     Tobacco Use: High Risk (5/9/2025)    Patient History     Smoking Tobacco Use: Every Day     Smokeless Tobacco Use: Never     Passive Exposure: Current   Alcohol Use: Not At Risk (12/17/2024)    AUDIT-C     Frequency of Alcohol Consumption: Never     Average Number of Drinks: Patient does not drink     Frequency of Binge Drinking: Never   Financial Resource Strain: Low Risk  (5/9/2025)    Overall Financial Resource Strain (CARDIA)     Difficulty of Paying Living Expenses: Not very hard   Food Insecurity: No Food Insecurity (5/9/2025)    Hunger Vital Sign     Worried About Running Out of Food in the Last Year: Never true     Ran Out of Food in the Last Year: Never true   Transportation Needs: No Transportation Needs (5/9/2025)    PRAPARE - Transportation     Lack of Transportation (Medical): No     Lack of Transportation (Non-Medical): No   Physical Activity: Sufficiently Active (12/17/2024)    Exercise Vital Sign     Days of Exercise per Week: 7 days     Minutes of Exercise per Session: 30 min   Stress: No Stress Concern Present (5/9/2025)    [employer] - Occupational Stress Questionnaire     Feeling of Stress : Not at all   Housing Stability: Low Risk  (5/9/2025)    Housing Stability Vital Sign     Unable to Pay for Housing in the  Last Year: No     Number of Times Moved in the Last Year: Not on file     Homeless in the Last Year: No   Depression: Low Risk  (5/8/2025)    Depression     Last PHQ-4: Flowsheet Data: 0   Utilities: Not At Risk (5/9/2025)    [facility] Utilities     Threatened with loss of utilities: No   Health Literacy: Adequate Health Literacy (5/9/2025)     Health Literacy     Frequency of need for help with medical instructions: Never   Social Isolation: Not on file          Malnutrition  Is Patient Malnourished: Yes Malnutrition Assessment  Malnutrition Context: chronic illness  Malnutrition Level: severe          Weight Loss (Malnutrition): greater than 10% in 6 months  Energy Intake (Malnutrition): less than or equal to 50% for greater than or equal to 5 days  Subcutaneous Fat (Malnutrition): severe depletion  Muscle Mass (Malnutrition): severe depletion   Orbital Region (Subcutaneous Fat Loss): severe depletion  Upper Arm Region (Subcutaneous Fat Loss): severe depletion   Imogene Region (Muscle Loss): severe depletion  Clavicle Bone Region (Muscle Loss): severe depletion                 Nutrition Diagnosis  Malnutrition (Severe) related to Catabolic illness and Chronic illness as evidenced by  >5% significant unintentional weight loss x 1 month, PO intakes <50% for greater than one week, and severe fat and muscle depletion to upper arm, orbital, clavicle and temple regions.   Comments: continue current diet as tolerated    Recent Labs   Lab 05/09/25  0359   GLU 95       Nutrition Prescription / Recommendations  Recommendation/Intervention: Recommend continue current diet as tolerated. Encourage good PO intakes.  Goals: Weight maintenance during admission, intake 50-75% of meals during admission  Nutrition Goal Status: new  Current Diet Order: Regular  Chewing or Swallowing Difficulty?: No Chewing or swallowing difficulty  Recommended Diet: Regular  Recommended Oral Supplement: No Oral Supplements  Is Nutrition Support  "Recommended: No  Is Nutrition Education Recommended: No    Monitor and Evaluation  % current Intake: New Diet order with no P.O. intake documented currently  % intake to meet estimated needs: 50 - 75 %  Monitor and Evaluation: Food and beverage intake, Diet order, Weight, Electrolyte and renal panel, Gastrointestinal profile, Glucose/endocrine profile, Inflammatory profile, Lipid profile  Food and beverage intake, Diet order, Weight, Electrolyte and renal panel, Gastrointestinal profile, Glucose/endocrine profile, Inflammatory profile, Lipid profile    Current Medical Diagnosis and Past Medical History  Diagnosis: infection/sepsis  Past Medical History:   Diagnosis Date    GERD (gastroesophageal reflux disease)     Hypertension        Nutrition/Diet History  Food Allergies: NKFA    Lab/Procedures/Meds  Recent Labs   Lab 05/09/25  0359   *   K 3.9   BUN 56*   CREATININE 2.68*   CALCIUM 7.3*   ALBUMIN 2.5*      ALT 89*   *   Note: Na+, Ca++ low. Recommend consider replete to WNL as appropriate. BUN, Cr elevated. Dx GEORGE. ALT, AST elevated. PMH liver disease. Alb low, likely related to poor PO intakes and liver disease.       Last A1c: No results found for: "HGBA1C"  Lab Results   Component Value Date    RBC 3.40 (L) 05/09/2025    HGB 11.1 (L) 05/09/2025    HCT 32.2 (L) 05/09/2025    MCV 94.7 05/09/2025    MCH 32.6 (H) 05/09/2025    MCHC 34.5 05/09/2025    TIBC 216 (L) 03/03/2025   Note: H&H low    Pertinent Labs Reviewed: reviewed  Pertinent Medications Reviewed: reviewed  Scheduled Meds:   albuterol-ipratropium  3 mL Nebulization Q6H WAKE    atorvastatin  80 mg Oral QHS    azithromycin  500 mg Intravenous Q24H    metoprolol succinate  25 mg Oral Daily    pantoprazole  40 mg Oral Daily    piperacillin-tazobactam (Zosyn) IV (PEDS and ADULTS) (extended infusion is not appropriate)  4.5 g Intravenous Q8H     Continuous Infusions:   0.9% NaCl   Intravenous Continuous 75 mL/hr at 05/09/25 0300 New Bag at " "05/09/25 0300     PRN Meds:.  Current Facility-Administered Medications:     acetaminophen, 1,000 mg, Oral, Q8H PRN    acetaminophen, 650 mg, Oral, Q4H PRN    aluminum-magnesium hydroxide-simethicone, 30 mL, Oral, QID PRN    dextrose 50%, 12.5 g, Intravenous, PRN    dextrose 50%, 25 g, Intravenous, PRN    docusate sodium, 100 mg, Oral, Daily PRN    glucagon (human recombinant), 1 mg, Intramuscular, PRN    glucose, 16 g, Oral, PRN    glucose, 24 g, Oral, PRN    melatonin, 6 mg, Oral, Nightly PRN    naloxone, 0.02 mg, Intravenous, PRN    ondansetron, 4 mg, Oral, Q8H PRN    prochlorperazine, 5 mg, Intravenous, Q6H PRN    sodium chloride 0.9%, 10 mL, Intravenous, Q12H PRN    Anthropometrics  Height: 6' 3" (190.5 cm)  Height (inches): 75 in  Height Method: Stated  Weight: 64.2 kg (141 lb 9.6 oz)  Weight (lb): 141.6 lb  Weight Method: Bed Scale  Ideal Body Weight (IBW), Male: 196 lb  % Ideal Body Weight, Male (lb): 72.24 %  BMI (Calculated): 17.7       Estimated/Assessed Needs  RMR (Canyon-St. Jeor Equation): 1482.92     Temp: 98.3 °F (36.8 °C)Oral  Weight Used For Calorie Calculations: 64.2 kg (141 lb 8.6 oz)     Energy Calorie Requirements (kcal): 1926-2247kcal (30-35kcal/kg)  Weight Used For Protein Calculations: 88.9 kg (196 lb)  Protein Requirements: 89-107g (1.0-1.2/kg ideal body weight)       RDA Method (mL): 1926       Nutrition by Nursing           Diet/Feeding Tolerance: poor                Nutrition Follow-Up  RD Follow-up?: Yes      Nutrition Discharge Planning: General healthy diet             Pat Colin MS, RD, LD  Available via Secure Chat  "

## 2025-05-09 NOTE — NURSING
Pt received from [facility] ambulance via stretcher. Pt on venturi mask at 50% 15L.  Pt concerned with gallbladder.  Has pain rating of 5 in midlower right quadrant.  Pt very weak and claimed to not have eaten in 2 days and has only drank some water.  Appears very malnourished. A&Ax4.  Pt v/s are as follows on admit:  140/71, 76, 97.6F, 95%, 16.   Pt has sisters at bedside. Doctor alerted to admitted to floor. Admissions known.

## 2025-05-09 NOTE — PLAN OF CARE
Problem: Adult Inpatient Plan of Care  Goal: Plan of Care Review  Outcome: Ongoing  Goal: Patient-Specific Goal (Individualized)  Outcome: Ongoing  Goal: Absence of Hospital-Acquired Illness or Injury  Outcome: Ongoing  Goal: Optimal Comfort and Wellbeing  Outcome: Ongoing  Goal: Readiness for Transition of Care  Outcome: Ongoing     Problem: Skin Injury Risk Increased  Goal: Skin Health and Integrity  Outcome: Ongoing     Problem: Sepsis/Septic Shock  Goal: Optimal Coping  Outcome: Ongoing  Goal: Absence of Bleeding  Outcome: Ongoing  Goal: Blood Glucose Level Within Targeted Range  Outcome: Ongoing  Goal: Absence of Infection Signs and Symptoms  Outcome: Ongoing  Goal: Optimal Nutrition Intake  Outcome: Ongoing     Problem: Acute Kidney Injury/Impairment  Goal: Fluid and Electrolyte Balance  Outcome: Ongoing  Goal: Improved Oral Intake  Outcome: Ongoing  Goal: Effective Renal Function  Outcome: Ongoing     Problem: Pneumonia  Goal: Fluid Balance  Outcome: Ongoing  Goal: Resolution of Infection Signs and Symptoms  Outcome: Ongoing  Goal: Effective Oxygenation and Ventilation  Outcome: Ongoing

## 2025-05-10 PROBLEM — R74.8 ELEVATED LIVER ENZYMES: Status: ACTIVE | Noted: 2025-05-10

## 2025-05-10 LAB — BACTERIA BLD CULT: NORMAL

## 2025-05-10 NOTE — PROGRESS NOTES
[facility] - [address] [place] [address]  Hematology/Oncology  Progress Note    Patient Name: Bhaskar Patel  Admission Date: 5/8/2025  Hospital Length of Stay: 2 days  Code Status: Full Code     Subjective:     Interval History: The patients platelet count is lower. Is that antibiotics were just changed yesterday and he is no longer on.Zosyn. Merrem was started. I would consider initiating corticosteroid therapy at this point. Consider using.Solumedrol 125mg. q8h.   I do not see any positive culture reports so far.    I discussed his case with his daughter. He is chronically ill with arteriosclerosis and heart disease, as well as renal and liver dysfunction.    Oncology Treatment Plan:   [No matching plan found]    Medications:  Continuous Infusions:   0.9% NaCl   Intravenous Continuous         Scheduled Meds:   albuterol-ipratropium  3 mL Nebulization Q6H WAKE    atorvastatin  80 mg Oral QHS    meropenem IV (PEDS and ADULTS)  500 mg Intravenous Q12H    metoprolol succinate  25 mg Oral Daily    pantoprazole  40 mg Oral Daily    vitamin D  1,000 Units Oral Daily     PRN Meds:    Current Facility-Administered Medications:     acetaminophen, 1,000 mg, Oral, Q8H PRN    acetaminophen, 650 mg, Oral, Q4H PRN    ALPRAZolam, 0.5 mg, Oral, TID PRN    aluminum-magnesium hydroxide-simethicone, 30 mL, Oral, QID PRN    dextrose 50%, 12.5 g, Intravenous, PRN    dextrose 50%, 25 g, Intravenous, PRN    docusate sodium, 100 mg, Oral, Daily PRN    glucagon (human recombinant), 1 mg, Intramuscular, PRN    glucose, 16 g, Oral, PRN    glucose, 24 g, Oral, PRN    melatonin, 6 mg, Oral, Nightly PRN    naloxone, 0.02 mg, Intravenous, PRN    ondansetron, 4 mg, Oral, Q8H PRN    prochlorperazine, 5 mg, Intravenous, Q6H PRN    sodium chloride 0.9%, 10 mL, Intravenous, Q12H PRN       Review of Systems  Objective:     Vital Signs (Most Recent):  Temp: 98.1 °F (36.7 °C) (05/10/25 0723)  Pulse: (!) 111 (05/10/25  0725)  Resp: 20 (05/10/25 0725)  BP: 137/71 (05/10/25 0723)  SpO2: (!) 82 % (room air) (05/10/25 0725)   Vital Signs (24h Range):  Temp:  [98 °F (36.7 °C)-99.9 °F (37.7 °C)] 98.1 °F (36.7 °C)  Pulse:  [103-114] 111  Resp:  [18-20] 20  SpO2:  [79 %-95 %] 82 %  BP: (123-154)/(63-72) 137/71       Weight: 64.2 kg (141 lb 9.6 oz)  Body mass index is 17.7 kg/m².  Body surface area is 1.84 meters squared.      Intake/Output Summary (Last 24 hours) at 5/10/2025 1120  Last data filed at 5/9/2025 1500  Gross per 24 hour   Intake 240 ml   Output --   Net 240 ml       Physical Exam    Significant Labs:   CBC:   Recent Labs   Lab 05/08/25  1522 05/09/25  0359 05/10/25  0402   WBC 6.04 5.36 4.71   HGB 11.6* 11.1* 10.8*   HCT 34.3* 32.2* 31.0*   PLT 25* 33* 27*     Diagnostic Results:  CT: Consider adding Solumedrol.   I have reviewed all pertinent imaging results/findings within the past 24 hours.    Assessment/Plan:     Active Diagnoses:    Diagnosis Date Noted POA    PRINCIPAL PROBLEM:  Sepsis [A41.9] 05/09/2025 Yes    Thrombocytopenia [D69.6] 05/09/2025 Yes    Acute hypoxemic respiratory failure [J96.01] 05/09/2025 Yes    Pneumonia [J18.9] 05/09/2025 Yes    GEORGE (acute kidney injury) [N17.9] 05/09/2025 Yes    Elevated troponin [R79.89] 05/09/2025 Yes    Acute liver disease [K76.9] 05/09/2025 Yes    Severe protein-calorie malnutrition [E43] 05/09/2025 Yes    Gastroesophageal reflux disease [K21.9] 10/06/2021 Yes    Cigarette nicotine dependence without complication [F17.210] 10/06/2021 Yes    Coronary artery disease involving native coronary artery of native heart without angina pectoris [I25.10] 09/01/2021 Yes    Essential hypertension [I10] 09/01/2021 Yes      Problems Resolved During this Admission:       Thank you for your consult. I will check back tomorrow.      Oliver Samson MD  Hematology/Oncology  [facility] - [address] [place] [address]

## 2025-05-10 NOTE — NURSING
Had a critical of 27 Platelet count from lab called to me. Sent Dr. Price a message about it - no new orders as Dr. Samson was consulted on 5/9/25 as well.

## 2025-05-10 NOTE — ASSESSMENT & PLAN NOTE
Nutrition consulted. Most recent weight and BMI monitored-     Measurements:  Wt Readings from Last 1 Encounters:   05/09/25 64.2 kg (141 lb 9.6 oz)   Body mass index is 17.7 kg/m².    Patient has been screened and assessed by RD.    Malnutrition Type:  Context: chronic illness  Level: severe    Malnutrition Characteristic Summary:  Weight Loss (Malnutrition): greater than 10% in 6 months  Energy Intake (Malnutrition): less than or equal to 50% for greater than or equal to 5 days  Subcutaneous Fat (Malnutrition): severe depletion  Muscle Mass (Malnutrition): severe depletion    Interventions/Recommendations (treatment strategy):  Recommend continue current diet as tolerated. Encourage good PO intakes.

## 2025-05-10 NOTE — NURSING
Received order for Xanax - educated pt on this order and usage/need for order. Pt refused. Will continue to further educate, monitor and care for pt on needs.

## 2025-05-10 NOTE — ASSESSMENT & PLAN NOTE
This patient does have evidence of infective focus  My overall impression is sepsis.  Source: Respiratory vs abdominal vs other  Antibiotics given-   Antibiotics (72h ago, onward)      Start     Stop Route Frequency Ordered    05/09/25 1845  meropenem injection 500 mg         -- IV Every 12 hours (non-standard times) 05/09/25 1739          Latest lactate reviewed-  Recent Labs   Lab 05/10/25  0935   LACTATE 2.1     Organ dysfunction indicated by Acute kidney injury, Acute liver injury, and Thrombocytopenia     Fluid challenge Actual Body Weight- The patient's actual body weight will be used to calculate the 30 ml/kg fluid bolus.     Post- resuscitation assessment Yes - I attest a sepsis perfusion exam was performed within 6 hours of sepsis, severe sepsis, or septic shock presentation, following fluid resuscitation.      Will Not start Pressors- Levophed for MAP of 65  Source control achieved by: IV antibiotics    Cultures negative to date aside from small bacteria on urine culture with ID pending.

## 2025-05-10 NOTE — ASSESSMENT & PLAN NOTE
GEORGE is likely due to acute renal dysfunction due to sepsis. Baseline creatinine is 1.18. Most recent creatinine and eGFR are listed below.  Recent Labs     05/08/25  1522 05/09/25  0359 05/10/25  0402   CREATININE 3.04* 2.68* 3.43*   EGFRNORACEVR 21* 25* 18*      Plan  - GEORGE is worsening. Will adjust treatment as follows: continuous IV fluids  - Avoid nephrotoxins and renally dose meds for GFR listed above  - Monitor urine output, serial BMP, and adjust therapy as needed

## 2025-05-10 NOTE — ASSESSMENT & PLAN NOTE
Patient's blood pressure range in the last 24 hours was: BP  Min: 123/63  Max: 154/67.The patient's inpatient anti-hypertensive regimen is listed below:  Current Antihypertensives  metoprolol succinate (TOPROL-XL) 24 hr tablet 25 mg, Daily, Oral    Plan  - BP is controlled, no changes needed to their regimen  - Losartan on hold due to GEORGE

## 2025-05-10 NOTE — ASSESSMENT & PLAN NOTE
Patient has a diagnosis of pneumonia. The cause of the pneumonia is suspected to be bacterial in etiology but organism is not known. The pneumonia is stable. The patient has the following signs/symptoms of pneumonia: persistent hypoxia  and shortness of breath. The patient does have a current oxygen requirement and the patient does not have a home oxygen requirement. I have reviewed the pertinent imaging. The following cultures have been collected: Blood cultures The culture results are listed below.     Current antimicrobial regimen consists of the antibiotics listed below. Will monitor patient closely and continue current treatment plan unchanged.    Antibiotics (From admission, onward)      Start     Stop Route Frequency Ordered    05/09/25 1845  meropenem injection 500 mg         -- IV Every 12 hours (non-standard times) 05/09/25 1739            Microbiology Results (last 7 days)       ** No results found for the last 168 hours. **

## 2025-05-10 NOTE — ASSESSMENT & PLAN NOTE
GI consult this AM. Continue to monitor labs.     CT abdomen with no acute findings to explain presenting symptoms.     Further evaluation in progress with plan of care pending results.     -----  On admission:     Abnormal liver enzymes in the setting of sepsis  , , , PTT 46.7, PC 25  U/S of abd reveals mild splenomegaly and hepatic steatosis   Monitor COMP

## 2025-05-10 NOTE — PROGRESS NOTES
[facility] - [facility] Hospital Medicine  Progress Note    Patient Name: Bhaskar Patel  MRN: [id number]  Patient Class: IP- Inpatient   Admission Date: 5/8/2025  Length of Stay: 2 days  Attending Physician: Estela Melendez DO  Primary Care Provider: Crys Jackson FNP        Subjective     Principal Problem:Sepsis        HPI:  Patient is a 69 y/o male with PMHx/PSHx of HTN, GERD, CAD (cardiac bypass), and left carotid endarterectomy who was transferred from [facility] to [facility] for nausea, abdominal pain and decrease PO intake. Pt states he developed decrease PO intake 1 week ago and later developed nausea and generalized abdominal pain. He describes his abdominal pain as 5/10, achy, intermittent without any radiation. He denies vomiting, diarrhea, congestion, rhinorrhea, sore throat, chest pain or chest tightness but reports intermittent cough, SOB, and fever. He denies any h/o COPD or PNA in the past. He denies any sick contacts. He was evaluated in the ER for nausea on 5/05 and was treated with zofran and IV fluids at that time. Of note, pt lives alone and ambulates with the use of any assistive device. He is able to do all ADLs. He has  60-pack-yr smoking hx and previous drank multiple beers on his days off quitting 2 yrs ago.     In the ED at [facility], his  vitals were /51, , RR 25, T 102.2 F, SpO2 87%. Labs were significant lactate 4.2-->1.9 PC 25, Na 130, BUN/CR 56/3.04, , , , PTT 46.7, Albumin 2.9, Ca 7.8, Trop 50.3. Negative for COVID/influenza. EKG reveals NSR. CXR reveals Diffuse emphysematous changes with no acute pulmonary process. He was treated with 2L bolus of NS, IV Zosyn 4.5 g x1, IV zithromax 500 mg x1, and duonebs x1.  Given the severity of his symptoms he has been admitted to [facility] to the family medicine service under the direct supervision of Dr. Albert Diego for the continue care and medical management of this patient.              Overview/Hospital Course:  No notes on file    Interval History:     No significant events overnight, no new complaints or concerns. CT chest and CT abdomen pelvis with few abnormal findings but no acute pathology and no clear etiology of presenting symptoms. GI evaluation this morning, oncology continues to follow with recommendations. Have started Solumedrol per recommendations. Additional labs pending.       Objective:     Vital Signs (Most Recent):  Temp: 98.4 °F (36.9 °C) (05/10/25 1143)  Pulse: 90 (05/10/25 1309)  Resp: 20 (05/10/25 1309)  BP: 128/75 (05/10/25 1143)  SpO2: (!) 92 % (05/10/25 1309) Vital Signs (24h Range):  Temp:  [98 °F (36.7 °C)-98.4 °F (36.9 °C)] 98.4 °F (36.9 °C)  Pulse:  [] 90  Resp:  [18-20] 20  SpO2:  [82 %-95 %] 92 %  BP: (123-154)/(63-75) 128/75     Weight: 64.2 kg (141 lb 9.6 oz)  Body mass index is 17.7 kg/m².    Intake/Output Summary (Last 24 hours) at 5/10/2025 1610  Last data filed at 5/10/2025 1200  Gross per 24 hour   Intake 480 ml   Output --   Net 480 ml         Physical Exam  Constitutional:       General: He is not in acute distress.     Appearance: Normal appearance. He is ill-appearing.   HENT:      Head: Normocephalic and atraumatic.      Nose: Nose normal.   Eyes:      Conjunctiva/sclera: Conjunctivae normal.      Pupils: Pupils are equal, round, and reactive to light.   Cardiovascular:      Rate and Rhythm: Normal rate and regular rhythm.   Pulmonary:      Effort: Pulmonary effort is normal. No respiratory distress.   Musculoskeletal:      Cervical back: No rigidity.   Skin:     Coloration: Skin is not jaundiced or pale.      Findings: No rash.   Neurological:      General: No focal deficit present.      Mental Status: He is alert.   Psychiatric:         Mood and Affect: Mood is anxious.         Behavior: Behavior normal.         Thought Content: Thought content normal.               Significant Labs: All pertinent labs within the past 24 hours have  been reviewed.    Significant Imaging: I have reviewed all pertinent imaging results/findings within the past 24 hours.      Assessment & Plan  Sepsis  This patient does have evidence of infective focus  My overall impression is sepsis.  Source: Respiratory vs abdominal vs other  Antibiotics given-   Antibiotics (72h ago, onward)      Start     Stop Route Frequency Ordered    05/09/25 1845  meropenem injection 500 mg         -- IV Every 12 hours (non-standard times) 05/09/25 1739          Latest lactate reviewed-  Recent Labs   Lab 05/10/25  0935   LACTATE 2.1     Organ dysfunction indicated by Acute kidney injury, Acute liver injury, and Thrombocytopenia     Fluid challenge Actual Body Weight- The patient's actual body weight will be used to calculate the 30 ml/kg fluid bolus.     Post- resuscitation assessment Yes - I attest a sepsis perfusion exam was performed within 6 hours of sepsis, severe sepsis, or septic shock presentation, following fluid resuscitation.      Will Not start Pressors- Levophed for MAP of 65  Source control achieved by: IV antibiotics    Cultures negative to date aside from small bacteria on urine culture with ID pending.   Acute hypoxemic respiratory failure  Patient with Hypoxic Respiratory failure which is Acute.  he is not on home oxygen. Supplemental oxygen was provided and noted- Oxygen Concentration (%):  [50] 50  Signs/symptoms of respiratory failure include- tachypnea. Contributing diagnoses includes - Pneumonia Labs and images were reviewed. Patient Has recent ABG, which has been reviewed. Will treat underlying causes and adjust management of respiratory failure as follows- venturi mask O2 @15/L, duonebs and continuous O2 monitoring.   Pneumonia  Patient has a diagnosis of pneumonia. The cause of the pneumonia is suspected to be bacterial in etiology but organism is not known. The pneumonia is stable. The patient has the following signs/symptoms of pneumonia: persistent hypoxia   and shortness of breath. The patient does have a current oxygen requirement and the patient does not have a home oxygen requirement. I have reviewed the pertinent imaging. The following cultures have been collected: Blood cultures The culture results are listed below.     Current antimicrobial regimen consists of the antibiotics listed below. Will monitor patient closely and continue current treatment plan unchanged.    Antibiotics (From admission, onward)      Start     Stop Route Frequency Ordered    05/09/25 1845  meropenem injection 500 mg         -- IV Every 12 hours (non-standard times) 05/09/25 1739            Microbiology Results (last 7 days)       ** No results found for the last 168 hours. **          Elevated troponin  ACS vs demand ischemia   No chest pain but reports SOB  EKG reveals NSR  Troponin 50.1-->trending troponins  -cardiac monitoring     GEORGE (acute kidney injury)  GEORGE is likely due to acute renal dysfunction due to sepsis. Baseline creatinine is 1.18. Most recent creatinine and eGFR are listed below.  Recent Labs     05/08/25  1522 05/09/25  0359 05/10/25  0402   CREATININE 3.04* 2.68* 3.43*   EGFRNORACEVR 21* 25* 18*      Plan  - GEORGE is worsening. Will adjust treatment as follows: continuous IV fluids  - Avoid nephrotoxins and renally dose meds for GFR listed above  - Monitor urine output, serial BMP, and adjust therapy as needed  Essential hypertension  Patient's blood pressure range in the last 24 hours was: BP  Min: 123/63  Max: 154/67.The patient's inpatient anti-hypertensive regimen is listed below:  Current Antihypertensives  metoprolol succinate (TOPROL-XL) 24 hr tablet 25 mg, Daily, Oral    Plan  - BP is controlled, no changes needed to their regimen  - Losartan on hold due to GEORGE  Coronary artery disease involving native coronary artery of native heart without angina pectoris  Patient with known CAD s/p CABG, which is controlled Will continue Statin and monitor for S/Sx of  angina/ACS. Continue to monitor on telemetry. ASA on hold given thrombocytopenia.   Gastroesophageal reflux disease  Continue protonix 40 mg     Cigarette nicotine dependence without complication  Dangers of cigarette smoking were reviewed with patient in detail. Patient was Referred to Tobacco Cessation Program. Nicotine replacement options were discussed. Nicotine replacement was discussed- will be prescribed.  Thrombocytopenia  The likely etiology of thrombocytopenia is sepsis vs other. The patients 3 most recent labs are listed below.  Recent Labs     05/08/25  1522 05/09/25  0359 05/10/25  0402   PLT 25* 33* 27*     Plan  - Will transfuse if platelet count is <20k.  - Oncology continues to follow; Solumedrol started today per recommendations    Acute liver disease  GI consult this AM. Continue to monitor labs.     CT abdomen with no acute findings to explain presenting symptoms.     Further evaluation in progress with plan of care pending results.     -----  On admission:     Abnormal liver enzymes in the setting of sepsis  , , , PTT 46.7, PC 25  U/S of abd reveals mild splenomegaly and hepatic steatosis   Monitor COMP    Severe protein-calorie malnutrition  Nutrition consulted. Most recent weight and BMI monitored-     Measurements:  Wt Readings from Last 1 Encounters:   05/09/25 64.2 kg (141 lb 9.6 oz)   Body mass index is 17.7 kg/m².    Patient has been screened and assessed by RD.    Malnutrition Type:  Context: chronic illness  Level: severe    Malnutrition Characteristic Summary:  Weight Loss (Malnutrition): greater than 10% in 6 months  Energy Intake (Malnutrition): less than or equal to 50% for greater than or equal to 5 days  Subcutaneous Fat (Malnutrition): severe depletion  Muscle Mass (Malnutrition): severe depletion    Interventions/Recommendations (treatment strategy):  Recommend continue current diet as tolerated. Encourage good PO intakes.    Elevated liver enzymes      VTE  Risk Mitigation (From admission, onward)           Ordered     IP VTE HIGH RISK PATIENT  Once         05/09/25 0149     Place sequential compression device  Until discontinued         05/09/25 0149     Reason for No Pharmacological VTE Prophylaxis  Once        Question:  Reasons:  Answer:  Thrombocytopenia    05/09/25 0149                    Discharge Planning   CHELSEA:      Code Status: Full Code   Medical Readiness for Discharge Date:   Discharge Plan A: Home                        Estela Melendez DO  Department of Hospital Medicine   [facility] - [facility]

## 2025-05-10 NOTE — SUBJECTIVE & OBJECTIVE
Interval History:     No significant events overnight, no new complaints or concerns. CT chest and CT abdomen pelvis with few abnormal findings but no acute pathology and no clear etiology of presenting symptoms. GI evaluation this morning, oncology continues to follow with recommendations. Have started Solumedrol per recommendations. Additional labs pending.       Objective:     Vital Signs (Most Recent):  Temp: 98.4 °F (36.9 °C) (05/10/25 1143)  Pulse: 90 (05/10/25 1309)  Resp: 20 (05/10/25 1309)  BP: 128/75 (05/10/25 1143)  SpO2: (!) 92 % (05/10/25 1309) Vital Signs (24h Range):  Temp:  [98 °F (36.7 °C)-98.4 °F (36.9 °C)] 98.4 °F (36.9 °C)  Pulse:  [] 90  Resp:  [18-20] 20  SpO2:  [82 %-95 %] 92 %  BP: (123-154)/(63-75) 128/75     Weight: 64.2 kg (141 lb 9.6 oz)  Body mass index is 17.7 kg/m².    Intake/Output Summary (Last 24 hours) at 5/10/2025 1610  Last data filed at 5/10/2025 1200  Gross per 24 hour   Intake 480 ml   Output --   Net 480 ml         Physical Exam  Constitutional:       General: He is not in acute distress.     Appearance: Normal appearance. He is ill-appearing.   HENT:      Head: Normocephalic and atraumatic.      Nose: Nose normal.   Eyes:      Conjunctiva/sclera: Conjunctivae normal.      Pupils: Pupils are equal, round, and reactive to light.   Cardiovascular:      Rate and Rhythm: Normal rate and regular rhythm.   Pulmonary:      Effort: Pulmonary effort is normal. No respiratory distress.   Musculoskeletal:      Cervical back: No rigidity.   Skin:     Coloration: Skin is not jaundiced or pale.      Findings: No rash.   Neurological:      General: No focal deficit present.      Mental Status: He is alert.   Psychiatric:         Mood and Affect: Mood is anxious.         Behavior: Behavior normal.         Thought Content: Thought content normal.               Significant Labs: All pertinent labs within the past 24 hours have been reviewed.    Significant Imaging: I have reviewed all  pertinent imaging results/findings within the past 24 hours.

## 2025-05-10 NOTE — CONSULTS
Gastroenterology Consult Note    Chief Complaint: elevated liver enzymes, hyperbilirubinemia     Consulted by:   Dr. Melendez     HPI:  Bhaskar Patel is a 70 y.o. WM with GERD, HTN, CAD s/p CABG, PAD s/p , celiac disease (DH with +serology), chronic thrombocytopenia that presents with nausea, abdominal pain, fever, and thrombocytopenia. Patient is a poor historian. Had an ER presentation with similar complaints on 25 with fever up to 102 and was discharged home - does not appear to have received antibiotics in the ER and no antibiotics prescribed that I can see. He followed up with PCP appearing acutely ill and was sent to the ER and started on antibiotics to cover for PNA - appears this was first abx exposure on 25 (zosyn, azithromycin). He has had worsening of thrombocytopenia resulting in hematology evaluation for ITP - antibiotics have been changed from zosyn to merrem with concern for antibiotic exposure causing acute worsening. Patient has a history of alcohol abuse, but denies alcohol for the last 3 years. Longtime smoker. No prior colonoscopy, but did have a negative cologuard in .       Past Medical History:   Diagnosis Date    GERD (gastroesophageal reflux disease)     Hypertension      Past Surgical History:   Procedure Laterality Date    CAROTID ENDARTERECTOMY Left 2022    Procedure: ENDARTERECTOMY-CAROTID;  Surgeon: Karen Spencer MD;  Location: [facility] OR;  Service: General;  Laterality: Left;    CORONARY ARTERY BYPASS GRAFT      HERNIA REPAIR      LEFT HEART CATHETERIZATION Left 9/15/2021    Procedure: Left heart cath;  Surgeon: Quoc Vo DO;  Location: [facility] CATH LAB;  Service: Cardiology;  Laterality: Left;     Family History   Problem Relation Name Age of Onset    Alzheimer's disease Mother      Polycystic kidney disease Mother      Hypertension Father      Heart disease Father      Cancer Sister      Heart disease Sister      Hypertension Sister    "      OBJECTIVE:  /75 (BP Location: Left arm, Patient Position: Sitting)   Pulse 93   Temp 98.4 °F (36.9 °C) (Oral)   Resp 18   Ht 6' 3" (1.905 m)   Wt 64.2 kg (141 lb 9.6 oz)   SpO2 (!) 93%   BMI 17.70 kg/m²   GEN: ill appearing, cachectic   HEENT: NCAT, MMM; anicteric   NECK: Supple, no LAD  CV: normal rate, regular rhythm; prior sternotomy   RESP: prolonged expiratory phase, mildly labored on simple facemask   ABD: NABS, ND, NT, soft, no guarding  EXT: No clubbing, cyanosis, or edema.  SKIN: Warm and dry; no rashes, petechiae, or spider angiomata   NEURO: AAO x3 but does seem to be slow to mentate at times. Afocal. Tremor of right hand    LABS:  I reviewed, CMP, CBC, Vit D    IMAGING:    Imaging reviewed personally; CT chest with severe emphysematous changes and AAA; CT abd/pelvis WO with limited evaluation of AAA (3-cm) but no appendicitis, liver/biliary abnormality     US reviewed - no cholelithiasis, CBD dilatation, hepatic steatosis; splenomegaly noted     ASSESSMENT:    70 y.o. WM with GERD, HTN, CAD s/p CABG, PAD s/p , celiac disease (DH with +serology), chronic thrombocytopenia that presents with nausea, abdominal pain, fever, and thrombocytopenia.    Acute abdominal pain  Fever (up to 102)   Acute on chronic Thrombocytopenia, Possible ITP   Hyperbilirubinemia, elevated liver enzymes   Unintentional weight loss (30+ lbs) with cachexia   Celiac Disease   GEORGE on CKD     PLAN:    Complex and very ill patient with uncertain underlying diagnosis. Imaging with US/CT unremarkable in regard to liver and gallbladder with no biliary dilatation, cholelithiasis, or imaging signs of cholecystitis despite pattern of LFT's. No imaging concern for cirrhosis, though splenomegaly noted; however, no signs on physical exam to suggest cirrhosis (no ascites, no spider angiomata, no asterixis, & synthetic liver function intact with normal INR). Interestingly, he is on Dapsone as outpatient for dermatitis " herpetiformis which should have helped in the setting of ITP. Downtrend of platelets appears to have started before antibiotics were initiated? Could have been a documentation issue if he received antibiotics earlier. Low haptoglobin and elevated LDH now with elevated bili and worsening renal function as well. Unclear if TTP would be in differential. Will defer to hematology. Steroids being started today.     AST/ALT ratio suspicious for alcoholic hepatitis, but patient denies alcohol for past few years - seems to be reliable and truthful. ALP (improving) and bili also elevated. Will continue to monitor trend and evaluate for acalculous cholecystitis given presentation.     Given history of celiac disease, weight loss, & fever, lymphoma (enteropathy associated T-cell lymphoma) would also be in the differential. I had previously recommended MRE to patient which was declined. If above workup is unremarkable or patient continues to worsen, could consider further evaluation. Though no definitive evidence of other organ involvement (mild LAD, splenomegaly non-specific) or thickened small bowel on routine CT w/o. Colonoscopy also recommended in the past which patient ultimately agreed to and is scheduled for July; I don't think urgent colonoscopy would be helpful in the acute setting at this time.     Recommend HIDA to evaluate for acalculous cholecystitis given fever, presentation (abd pain, nausea), and elevated LFT's (though somewhat improving)  Recommend surgical evaluation given ill appearance and possibility for acalculous cholecystitis   Recommend Echo to evaluate heart function, gila right heart dysfunction    Recommend daily hepatic function panel, INR  Case discussed with Dr. Melendez in person and by phone      Thank you for the consult and including me in the care of this patient. I will follow up with patient. Please call me with questions.     Joseph Alex MD  Gastroenterology

## 2025-05-10 NOTE — NURSING
Pt seemingly more anxious and another nurse found that Pt has a pack of cigarettes in bag from home. Education provided to Pt. Message sent to on call provider to get a PRN to help Pt with craving and anxiety that Pt is having while here receiving care.

## 2025-05-10 NOTE — ASSESSMENT & PLAN NOTE
The likely etiology of thrombocytopenia is sepsis vs other. The patients 3 most recent labs are listed below.  Recent Labs     05/08/25  1522 05/09/25  0359 05/10/25  0402   PLT 25* 33* 27*     Plan  - Will transfuse if platelet count is <20k.  - Oncology continues to follow; Solumedrol started today per recommendations

## 2025-05-10 NOTE — PLAN OF CARE
Problem: Pneumonia  Goal: Fluid Balance  Outcome: Progressing     Problem: Gas Exchange Impaired  Goal: Optimal Gas Exchange  Outcome: Progressing

## 2025-05-11 PROBLEM — D69.3 IMMUNE THROMBOCYTOPENIA: Status: ACTIVE | Noted: 2025-05-11

## 2025-05-11 NOTE — CONSULTS
[facility]  General Surgery  Consult Note    Inpatient consult to General Surgery  Consult performed by: Jose Guadalupe Marlow MD  Consult ordered by: Estela Melendez DO  Reason for consult: Abdominal pain with poor p.o. intake and nausea and emesis  Assessment/Recommendations: Concerning for possible gallbladder dysfunction.  Other etiologies has been discussed including underlying cirrhosis versus other functional disorders.  LFTs are mildly elevated but stable.  Ultrasound performed 5 days ago was normal and a HIDA scan is pending.  Overall is a high-risk patient given the thrombocytopenia in other underlying medical diagnoses.  Spoke with the patient's family in the room.        Subjective:     Chief Complaint/Reason for Admission:  Decreased p.o. intake elevated LFTs nausea or vomiting    History of Present Illness:  70-year-old male multiple medical problems including coronary artery disease thrombocytopenia acute renal insufficiency    No current facility-administered medications on file prior to encounter.     Current Outpatient Medications on File Prior to Encounter   Medication Sig    aspirin (ECOTRIN) 81 MG EC tablet Take 81 mg by mouth once daily.    atorvastatin (LIPITOR) 80 MG tablet Take 1 tablet (80 mg total) by mouth every evening.    dapsone 100 MG Tab Take 1 tablet (100 mg total) by mouth once daily.    docusate sodium (COLACE) 100 MG capsule Take 100 mg by mouth daily as needed.     ergocalciferol (ERGOCALCIFEROL) 50,000 unit Cap Take 1 capsule (50,000 Units total) by mouth every 7 days. for 8 doses    losartan (COZAAR) 100 MG tablet Take 1 tablet (100 mg total) by mouth once daily.    metoprolol succinate (TOPROL-XL) 25 MG 24 hr tablet Take 1 tablet (25 mg total) by mouth once daily.    omeprazole (PRILOSEC) 20 MG capsule Take 1 capsule (20 mg total) by mouth once daily.    ondansetron (ZOFRAN-ODT) 4 MG TbDL Take 1 tablet (4 mg total) by mouth every 8 (eight) hours as  needed (nausea).    triamcinolone acetonide 0.1% (KENALOG) 0.1 % cream Apply to AA on body BID PRN flares tapering with improvement       Review of patient's allergies indicates:  No Known Allergies    Past Medical History:   Diagnosis Date    GERD (gastroesophageal reflux disease)     Hypertension      Past Surgical History:   Procedure Laterality Date    CAROTID ENDARTERECTOMY Left 5/16/2022    Procedure: ENDARTERECTOMY-CAROTID;  Surgeon: Karen Spencer MD;  Location: [facility] OR;  Service: General;  Laterality: Left;    CORONARY ARTERY BYPASS GRAFT      HERNIA REPAIR      LEFT HEART CATHETERIZATION Left 9/15/2021    Procedure: Left heart cath;  Surgeon: Quoc Vo DO;  Location: [facility] CATH LAB;  Service: Cardiology;  Laterality: Left;     Family History       Problem Relation (Age of Onset)    Alzheimer's disease Mother    Cancer Sister    Heart disease Father, Sister    Hypertension Father, Sister    Polycystic kidney disease Mother          Tobacco Use    Smoking status: Every Day     Current packs/day: 0.50     Average packs/day: 0.5 packs/day for 54.4 years (27.2 ttl pk-yrs)     Types: Cigarettes     Start date: 1971     Passive exposure: Current    Smokeless tobacco: Never    Tobacco comments:     already did [place] tobacco quitline. not interested in trying again. states he will quit on his own   Substance and Sexual Activity    Alcohol use: Not Currently    Drug use: Not Currently     Types: Marijuana    Sexual activity: Not Currently     Review of Systems   Constitutional:  Positive for activity change, appetite change and fatigue. Negative for fever.   HENT:  Negative for trouble swallowing.    Respiratory:  Negative for cough and shortness of breath.    Cardiovascular:  Negative for chest pain and palpitations.   Gastrointestinal:  Positive for abdominal pain, nausea and vomiting. Negative for abdominal distention, blood in stool, constipation and diarrhea.   Genitourinary:  Negative for flank  "pain.   Musculoskeletal:  Negative for neck pain and neck stiffness.   Neurological:  Positive for weakness.     Objective:     Vital Signs (Most Recent):  Temp: 98.1 °F (36.7 °C) (05/11/25 0815)  Pulse: 105 (05/11/25 0815)  Resp: (!) 22 (05/11/25 0815)  BP: 133/63 (05/11/25 0815)  SpO2: 97 % (05/11/25 0815) Vital Signs (24h Range):  Temp:  [97.7 °F (36.5 °C)-98.4 °F (36.9 °C)] 98.1 °F (36.7 °C)  Pulse:  [] 105  Resp:  [] 22  SpO2:  [90 %-100 %] 97 %  BP: (121-145)/(63-88) 133/63     Weight: 64.2 kg (141 lb 9.6 oz)  Body mass index is 17.7 kg/m².      Intake/Output Summary (Last 24 hours) at 5/11/2025 1033  Last data filed at 5/11/2025 0646  Gross per 24 hour   Intake 720 ml   Output 1200 ml   Net -480 ml       Physical Exam  Constitutional:       General: He is not in acute distress.  HENT:      Head: Normocephalic.   Cardiovascular:      Rate and Rhythm: Normal rate and regular rhythm.      Pulses: Normal pulses.   Pulmonary:      Effort: Pulmonary effort is normal. No respiratory distress.      Breath sounds: Normal breath sounds.   Abdominal:      General: Abdomen is flat. There is no distension.      Palpations: Abdomen is soft.      Tenderness: There is abdominal tenderness.   Musculoskeletal:         General: Normal range of motion.   Skin:     General: Skin is warm.   Neurological:      General: No focal deficit present.         Significant Labs:  Amylase: No results for input(s): "AMYLASE" in the last 48 hours.  BMP:   Recent Labs   Lab 05/11/25  0510         K 4.0      CO2 16*   BUN 92*   CREATININE 4.34*   CALCIUM 7.0*     Cardiac markers: No results for input(s): "CKMB", "CPKMB", "TROPONINT", "TROPONINI", "MYOGLOBIN" in the last 48 hours.  CBC:   Recent Labs   Lab 05/11/25  0510   WBC 3.88*   RBC 3.51*   HGB 11.3*   HCT 33.0*   PLT 27*   MCV 94.0   MCH 32.2*   MCHC 34.2       Significant Diagnostics:  I have reviewed all pertinent imaging results/findings within the past 24 " hours.    Assessment/Plan:     Active Diagnoses:    Diagnosis Date Noted POA    PRINCIPAL PROBLEM:  Sepsis [A41.9] 05/09/2025 Yes    Elevated liver enzymes [R74.8] 05/10/2025 Yes    Thrombocytopenia [D69.6] 05/09/2025 Yes    Acute hypoxemic respiratory failure [J96.01] 05/09/2025 Yes    Pneumonia [J18.9] 05/09/2025 Yes    GEORGE (acute kidney injury) [N17.9] 05/09/2025 Yes    Elevated troponin [R79.89] 05/09/2025 Yes    Acute liver disease [K76.9] 05/09/2025 Yes    Severe protein-calorie malnutrition [E43] 05/09/2025 Yes    Gastroesophageal reflux disease [K21.9] 10/06/2021 Yes    Cigarette nicotine dependence without complication [F17.210] 10/06/2021 Yes    Coronary artery disease involving native coronary artery of native heart without angina pectoris [I25.10] 09/01/2021 Yes    Essential hypertension [I10] 09/01/2021 Yes      Problems Resolved During this Admission:       Thank you for your consult. I will follow-up with patient. Please contact us if you have any additional questions.    Jose Guadalupe Marlow MD  General Surgery  [facility]

## 2025-05-11 NOTE — NURSING
1930- pt lying in bed with family at bedside . Pt unable to void bladder scan completed 637 ml noted pt tolerated well . Not Dr Diego Orders to place Jones . Jones placed see epic pt tolerated well 800 ml Dark urine returned not DR Diego . No new orders at this time . Will give Angelina OSMAN report and tx all care  . Pt AAAOx3 VSS NAD noted at time of shift change .

## 2025-05-11 NOTE — ASSESSMENT & PLAN NOTE
The likely etiology of thrombocytopenia is sepsis vs other. The patients 3 most recent labs are listed below.  Recent Labs     05/09/25  0359 05/10/25  0402 05/11/25  0510   PLT 33* 27* 27*     Plan  - Will transfuse if platelet count is <20k.  - Oncology continues direct management  - Continue Solumedrol  - IVIG started today

## 2025-05-11 NOTE — ASSESSMENT & PLAN NOTE
GEORGE is likely due to acute renal dysfunction due to sepsis. Baseline creatinine is 1.18. Most recent creatinine and eGFR are listed below.  Recent Labs     05/09/25  0359 05/10/25  0402 05/11/25  0510   CREATININE 2.68* 3.43* 4.34*   EGFRNORACEVR 25* 18* 14*      Plan  - GEORGE is worsening. Will adjust treatment as follows: continuous IV fluids  - Avoid nephrotoxins and renally dose meds for GFR listed above  - Monitor urine output, serial BMP, and adjust therapy as needed  - Now with Jones catheter in place due to urinary retention  - Nephrology consult placed yesterday

## 2025-05-11 NOTE — ASSESSMENT & PLAN NOTE
This patient does have evidence of infective focus  My overall impression is sepsis.  Source: Respiratory vs abdominal vs other  Antibiotics given-   Antibiotics (72h ago, onward)      Start     Stop Route Frequency Ordered    05/11/25 0900  mupirocin 2 % ointment         05/16/25 0859 Nasl 2 times daily 05/11/25 0732    05/09/25 1845  meropenem injection 500 mg         -- IV Every 12 hours (non-standard times) 05/09/25 1739          Latest lactate reviewed-  Recent Labs   Lab 05/10/25  0935   LACTATE 2.1     Organ dysfunction indicated by Acute kidney injury, Acute liver injury, and Thrombocytopenia     Fluid challenge Actual Body Weight- The patient's actual body weight will be used to calculate the 30 ml/kg fluid bolus.     Post- resuscitation assessment Yes - I attest a sepsis perfusion exam was performed within 6 hours of sepsis, severe sepsis, or septic shock presentation, following fluid resuscitation.      Will Not start Pressors- Levophed for MAP of 65  Source control achieved by: IV antibiotics    Cultures negative to date aside from small bacteria on urine culture - ID ESBL sensitive to meropenem which patient is already receiving.

## 2025-05-11 NOTE — PROGRESS NOTES
[facility] Medicine  Progress Note    Patient Name: Bhaskar Patel  MRN: [id number]  Patient Class: IP- Inpatient   Admission Date: 5/8/2025  Length of Stay: 3 days  Attending Physician: Estela Melendez DO  Primary Care Provider: Crys Jackson FNP        Subjective     Principal Problem:Sepsis        HPI:  Patient is a 69 y/o male with PMHx/PSHx of HTN, GERD, CAD (cardiac bypass), and left carotid endarterectomy who was transferred from [facility] to [facility] for nausea, abdominal pain and decrease PO intake. Pt states he developed decrease PO intake 1 week ago and later developed nausea and generalized abdominal pain. He describes his abdominal pain as 5/10, achy, intermittent without any radiation. He denies vomiting, diarrhea, congestion, rhinorrhea, sore throat, chest pain or chest tightness but reports intermittent cough, SOB, and fever. He denies any h/o COPD or PNA in the past. He denies any sick contacts. He was evaluated in the ER for nausea on 5/05 and was treated with zofran and IV fluids at that time. Of note, pt lives alone and ambulates with the use of any assistive device. He is able to do all ADLs. He has  60-pack-yr smoking hx and previous drank multiple beers on his days off quitting 2 yrs ago.     In the ED at [facility], his  vitals were /51, , RR 25, T 102.2 F, SpO2 87%. Labs were significant lactate 4.2-->1.9 PC 25, Na 130, BUN/CR 56/3.04, , , , PTT 46.7, Albumin 2.9, Ca 7.8, Trop 50.3. Negative for COVID/influenza. EKG reveals NSR. CXR reveals Diffuse emphysematous changes with no acute pulmonary process. He was treated with 2L bolus of NS, IV Zosyn 4.5 g x1, IV zithromax 500 mg x1, and duonebs x1.  Given the severity of his symptoms he has been admitted to [facility] to the family medicine service under the direct supervision of Dr. Albert Diego for the continue care and medical management of this patient.             Overview/Hospital  Course:  No notes on file    Interval History:     No significant events overnight, no new complaints or concerns. Respiratory status has improved. Otherwise essentially unchanged from yesterday aside from worsening renal function. Starting IVIG today per heme-onc. HIDA pending.       Objective:     Vital Signs (Most Recent):  Temp: 98.1 °F (36.7 °C) (05/11/25 0815)  Pulse: 105 (05/11/25 0815)  Resp: (!) 22 (05/11/25 0815)  BP: 133/63 (05/11/25 0815)  SpO2: 97 % (05/11/25 0815) Vital Signs (24h Range):  Temp:  [97.7 °F (36.5 °C)-98.4 °F (36.9 °C)] 98.1 °F (36.7 °C)  Pulse:  [] 105  Resp:  [] 22  SpO2:  [90 %-100 %] 97 %  BP: (121-145)/(63-88) 133/63     Weight: 64.2 kg (141 lb 9.6 oz)  Body mass index is 17.7 kg/m².    Intake/Output Summary (Last 24 hours) at 5/11/2025 1117  Last data filed at 5/11/2025 0646  Gross per 24 hour   Intake 720 ml   Output 1200 ml   Net -480 ml         Physical Exam  Constitutional:       General: He is not in acute distress.     Appearance: Normal appearance. He is ill-appearing.   HENT:      Head: Normocephalic and atraumatic.      Nose: Nose normal.   Eyes:      Conjunctiva/sclera: Conjunctivae normal.      Pupils: Pupils are equal, round, and reactive to light.   Cardiovascular:      Rate and Rhythm: Normal rate and regular rhythm.   Pulmonary:      Effort: Pulmonary effort is normal. No respiratory distress.   Musculoskeletal:      Cervical back: No rigidity.   Skin:     Coloration: Skin is not jaundiced or pale.      Findings: No rash.   Neurological:      General: No focal deficit present.      Mental Status: He is alert.   Psychiatric:         Mood and Affect: Mood is anxious.         Behavior: Behavior normal.         Thought Content: Thought content normal.               Significant Labs: All pertinent labs within the past 24 hours have been reviewed.    Significant Imaging: I have reviewed all pertinent imaging results/findings within the past 24  hours.        Assessment & Plan  Sepsis  This patient does have evidence of infective focus  My overall impression is sepsis.  Source: Respiratory vs abdominal vs other  Antibiotics given-   Antibiotics (72h ago, onward)      Start     Stop Route Frequency Ordered    05/11/25 0900  mupirocin 2 % ointment         05/16/25 0859 Nasl 2 times daily 05/11/25 0732    05/09/25 1845  meropenem injection 500 mg         -- IV Every 12 hours (non-standard times) 05/09/25 1739          Latest lactate reviewed-  Recent Labs   Lab 05/10/25  0935   LACTATE 2.1     Organ dysfunction indicated by Acute kidney injury, Acute liver injury, and Thrombocytopenia     Fluid challenge Actual Body Weight- The patient's actual body weight will be used to calculate the 30 ml/kg fluid bolus.     Post- resuscitation assessment Yes - I attest a sepsis perfusion exam was performed within 6 hours of sepsis, severe sepsis, or septic shock presentation, following fluid resuscitation.      Will Not start Pressors- Levophed for MAP of 65  Source control achieved by: IV antibiotics    Cultures negative to date aside from small bacteria on urine culture - ID ESBL sensitive to meropenem which patient is already receiving.   Acute hypoxemic respiratory failure  Some improvement. Patient now on 5L @ 40%. No obvious pneumonia on CT chest. Patient does have evidence of emphysema and likely pleural effusions. Echocardiogram is pending. Continue antibiotics. Also on systemic steroids for thrombocytopenia which should also address any underlying COPD, etc.    -----  On admission:    Patient with Hypoxic Respiratory failure which is Acute.  he is not on home oxygen. Supplemental oxygen was provided and noted- Oxygen Concentration (%):  [40-50] 40    Signs/symptoms of respiratory failure include- tachypnea. Contributing diagnoses includes - Pneumonia Labs and images were reviewed. Patient Has recent ABG, which has been reviewed. Will treat underlying causes and  adjust management of respiratory failure as follows- venturi mask O2 @15/L, duonebs and continuous O2 monitoring.   Pneumonia  Patient has a diagnosis of pneumonia. The cause of the pneumonia is suspected to be bacterial in etiology but organism is not known. The pneumonia is stable. The patient has the following signs/symptoms of pneumonia: persistent hypoxia  and shortness of breath. The patient does have a current oxygen requirement and the patient does not have a home oxygen requirement. I have reviewed the pertinent imaging. The following cultures have been collected: Blood cultures The culture results are listed below.     Current antimicrobial regimen consists of the antibiotics listed below. Will monitor patient closely and continue current treatment plan unchanged.    Antibiotics (From admission, onward)      Start     Stop Route Frequency Ordered    05/11/25 0900  mupirocin 2 % ointment         05/16/25 0859 Nasl 2 times daily 05/11/25 0732    05/09/25 1845  meropenem injection 500 mg         -- IV Every 12 hours (non-standard times) 05/09/25 1739            Microbiology Results (last 7 days)       ** No results found for the last 168 hours. **          Elevated troponin  ACS vs demand ischemia   No chest pain but reports SOB  EKG reveals NSR  Troponin 50.1-->trending troponins  -cardiac monitoring     GEORGE (acute kidney injury)  GEORGE is likely due to acute renal dysfunction due to sepsis. Baseline creatinine is 1.18. Most recent creatinine and eGFR are listed below.  Recent Labs     05/09/25  0359 05/10/25  0402 05/11/25  0510   CREATININE 2.68* 3.43* 4.34*   EGFRNORACEVR 25* 18* 14*      Plan  - GEORGE is worsening. Will adjust treatment as follows: continuous IV fluids  - Avoid nephrotoxins and renally dose meds for GFR listed above  - Monitor urine output, serial BMP, and adjust therapy as needed  - Now with Jones catheter in place due to urinary retention  - Nephrology consult placed yesterday  Essential  hypertension  Patient's blood pressure range in the last 24 hours was: BP  Min: 121/84  Max: 145/72.The patient's inpatient anti-hypertensive regimen is listed below:  Current Antihypertensives  metoprolol succinate (TOPROL-XL) 24 hr tablet 25 mg, Daily, Oral    Plan  - BP is controlled, no changes needed to their regimen  - Losartan on hold due to GEORGE  Coronary artery disease involving native coronary artery of native heart without angina pectoris  Patient with known CAD s/p CABG, which is controlled Will continue Statin and monitor for S/Sx of angina/ACS. Continue to monitor on telemetry. ASA on hold given thrombocytopenia.   Gastroesophageal reflux disease  Continue protonix 40 mg     Cigarette nicotine dependence without complication  Dangers of cigarette smoking were reviewed with patient in detail. Patient was Referred to Tobacco Cessation Program. Nicotine replacement options were discussed. Nicotine replacement was discussed- will be prescribed.  Thrombocytopenia  The likely etiology of thrombocytopenia is sepsis vs other. The patients 3 most recent labs are listed below.  Recent Labs     05/09/25  0359 05/10/25  0402 05/11/25  0510   PLT 33* 27* 27*     Plan  - Will transfuse if platelet count is <20k.  - Oncology continues direct management  - Continue Solumedrol  - IVIG started today     Acute liver disease  Transaminitis and hyperbilirubinemia stable.     GI and surgery assisting. HIDA pending today.    CT abdomen with no acute findings to explain presenting symptoms.     Further evaluation in progress with plan of care pending results.     -----  On admission:     Abnormal liver enzymes in the setting of sepsis  , , , PTT 46.7, PC 25  U/S of abd reveals mild splenomegaly and hepatic steatosis   Monitor COMP    Severe protein-calorie malnutrition  Nutrition consulted. Most recent weight and BMI monitored-     Measurements:  Wt Readings from Last 1 Encounters:   05/09/25 64.2 kg (141  lb 9.6 oz)   Body mass index is 17.7 kg/m².    Patient has been screened and assessed by RD.    Malnutrition Type:  Context: chronic illness  Level: severe    Malnutrition Characteristic Summary:  Weight Loss (Malnutrition): greater than 10% in 6 months  Energy Intake (Malnutrition): less than or equal to 50% for greater than or equal to 5 days  Subcutaneous Fat (Malnutrition): severe depletion  Muscle Mass (Malnutrition): severe depletion    Interventions/Recommendations (treatment strategy):  Recommend continue current diet as tolerated. Encourage good PO intakes.    Elevated liver enzymes      VTE Risk Mitigation (From admission, onward)           Ordered     IP VTE HIGH RISK PATIENT  Once         05/09/25 0149     Place sequential compression device  Until discontinued         05/09/25 0149     Reason for No Pharmacological VTE Prophylaxis  Once        Question:  Reasons:  Answer:  Thrombocytopenia    05/09/25 0149                    Discharge Planning   CHELSEA:      Code Status: Full Code   Medical Readiness for Discharge Date:   Discharge Plan A: Home                        Estela Melendez DO  Department of Hospital Medicine   [facility]

## 2025-05-11 NOTE — PROGRESS NOTES
He has a low haptoglobin and persistent thrombocytopenia. I recommend IV immunoglobulin 30 grams. Premedicate with Benadryl 50mg po and Pepcid 20mg po. This appears autoimmune mediated.

## 2025-05-11 NOTE — ASSESSMENT & PLAN NOTE
Transaminitis and hyperbilirubinemia stable.     GI and surgery assisting. HIDA pending today.    CT abdomen with no acute findings to explain presenting symptoms.     Further evaluation in progress with plan of care pending results.     -----  On admission:     Abnormal liver enzymes in the setting of sepsis  , , , PTT 46.7, PC 25  U/S of abd reveals mild splenomegaly and hepatic steatosis   Monitor COMP

## 2025-05-11 NOTE — PROGRESS NOTES
He is critically ill.   He has pectus excavatum that I failed to mention, as well as COPD. We may need to go up further on steroids.   He is sleeping but awakens easily and responds appropriately. He is malnourished, if not cachectic.   See the other part of my note above.

## 2025-05-11 NOTE — ASSESSMENT & PLAN NOTE
Patient has a diagnosis of pneumonia. The cause of the pneumonia is suspected to be bacterial in etiology but organism is not known. The pneumonia is stable. The patient has the following signs/symptoms of pneumonia: persistent hypoxia  and shortness of breath. The patient does have a current oxygen requirement and the patient does not have a home oxygen requirement. I have reviewed the pertinent imaging. The following cultures have been collected: Blood cultures The culture results are listed below.     Current antimicrobial regimen consists of the antibiotics listed below. Will monitor patient closely and continue current treatment plan unchanged.    Antibiotics (From admission, onward)      Start     Stop Route Frequency Ordered    05/11/25 0900  mupirocin 2 % ointment         05/16/25 0859 Nasl 2 times daily 05/11/25 0732    05/09/25 1845  meropenem injection 500 mg         -- IV Every 12 hours (non-standard times) 05/09/25 1739            Microbiology Results (last 7 days)       ** No results found for the last 168 hours. **

## 2025-05-11 NOTE — PROGRESS NOTES
Omeprazole has apparently been d/cd. Lets leave off atorvaststin as well, at least temporarily. Both meds can cause thrombocytopenia rarely and induce autoimmune disorders. Consider bone marrow biopsy and aspirate with flow cytometry and cytogenetics.

## 2025-05-11 NOTE — PROGRESS NOTES
Gastroenterology Consult Note    Chief Complaint: elevated liver enzymes, hyperbilirubinemia     Consulted by:   Dr. Melendez     HPI:  Bhaskar Patel is a 70 y.o. WM with GERD, HTN, CAD s/p CABG, PAD s/p , celiac disease (DH with +serology), chronic thrombocytopenia that presents with nausea, abdominal pain, fever, and thrombocytopenia. Patient is a poor historian. Had an ER presentation with similar complaints on 25 with fever up to 102 and was discharged home - does not appear to have received antibiotics in the ER and no antibiotics prescribed that I can see. He followed up with PCP appearing acutely ill and was sent to the ER and started on antibiotics to cover for PNA - appears this was first abx exposure on 25 (zosyn, azithromycin). He has had worsening of thrombocytopenia resulting in hematology evaluation for ITP - antibiotics have been changed from zosyn to merrem with concern for antibiotic exposure causing acute worsening. Patient has a history of alcohol abuse, but denies alcohol for the last 3 years. Longtime smoker. No prior colonoscopy, but did have a negative cologuard in .     INTERVAL  - afebrile  - somnolent but awakes to verbal stimuli and conversive  - daughter, JONNY at bedside - all questions answered      Past Medical History:   Diagnosis Date    GERD (gastroesophageal reflux disease)     Hypertension      Past Surgical History:   Procedure Laterality Date    CAROTID ENDARTERECTOMY Left 2022    Procedure: ENDARTERECTOMY-CAROTID;  Surgeon: Karen Spencer MD;  Location: [facility] OR;  Service: General;  Laterality: Left;    CORONARY ARTERY BYPASS GRAFT      HERNIA REPAIR      LEFT HEART CATHETERIZATION Left 9/15/2021    Procedure: Left heart cath;  Surgeon: Quoc Vo DO;  Location: [facility] CATH LAB;  Service: Cardiology;  Laterality: Left;     Family History   Problem Relation Name Age of Onset    Alzheimer's disease Mother      Polycystic kidney disease Mother       "Hypertension Father      Heart disease Father      Cancer Sister      Heart disease Sister      Hypertension Sister         OBJECTIVE:  /63 (BP Location: Left arm, Patient Position: Lying)   Pulse 105   Temp 98.1 °F (36.7 °C) (Oral)   Resp (!) 22   Ht 6' 3" (1.905 m)   Wt 64.2 kg (141 lb 9.6 oz)   SpO2 97%   BMI 17.70 kg/m²   GEN: ill appearing, cachectic   HEENT: NCAT, MMM; anicteric   NECK: Supple, no LAD  CV: tachycardic, regular rhythm; prior sternotomy   RESP: prolonged expiratory phase, mildly labored on NC  ABD: NABS, ND, NT - though difficult to assess, soft, no guarding  EXT: No clubbing, cyanosis, or edema.  SKIN: Warm and dry; no rashes, petechiae, or spider angiomata   NEURO: AAO x3 but does seem to be slow to mentate at times. Afocal. Tremor of right hand    LABS:  I reviewed, CMP, CBC, INR    IMAGING:    Imaging reviewed personally; CT chest with severe emphysematous changes and AAA; CT abd/pelvis WO with limited evaluation of AAA (3-cm) but no appendicitis, liver/biliary abnormality     US reviewed - no cholelithiasis, CBD dilatation, hepatic steatosis; splenomegaly noted     HIDA, Echo pending     ASSESSMENT:    70 y.o. WM with GERD, HTN, CAD s/p CABG, PAD s/p , celiac disease (DH with +serology), chronic thrombocytopenia that presents with nausea, abdominal pain, fever, and thrombocytopenia.    Acute abdominal pain, N/V  Fever (up to 102 prior to presentation)   Acute on chronic Thrombocytopenia, Possible ITP   Hyperbilirubinemia, elevated liver enzymes   Unintentional weight loss (30+ lbs) with cachexia   Celiac Disease   GEORGE on CKD     PLAN:    Complex and very ill patient with uncertain underlying diagnosis. I don't favor underlying cirrhosis, see initial consult note. Interestingly, he is on Dapsone as outpatient for dermatitis herpetiformis which should have helped in the setting of ITP but can also cause hemolytic anemia, LFT elevation. Downtrend of platelets appears to have " started before antibiotics were initiated? Hematology note reviewed, ITP favored. Starting IVIG today given lack of response to steroids.     AST/ALT ratio suspicious for alcoholic hepatitis, but patient denies alcohol for past few years - seems to be reliable and truthful. ALP, Bili, ALT/AST elevated but relatively stable. Multiple possible etiologies including acalculous cholecystitis, drug induced (dapsone), alcohol, RHF, Sepsis - neg Bcx. Can also see enzyme elevation with celiac disease, but I doubt this is the culprit given ALP/Bili abnormality as well. Will continue to monitor trend; HIDA today to evaluate for acalculous cholecystitis given presentation.     Given history of celiac disease, weight loss, & fever, lymphoma would also be in the differential. No definitive evidence of other organ involvement (mild LAD, splenomegaly non-specific) or thickened small bowel on routine CT. Colonoscopy also recommended in the past which patient ultimately agreed to and is scheduled for July; I don't think urgent colonoscopy would be helpful in the acute setting at this time. Will plan for outpatient MRE after he improves (though previously refused by patient).     CHAS, Echo pending today - will follow up  Appreciate Dr. Marlow's evaluation and assistance with this patient    Recommend daily hepatic function panel, INR  Case discussed with Dr. Melendez in person       Thank you for the consult and including me in the care of this patient. I will follow up with patient. Please call me with questions.     Joseph Alex MD  Gastroenterology

## 2025-05-11 NOTE — ASSESSMENT & PLAN NOTE
Some improvement. Patient now on 5L @ 40%. No obvious pneumonia on CT chest. Patient does have evidence of emphysema and likely pleural effusions. Echocardiogram is pending. Continue antibiotics. Also on systemic steroids for thrombocytopenia which should also address any underlying COPD, etc.    -----  On admission:    Patient with Hypoxic Respiratory failure which is Acute.  he is not on home oxygen. Supplemental oxygen was provided and noted- Oxygen Concentration (%):  [40-50] 40    Signs/symptoms of respiratory failure include- tachypnea. Contributing diagnoses includes - Pneumonia Labs and images were reviewed. Patient Has recent ABG, which has been reviewed. Will treat underlying causes and adjust management of respiratory failure as follows- venturi mask O2 @15/L, duonebs and continuous O2 monitoring.

## 2025-05-11 NOTE — PLAN OF CARE
Problem: Adult Inpatient Plan of Care  Goal: Plan of Care Review  Outcome: Progressing  Goal: Patient-Specific Goal (Individualized)  Outcome: Progressing  Goal: Absence of Hospital-Acquired Illness or Injury  Outcome: Progressing  Goal: Optimal Comfort and Wellbeing  Outcome: Progressing  Goal: Readiness for Transition of Care  Outcome: Progressing     Problem: Skin Injury Risk Increased  Goal: Skin Health and Integrity  Outcome: Progressing     Problem: Sepsis/Septic Shock  Goal: Optimal Coping  Outcome: Progressing  Goal: Absence of Bleeding  Outcome: Progressing  Goal: Blood Glucose Level Within Targeted Range  Outcome: Progressing  Goal: Absence of Infection Signs and Symptoms  Outcome: Progressing  Goal: Optimal Nutrition Intake  Outcome: Progressing     Problem: Acute Kidney Injury/Impairment  Goal: Fluid and Electrolyte Balance  Outcome: Progressing  Goal: Improved Oral Intake  Outcome: Progressing  Goal: Effective Renal Function  Outcome: Progressing     Problem: Pneumonia  Goal: Fluid Balance  Outcome: Progressing  Goal: Resolution of Infection Signs and Symptoms  Outcome: Progressing  Goal: Effective Oxygenation and Ventilation  Outcome: Progressing     Problem: Gas Exchange Impaired  Goal: Optimal Gas Exchange  Outcome: Progressing     Problem: Infection  Goal: Absence of Infection Signs and Symptoms  Outcome: Progressing

## 2025-05-11 NOTE — ASSESSMENT & PLAN NOTE
Patient's blood pressure range in the last 24 hours was: BP  Min: 121/84  Max: 145/72.The patient's inpatient anti-hypertensive regimen is listed below:  Current Antihypertensives  metoprolol succinate (TOPROL-XL) 24 hr tablet 25 mg, Daily, Oral    Plan  - BP is controlled, no changes needed to their regimen  - Losartan on hold due to GEORGE

## 2025-05-11 NOTE — SUBJECTIVE & OBJECTIVE
Interval History:     No significant events overnight, no new complaints or concerns. Respiratory status has improved. Otherwise essentially unchanged from yesterday aside from worsening renal function. Starting IVIG today per heme-onc. HIDA pending.       Objective:     Vital Signs (Most Recent):  Temp: 98.1 °F (36.7 °C) (05/11/25 0815)  Pulse: 105 (05/11/25 0815)  Resp: (!) 22 (05/11/25 0815)  BP: 133/63 (05/11/25 0815)  SpO2: 97 % (05/11/25 0815) Vital Signs (24h Range):  Temp:  [97.7 °F (36.5 °C)-98.4 °F (36.9 °C)] 98.1 °F (36.7 °C)  Pulse:  [] 105  Resp:  [] 22  SpO2:  [90 %-100 %] 97 %  BP: (121-145)/(63-88) 133/63     Weight: 64.2 kg (141 lb 9.6 oz)  Body mass index is 17.7 kg/m².    Intake/Output Summary (Last 24 hours) at 5/11/2025 1117  Last data filed at 5/11/2025 0646  Gross per 24 hour   Intake 720 ml   Output 1200 ml   Net -480 ml         Physical Exam  Constitutional:       General: He is not in acute distress.     Appearance: Normal appearance. He is ill-appearing.   HENT:      Head: Normocephalic and atraumatic.      Nose: Nose normal.   Eyes:      Conjunctiva/sclera: Conjunctivae normal.      Pupils: Pupils are equal, round, and reactive to light.   Cardiovascular:      Rate and Rhythm: Normal rate and regular rhythm.   Pulmonary:      Effort: Pulmonary effort is normal. No respiratory distress.   Musculoskeletal:      Cervical back: No rigidity.   Skin:     Coloration: Skin is not jaundiced or pale.      Findings: No rash.   Neurological:      General: No focal deficit present.      Mental Status: He is alert.   Psychiatric:         Mood and Affect: Mood is anxious.         Behavior: Behavior normal.         Thought Content: Thought content normal.               Significant Labs: All pertinent labs within the past 24 hours have been reviewed.    Significant Imaging: I have reviewed all pertinent imaging results/findings within the past 24 hours.

## 2025-05-12 PROBLEM — E83.51 HYPOCALCEMIA: Status: ACTIVE | Noted: 2025-05-12

## 2025-05-12 PROBLEM — R11.2 NAUSEA AND VOMITING: Status: ACTIVE | Noted: 2025-05-12

## 2025-05-12 PROBLEM — R10.13 EPIGASTRIC PAIN: Status: ACTIVE | Noted: 2025-01-01

## 2025-05-12 NOTE — ASSESSMENT & PLAN NOTE
Followed by Oncology; on IVIG and Solumedrol; PLT count 20 and he is having signs of bleed from his mouth; will leave to Oncology

## 2025-05-12 NOTE — PROGRESS NOTES
TRANSFER NOTE    This patient is acutely ill with multiple serious medical issues that require higher level of care.  He is followed by GI, Oncology, Nephrology, and Surgery.  Please refer to my progress note for full details.  I spoke to Dr. Camarillo, who graciously accepted this patient into the ICU for higher level of care.  I also spoke to Jeanine Guerrero in the ICU and Batsheva, the nursing supervisor.  Will transfer the patient to the ICU.

## 2025-05-12 NOTE — PLAN OF CARE
Problem: Skin Injury Risk Increased  Goal: Skin Health and Integrity  Outcome: Progressing     Problem: Pneumonia  Goal: Effective Oxygenation and Ventilation  Outcome: Progressing     Problem: Gas Exchange Impaired  Goal: Optimal Gas Exchange  Outcome: Progressing

## 2025-05-12 NOTE — PLAN OF CARE
Problem: Adult Inpatient Plan of Care  Goal: Plan of Care Review  Outcome: Ongoing  Goal: Patient-Specific Goal (Individualized)  Outcome: Ongoing  Goal: Absence of Hospital-Acquired Illness or Injury  Outcome: Ongoing  Goal: Optimal Comfort and Wellbeing  Outcome: Ongoing  Goal: Readiness for Transition of Care  Outcome: Ongoing     Problem: Skin Injury Risk Increased  Goal: Skin Health and Integrity  Outcome: Ongoing     Problem: Sepsis/Septic Shock  Goal: Optimal Coping  Outcome: Ongoing  Goal: Absence of Bleeding  Outcome: Ongoing  Goal: Blood Glucose Level Within Targeted Range  Outcome: Ongoing  Goal: Absence of Infection Signs and Symptoms  Outcome: Ongoing  Goal: Optimal Nutrition Intake  Outcome: Ongoing     Problem: Acute Kidney Injury/Impairment  Goal: Fluid and Electrolyte Balance  Outcome: Ongoing  Goal: Improved Oral Intake  Outcome: Ongoing  Goal: Effective Renal Function  Outcome: Ongoing     Problem: Pneumonia  Goal: Fluid Balance  Outcome: Ongoing  Goal: Resolution of Infection Signs and Symptoms  Outcome: Ongoing  Goal: Effective Oxygenation and Ventilation  Outcome: Ongoing     Problem: Gas Exchange Impaired  Goal: Optimal Gas Exchange  Outcome: Ongoing     Problem: Infection  Goal: Absence of Infection Signs and Symptoms  Outcome: Ongoing

## 2025-05-12 NOTE — ASSESSMENT & PLAN NOTE
Nutrition consulted. Most recent weight and BMI monitored-     Measurements:  Wt Readings from Last 1 Encounters:   05/12/25 65.5 kg (144 lb 6.4 oz)   Body mass index is 18.05 kg/m².    Patient has been screened and assessed by RD.    Malnutrition Type:  Context: chronic illness  Level: severe    Malnutrition Characteristic Summary:  Weight Loss (Malnutrition): greater than 10% in 6 months  Energy Intake (Malnutrition): less than or equal to 50% for greater than or equal to 5 days  Subcutaneous Fat (Malnutrition): severe depletion  Muscle Mass (Malnutrition): severe depletion    Interventions/Recommendations (treatment strategy):  Recommend continue current diet as tolerated. Encourage good PO intakes.

## 2025-05-12 NOTE — PROGRESS NOTES
[facility]  Nephrology  Progress Note    Patient Name: Bhaskar Patel  MRN: [id number]  Admission Date: 5/8/2025  Hospital Length of Stay: 4 days  Attending Provider: Jaycee Marinelli DO   Primary Care Physician: Crys Jackson FNP  Principal Problem:Sepsis    Subjective:     HPI: This patient with multiple medical problems of COPD, CAD weight loss was admitted for sepsis.  His serum creatinine has trended up over the past several days.  According to the patient's family, he has been complaining of abdominal pain and weight loss.  He was recently diagnosed with Celiac disease.  Today, nephrology is consulted for GEORGE.  Serum creatinine is 4.34.  Serum potassium is 4.    Interval History: The patient's condition is about the same.  He will open his eyes more this morning.  Serum  potassium is 4.1.  Creatinine is 5.2.    Review of patient's allergies indicates:  No Known Allergies  Current Facility-Administered Medications   Medication Frequency    acetaminophen suppository 650 mg Q6H PRN    acetaminophen tablet 1,000 mg Q8H PRN    albuterol-ipratropium 2.5 mg-0.5 mg/3 mL nebulizer solution 3 mL Q6H WAKE    ALPRAZolam tablet 0.5 mg TID PRN    aluminum-magnesium hydroxide-simethicone 200-200-20 mg/5 mL suspension 30 mL QID PRN    dextrose 50% injection 12.5 g PRN    dextrose 50% injection 25 g PRN    diphenhydrAMINE capsule 50 mg Daily PRN    docusate sodium capsule 100 mg Daily PRN    famotidine tablet 20 mg Daily PRN    glucagon (human recombinant) injection 1 mg PRN    glucose chewable tablet 16 g PRN    glucose chewable tablet 24 g PRN    Immune Globulin G (IGG)-PRO-IGA 10 % injection (Privigen) 10 % injection 30 g Q24H    melatonin tablet 6 mg Nightly PRN    meropenem injection 500 mg Q12H    methylPREDNISolone sodium succinate injection 125 mg Q8H    metoprolol succinate (TOPROL-XL) 24 hr tablet 25 mg Daily    mupirocin 2 % ointment BID    naloxone 0.4 mg/mL injection 0.02 mg PRN     ondansetron disintegrating tablet 4 mg Q8H PRN    pantoprazole EC tablet 40 mg Daily    prochlorperazine injection Soln 5 mg Q6H PRN    sodium bicarbonate 75 mEq in D5 and 0.45% NaCl 1,000 mL infusion Continuous    sodium chloride 0.9% flush 10 mL Q12H PRN    vitamin D 1000 units tablet 1,000 Units Daily       Objective:     Vital Signs (Most Recent):  Temp: 98.4 °F (36.9 °C) (05/12/25 0802)  Pulse: 103 (05/12/25 0802)  Resp: 20 (05/12/25 0802)  BP: (!) 131/53 (05/12/25 0802)  SpO2: (!) 86 % (05/12/25 0802) Vital Signs (24h Range):  Temp:  [97.7 °F (36.5 °C)-100.5 °F (38.1 °C)] 98.4 °F (36.9 °C)  Pulse:  [] 103  Resp:  [18-24] 20  SpO2:  [86 %-98 %] 86 %  BP: (121-146)/(53-88) 131/53     Weight: 64.2 kg (141 lb 9.6 oz) (05/09/25 0313)  Body mass index is 17.7 kg/m².  Body surface area is 1.84 meters squared.    I/O last 3 completed shifts:  In: 760 [I.V.:760]  Out: 2450 [Urine:2450]     Physical Exam  Vitals reviewed.   Constitutional:       Appearance: He is ill-appearing.   HENT:      Head: Normocephalic and atraumatic.   Eyes:      Pupils: Pupils are equal, round, and reactive to light.   Cardiovascular:      Rate and Rhythm: Regular rhythm.   Pulmonary:      Effort: Pulmonary effort is normal.      Breath sounds: Normal breath sounds.   Abdominal:      General: Abdomen is flat.      Palpations: Abdomen is soft.   Musculoskeletal:      Cervical back: Neck supple.   Skin:     General: Skin is warm.   Neurological:      Mental Status: He is alert.   Psychiatric:         Mood and Affect: Mood normal.          Significant Labs:  BMP:   Recent Labs   Lab 05/08/25  1522 05/09/25  0359 05/12/25  0334      < > 157*   *   < > 137   K 4.1   < > 4.1   CL 98   < > 105   CO2 19*   < > 20*   BUN 56*   < > 109*   CREATININE 3.04*   < > 5.29*   CALCIUM 7.8*   < > 6.3*   MG 1.7  --   --     < > = values in this interval not displayed.     CBC:   Recent Labs   Lab 05/12/25  0334   WBC 4.45*   RBC 3.00*   HGB  9.6*   HCT 27.9*   PLT 20*   MCV 93.0   MCH 32.0*   MCHC 34.4        Significant Imaging:  Labs: Reviewed  Assessment/Plan:     Pulmonary  Chronic obstructive pulmonary disease, unspecified COPD type  Chronic condition    Renal/  GEORGE (acute kidney injury)  Renal ultrasound  BMP  Strict I's O's  Hepatitis panel  Continue to monitor        Thank you for your consult. I will follow-up with patient. Please contact us if you have any additional questions.    Miguel A Garg Jr, MD  Nephrology  [facility]

## 2025-05-12 NOTE — HPI
This patient with multiple medical problems of COPD, CAD weight loss was admitted for sepsis.  His serum creatinine has trended up over the past several days.  According to the patient's family, he has been complaining of abdominal pain and weight loss.  He was recently diagnosed with Celiac disease.  Today, nephrology is consulted for GEORGE.  Serum creatinine is 4.34.  Serum potassium is 4.

## 2025-05-12 NOTE — ASSESSMENT & PLAN NOTE
S Cr 5.29 today, up from 4.34. Likely 2/2 to sepsis component  - Nephrology is following  - renal US showed no stones or hydronephrosis, impression: Increased renal echogenicity, suggesting medical renal disease.  - avoid nephrotoxic agents  - renally dose medications  - strict I&O  - daily BMP

## 2025-05-12 NOTE — PROGRESS NOTES
[facility]  Critical Care Medicine  Progress Note    Patient Name: Bhaskar Patel  MRN: [id number]  Admission Date: 5/8/2025  Hospital Length of Stay: 4 days  Code Status: Full Code  Attending Provider: Lam Camarillo MD  Primary Care Provider: Crys Jackson FNP   Principal Problem: Sepsis    Subjective:     HPI:  As per H&P:  69 y/o male with PMHx/PSHx of HTN, GERD, CAD (cardiac bypass), and left carotid endarterectomy who was transferred from [facility] on 5/8/25 to [facility] for nausea, abdominal pain and decrease PO intake. Pt states he developed decrease PO intake 1 week ago and later developed nausea and generalized abdominal pain. He describes his abdominal pain as 5/10, achy, intermittent without any radiation. He denies vomiting, diarrhea, congestion, rhinorrhea, sore throat, chest pain or chest tightness but reports intermittent cough, SOB, and fever. He denies any h/o COPD or PNA in the past. He denies any sick contacts. He was evaluated in the ER for nausea on 5/05 and was treated with zofran and IV fluids at that time. Of note, pt lives alone and ambulates without the use of any assistive device. He is able to do all ADLs. He has 60-pack-yr smoking hx and previous drank multiple beers on his days off quitting 2 yrs ago. He was subsequently admitted to the hospital medicine service at [facility] for continuation of care. He is now being followed by Nephrology, Hematology/Oncology, GI, & General Surgery.     Hospital/ICU Course:  5/12/25: Dr. Marinelli contacted me this morning about transferring this patient to the ICU for higher level of care. He has been in-patient since 5/8/25 & continues to be thrombocytopenic, unsure of cause. Nephrology is following for ARF, Hematology/Oncology is following for persistent thrombocytopenia, General surgery is following for abdominal pain (ruling out gallbladder concerns), & GI is following & recommended HIDA scan to evaluate for acalculous  cholecystitis given fever, presentation (abd pain, nausea), and elevated LFT's (though somewhat improving), ECHO to evaluate heart function, & daily INR & hepatic function panel.     On admission to the ICU, patient is confused, agitated, & uncooperative. No active bleeding noted. Vital signs are stable. Family is at bedside. Will continue to monitor & treat as necessary.     Interval History/Significant Events: Please refer to hospital course.     Review of Systems   Unable to perform ROS: Acuity of condition     Objective:     Vital Signs (Most Recent):  Temp: 99.2 °F (37.3 °C) (05/12/25 1409)  Pulse: 103 (05/12/25 0802)  Resp: 20 (05/12/25 0802)  BP: (!) 131/53 (05/12/25 0802)  SpO2: (!) 86 % (05/12/25 0802) Vital Signs (24h Range):  Temp:  [97.7 °F (36.5 °C)-100.5 °F (38.1 °C)] 99.2 °F (37.3 °C)  Pulse:  [] 103  Resp:  [18-22] 20  SpO2:  [86 %-97 %] 86 %  BP: (121-137)/(53-85) 131/53   Weight: 65.5 kg (144 lb 6.4 oz)  Body mass index is 18.05 kg/m².      Intake/Output Summary (Last 24 hours) at 5/12/2025 1439  Last data filed at 5/12/2025 0559  Gross per 24 hour   Intake 760.04 ml   Output 950 ml   Net -189.96 ml          Physical Exam  Vitals and nursing note reviewed.   Constitutional:       General: He is awake. He is not in acute distress.     Appearance: He is cachectic. He is ill-appearing.      Interventions: Face mask in place.   HENT:      Head: Normocephalic.      Mouth/Throat:      Mouth: Mucous membranes are moist.   Eyes:      Pupils: Pupils are equal, round, and reactive to light.   Cardiovascular:      Rate and Rhythm: Regular rhythm. Tachycardia present.      Pulses: Normal pulses.   Pulmonary:      Effort: Pulmonary effort is normal.      Breath sounds: Normal breath sounds.   Abdominal:      General: Bowel sounds are normal.      Palpations: Abdomen is soft.      Tenderness: There is abdominal tenderness (generalized).   Musculoskeletal:      Right lower leg: No edema.      Left lower  leg: No edema.   Skin:     General: Skin is warm and dry.      Capillary Refill: Capillary refill takes 2 to 3 seconds.   Neurological:      Mental Status: He is disoriented and confused.   Psychiatric:         Mood and Affect: Mood is anxious.         Behavior: Behavior is uncooperative and agitated.            Vents:  Oxygen Concentration (%): 50 (05/12/25 1430)  Lines/Drains/Airways       Drain  Duration                  Urethral Catheter 05/10/25 1957 Straight-tip 16 Fr. 1 day              Peripheral Intravenous Line  Duration                  Peripheral IV - Single Lumen 05/09/25 20 G Anterior;Right Forearm 3 days         Peripheral IV - Single Lumen 05/11/25 1103 22 G Left;Posterior Forearm 1 day                  Significant Labs:    CBC/Anemia Profile:  Recent Labs   Lab 05/11/25  0510 05/12/25  0334   WBC 3.88* 4.45*   HGB 11.3* 9.6*   HCT 33.0* 27.9*   PLT 27* 20*   MCV 94.0 93.0   RDW 13.7 13.6        Chemistries:  Recent Labs   Lab 05/11/25  0510 05/12/25  0334    137   K 4.0 4.1    105   CO2 16* 20*   BUN 92* 109*   CREATININE 4.34* 5.29*   CALCIUM 7.0* 6.3*   ALBUMIN 2.2* 1.8*   PROT 6.0 5.7*   BILITOT 2.5* 2.5*   ALKPHOS 239* 240*   ALT 91* 70*   * 260*       All pertinent labs within the past 24 hours have been reviewed.    Significant Imaging:  I have reviewed all pertinent imaging results/findings within the past 24 hours.    ABG  Recent Labs   Lab 05/08/25  1557   PH 7.40   PO2 56*   PCO2 32*   HCO3 19.8*     Assessment/Plan:     Pulmonary  Acute hypoxemic respiratory failure  Patient with Hypoxic Respiratory failure which is Acute on chronic.  he is not on home oxygen. Supplemental oxygen was provided and noted- Oxygen Concentration (%):  [40-50] 50    .   Signs/symptoms of respiratory failure include- tachypnea, increased work of breathing, and respiratory distress. Contributing diagnoses includes - COPD Labs and images were reviewed. Patient Has not had a recent ABG. Will  treat underlying causes and adjust management of respiratory failure as follows- 50% venti mask in use. ABGs PRN    Cardiac/Vascular  Elevated troponin  Elevated initially at 50, now resolved    Hyperlipidemia  Noted  - currently holding statin due to persistent thrombocytopenia    Coronary artery disease involving native coronary artery of native heart without angina pectoris  Noted  - s/p CABG  - continuous cardiac monitoring  - 12 Lead EKG PRN       Essential hypertension  Noted  - BP stable currently  - Holding home antihypertensives for now    Renal/  Hypocalcemia  Calcium 6.3- when corrected for an albumin of 1.8 it is 8.1- This is likely 2/2 to malnutrition    GEORGE (acute kidney injury)  S Cr 5.29 today, up from 4.34. Likely 2/2 to sepsis component  - Nephrology is following  - renal US showed no stones or hydronephrosis, impression: Increased renal echogenicity, suggesting medical renal disease.  - avoid nephrotoxic agents  - renally dose medications  - strict I&O  - daily BMP       Endocrine  Severe protein-calorie malnutrition  Nutrition consulted. Most recent weight and BMI monitored-     Measurements:  Wt Readings from Last 1 Encounters:   05/12/25 65.5 kg (144 lb 6.4 oz)   Body mass index is 18.05 kg/m².    Patient has been screened and assessed by RD.    Malnutrition Type:  Context: chronic illness  Level: severe    Malnutrition Characteristic Summary:  Weight Loss (Malnutrition): greater than 10% in 6 months  Energy Intake (Malnutrition): less than or equal to 50% for greater than or equal to 5 days  Subcutaneous Fat (Malnutrition): severe depletion  Muscle Mass (Malnutrition): severe depletion    Interventions/Recommendations (treatment strategy):  Recommend continue current diet as tolerated. Encourage good PO intakes.      GI  Acute liver disease  - Daily hepatic function panel    Gastroesophageal reflux disease  Noted  - continue PPI    Other  Thrombocytopenia  Unknown etiology at this time-  Hematology/Oncology is following  - Platelets are 20 today which have trended down from 46 on 5/5/25.  - Zosyn stopped & Merrem added  - Solu-medrol 125mg IV Q8 hours  - Dr. Samson may consider a bone marrow biopsy and aspirate with flow cytometry and cytogenetics   - Omeprozole & Atorvastatin being held per Dr. Samson's recommendation due to possibly causing thrombocytopenia  - monitor for bleeding        Addendum: With hemolytic anemia, AMS, & renal failure, there is concern for an autoimmune disorder & ITP vs. TTP. If TTP, patient would possibly need plasmapheresis, so will plan to continue pursuing transfer for a higher level of care in the event this treatment is what he needs.     Also plan for possible lumbar puncture tomorrow if condition does not improve.        Critical Care Checklist     Refer to chart for details regarding spontaneous awakening trial (SAT) and spontaneous breathing trial (SBT), CAM-ICU assessment, early mobility and feeding.       Analgesia and sedation- Tylenol/Ativan  Thromboembolic prophylaxis- SCDs  Height of bed greater than 30°- Yes  Stress ulcer prophylaxis- Protonix  Indwelling catheter (vascular access lines/Jones catheter)-Reviewed  Deescalation of antimicrobials/pharmacotherapies-Reviewed and addressed appropriately  Code status- Full Code           GILBERT JOE NP  Critical Care Medicine  [facility]

## 2025-05-12 NOTE — ASSESSMENT & PLAN NOTE
Unknown etiology at this time- Hematology/Oncology is following  - Platelets are 20 today which have trended down from 46 on 5/5/25.  - Zosyn stopped & Merrem added  - Solu-medrol 125mg IV Q8 hours  - Dr. Samson may consider a bone marrow biopsy and aspirate with flow cytometry and cytogenetics   - Omeprozole & Atorvastatin being held per Dr. Samson's recommendation due to possibly causing thrombocytopenia  - monitor for bleeding

## 2025-05-12 NOTE — CARE UPDATE
1630: Dr. Camarillo spoke with son, Isma Patel, & patient's ex-wife, regarding patient condition. Long conversation about multisystem disease & the plan for trying to find an answer to the patient's overall decline over the last few days. Awaiting results of the HIDA scan that was completed today. Family verbalizes understanding. All questions answered.   
No significant events, no new complaints or concerns. Lactate is now WNL. All other labs stable vs minimally improved. CT chest as well as further abdominal evaluation pending. Having some discomfort and nausea with meals so will place on clear liquids for now. Continue antibiotics.   
social work

## 2025-05-12 NOTE — ASSESSMENT & PLAN NOTE
Patient with Hypoxic Respiratory failure which is Acute on chronic.  he is not on home oxygen. Supplemental oxygen was provided and noted- Oxygen Concentration (%):  [40-50] 50    .   Signs/symptoms of respiratory failure include- tachypnea, increased work of breathing, and respiratory distress. Contributing diagnoses includes - COPD Labs and images were reviewed. Patient Has not had a recent ABG. Will treat underlying causes and adjust management of respiratory failure as follows- 50% venti mask in use. ABGs PRN

## 2025-05-12 NOTE — PROGRESS NOTES
Gastroenterology Consult Note    Chief Complaint: elevated liver enzymes, hyperbilirubinemia     Consulted by:   Dr. Melendez     HPI:  Bhaskar Patel is a 70 y.o. WM with GERD, HTN, CAD s/p CABG, PAD s/p , celiac disease (DH with +serology), chronic thrombocytopenia that presents with nausea, abdominal pain, fever, and thrombocytopenia. Patient is a poor historian. Had an ER presentation with similar complaints on 25 with fever up to 102 and was discharged home - does not appear to have received antibiotics in the ER and no antibiotics prescribed that I can see. He followed up with PCP appearing acutely ill and was sent to the ER and started on antibiotics to cover for PNA - appears this was first abx exposure on 25 (zosyn, azithromycin). He has had worsening of thrombocytopenia resulting in hematology evaluation for ITP - antibiotics have been changed from zosyn to merrem with concern for antibiotic exposure causing acute worsening. Patient has a history of alcohol abuse, but denies alcohol for the last 3 years. Longtime smoker. No prior colonoscopy, but did have a negative cologuard in .     INTERVAL  - febrile, clinically worsening  - more somnolent and confused today, moved to ICU      Past Medical History:   Diagnosis Date    GERD (gastroesophageal reflux disease)     Hypertension      Past Surgical History:   Procedure Laterality Date    CAROTID ENDARTERECTOMY Left 2022    Procedure: ENDARTERECTOMY-CAROTID;  Surgeon: Karen Spencer MD;  Location: [facility] OR;  Service: General;  Laterality: Left;    CORONARY ARTERY BYPASS GRAFT      HERNIA REPAIR      LEFT HEART CATHETERIZATION Left 9/15/2021    Procedure: Left heart cath;  Surgeon: Quoc Vo DO;  Location: [facility] CATH LAB;  Service: Cardiology;  Laterality: Left;     Family History   Problem Relation Name Age of Onset    Alzheimer's disease Mother      Polycystic kidney disease Mother      Hypertension Father      Heart disease  "Father      Cancer Sister      Heart disease Sister      Hypertension Sister         OBJECTIVE:  /74   Pulse 105   Temp 98.9 °F (37.2 °C)   Resp (!) 28   Ht 6' 3" (1.905 m)   Wt 65.5 kg (144 lb 6.4 oz)   SpO2 (!) 91%   BMI 18.05 kg/m²   GEN: critically ill appearing, cachectic   HEENT: NCAT, dry MM; anicteric   NECK: Supple, no LAD  CV: tachycardic, regular rhythm; prior sternotomy   RESP: prolonged expiratory phase, mildly labored on SFM  ABD: NABS, ND, NT - though difficult to assess, soft, no guarding  EXT: No clubbing, cyanosis, or edema.  SKIN: Warm and dry; no rashes, petechiae, or spider angiomata   NEURO: awake but confused, disoriented      LABS:  I reviewed, CMP, CBC, INR    IMAGING:    US reviewed - no cholelithiasis, CBD dilatation, hepatic steatosis; splenomegaly noted     HIDA pending    ASSESSMENT:    70 y.o. WM with GERD, HTN, CAD s/p CABG, PAD s/p , celiac disease (DH with +serology), chronic thrombocytopenia, advanced COPD that presents with nausea, abdominal pain, fever, and thrombocytopenia.     Acute abdominal pain, N/V  Fever (up to 102 prior to presentation)   Acute on chronic Thrombocytopenia, Possible ITP   Hyperbilirubinemia, elevated liver enzymes   Unintentional weight loss (30+ lbs) with cachexia   Celiac Disease   GEORGE on CKD     PLAN:    Abnormal LFT''s  - trend suspicious for alcoholic hepatitis, but patient denies alcohol for past few years - seems to be reliable and truthful  - LFT's relatively stable today  - splenomegaly and thrombocytopenia, however, given the context and other imaging findings, I don't favor cirrhosis   - HIDA pending today to evaluate for acalculous cholecystitis   - hepatic function panel, INR daily       Celiac Disease  - given weight loss, fever, presentation, lymphoma would be in the differential for underlying problem contributing to his presentation   - No definitive evidence of other organ involvement (mild LAD, splenomegaly non-specific) " or thickened small bowel on routine CT. Colonoscopy also recommended in the past which patient ultimately agreed to and is scheduled for July; I don't think urgent colonoscopy would be helpful in the acute setting at this time. Will plan for outpatient MRE after he improves (though previously refused by patient).       Anemia  - acute on chronic anemia noted  - no GI bleeding, though has had some oral mucosal bleeding related to thrombocytopenia   - continue to monitor daily       Thank you for the consult and including me in the care of this patient. I will follow up with patient. Please call me with questions.     Joseph Alex MD  Gastroenterology

## 2025-05-12 NOTE — SUBJECTIVE & OBJECTIVE
Past Medical History:   Diagnosis Date    GERD (gastroesophageal reflux disease)     Hypertension        Past Surgical History:   Procedure Laterality Date    CAROTID ENDARTERECTOMY Left 5/16/2022    Procedure: ENDARTERECTOMY-CAROTID;  Surgeon: Karen Spencer MD;  Location: [facility] OR;  Service: General;  Laterality: Left;    CORONARY ARTERY BYPASS GRAFT      HERNIA REPAIR      LEFT HEART CATHETERIZATION Left 9/15/2021    Procedure: Left heart cath;  Surgeon: Quoc Vo DO;  Location: [facility] CATH LAB;  Service: Cardiology;  Laterality: Left;       Review of patient's allergies indicates:  No Known Allergies  Current Facility-Administered Medications   Medication Frequency    acetaminophen suppository 650 mg Q6H PRN    acetaminophen tablet 1,000 mg Q8H PRN    albuterol-ipratropium 2.5 mg-0.5 mg/3 mL nebulizer solution 3 mL Q6H WAKE    ALPRAZolam tablet 0.5 mg TID PRN    aluminum-magnesium hydroxide-simethicone 200-200-20 mg/5 mL suspension 30 mL QID PRN    dextrose 50% injection 12.5 g PRN    dextrose 50% injection 25 g PRN    diphenhydrAMINE capsule 50 mg Daily PRN    docusate sodium capsule 100 mg Daily PRN    famotidine tablet 20 mg Daily PRN    glucagon (human recombinant) injection 1 mg PRN    glucose chewable tablet 16 g PRN    glucose chewable tablet 24 g PRN    Immune Globulin G (IGG)-PRO-IGA 10 % injection (Privigen) 10 % injection 30 g Q24H    melatonin tablet 6 mg Nightly PRN    meropenem injection 500 mg Q12H    methylPREDNISolone sodium succinate injection 125 mg Q8H    metoprolol succinate (TOPROL-XL) 24 hr tablet 25 mg Daily    mupirocin 2 % ointment BID    naloxone 0.4 mg/mL injection 0.02 mg PRN    ondansetron disintegrating tablet 4 mg Q8H PRN    pantoprazole EC tablet 40 mg Daily    prochlorperazine injection Soln 5 mg Q6H PRN    sodium bicarbonate 150 mEq in D5W 1,000 mL infusion Continuous    sodium chloride 0.9% flush 10 mL Q12H PRN    vitamin D 1000 units tablet 1,000 Units Daily      Family History       Problem Relation (Age of Onset)    Alzheimer's disease Mother    Cancer Sister    Heart disease Father, Sister    Hypertension Father, Sister    Polycystic kidney disease Mother          Tobacco Use    Smoking status: Every Day     Current packs/day: 0.50     Average packs/day: 0.5 packs/day for 54.4 years (27.2 ttl pk-yrs)     Types: Cigarettes     Start date: 1971     Passive exposure: Current    Smokeless tobacco: Never    Tobacco comments:     already did [place] tobacco quitline. not interested in trying again. states he will quit on his own   Substance and Sexual Activity    Alcohol use: Not Currently    Drug use: Not Currently     Types: Marijuana    Sexual activity: Not Currently     Review of Systems   All other systems reviewed and are negative.    Objective:     Vital Signs (Most Recent):  Temp: 98.3 °F (36.8 °C) (05/11/25 1946)  Pulse: 103 (05/11/25 2000)  Resp: 18 (05/11/25 2000)  BP: (!) 121/54 (05/11/25 1946)  SpO2: (!) 93 % (05/11/25 2000) Vital Signs (24h Range):  Temp:  [98.1 °F (36.7 °C)-100.5 °F (38.1 °C)] 98.3 °F (36.8 °C)  Pulse:  [] 103  Resp:  [] 18  SpO2:  [88 %-100 %] 93 %  BP: (121-146)/(54-88) 121/54     Weight: 64.2 kg (141 lb 9.6 oz) (05/09/25 0313)  Body mass index is 17.7 kg/m².  Body surface area is 1.84 meters squared.    I/O last 3 completed shifts:  In: 720 [P.O.:720]  Out: 1500 [Urine:1500]     Physical Exam  Constitutional:       Appearance: He is ill-appearing.   HENT:      Head: Normocephalic and atraumatic.   Eyes:      Pupils: Pupils are equal, round, and reactive to light.   Cardiovascular:      Rate and Rhythm: Regular rhythm.      Heart sounds: Normal heart sounds.   Pulmonary:      Effort: Pulmonary effort is normal.   Abdominal:      General: Abdomen is flat.   Skin:     General: Skin is warm.   Psychiatric:         Mood and Affect: Mood normal.          Significant Labs:  BMP:   Recent Labs   Lab 05/08/25  1522 05/09/25  3576  05/11/25  0510      < > 100   *   < > 136   K 4.1   < > 4.0   CL 98   < > 107   CO2 19*   < > 16*   BUN 56*   < > 92*   CREATININE 3.04*   < > 4.34*   CALCIUM 7.8*   < > 7.0*   MG 1.7  --   --     < > = values in this interval not displayed.     CBC:   Recent Labs   Lab 05/11/25  0510   WBC 3.88*   RBC 3.51*   HGB 11.3*   HCT 33.0*   PLT 27*   MCV 94.0   MCH 32.2*   MCHC 34.2       Significant Imaging:  Labs: Reviewed

## 2025-05-12 NOTE — HOSPITAL COURSE
5/12/25: Dr. Marinelli contacted me this morning about transferring this patient to the ICU for higher level of care. He has been in-patient since 5/8/25 & continues to be thrombocytopenic, unsure of cause. Nephrology is following for ARF, Hematology/Oncology is following for persistent thrombocytopenia, General surgery is following for abdominal pain (ruling out gallbladder concerns), & GI is following & recommended HIDA scan to evaluate for acalculous cholecystitis given fever, presentation (abd pain, nausea), and elevated LFT's (though somewhat improving), ECHO to evaluate heart function, & daily INR & hepatic function panel.     On admission to the ICU, patient is confused, agitated, & uncooperative. No active bleeding noted. Vital signs are stable. Family is at bedside. Will continue to monitor & treat as necessary.   05/14/2025 patient was transferred to the ICU on 05/13/2025 patient had what seemed to be multi-system disorder which included metabolic encephalopathy, respiratory distress with recent diagnosis of pneumonia, acute renal failure, possible cholecystitis, hemolytic anemia, and thrombocytopenia.  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  Discussed the case with Dr. Wick last night in the MICU at [facility] agree to accept and now has a bed.  We have transfuse platelets day in anticipation of possibly placing a dialysis catheter in a cholecystotomy drain.  The decision was to go ahead and transfer end defer those procedures the [facility] potassium today was 5.2 respiratory status is borderline but does not need emergent dialysis but may need dialysis in the next 24 hours.  Infectious Disease has seen in the agrees with our current antibiotic choice.  We are going to intubate and ventilate the patient to protect his airway for transport he has been borderline needing  to be intubated for 24 hours but family has not wanted to proceed.  Hemoglobin is 6.9 once dialysis is initiated patient could receive a unit of packed red blood cells

## 2025-05-12 NOTE — SUBJECTIVE & OBJECTIVE
Interval History: The patient's condition is about the same.  He will open his eyes more this morning.  Serum  potassium is 4.1.  Creatinine is 5.2.    Review of patient's allergies indicates:  No Known Allergies  Current Facility-Administered Medications   Medication Frequency    acetaminophen suppository 650 mg Q6H PRN    acetaminophen tablet 1,000 mg Q8H PRN    albuterol-ipratropium 2.5 mg-0.5 mg/3 mL nebulizer solution 3 mL Q6H WAKE    ALPRAZolam tablet 0.5 mg TID PRN    aluminum-magnesium hydroxide-simethicone 200-200-20 mg/5 mL suspension 30 mL QID PRN    dextrose 50% injection 12.5 g PRN    dextrose 50% injection 25 g PRN    diphenhydrAMINE capsule 50 mg Daily PRN    docusate sodium capsule 100 mg Daily PRN    famotidine tablet 20 mg Daily PRN    glucagon (human recombinant) injection 1 mg PRN    glucose chewable tablet 16 g PRN    glucose chewable tablet 24 g PRN    Immune Globulin G (IGG)-PRO-IGA 10 % injection (Privigen) 10 % injection 30 g Q24H    melatonin tablet 6 mg Nightly PRN    meropenem injection 500 mg Q12H    methylPREDNISolone sodium succinate injection 125 mg Q8H    metoprolol succinate (TOPROL-XL) 24 hr tablet 25 mg Daily    mupirocin 2 % ointment BID    naloxone 0.4 mg/mL injection 0.02 mg PRN    ondansetron disintegrating tablet 4 mg Q8H PRN    pantoprazole EC tablet 40 mg Daily    prochlorperazine injection Soln 5 mg Q6H PRN    sodium bicarbonate 75 mEq in D5 and 0.45% NaCl 1,000 mL infusion Continuous    sodium chloride 0.9% flush 10 mL Q12H PRN    vitamin D 1000 units tablet 1,000 Units Daily       Objective:     Vital Signs (Most Recent):  Temp: 98.4 °F (36.9 °C) (05/12/25 0802)  Pulse: 103 (05/12/25 0802)  Resp: 20 (05/12/25 0802)  BP: (!) 131/53 (05/12/25 0802)  SpO2: (!) 86 % (05/12/25 0802) Vital Signs (24h Range):  Temp:  [97.7 °F (36.5 °C)-100.5 °F (38.1 °C)] 98.4 °F (36.9 °C)  Pulse:  [] 103  Resp:  [18-24] 20  SpO2:  [86 %-98 %] 86 %  BP: (121-146)/(53-88) 131/53      Weight: 64.2 kg (141 lb 9.6 oz) (05/09/25 0313)  Body mass index is 17.7 kg/m².  Body surface area is 1.84 meters squared.    I/O last 3 completed shifts:  In: 760 [I.V.:760]  Out: 2450 [Urine:2450]     Physical Exam  Vitals reviewed.   Constitutional:       Appearance: He is ill-appearing.   HENT:      Head: Normocephalic and atraumatic.   Eyes:      Pupils: Pupils are equal, round, and reactive to light.   Cardiovascular:      Rate and Rhythm: Regular rhythm.   Pulmonary:      Effort: Pulmonary effort is normal.      Breath sounds: Normal breath sounds.   Abdominal:      General: Abdomen is flat.      Palpations: Abdomen is soft.   Musculoskeletal:      Cervical back: Neck supple.   Skin:     General: Skin is warm.   Neurological:      Mental Status: He is alert.   Psychiatric:         Mood and Affect: Mood normal.          Significant Labs:  BMP:   Recent Labs   Lab 05/08/25  1522 05/09/25  0359 05/12/25  0334      < > 157*   *   < > 137   K 4.1   < > 4.1   CL 98   < > 105   CO2 19*   < > 20*   BUN 56*   < > 109*   CREATININE 3.04*   < > 5.29*   CALCIUM 7.8*   < > 6.3*   MG 1.7  --   --     < > = values in this interval not displayed.     CBC:   Recent Labs   Lab 05/12/25  0334   WBC 4.45*   RBC 3.00*   HGB 9.6*   HCT 27.9*   PLT 20*   MCV 93.0   MCH 32.0*   MCHC 34.4        Significant Imaging:  Labs: Reviewed

## 2025-05-12 NOTE — SUBJECTIVE & OBJECTIVE
Interval History/Significant Events: Please refer to hospital course.     Review of Systems   Unable to perform ROS: Acuity of condition     Objective:     Vital Signs (Most Recent):  Temp: 99.2 °F (37.3 °C) (05/12/25 1409)  Pulse: 103 (05/12/25 0802)  Resp: 20 (05/12/25 0802)  BP: (!) 131/53 (05/12/25 0802)  SpO2: (!) 86 % (05/12/25 0802) Vital Signs (24h Range):  Temp:  [97.7 °F (36.5 °C)-100.5 °F (38.1 °C)] 99.2 °F (37.3 °C)  Pulse:  [] 103  Resp:  [18-22] 20  SpO2:  [86 %-97 %] 86 %  BP: (121-137)/(53-85) 131/53   Weight: 65.5 kg (144 lb 6.4 oz)  Body mass index is 18.05 kg/m².      Intake/Output Summary (Last 24 hours) at 5/12/2025 1439  Last data filed at 5/12/2025 0559  Gross per 24 hour   Intake 760.04 ml   Output 950 ml   Net -189.96 ml          Physical Exam  Vitals and nursing note reviewed.   Constitutional:       General: He is awake. He is not in acute distress.     Appearance: He is cachectic. He is ill-appearing.      Interventions: Face mask in place.   HENT:      Head: Normocephalic.      Mouth/Throat:      Mouth: Mucous membranes are moist.   Eyes:      Pupils: Pupils are equal, round, and reactive to light.   Cardiovascular:      Rate and Rhythm: Regular rhythm. Tachycardia present.      Pulses: Normal pulses.   Pulmonary:      Effort: Pulmonary effort is normal.      Breath sounds: Normal breath sounds.   Abdominal:      General: Bowel sounds are normal.      Palpations: Abdomen is soft.      Tenderness: There is abdominal tenderness (generalized).   Musculoskeletal:      Right lower leg: No edema.      Left lower leg: No edema.   Skin:     General: Skin is warm and dry.      Capillary Refill: Capillary refill takes 2 to 3 seconds.   Neurological:      Mental Status: He is disoriented and confused.   Psychiatric:         Mood and Affect: Mood is anxious.         Behavior: Behavior is uncooperative and agitated.            Vents:  Oxygen Concentration (%): 50 (05/12/25  1430)  Lines/Drains/Airways       Drain  Duration                  Urethral Catheter 05/10/25 1957 Straight-tip 16 Fr. 1 day              Peripheral Intravenous Line  Duration                  Peripheral IV - Single Lumen 05/09/25 20 G Anterior;Right Forearm 3 days         Peripheral IV - Single Lumen 05/11/25 1103 22 G Left;Posterior Forearm 1 day                  Significant Labs:    CBC/Anemia Profile:  Recent Labs   Lab 05/11/25  0510 05/12/25  0334   WBC 3.88* 4.45*   HGB 11.3* 9.6*   HCT 33.0* 27.9*   PLT 27* 20*   MCV 94.0 93.0   RDW 13.7 13.6        Chemistries:  Recent Labs   Lab 05/11/25  0510 05/12/25  0334    137   K 4.0 4.1    105   CO2 16* 20*   BUN 92* 109*   CREATININE 4.34* 5.29*   CALCIUM 7.0* 6.3*   ALBUMIN 2.2* 1.8*   PROT 6.0 5.7*   BILITOT 2.5* 2.5*   ALKPHOS 239* 240*   ALT 91* 70*   * 260*       All pertinent labs within the past 24 hours have been reviewed.    Significant Imaging:  I have reviewed all pertinent imaging results/findings within the past 24 hours.

## 2025-05-12 NOTE — HOSPITAL COURSE
70 year old male presents with N/V and abdominal pain.  He was found to have elevated LFT's and pneumonia; he is now in renal failure and has TCP. He is followed by Oncology.  He is sick enough to transfer to the ICU today for higher level of care. Family at bedside.

## 2025-05-12 NOTE — PROGRESS NOTES
[facility]  General Surgery  Progress Note    Subjective:     History of Present Illness:  Patient is admitted to service of hospital medicine for sepsis, elevated troponin, pneumonia, , acute kidney injury, chronic liver disease, and thrombocytopenia.  General surgery was consulted to evaluate abdominal pain with nausea vomiting.  Patient has past medical history to include hypertension, GERD, and CAD.  Surgical history includes cardiac bypass, and left carotid enterectomy.  Patient's symptoms started proximally 2 weeks ago.  Symptoms have been worse over the last 2-3 days per family.  Family reports patient lives by himself.  He has seen in ER 1 week ago, and was discharged home.  He returned 3-4 days later after worsening of symptoms and was admitted at that time.  Patient had normal abdominal ultrasound.  He has elevation of liver enzymes including a bilirubin of 2.5.  We will obtain HIDA scan to evaluate for gallbladder dysfunction.    Post-Op Info:  * No surgery found *         Interval History:  Patient is sleeping on exam this morning.  Family reports patient has not eating or drinking the liquid diet he has ordered.  HIDA scan ordered for today.    Medications:  Continuous Infusions:   sodium bicarbonate 75 mEq in D5 and 0.45% NaCl 1,000 mL infusion   Intravenous Continuous         Scheduled Meds:   albuterol-ipratropium  3 mL Nebulization Q6H WAKE    Immune Globulin G (IGG)-PRO-IGA 10 % injection (Privigen)  30 g Intravenous Q24H    meropenem IV (PEDS and ADULTS)  500 mg Intravenous Q12H    methylPREDNISolone injection (PEDS and ADULTS)  125 mg Intravenous Q8H    metoprolol succinate  25 mg Oral Daily    mupirocin   Nasal BID    pantoprazole  40 mg Oral Daily    vitamin D  1,000 Units Oral Daily     PRN Meds:  Current Facility-Administered Medications:     acetaminophen, 650 mg, Rectal, Q6H PRN    acetaminophen, 1,000 mg, Oral, Q8H PRN    ALPRAZolam, 0.5 mg, Oral, TID PRN     aluminum-magnesium hydroxide-simethicone, 30 mL, Oral, QID PRN    dextrose 50%, 12.5 g, Intravenous, PRN    dextrose 50%, 25 g, Intravenous, PRN    diphenhydrAMINE, 50 mg, Oral, Daily PRN    docusate sodium, 100 mg, Oral, Daily PRN    famotidine, 20 mg, Oral, Daily PRN    glucagon (human recombinant), 1 mg, Intramuscular, PRN    glucose, 16 g, Oral, PRN    glucose, 24 g, Oral, PRN    melatonin, 6 mg, Oral, Nightly PRN    naloxone, 0.02 mg, Intravenous, PRN    ondansetron, 4 mg, Oral, Q8H PRN    prochlorperazine, 5 mg, Intravenous, Q6H PRN    sodium chloride 0.9%, 10 mL, Intravenous, Q12H PRN     Review of patient's allergies indicates:  No Known Allergies  Objective:     Vital Signs (Most Recent):  Temp: 98.4 °F (36.9 °C) (05/12/25 0802)  Pulse: 103 (05/12/25 0802)  Resp: 20 (05/12/25 0802)  BP: (!) 131/53 (05/12/25 0802)  SpO2: (!) 86 % (05/12/25 0802) Vital Signs (24h Range):  Temp:  [97.7 °F (36.5 °C)-100.5 °F (38.1 °C)] 98.4 °F (36.9 °C)  Pulse:  [] 103  Resp:  [18-24] 20  SpO2:  [86 %-98 %] 86 %  BP: (121-146)/(53-88) 131/53     Weight: 64.2 kg (141 lb 9.6 oz)  Body mass index is 17.7 kg/m².    Intake/Output - Last 3 Shifts         05/10 0700  05/11 0659 05/11 0700  05/12 0659 05/12 0700 05/13 0659    P.O. 720      I.V. (mL/kg)  760 (11.8)     Total Intake(mL/kg) 720 (11.2) 760 (11.8)     Urine (mL/kg/hr) 1200 (0.8) 1250 (0.8)     Total Output 1200 1250     Net -480 -490                     Physical Exam  Vitals and nursing note reviewed.   Constitutional:       Appearance: He is ill-appearing.   HENT:      Head: Normocephalic.      Nose: Nose normal.      Mouth/Throat:      Mouth: Mucous membranes are moist.   Eyes:      Extraocular Movements: Extraocular movements intact.   Cardiovascular:      Rate and Rhythm: Tachycardia present.   Pulmonary:      Effort: Pulmonary effort is normal.   Abdominal:      General: Bowel sounds are normal. There is no distension.      Tenderness: There is no abdominal  "tenderness.   Musculoskeletal:         General: Normal range of motion.      Cervical back: Normal range of motion.   Skin:     General: Skin is warm and dry.      Capillary Refill: Capillary refill takes less than 2 seconds.      Coloration: Skin is pale.   Neurological:      General: No focal deficit present.      Mental Status: He is oriented to person, place, and time.   Psychiatric:         Mood and Affect: Mood normal.          Significant Labs:  I have reviewed all pertinent lab results within the past 24 hours.  CBC:   Recent Labs   Lab 05/12/25  0334   WBC 4.45*   RBC 3.00*   HGB 9.6*   HCT 27.9*   PLT 20*   MCV 93.0   MCH 32.0*   MCHC 34.4     BMP:   Recent Labs   Lab 05/08/25  1522 05/09/25  0359 05/12/25  0334      < > 157*   *   < > 137   K 4.1   < > 4.1   CL 98   < > 105   CO2 19*   < > 20*   BUN 56*   < > 109*   CREATININE 3.04*   < > 5.29*   CALCIUM 7.8*   < > 6.3*   MG 1.7  --   --     < > = values in this interval not displayed.     CMP:   Recent Labs   Lab 05/12/25  0334   *   CALCIUM 6.3*   ALBUMIN 1.8*   PROT 5.7*      K 4.1   CO2 20*      *   CREATININE 5.29*   ALKPHOS 240*   ALT 70*   *   BILITOT 2.5*     LFTs:   Recent Labs   Lab 05/12/25  0334   ALT 70*   *   ALKPHOS 240*   BILITOT 2.5*   PROT 5.7*   ALBUMIN 1.8*     Coagulation:   Recent Labs   Lab 05/08/25  1522 05/10/25  1436   LABPROT 14.4 16.0*   INR 1.06 1.28   APTT 46.7*  --      Cardiac markers: No results for input(s): "CKMB", "CPKMB", "TROPONINT", "TROPONINI", "MYOGLOBIN" in the last 168 hours.  Microbiology Results (last 7 days)       ** No results found for the last 168 hours. **          Specimen (24h ago, onward)      None          Recent Labs   Lab 05/08/25  1818   COLORU Yellow   SPECGRAV 1.025   PHUR 6.0   PROTEINUA 100*   BACTERIA Many*   NITRITE Negative   LEUKOCYTESUR Negative   UROBILINOGEN 2.0*       Significant Diagnostics:  I have reviewed all pertinent imaging " results/findings within the past 24 hours.  Assessment/Plan:     No notes have been filed under this hospital service.  Service: General Surgery      BLANCA Navarro  General Surgery  [facility]

## 2025-05-12 NOTE — PROGRESS NOTES
[facility] Hospital Medicine  Progress Note    Patient Name: Bhaskar Patel  MRN: [id number]  Patient Class: IP- Inpatient   Admission Date: 5/8/2025  Length of Stay: 4 days  Attending Physician: Jaycee Marinelli DO  Primary Care Provider: Crys Jackson FNP        Subjective     Principal Problem:Sepsis        HPI:  Patient is a 71 y/o male with PMHx/PSHx of HTN, GERD, CAD (cardiac bypass), and left carotid endarterectomy who was transferred from [facility] to [facility] for nausea, abdominal pain and decrease PO intake. Pt states he developed decrease PO intake 1 week ago and later developed nausea and generalized abdominal pain. He describes his abdominal pain as 5/10, achy, intermittent without any radiation. He denies vomiting, diarrhea, congestion, rhinorrhea, sore throat, chest pain or chest tightness but reports intermittent cough, SOB, and fever. He denies any h/o COPD or PNA in the past. He denies any sick contacts. He was evaluated in the ER for nausea on 5/05 and was treated with zofran and IV fluids at that time. Of note, pt lives alone and ambulates with the use of any assistive device. He is able to do all ADLs. He has  60-pack-yr smoking hx and previous drank multiple beers on his days off quitting 2 yrs ago.     In the ED at [place], his  vitals were /51, , RR 25, T 102.2 F, SpO2 87%. Labs were significant lactate 4.2-->1.9 PC 25, Na 130, BUN/CR 56/3.04, , , , PTT 46.7, Albumin 2.9, Ca 7.8, Trop 50.3. Negative for COVID/influenza. EKG reveals NSR. CXR reveals Diffuse emphysematous changes with no acute pulmonary process. He was treated with 2L bolus of NS, IV Zosyn 4.5 g x1, IV zithromax 500 mg x1, and duonebs x1.  Given the severity of his symptoms he has been admitted to [facility] to the family medicine service under the direct supervision of Dr. Albert Diego for the continue care and medical management of this patient.              Overview/Hospital Course:  70 year old male presents with N/V and abdominal pain.  He was found to have elevated LFT's and pneumonia; he is now in renal failure and has TCP. He is followed by Oncology.  He is sick enough to transfer to the ICU today for higher level of care. Family at bedside.    Vitals:    05/12/25 0008 05/12/25 0438 05/12/25 0737 05/12/25 0802   BP: 137/71 133/74  (!) 131/53   BP Location:       Patient Position:       Pulse: 95 92 92 103   Resp: 20 20 20 20   Temp: 97.7 °F (36.5 °C) 97.9 °F (36.6 °C)  98.4 °F (36.9 °C)   TempSrc: Oral   Oral   SpO2: (!) 90% (!) 91% 97% (!) 86%   Weight:       Height:             PHYSICAL EXAM:    GEN: NAD; arousable  CVS: regular rate and rhythm; no murmurs  RESP: clear to auscultation bilaterally; no rhonchi, rales, or wheezes noted  GI: soft, non-tender, non-distended; + bowel sounds  EXTR: no clubbing, cyanosis, or edema        Assessment & Plan  Sepsis  Due to pneumonia; Tmax 100.5; on Merrem    Acute hypoxemic respiratory failure  May have a pneumonia; on Merrem; on 5L O2; will monitor  GEORGE (acute kidney injury)  Appreciate Nephrology consult; on IVF; U/S pending; will monitor Cr  Thrombocytopenia  Followed by Oncology; on IVIG and Solumedrol; PLT count 20 and he is having signs of bleed from his mouth; will leave to Oncology  Acute liver disease  HIDA scan pending; followed by GI and Surgery; will monitor    Severe protein-calorie malnutrition  Nutrition consulted. Most recent weight and BMI monitored-     Measurements:  Wt Readings from Last 1 Encounters:   05/09/25 64.2 kg (141 lb 9.6 oz)   Body mass index is 17.7 kg/m².    Patient has been screened and assessed by RD.    Malnutrition Type:  Context: chronic illness  Level: severe    Malnutrition Characteristic Summary:  Weight Loss (Malnutrition): greater than 10% in 6 months  Energy Intake (Malnutrition): less than or equal to 50% for greater than or equal to 5 days  Subcutaneous Fat  (Malnutrition): severe depletion  Muscle Mass (Malnutrition): severe depletion    Interventions/Recommendations (treatment strategy):  Recommend continue current diet as tolerated. Encourage good PO intakes.    Essential hypertension  BP stable  Coronary artery disease involving native coronary artery of native heart without angina pectoris  Continue current meds        Jaycee Marinelli DO  Department of Hospital Medicine   [facility]

## 2025-05-12 NOTE — CONSULTS
[facility]  Nephrology  Consult Note    Patient Name: Bhaskar Patel  MRN: [id number]  Admission Date: 5/8/2025  Hospital Length of Stay: 3 days  Attending Provider: Estela Melendez DO   Primary Care Physician: Crys Jackson FNP  Principal Problem:Sepsis    Consults  Subjective:     HPI: This patient with multiple medical problems of COPD, CAD weight loss was admitted for sepsis.  His serum creatinine has trended up over the past several days.  According to the patient's family, he has been complaining of abdominal pain and weight loss.  He was recently diagnosed with Celiac disease.  Today, nephrology is consulted for GEORGE.  Serum creatinine is 4.34.  Serum potassium is 4.    Past Medical History:   Diagnosis Date    GERD (gastroesophageal reflux disease)     Hypertension        Past Surgical History:   Procedure Laterality Date    CAROTID ENDARTERECTOMY Left 5/16/2022    Procedure: ENDARTERECTOMY-CAROTID;  Surgeon: Karen Spencer MD;  Location: [facility] OR;  Service: General;  Laterality: Left;    CORONARY ARTERY BYPASS GRAFT      HERNIA REPAIR      LEFT HEART CATHETERIZATION Left 9/15/2021    Procedure: Left heart cath;  Surgeon: Quoc Vo DO;  Location: [facility] CATH LAB;  Service: Cardiology;  Laterality: Left;       Review of patient's allergies indicates:  No Known Allergies  Current Facility-Administered Medications   Medication Frequency    acetaminophen suppository 650 mg Q6H PRN    acetaminophen tablet 1,000 mg Q8H PRN    albuterol-ipratropium 2.5 mg-0.5 mg/3 mL nebulizer solution 3 mL Q6H WAKE    ALPRAZolam tablet 0.5 mg TID PRN    aluminum-magnesium hydroxide-simethicone 200-200-20 mg/5 mL suspension 30 mL QID PRN    dextrose 50% injection 12.5 g PRN    dextrose 50% injection 25 g PRN    diphenhydrAMINE capsule 50 mg Daily PRN    docusate sodium capsule 100 mg Daily PRN    famotidine tablet 20 mg Daily PRN    glucagon (human recombinant) injection 1 mg PRN     glucose chewable tablet 16 g PRN    glucose chewable tablet 24 g PRN    Immune Globulin G (IGG)-PRO-IGA 10 % injection (Privigen) 10 % injection 30 g Q24H    melatonin tablet 6 mg Nightly PRN    meropenem injection 500 mg Q12H    methylPREDNISolone sodium succinate injection 125 mg Q8H    metoprolol succinate (TOPROL-XL) 24 hr tablet 25 mg Daily    mupirocin 2 % ointment BID    naloxone 0.4 mg/mL injection 0.02 mg PRN    ondansetron disintegrating tablet 4 mg Q8H PRN    pantoprazole EC tablet 40 mg Daily    prochlorperazine injection Soln 5 mg Q6H PRN    sodium bicarbonate 150 mEq in D5W 1,000 mL infusion Continuous    sodium chloride 0.9% flush 10 mL Q12H PRN    vitamin D 1000 units tablet 1,000 Units Daily     Family History       Problem Relation (Age of Onset)    Alzheimer's disease Mother    Cancer Sister    Heart disease Father, Sister    Hypertension Father, Sister    Polycystic kidney disease Mother          Tobacco Use    Smoking status: Every Day     Current packs/day: 0.50     Average packs/day: 0.5 packs/day for 54.4 years (27.2 ttl pk-yrs)     Types: Cigarettes     Start date: 1971     Passive exposure: Current    Smokeless tobacco: Never    Tobacco comments:     already did [place] tobacco quitline. not interested in trying again. states he will quit on his own   Substance and Sexual Activity    Alcohol use: Not Currently    Drug use: Not Currently     Types: Marijuana    Sexual activity: Not Currently     Review of Systems   All other systems reviewed and are negative.    Objective:     Vital Signs (Most Recent):  Temp: 98.3 °F (36.8 °C) (05/11/25 1946)  Pulse: 103 (05/11/25 2000)  Resp: 18 (05/11/25 2000)  BP: (!) 121/54 (05/11/25 1946)  SpO2: (!) 93 % (05/11/25 2000) Vital Signs (24h Range):  Temp:  [98.1 °F (36.7 °C)-100.5 °F (38.1 °C)] 98.3 °F (36.8 °C)  Pulse:  [] 103  Resp:  [] 18  SpO2:  [88 %-100 %] 93 %  BP: (121-146)/(54-88) 121/54     Weight: 64.2 kg (141 lb 9.6 oz) (05/09/25  0833)  Body mass index is 17.7 kg/m².  Body surface area is 1.84 meters squared.    I/O last 3 completed shifts:  In: 720 [P.O.:720]  Out: 1500 [Urine:1500]     Physical Exam  Constitutional:       Appearance: He is ill-appearing.   HENT:      Head: Normocephalic and atraumatic.   Eyes:      Pupils: Pupils are equal, round, and reactive to light.   Cardiovascular:      Rate and Rhythm: Regular rhythm.      Heart sounds: Normal heart sounds.   Pulmonary:      Effort: Pulmonary effort is normal.   Abdominal:      General: Abdomen is flat.   Skin:     General: Skin is warm.   Psychiatric:         Mood and Affect: Mood normal.          Significant Labs:  BMP:   Recent Labs   Lab 05/08/25  1522 05/09/25  0359 05/11/25  0510      < > 100   *   < > 136   K 4.1   < > 4.0   CL 98   < > 107   CO2 19*   < > 16*   BUN 56*   < > 92*   CREATININE 3.04*   < > 4.34*   CALCIUM 7.8*   < > 7.0*   MG 1.7  --   --     < > = values in this interval not displayed.     CBC:   Recent Labs   Lab 05/11/25  0510   WBC 3.88*   RBC 3.51*   HGB 11.3*   HCT 33.0*   PLT 27*   MCV 94.0   MCH 32.2*   MCHC 34.2       Significant Imaging:  Labs: Reviewed  Assessment/Plan:     Renal/  GEORGE (acute kidney injury)  Renal ultrasound  BMP  Strict I's O's  Hepatitis panel        Thank you for your consult. I will follow-up with patient. Please contact us if you have any additional questions.    Miguel A Garg Jr, MD  Nephrology  [facility]

## 2025-05-12 NOTE — SUBJECTIVE & OBJECTIVE
Interval History:  Patient is sleeping on exam this morning.  Family reports patient has not eating or drinking the liquid diet he has ordered.  HIDA scan ordered for today.    Medications:  Continuous Infusions:   sodium bicarbonate 75 mEq in D5 and 0.45% NaCl 1,000 mL infusion   Intravenous Continuous         Scheduled Meds:   albuterol-ipratropium  3 mL Nebulization Q6H WAKE    Immune Globulin G (IGG)-PRO-IGA 10 % injection (Privigen)  30 g Intravenous Q24H    meropenem IV (PEDS and ADULTS)  500 mg Intravenous Q12H    methylPREDNISolone injection (PEDS and ADULTS)  125 mg Intravenous Q8H    metoprolol succinate  25 mg Oral Daily    mupirocin   Nasal BID    pantoprazole  40 mg Oral Daily    vitamin D  1,000 Units Oral Daily     PRN Meds:  Current Facility-Administered Medications:     acetaminophen, 650 mg, Rectal, Q6H PRN    acetaminophen, 1,000 mg, Oral, Q8H PRN    ALPRAZolam, 0.5 mg, Oral, TID PRN    aluminum-magnesium hydroxide-simethicone, 30 mL, Oral, QID PRN    dextrose 50%, 12.5 g, Intravenous, PRN    dextrose 50%, 25 g, Intravenous, PRN    diphenhydrAMINE, 50 mg, Oral, Daily PRN    docusate sodium, 100 mg, Oral, Daily PRN    famotidine, 20 mg, Oral, Daily PRN    glucagon (human recombinant), 1 mg, Intramuscular, PRN    glucose, 16 g, Oral, PRN    glucose, 24 g, Oral, PRN    melatonin, 6 mg, Oral, Nightly PRN    naloxone, 0.02 mg, Intravenous, PRN    ondansetron, 4 mg, Oral, Q8H PRN    prochlorperazine, 5 mg, Intravenous, Q6H PRN    sodium chloride 0.9%, 10 mL, Intravenous, Q12H PRN     Review of patient's allergies indicates:  No Known Allergies  Objective:     Vital Signs (Most Recent):  Temp: 98.4 °F (36.9 °C) (05/12/25 0802)  Pulse: 103 (05/12/25 0802)  Resp: 20 (05/12/25 0802)  BP: (!) 131/53 (05/12/25 0802)  SpO2: (!) 86 % (05/12/25 0802) Vital Signs (24h Range):  Temp:  [97.7 °F (36.5 °C)-100.5 °F (38.1 °C)] 98.4 °F (36.9 °C)  Pulse:  [] 103  Resp:  [18-24] 20  SpO2:  [86 %-98 %] 86 %  BP:  (121-146)/(53-88) 131/53     Weight: 64.2 kg (141 lb 9.6 oz)  Body mass index is 17.7 kg/m².    Intake/Output - Last 3 Shifts         05/10 0700 05/11 0659 05/11 0700  05/12 0659 05/12 0700  05/13 0659    P.O. 720      I.V. (mL/kg)  760 (11.8)     Total Intake(mL/kg) 720 (11.2) 760 (11.8)     Urine (mL/kg/hr) 1200 (0.8) 1250 (0.8)     Total Output 1200 1250     Net -480 -490                     Physical Exam  Vitals and nursing note reviewed.   Constitutional:       Appearance: He is ill-appearing.   HENT:      Head: Normocephalic.      Nose: Nose normal.      Mouth/Throat:      Mouth: Mucous membranes are moist.   Eyes:      Extraocular Movements: Extraocular movements intact.   Cardiovascular:      Rate and Rhythm: Tachycardia present.   Pulmonary:      Effort: Pulmonary effort is normal.   Abdominal:      General: Bowel sounds are normal. There is no distension.      Tenderness: There is no abdominal tenderness.   Musculoskeletal:         General: Normal range of motion.      Cervical back: Normal range of motion.   Skin:     General: Skin is warm and dry.      Capillary Refill: Capillary refill takes less than 2 seconds.      Coloration: Skin is pale.   Neurological:      General: No focal deficit present.      Mental Status: He is oriented to person, place, and time.   Psychiatric:         Mood and Affect: Mood normal.          Significant Labs:  I have reviewed all pertinent lab results within the past 24 hours.  CBC:   Recent Labs   Lab 05/12/25  0334   WBC 4.45*   RBC 3.00*   HGB 9.6*   HCT 27.9*   PLT 20*   MCV 93.0   MCH 32.0*   MCHC 34.4     BMP:   Recent Labs   Lab 05/08/25  1522 05/09/25  0359 05/12/25  0334      < > 157*   *   < > 137   K 4.1   < > 4.1   CL 98   < > 105   CO2 19*   < > 20*   BUN 56*   < > 109*   CREATININE 3.04*   < > 5.29*   CALCIUM 7.8*   < > 6.3*   MG 1.7  --   --     < > = values in this interval not displayed.     CMP:   Recent Labs   Lab 05/12/25  0334   *  "  CALCIUM 6.3*   ALBUMIN 1.8*   PROT 5.7*      K 4.1   CO2 20*      *   CREATININE 5.29*   ALKPHOS 240*   ALT 70*   *   BILITOT 2.5*     LFTs:   Recent Labs   Lab 05/12/25  0334   ALT 70*   *   ALKPHOS 240*   BILITOT 2.5*   PROT 5.7*   ALBUMIN 1.8*     Coagulation:   Recent Labs   Lab 05/08/25  1522 05/10/25  1436   LABPROT 14.4 16.0*   INR 1.06 1.28   APTT 46.7*  --      Cardiac markers: No results for input(s): "CKMB", "CPKMB", "TROPONINT", "TROPONINI", "MYOGLOBIN" in the last 168 hours.  Microbiology Results (last 7 days)       ** No results found for the last 168 hours. **          Specimen (24h ago, onward)      None          Recent Labs   Lab 05/08/25  1818   COLORU Yellow   SPECGRAV 1.025   PHUR 6.0   PROTEINUA 100*   BACTERIA Many*   NITRITE Negative   LEUKOCYTESUR Negative   UROBILINOGEN 2.0*       Significant Diagnostics:  I have reviewed all pertinent imaging results/findings within the past 24 hours.  "

## 2025-05-12 NOTE — HPI
Patient is admitted to service of hospital medicine for sepsis, elevated troponin, pneumonia, , acute kidney injury, chronic liver disease, and thrombocytopenia.  General surgery was consulted to evaluate abdominal pain with nausea vomiting.  Patient has past medical history to include hypertension, GERD, and CAD.  Surgical history includes cardiac bypass, and left carotid enterectomy.  Patient's symptoms started proximally 2 weeks ago.  Symptoms have been worse over the last 2-3 days per family.  Family reports patient lives by himself.  He has seen in ER 1 week ago, and was discharged home.  He returned 3-4 days later after worsening of symptoms and was admitted at that time.  Patient had normal abdominal ultrasound.  He has elevation of liver enzymes including a bilirubin of 2.5.  We will obtain HIDA scan to evaluate for gallbladder dysfunction.

## 2025-05-12 NOTE — HPI
As per H&P:  71 y/o male with PMHx/PSHx of HTN, GERD, CAD (cardiac bypass), and left carotid endarterectomy who was transferred from [facility] on 5/8/25 to [facility] for nausea, abdominal pain and decrease PO intake. Pt states he developed decrease PO intake 1 week ago and later developed nausea and generalized abdominal pain. He describes his abdominal pain as 5/10, achy, intermittent without any radiation. He denies vomiting, diarrhea, congestion, rhinorrhea, sore throat, chest pain or chest tightness but reports intermittent cough, SOB, and fever. He denies any h/o COPD or PNA in the past. He denies any sick contacts. He was evaluated in the ER for nausea on 5/05 and was treated with zofran and IV fluids at that time. Of note, pt lives alone and ambulates without the use of any assistive device. He is able to do all ADLs. He has 60-pack-yr smoking hx and previous drank multiple beers on his days off quitting 2 yrs ago. He was subsequently admitted to the hospital medicine service at [facility] for continuation of care. He is now being followed by Nephrology, Hematology/Oncology, GI, & General Surgery.

## 2025-05-12 NOTE — HOSPITAL COURSE
05/12/25 HIDA scan to be done today.  Patient was sleeping this morning on exam.  Has clear liquids diet ordered, but family reports patient is not eating or drinking.  Consult notes from GI reviewed.  Patient was evaluated by myself and Dr. Marlow this morning.    05/13/25 Patient is evaluated by Dr Marlow this morning.  Family at bedside and plan of care discussed per Dr Marlow.  Patient sleeping but easily aroused.  He is more disoriented today and pulls IV out during exam.  Patient's Plts this morning decreased at 15.  Request to transfer to facility with availability for plasma exchange.  Results of HIDa scan discussed with family.  Need for surgical intervention/ Cholecystectomy when patient is stable. Recommendation to transfuse 1 unit FFP now and repeat possibly overnight.  Will discuss need for drain insertion with IR when patient stable and if unable to get transfer promptly.

## 2025-05-12 NOTE — NURSING
0500- Critical lab called to floor, plt count of 20 noted. Pt with no active bleeding noted. Pt scheduled to get IV steroids. Will notify MD on morning rounds. Care ongoing.

## 2025-05-13 PROBLEM — G93.41 ACUTE METABOLIC ENCEPHALOPATHY: Status: ACTIVE | Noted: 2025-05-13

## 2025-05-13 NOTE — ASSESSMENT & PLAN NOTE
Not sure the cause here will consult ID may require lumbar puncture would need to transfuse platelets this morning.  With elevated liver test will check an ammonia level

## 2025-05-13 NOTE — NURSING
Telephoned Telemedicine and talked to Jonatan and he stated the  had already phoned about consult and to consult tomorrow

## 2025-05-13 NOTE — ASSESSMENT & PLAN NOTE
Thrombocytopenia is an ongoing issue I have brought up the idea could this be TTP in discussed it with Dr. Boo ordered an abdomen Joseph's T 13.  I believe this patient would be best served by transfer to a higher level of care transfer has been put in were waiting for an answer to that.  Platelet count 15,000 will need a transfusion continue IVIG and steroids has a low haptoglobin elevated LDH concerned this could be hemolysis also

## 2025-05-13 NOTE — PROGRESS NOTES
Pharmacist Renal Dose Adjustment Note    Bhaskar Patel is a 70 y.o. male being treated with the medication Merrem.    Patient Data:    Vital Signs (Most Recent):  Temp: 98.1 °F (36.7 °C) (05/13/25 1009)  Pulse: 91 (05/13/25 1110)  Resp: (!) 22 (05/13/25 1045)  BP: 118/72 (05/13/25 1009)  SpO2: (!) 91 % (05/13/25 1045) Vital Signs (72h Range):  Temp:  [97.2 °F (36.2 °C)-100.5 °F (38.1 °C)]   Pulse:  []   Resp:  []   BP: (103-146)/(49-97)   SpO2:  [82 %-100 %]      Recent Labs   Lab 05/11/25  0510 05/12/25  0334 05/13/25  0325   CREATININE 4.34* 5.29* 6.55*     Serum creatinine: 6.55 mg/dL (H) 05/13/25 0325  Estimated creatinine clearance: 9.6 mL/min (A)    Medication:Merrem dose: 500 mg frequency every 12 hours will be changed to medication:Merrem dose:500 mg frequency:every 24 hours.    Pharmacist's Name: Bonita Hampton  Pharmacist's Extension: [phone]

## 2025-05-13 NOTE — PLAN OF CARE
Problem: Adult Inpatient Plan of Care  Goal: Plan of Care Review  5/13/2025 1349 by Gunnar Hardwick, RRT  Outcome: Progressing  5/13/2025 1348 by Gunnar Hardwick, RRT  Outcome: Progressing     Problem: Pneumonia  Goal: Fluid Balance  5/13/2025 1349 by Gunnar Hardwick, RRT  Outcome: Progressing  5/13/2025 1348 by Gunnar Hardwick, RRT  Outcome: Progressing     Problem: Gas Exchange Impaired  Goal: Optimal Gas Exchange  5/13/2025 1349 by Gunnar Hadrwick, RRT  Outcome: Progressing  5/13/2025 1348 by Gunnar Hardwick, RRT  Outcome: Progressing     Problem: COPD (Chronic Obstructive Pulmonary Disease)  Goal: Effective Oxygenation and Ventilation  Outcome: Progressing

## 2025-05-13 NOTE — ASSESSMENT & PLAN NOTE
Patient has some tachypnea he is currently protecting his airway but his respiratory status looks little bit worse we may have to make a decision about elective intubation soon

## 2025-05-13 NOTE — PROGRESS NOTES
[facility]  General Surgery  Progress Note    Subjective:     History of Present Illness:  Patient is admitted to service of hospital medicine for sepsis, elevated troponin, pneumonia, , acute kidney injury, chronic liver disease, and thrombocytopenia.  General surgery was consulted to evaluate abdominal pain with nausea vomiting.  Patient has past medical history to include hypertension, GERD, and CAD.  Surgical history includes cardiac bypass, and left carotid enterectomy.  Patient's symptoms started proximally 2 weeks ago.  Symptoms have been worse over the last 2-3 days per family.  Family reports patient lives by himself.  He has seen in ER 1 week ago, and was discharged home.  He returned 3-4 days later after worsening of symptoms and was admitted at that time.  Patient had normal abdominal ultrasound.  He has elevation of liver enzymes including a bilirubin of 2.5.  We will obtain HIDA scan to evaluate for gallbladder dysfunction.    Post-Op Info:  * No surgery found *         Interval History: 05/13/25 Patient is evaluated by Dr Marlow this morning.  Family at bedside and plan of care discussed per Dr Marlow.  Patient sleeping but easily aroused.  He is more disoriented today and pulls IV out during exam.  Patient's Plts this morning decreased at 15.  Request to transfer to facility with availability for plasma exchange.  Results of HIDa scan discussed with family.  Need for surgical intervention/ Cholecystectomy when patient is stable. Recommendation to transfuse 1 unit FFP now and repeat possibly overnight.  Will discuss need for drain insertion with IR when patient stable and if unable to get transfer promptly.     Medications:  Continuous Infusions:   dexmedeTOMIDine (Precedex) infusion (titrating)  0-1.4 mcg/kg/hr Intravenous Continuous 4.91 mL/hr at 05/13/25 1210 0.3 mcg/kg/hr at 05/13/25 1210    sodium bicarbonate 75 mEq in D5 and 0.45% NaCl 1,000 mL infusion   Intravenous Continuous  100 mL/hr at 05/13/25 1210 Rate Verify at 05/13/25 1210     Scheduled Meds:   albuterol-ipratropium  3 mL Nebulization Q6H WAKE    [START ON 5/15/2025] famotidine  20 mg Oral Q48H    immun Globulin G (IGG)-PRO-IGA 10 % injection (Privigen)  30 g Intravenous Q24H    [START ON 5/14/2025] meropenem IV (PEDS and ADULTS)  500 mg Intravenous Q24H    methylPREDNISolone injection (PEDS and ADULTS)  125 mg Intravenous Q6H    metoprolol tartrate  12.5 mg Oral BID    mupirocin   Nasal BID    vitamin D  1,000 Units Oral Daily     PRN Meds:  Current Facility-Administered Medications:     0.9%  NaCl infusion (for blood administration), , Intravenous, Q24H PRN    0.9%  NaCl infusion (for blood administration), , Intravenous, Q24H PRN    acetaminophen, 650 mg, Rectal, Q6H PRN    acetaminophen, 1,000 mg, Oral, Q8H PRN    ALPRAZolam, 0.5 mg, Oral, TID PRN    aluminum-magnesium hydroxide-simethicone, 30 mL, Oral, QID PRN    dextrose 50%, 12.5 g, Intravenous, PRN    dextrose 50%, 25 g, Intravenous, PRN    diphenhydrAMINE, 50 mg, Intravenous, Daily PRN    docusate sodium, 100 mg, Oral, Daily PRN    famotidine (PF), 20 mg, Intravenous, Daily PRN    glucagon (human recombinant), 1 mg, Intramuscular, PRN    glucose, 16 g, Oral, PRN    glucose, 24 g, Oral, PRN    insulin aspart U-100, 0-10 Units, Subcutaneous, Q6H PRN    melatonin, 6 mg, Oral, Nightly PRN    naloxone, 0.02 mg, Intravenous, PRN    ondansetron, 4 mg, Oral, Q8H PRN    prochlorperazine, 5 mg, Intravenous, Q6H PRN    sodium chloride 0.9%, 10 mL, Intravenous, Q12H PRN     Review of patient's allergies indicates:  No Known Allergies  Objective:     Vital Signs (Most Recent):  Temp: 98.9 °F (37.2 °C) (05/13/25 1110)  Pulse: 92 (05/13/25 1200)  Resp: (!) 22 (05/13/25 1200)  BP: 131/65 (05/13/25 1200)  SpO2: (!) 92 % (05/13/25 1200) Vital Signs (24h Range):  Temp:  [97.2 °F (36.2 °C)-99.5 °F (37.5 °C)] 98.9 °F (37.2 °C)  Pulse:  [] 92  Resp:  [20-38] 22  SpO2:  [82 %-97 %] 92  %  BP: (103-141)/(49-97) 131/65     Weight: 65.4 kg (144 lb 2.9 oz)  Body mass index is 18.02 kg/m².    Intake/Output - Last 3 Shifts         05/11 0700  05/12 0659 05/12 0700  05/13 0659 05/13 0700  05/14 0659    P.O.       I.V. (mL/kg) 760 (11.8) 479.3 (7.3) 1735 (26.5)    Blood   106.7    IV Piggyback  286.9     Total Intake(mL/kg) 760 (11.8) 766.2 (11.7) 1841.7 (28.2)    Urine (mL/kg/hr) 1250 (0.8) 400 (0.3) 65 (0.2)    Stool   3    Total Output 1250 400 68    Net -490 +366.2 +1773.7                    Physical Exam  Vitals and nursing note reviewed.   Constitutional:       Appearance: He is ill-appearing and toxic-appearing.   HENT:      Head: Normocephalic.      Nose: Nose normal.      Mouth/Throat:      Mouth: Mucous membranes are dry.   Eyes:      Extraocular Movements: Extraocular movements intact.   Cardiovascular:      Rate and Rhythm: Tachycardia present.   Pulmonary:      Effort: Pulmonary effort is normal.   Abdominal:      Palpations: Abdomen is soft.   Musculoskeletal:         General: Normal range of motion.      Cervical back: Normal range of motion.   Skin:     General: Skin is warm and dry.      Coloration: Skin is pale.   Neurological:      Mental Status: He is disoriented.   Psychiatric:         Attention and Perception: He is inattentive.          Significant Labs:  I have reviewed all pertinent lab results within the past 24 hours.  CBC:   Recent Labs   Lab 05/13/25  0325   WBC 4.27*   RBC 2.52*   HGB 8.2*   HCT 23.5*   PLT 15*   MCV 93.3   MCH 32.5*   MCHC 34.9     BMP:   Recent Labs   Lab 05/13/25  0325   *      K 4.6      CO2 22*   BUN >125*   CREATININE 6.55*   CALCIUM 6.3*   MG 2.9*     Microbiology Results (last 7 days)       ** No results found for the last 168 hours. **          Specimen (24h ago, onward)      None          Recent Labs   Lab 05/08/25  1818   COLORU Yellow   SPECGRAV 1.025   PHUR 6.0   PROTEINUA 100*   BACTERIA Many*   NITRITE Negative   LEUKOCYTESUR  Negative   UROBILINOGEN 2.0*       Significant Diagnostics:  I have reviewed all pertinent imaging results/findings within the past 24 hours.  Assessment/Plan:     No notes have been filed under this hospital service.  Service: General Surgery      BLANCA Navarro  General Surgery  [facility]

## 2025-05-13 NOTE — ASSESSMENT & PLAN NOTE
Renal following this is getting worse creatinine is more elevated, urine output maybe decreasing will watch.  Renal is following on a bicarb drip

## 2025-05-13 NOTE — PROGRESS NOTES
[facility]  Critical Care Medicine  Progress Note    Patient Name: Bhaskar Patel  MRN: [id number]  Admission Date: 5/8/2025  Hospital Length of Stay: 5 days  Code Status: Full Code  Attending Provider: Lam Camarillo MD  Primary Care Provider: Crys Jackson FNP   Principal Problem: Sepsis    Subjective:     HPI:  As per H&P:  71 y/o male with PMHx/PSHx of HTN, GERD, CAD (cardiac bypass), and left carotid endarterectomy who was transferred from [facility] on 5/8/25 to [facility] for nausea, abdominal pain and decrease PO intake. Pt states he developed decrease PO intake 1 week ago and later developed nausea and generalized abdominal pain. He describes his abdominal pain as 5/10, achy, intermittent without any radiation. He denies vomiting, diarrhea, congestion, rhinorrhea, sore throat, chest pain or chest tightness but reports intermittent cough, SOB, and fever. He denies any h/o COPD or PNA in the past. He denies any sick contacts. He was evaluated in the ER for nausea on 5/05 and was treated with zofran and IV fluids at that time. Of note, pt lives alone and ambulates without the use of any assistive device. He is able to do all ADLs. He has 60-pack-yr smoking hx and previous drank multiple beers on his days off quitting 2 yrs ago. He was subsequently admitted to the hospital medicine service at [facility] for continuation of care. He is now being followed by Nephrology, Hematology/Oncology, GI, & General Surgery.     Hospital/ICU Course:  5/12/25: Dr. Marinelli contacted me this morning about transferring this patient to the ICU for higher level of care. He has been in-patient since 5/8/25 & continues to be thrombocytopenic, unsure of cause. Nephrology is following for ARF, Hematology/Oncology is following for persistent thrombocytopenia, General surgery is following for abdominal pain (ruling out gallbladder concerns), & GI is following & recommended HIDA scan to evaluate for acalculous  cholecystitis given fever, presentation (abd pain, nausea), and elevated LFT's (though somewhat improving), ECHO to evaluate heart function, & daily INR & hepatic function panel.     On admission to the ICU, patient is confused, agitated, & uncooperative. No active bleeding noted. Vital signs are stable. Family is at bedside. Will continue to monitor & treat as necessary.     Interval History/Significant Events:  Patient will awaken at times but is confused    Review of Systems  Objective:     Vital Signs (Most Recent):  Temp: 97.7 °F (36.5 °C) (05/13/25 0315)  Pulse: 97 (05/13/25 0445)  Resp: (!) 23 (05/13/25 0445)  BP: (!) 115/49 (05/13/25 0400)  SpO2: (!) 94 % (05/13/25 0430) Vital Signs (24h Range):  Temp:  [97.2 °F (36.2 °C)-99.5 °F (37.5 °C)] 97.7 °F (36.5 °C)  Pulse:  [] 97  Resp:  [20-38] 23  SpO2:  [82 %-97 %] 94 %  BP: (103-141)/(49-97) 115/49   Weight: 65.4 kg (144 lb 2.9 oz)  Body mass index is 18.02 kg/m².      Intake/Output Summary (Last 24 hours) at 5/13/2025 0609  Last data filed at 5/12/2025 1835  Gross per 24 hour   Intake 766.17 ml   Output 200 ml   Net 566.17 ml          Physical Exam  Vitals reviewed.   Constitutional:       Appearance: Normal appearance.      Interventions: He is not intubated.  HENT:      Head: Normocephalic and atraumatic.      Nose: Nose normal.      Mouth/Throat:      Mouth: Mucous membranes are dry.      Pharynx: Oropharynx is clear.   Eyes:      Extraocular Movements: Extraocular movements intact.      Conjunctiva/sclera: Conjunctivae normal.      Pupils: Pupils are equal, round, and reactive to light.   Cardiovascular:      Rate and Rhythm: Normal rate.      Heart sounds: Normal heart sounds. No murmur heard.  Pulmonary:      Effort: Respiratory distress present. He is not intubated.      Breath sounds: Rhonchi present.   Abdominal:      General: Abdomen is flat. Bowel sounds are normal.      Palpations: Abdomen is soft.   Musculoskeletal:         General: Normal  range of motion.      Cervical back: Normal range of motion and neck supple.      Right lower leg: No edema.      Left lower leg: No edema.   Skin:     General: Skin is warm and dry.      Capillary Refill: Capillary refill takes less than 2 seconds.   Neurological:      General: No focal deficit present.      Mental Status: He is alert and oriented to person, place, and time.   Psychiatric:         Mood and Affect: Mood normal.         Behavior: Behavior normal.            Vents:  Oxygen Concentration (%): 50 (05/12/25 1910)  Lines/Drains/Airways       Drain  Duration                  Urethral Catheter 05/10/25 1957 Straight-tip 16 Fr. 2 days              Peripheral Intravenous Line  Duration                  Peripheral IV - Single Lumen 05/09/25 20 G Anterior;Right Forearm 4 days         Peripheral IV - Single Lumen 05/11/25 1103 22 G Left;Posterior Forearm 1 day                  Significant Labs:    CBC/Anemia Profile:  Recent Labs   Lab 05/12/25  0334 05/12/25 1752 05/13/25 0325   WBC 4.45*  --  4.27*   HGB 9.6*  --  8.2*   HCT 27.9*  --  23.5*   PLT 20*  --  15*   MCV 93.0  --  93.3   RDW 13.6  --  13.9   FERRITIN  --  >1,676*  --         Chemistries:  Recent Labs   Lab 05/12/25  0334 05/13/25  0325    139   K 4.1 4.6    104   CO2 20* 22*   * >125*   CREATININE 5.29* 6.55*   CALCIUM 6.3* 6.3*   ALBUMIN 1.8* 1.7*   PROT 5.7* 6.1   BILITOT 2.5* 2.8*   ALKPHOS 240* 357*   ALT 70* 74*   * 415*   MG  --  2.9*   PHOS  --  5.3*       Recent Lab Results  (Last 5 results in the past 24 hours)        05/13/25  0510   05/13/25  0325   05/12/25  2306   05/12/25  1752 05/12/25  1713        Albumin/Globulin Ratio   0.4             Albumin   1.7             ALP   357             ALT   74             Anion Gap   18             Aniso   2+             PTT       52.2         AST   415             Bands   4             Baso #   0.01             Basophil %   0.2             Bilirubin Direct   2.3              BILIRUBIN TOTAL   2.8             BUN   >125             Calcium   6.3  Comment: Critical Result called and verified by verbal readback to: reported critical value to Megan on 05/13/2025 at 05:26. Reported by [id number].             Chloride   104             CO2   22             Creatinine   6.55             Differential Method   Manual             eGFR   8  Comment: Estimated GFR calculated using the CKD-EPI creatinine (2021) equation.             Eos #   0.00             Eos %   0.0             Ferritin       >1,676         Fibrinogen       73         Globulin, Total   4.4             Glucose   134             Hematocrit   23.5             Hemoglobin   8.2             Immature Grans (Abs)   0.18             Immature Granulocytes   4.2             INR       1.60         Lymph #   0.24             Lymph %   5.6                3             Magnesium    2.9             MCH   32.5             MCHC   34.9             MCV   93.3             Metamyelocytes   3             Mono #   0.24             Mono %   5.6                1             MPV               Neutrophils, Abs   3.60             Neutrophils Relative   84.4             nRBC   1                0.5             NUCLEATED RBC ABSOLUTE   0.02             Phosphorus Level   5.3             PLATELET MORPHOLOGY   Decreased             Platelet Count   15             POC Glucose 168     171     161       Poly   Few             Potassium   4.6             PROTEIN TOTAL   6.1             PT       18.8         RBC   2.52             RDW   13.9             Schistocytes   Few             Segmented Neutrophils, Man %   89             Sodium   139             Target Cells   Few             WBC   4.27                                    Significant Imaging:  I have reviewed all pertinent imaging results/findings within the past 24 hours.    ABG  Recent Labs   Lab 05/12/25  1646   PH 7.45   PO2 69*   PCO2 40   HCO3 27.8     Assessment/Plan:     Neuro  Acute metabolic  encephalopathy  Not sure the cause here will consult ID may require lumbar puncture would need to transfuse platelets this morning.  With elevated liver test will check an ammonia level    Pulmonary  Pneumonia  Down changes earlier will repeat chest x-ray    Acute hypoxemic respiratory failure  Patient has some tachypnea he is currently protecting his airway but his respiratory status looks little bit worse we may have to make a decision about elective intubation soon    Cardiac/Vascular  Elevated troponin  Elevated initially at 50, now resolved    Hyperlipidemia  Noted  - currently holding statin due to persistent thrombocytopenia    Coronary artery disease involving native coronary artery of native heart without angina pectoris  Noted  - s/p CABG  - continuous cardiac monitoring  - 12 Lead EKG PRN       Essential hypertension  Blood pressure has been okay this morning    Renal/  Hypocalcemia  Calcium 6.3- when corrected for an albumin of 1.8 it is 8.1- This is likely 2/2 to malnutrition    GEORGE (acute kidney injury)  Renal following this is getting worse creatinine is more elevated, urine output maybe decreasing will watch.  Renal is following on a bicarb drip    ID  * Sepsis  Patient on broad-spectrum antibiotics, grew out E coli 15,000 colony count sensitive to Merrem also has had negative blood cultures in 1 blood culture where she grew out micro coccus.  I am going to consult Infectious Disease this morning for evaluation    Endocrine  Severe protein-calorie malnutrition  Nutrition consulted. Most recent weight and BMI monitored-     Measurements:  Wt Readings from Last 1 Encounters:   05/12/25 65.5 kg (144 lb 6.4 oz)   Body mass index is 18.05 kg/m².    Patient has been screened and assessed by RD.    Malnutrition Type:  Context: chronic illness  Level: severe    Malnutrition Characteristic Summary:  Weight Loss (Malnutrition): greater than 10% in 6 months  Energy Intake (Malnutrition): less than or equal to  50% for greater than or equal to 5 days  Subcutaneous Fat (Malnutrition): severe depletion  Muscle Mass (Malnutrition): severe depletion    Interventions/Recommendations (treatment strategy):  Recommend continue current diet as tolerated. Encourage good PO intakes.      GI  Nausea and vomiting  Noted by history    Epigastric pain  Noted on Protonix    Elevated liver enzymes  Liver tests remain abnormal    Acute liver disease  - Daily hepatic function panel    Gastroesophageal reflux disease  Noted  - continue PPI    Other  Immune thrombocytopenia  Followed by Hematology    Thrombocytopenia  Thrombocytopenia is an ongoing issue I have brought up the idea could this be TTP in discussed it with Dr. Boo ordered an abdomen Joseph's T 13.  I believe this patient would be best served by transfer to a higher level of care transfer has been put in were waiting for an answer to that.  Platelet count 15,000 will need a transfusion continue IVIG and steroids has a low haptoglobin elevated LDH concerned this could be hemolysis also      Patient has a multi-system disease TTP would be a good fit sent and [id number] off.  I have consulted Infectious Disease to see this morning help us with treatment he has a HIDA scan pending from his gallbladder with a possible source currently getting Merrem.  Worry about some occult infection.  Patient needs transfer to higher level of care discussed with family they agree will continue to work on that.   Critical Care Daily Checklist:    A: Awake: RASS Goal/Actual Goal:    Actual:     B: Spontaneous Breathing Trial Performed?     C: SAT & SBT Coordinated?  Not applicable                      D: Delirium: CAM-ICU     E: Early Mobility Performed? No   F: Feeding Goal: Goals: Weight maintenance during admission, intake 50-75% of meals during admission  Status: Nutrition Goal Status: new   Current Diet Order   Procedures    Diet NPO      AS: Analgesia/Sedation None   T: Thromboembolic Prophylaxis  SCD hose   H: HOB > 300 Yes   U: Stress Ulcer Prophylaxis (if needed) Protonix   G: Glucose Control Sliding scale   B: Bowel Function     I: Indwelling Catheter (Lines & Jones) Necessity Jones   D: De-escalation of Antimicrobials/Pharmacotherapies Not appropriate    Plan for the day/ETD Work on transfer consult ID    Code Status:  Family/Goals of Care: Full Code  Discussed with multiple family members son daughter 2 sisters last night tried to call son this morning no answer     Critical Care Time:  35 minutes  Critical secondary to Patient has a condition that poses threat to life and bodily function:  Multi-system organ disease unsure of the etiology critically ill      Critical care was time spent personally by me on the following activities: development of treatment plan with patient or surrogate and bedside caregivers, discussions with consultants, evaluation of patient's response to treatment, examination of patient, ordering and performing treatments and interventions, ordering and review of laboratory studies, ordering and review of radiographic studies, pulse oximetry, re-evaluation of patient's condition. This critical care time did not overlap with that of any other provider or involve time for any procedures.     Lam Camarillo MD  Critical Care Medicine  [facility]

## 2025-05-13 NOTE — PLAN OF CARE
Problem: Adult Inpatient Plan of Care  Goal: Optimal Comfort and Wellbeing  Outcome: Progressing  Intervention: Monitor Pain and Promote Comfort  Flowsheets (Taken 5/12/2025 2039)  Pain Management Interventions:   warm blanket provided   relaxation techniques promoted   position adjusted   pillow support provided   quiet environment facilitated  Intervention: Provide Person-Centered Care  Flowsheets (Taken 5/12/2025 2039)  Trust Relationship/Rapport:   care explained   choices provided   emotional support provided   empathic listening provided   questions answered   thoughts/feelings acknowledged   reassurance provided   questions encouraged     Problem: Skin Injury Risk Increased  Goal: Skin Health and Integrity  Outcome: Progressing  Intervention: Optimize Skin Protection  Flowsheets (Taken 5/12/2025 2039)  Skin Protection:   transparent dressing maintained   silicone foam dressing in place

## 2025-05-13 NOTE — PROGRESS NOTES
[facility]  Nephrology  Progress Note    Patient Name: Bhaskar Patel  MRN: [id number]  Admission Date: 5/8/2025  Hospital Length of Stay: 5 days  Attending Provider: Lam Camarillo MD   Primary Care Physician: Crys Jackson FNP  Principal Problem:Sepsis    Subjective:     HPI: This patient with multiple medical problems of COPD, CAD weight loss was admitted for sepsis.  His serum creatinine has trended up over the past several days.  According to the patient's family, he has been complaining of abdominal pain and weight loss.  He was recently diagnosed with Celiac disease.  Today, nephrology is consulted for GEORGE.  Serum creatinine is 4.34.  Serum potassium is 4.    Interval History: The patient's condition continues to decline.  Awaiting transfer to another facility for care.    Review of patient's allergies indicates:  No Known Allergies  Current Facility-Administered Medications   Medication Frequency    0.9%  NaCl infusion (for blood administration) Q24H PRN    0.9%  NaCl infusion (for blood administration) Q24H PRN    0.9%  NaCl infusion (for blood administration) Q24H PRN    acetaminophen suppository 650 mg Q6H PRN    acetaminophen tablet 1,000 mg Q8H PRN    albuterol-ipratropium 2.5 mg-0.5 mg/3 mL nebulizer solution 3 mL Q6H WAKE    ALPRAZolam tablet 0.5 mg TID PRN    aluminum-magnesium hydroxide-simethicone 200-200-20 mg/5 mL suspension 30 mL QID PRN    dexmedetomidine (PRECEDEX) 400mcg/100mL 0.9% NaCL infusion Continuous    dextrose 50% injection 12.5 g PRN    dextrose 50% injection 25 g PRN    diphenhydrAMINE injection 50 mg Daily PRN    docusate sodium capsule 100 mg Daily PRN    famotidine (PF) injection 20 mg Daily PRN    [START ON 5/15/2025] famotidine tablet 20 mg Q48H    glucagon (human recombinant) injection 1 mg PRN    glucose chewable tablet 16 g PRN    glucose chewable tablet 24 g PRN    Immune Globulin G (IGG)-PRO-IGA 10 % injection (Privigen) 10 % injection 30 g  Q24H    insulin aspart U-100 injection 0-10 Units Q6H PRN    melatonin tablet 6 mg Nightly PRN    [START ON 5/14/2025] meropenem injection 500 mg Q24H    methylPREDNISolone sodium succinate injection 125 mg Q6H    metoprolol tartrate (LOPRESSOR) split tablet 12.5 mg BID    mupirocin 2 % ointment BID    naloxone 0.4 mg/mL injection 0.02 mg PRN    ondansetron disintegrating tablet 4 mg Q8H PRN    prochlorperazine injection Soln 5 mg Q6H PRN    sodium bicarbonate 75 mEq in D5 and 0.45% NaCl 1,000 mL infusion Continuous    sodium chloride 0.9% flush 10 mL Q12H PRN    vitamin D 1000 units tablet 1,000 Units Daily       Objective:     Vital Signs (Most Recent):  Temp: 97.8 °F (36.6 °C) (05/13/25 1510)  Pulse: 82 (05/13/25 1700)  Resp: (!) 21 (05/13/25 1700)  BP: 95/62 (05/13/25 1700)  SpO2: (!) 91 % (05/13/25 1700) Vital Signs (24h Range):  Temp:  [97.2 °F (36.2 °C)-99.5 °F (37.5 °C)] 97.8 °F (36.6 °C)  Pulse:  [] 82  Resp:  [20-36] 21  SpO2:  [86 %-96 %] 91 %  BP: ()/(49-97) 95/62     Weight: 65.4 kg (144 lb 2.9 oz) (05/13/25 0316)  Body mass index is 18.02 kg/m².  Body surface area is 1.86 meters squared.    I/O last 3 completed shifts:  In: 1526.2 [I.V.:1239.3; IV Piggyback:286.9]  Out: 1350 [Urine:1350]     Physical Exam  Constitutional:       Appearance: He is ill-appearing and toxic-appearing.   HENT:      Head: Normocephalic.   Cardiovascular:      Rate and Rhythm: Tachycardia present.   Pulmonary:      Effort: Pulmonary effort is normal.   Abdominal:      General: Abdomen is flat.   Skin:     General: Skin is warm.          Significant Labs:  BMP:   Recent Labs   Lab 05/13/25 0325   *      K 4.6      CO2 22*   BUN >125*   CREATININE 6.55*   CALCIUM 6.3*   MG 2.9*     CBC:   Recent Labs   Lab 05/13/25 0325   WBC 4.27*   RBC 2.52*   HGB 8.2*   HCT 23.5*   PLT 15*   MCV 93.3   MCH 32.5*   MCHC 34.9        Significant Imaging:  Labs: Reviewed  Assessment/Plan:     Renal/  GEORGE (acute  kidney injury)  Renal ultrasound  BMP  Strict I's O's        Thank you for your consult. I will follow-up with patient. Please contact us if you have any additional questions.    Miguel A Garg Jr, MD  Nephrology  [facility]

## 2025-05-13 NOTE — ASSESSMENT & PLAN NOTE
Patient on broad-spectrum antibiotics, grew out E coli 15,000 colony count sensitive to Merrem also has had negative blood cultures in 1 blood culture where she grew out micro coccus.  I am going to consult Infectious Disease this morning for evaluation

## 2025-05-13 NOTE — SUBJECTIVE & OBJECTIVE
Interval History/Significant Events:  Patient will awaken at times but is confused    Review of Systems  Objective:     Vital Signs (Most Recent):  Temp: 97.7 °F (36.5 °C) (05/13/25 0315)  Pulse: 97 (05/13/25 0445)  Resp: (!) 23 (05/13/25 0445)  BP: (!) 115/49 (05/13/25 0400)  SpO2: (!) 94 % (05/13/25 0430) Vital Signs (24h Range):  Temp:  [97.2 °F (36.2 °C)-99.5 °F (37.5 °C)] 97.7 °F (36.5 °C)  Pulse:  [] 97  Resp:  [20-38] 23  SpO2:  [82 %-97 %] 94 %  BP: (103-141)/(49-97) 115/49   Weight: 65.4 kg (144 lb 2.9 oz)  Body mass index is 18.02 kg/m².      Intake/Output Summary (Last 24 hours) at 5/13/2025 0609  Last data filed at 5/12/2025 1835  Gross per 24 hour   Intake 766.17 ml   Output 200 ml   Net 566.17 ml          Physical Exam  Vitals reviewed.   Constitutional:       Appearance: Normal appearance.      Interventions: He is not intubated.  HENT:      Head: Normocephalic and atraumatic.      Nose: Nose normal.      Mouth/Throat:      Mouth: Mucous membranes are dry.      Pharynx: Oropharynx is clear.   Eyes:      Extraocular Movements: Extraocular movements intact.      Conjunctiva/sclera: Conjunctivae normal.      Pupils: Pupils are equal, round, and reactive to light.   Cardiovascular:      Rate and Rhythm: Normal rate.      Heart sounds: Normal heart sounds. No murmur heard.  Pulmonary:      Effort: Respiratory distress present. He is not intubated.      Breath sounds: Rhonchi present.   Abdominal:      General: Abdomen is flat. Bowel sounds are normal.      Palpations: Abdomen is soft.   Musculoskeletal:         General: Normal range of motion.      Cervical back: Normal range of motion and neck supple.      Right lower leg: No edema.      Left lower leg: No edema.   Skin:     General: Skin is warm and dry.      Capillary Refill: Capillary refill takes less than 2 seconds.   Neurological:      General: No focal deficit present.      Mental Status: He is alert and oriented to person, place, and time.    Psychiatric:         Mood and Affect: Mood normal.         Behavior: Behavior normal.            Vents:  Oxygen Concentration (%): 50 (05/12/25 1910)  Lines/Drains/Airways       Drain  Duration                  Urethral Catheter 05/10/25 1957 Straight-tip 16 Fr. 2 days              Peripheral Intravenous Line  Duration                  Peripheral IV - Single Lumen 05/09/25 20 G Anterior;Right Forearm 4 days         Peripheral IV - Single Lumen 05/11/25 1103 22 G Left;Posterior Forearm 1 day                  Significant Labs:    CBC/Anemia Profile:  Recent Labs   Lab 05/12/25  0334 05/12/25  1752 05/13/25  0325   WBC 4.45*  --  4.27*   HGB 9.6*  --  8.2*   HCT 27.9*  --  23.5*   PLT 20*  --  15*   MCV 93.0  --  93.3   RDW 13.6  --  13.9   FERRITIN  --  >1,676*  --         Chemistries:  Recent Labs   Lab 05/12/25 0334 05/13/25 0325    139   K 4.1 4.6    104   CO2 20* 22*   * >125*   CREATININE 5.29* 6.55*   CALCIUM 6.3* 6.3*   ALBUMIN 1.8* 1.7*   PROT 5.7* 6.1   BILITOT 2.5* 2.8*   ALKPHOS 240* 357*   ALT 70* 74*   * 415*   MG  --  2.9*   PHOS  --  5.3*       Recent Lab Results  (Last 5 results in the past 24 hours)        05/13/25  0510   05/13/25  0325   05/12/25  2306   05/12/25 1752 05/12/25  1713        Albumin/Globulin Ratio   0.4             Albumin   1.7             ALP   357             ALT   74             Anion Gap   18             Aniso   2+             PTT       52.2         AST   415             Bands   4             Baso #   0.01             Basophil %   0.2             Bilirubin Direct   2.3             BILIRUBIN TOTAL   2.8             BUN   >125             Calcium   6.3  Comment: Critical Result called and verified by verbal readback to: reported critical value to Megan on 05/13/2025 at 05:26. Reported by [id number].             Chloride   104             CO2   22             Creatinine   6.55             Differential Method   Manual             eGFR   8  Comment:  Estimated GFR calculated using the CKD-EPI creatinine (2021) equation.             Eos #   0.00             Eos %   0.0             Ferritin       >1,676         Fibrinogen       73         Globulin, Total   4.4             Glucose   134             Hematocrit   23.5             Hemoglobin   8.2             Immature Grans (Abs)   0.18             Immature Granulocytes   4.2             INR       1.60         Lymph #   0.24             Lymph %   5.6                3             Magnesium    2.9             MCH   32.5             MCHC   34.9             MCV   93.3             Metamyelocytes   3             Mono #   0.24             Mono %   5.6                1             MPV               Neutrophils, Abs   3.60             Neutrophils Relative   84.4             nRBC   1                0.5             NUCLEATED RBC ABSOLUTE   0.02             Phosphorus Level   5.3             PLATELET MORPHOLOGY   Decreased             Platelet Count   15             POC Glucose 168     171     161       Poly   Few             Potassium   4.6             PROTEIN TOTAL   6.1             PT       18.8         RBC   2.52             RDW   13.9             Schistocytes   Few             Segmented Neutrophils, Man %   89             Sodium   139             Target Cells   Few             WBC   4.27                                    Significant Imaging:  I have reviewed all pertinent imaging results/findings within the past 24 hours.

## 2025-05-13 NOTE — CONSULTS
Inpatient Consult to [facility]  Consult performed by: Sanjuana Moralez MD  Consult ordered by: Lam Camarillo MD  Reason for consult: fever, thrombocytopenia      Infectious Disease [facility]  Live consult Initial - 5/13/25     Patient Name: Bhaskar Patel    Attending Physician: Lam Camarillo MD   Primary Care Physician: Crys Jackson FNP     Consultation Information  Consultation was provided via telemedicine from the location in the above note header using two-way real-time interactive telecommunication including between the patient and telemedicine provider. This includes use of bluetooth stethoscope for auscultation performed by the telepresenter that the telemedicine provider can hear if described in the physical exam. 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 Infectious diseases will follow with you. Please contact us via the [facility] call center at [phone] should any questions arise.    SANJUANA MORALEZ MD  Infectious Diseases Attending  [facility]         Subjective   History of Present Illness   Bhaskar Patel is a 70 y.o. 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 Patient has developed severe thrombocytopenia and AMS.  Patient  was febrile, hypoxic and tachycardic on admission  Labs were significant lactate 4.2, Cr 3.04, , , , positive troponins. CBC showed normal WBC Hgb 11, platelets 25. Acute hepatitis panel negative. UA showed 5-10 wbcs. LDH 1, 198  Negative for COVID/influenza. Chest xray showed diffuse emphysematous changes with no acute pulmonary process. He was started on zosyn and azithromycin   Abdominal U/S showed mild splenomegaly, no acute liver pathology. CT Chest no infectious etiology.   GI, Surgery and Nephrology consulted. Heme/Onc also consulted given worsening thrombocytopenia    Review of EMR shows he presented to ED 5/5/25 with fever up to 102 and was discharged home 5/5 Bcx NG. No abx given then.     5/12 TF to ICU.    Review of Systems: - Review of Systems    Histories:   Past Medical History:  Past Medical History:   Diagnosis Date    GERD (gastroesophageal reflux disease)     Hypertension        Past Social History:  Past Surgical History:   Procedure Laterality Date    CAROTID ENDARTERECTOMY Left 5/16/2022    Procedure: ENDARTERECTOMY-CAROTID;  Surgeon: Karen Spencer MD;  Location: [facility] OR;  Service: General;  Laterality: Left;    CORONARY ARTERY BYPASS GRAFT      HERNIA REPAIR      LEFT HEART CATHETERIZATION Left 9/15/2021    Procedure: Left heart cath;  Surgeon: Quoc Vo DO;  Location: [facility] CATH LAB;  Service: Cardiology;  Laterality: Left;       Family History:  Family History   Problem Relation Name Age of Onset    Alzheimer's disease Mother      Polycystic kidney disease Mother      Hypertension Father      Heart disease Father      Cancer Sister      Heart disease Sister      Hypertension Sister         Social History:  Social History[1]    Inpatient Medications  Current Medications[2]    Allergies:   Review of patient's allergies indicates:  No Known Allergies           Objective   Vitals: BP (!) 115/49   Pulse 107   Temp 97.9 °F (36.6 °C) (Axillary)   Resp (!) 28  "  Ht 6' 3" (1.905 m)   Wt 65.4 kg (144 lb 2.9 oz)   SpO2 (!) 91%   BMI 18.02 kg/m²  FiO2-O2 (L/m): , Dosing Wt:  Wt Readings from Last 1 Encounters:   05/13/25 65.4 kg (144 lb 2.9 oz)   , BMI:Body mass index is 18.02 kg/m².    Physical Exam: Physical Exam    Results Review    Labs:      CBC  Recent Labs     05/11/25  0510 05/12/25  0334 05/13/25  0325   WBC 3.88* 4.45* 4.27*   HGB 11.3* 9.6* 8.2*   HCT 33.0* 27.9* 23.5*   PLT 27* 20* 15*     Renal function  Recent Labs     05/11/25  0510 05/12/25  0334 05/13/25  0325    137 139   K 4.0 4.1 4.6   CREATININE 4.34* 5.29* 6.55*     Liver function  Recent Labs     05/11/25  0510 05/12/25  0334 05/13/25  0325   * 260* 415*   ALT 91* 70* 74*   BILITOT 2.5* 2.5* 2.8*   BILIDIR  --   --  2.3*     Coagulation  Recent Labs     05/10/25  1436 05/12/25  1752   INR 1.28 1.60   APTT  --  52.2*      Procalcitonin  No results for input(s): "PROCALCIT" in the last 72 hours.    Microbiology:      Susceptibility data from last 90 days.  Collected Specimen Info Organism Amp/Sulbactam Ampicillin AZTREONAM ETEST Cefazolin CEFEPIME ETEST Cefotaxime Ceftazidime CEFTAZIDIME/AVIBACTAM SUSCEPTIBILITY Ceftriaxone Ciprofloxacin Ertapenem Gentamicin Meropenem Nitrofurantoin   05/08/25 Urine Escherichia coli ESBL  I  R  R  R  R  R  R  S  R  S  S  S  S  S   05/05/25 Blood Micrococcus and related species                   GRAM POSITIVE BOSTON RESEMBLING DIPHTHEROIDS                   Collected Specimen Info Organism Organism ID Only Pip/Tazo Tetracycline Tobramycin Trimeth/Sulfa   05/08/25 Urine Escherichia coli ESBL   S  R  S  R   05/05/25 Blood Micrococcus and related species           GRAM POSITIVE BOSTON RESEMBLING DIPHTHEROIDS            Imaging:     US Retroperitoneal Complete  Result Date: 5/12/2025  EXAMINATION: US RETROPERITONEAL COMPLETE CLINICAL HISTORY: acute kidney injury; TECHNIQUE: Ultrasound of the kidneys and urinary bladder was performed including color flow and Doppler " evaluation of the kidneys. COMPARISON: CT abdomen pelvis 05/09/2025 FINDINGS: Right kidney: The right kidney measures 12.8 cm.  Increased renal echogenicity.  Resistive index measures 0.63.  No renal stone. No hydronephrosis. Left kidney: The left kidney measures 12.6 cm.  Increased renal echogenicity.  Resistive index measures 0.63.  No renal stone. No hydronephrosis. Bladder is decompressed with Jones catheter in place..     Increased renal echogenicity, suggesting medical renal disease. Electronically signed by: Jenny Buchanan Date:    05/12/2025 Time:    13:35                             [1]   Social History  Tobacco Use    Smoking status: Every Day     Current packs/day: 0.50     Average packs/day: 0.5 packs/day for 54.4 years (27.2 ttl pk-yrs)     Types: Cigarettes     Start date: 1971     Passive exposure: Current    Smokeless tobacco: Never    Tobacco comments:     already did [place] tobacco quitline. not interested in trying again. states he will quit on his own   Substance Use Topics    Alcohol use: Not Currently    Drug use: Not Currently     Types: Marijuana   [2]   Current Facility-Administered Medications:     0.9%  NaCl infusion (for blood administration), , Intravenous, Q24H PRN, Lam Camarillo MD    acetaminophen suppository 650 mg, 650 mg, Rectal, Q6H PRN, Estela Melendez,     acetaminophen tablet 1,000 mg, 1,000 mg, Oral, Q8H PRN, Tea Bustamante MD, 1,000 mg at 05/10/25 1558    albuterol-ipratropium 2.5 mg-0.5 mg/3 mL nebulizer solution 3 mL, 3 mL, Nebulization, Q6H WAKE, Tea Bustamante MD, 3 mL at 05/13/25 0707    ALPRAZolam tablet 0.5 mg, 0.5 mg, Oral, TID PRN, Albert Diego MD, 0.5 mg at 05/10/25 1558    aluminum-magnesium hydroxide-simethicone 200-200-20 mg/5 mL suspension 30 mL, 30 mL, Oral, QID PRN, Tea Bustamante MD    dextrose 50% injection 12.5 g, 12.5 g, Intravenous, PRN, Jeanine Guerrero NP    dextrose 50% injection 25 g, 25 g, Intravenous, PRN, Tea Bustamante MD     diphenhydrAMINE injection 50 mg, 50 mg, Intravenous, Daily PRN, Lam Camarillo MD, 50 mg at 05/12/25 1517    docusate sodium capsule 100 mg, 100 mg, Oral, Daily PRN, Tea Bustamante MD    famotidine (PF) injection 20 mg, 20 mg, Intravenous, Daily PRN, Lam Camarillo MD, 20 mg at 05/12/25 1517    glucagon (human recombinant) injection 1 mg, 1 mg, Intramuscular, PRN, Tea Bustamante MD    glucagon (human recombinant) injection 1 mg, 1 mg, Intramuscular, PRN, Jeanine Guerrero, NP    glucose chewable tablet 16 g, 16 g, Oral, PRN, Tea Bustamante MD    glucose chewable tablet 24 g, 24 g, Oral, PRN, Tea Bustamante MD    Immune Globulin G (IGG)-PRO-IGA 10 % injection (Privigen) 10 % injection 30 g, 30 g, Intravenous, Q24H, Lam Camarillo MD, Last Rate: 157 mL/hr at 05/12/25 1835, Rate Verify at 05/12/25 1835    insulin aspart U-100 injection 0-10 Units, 0-10 Units, Subcutaneous, Q6H PRN, Jeanine Guerrero, NP, 2 Units at 05/13/25 0615    melatonin tablet 6 mg, 6 mg, Oral, Nightly PRN, Tea Bustamante MD    meropenem injection 500 mg, 500 mg, Intravenous, Q12H, Estela Melendez DO, 500 mg at 05/12/25 2200    methylPREDNISolone sodium succinate injection 125 mg, 125 mg, Intravenous, Q6H, Lam Camarillo MD, 125 mg at 05/13/25 0615    metoprolol succinate (TOPROL-XL) 24 hr tablet 25 mg, 25 mg, Oral, Daily, Tea Bustamante MD, 25 mg at 05/11/25 0853    mupirocin 2 % ointment, , Nasal, BID, Estela Melendez DO, Given at 05/12/25 2200    naloxone 0.4 mg/mL injection 0.02 mg, 0.02 mg, Intravenous, PRN, Tea Bustamante MD    ondansetron disintegrating tablet 4 mg, 4 mg, Oral, Q8H PRN, Tea Bustamante MD, 4 mg at 05/09/25 1755    pantoprazole EC tablet 40 mg, 40 mg, Oral, Daily, Tea Bustamante MD, 40 mg at 05/12/25 0856    prochlorperazine injection Soln 5 mg, 5 mg, Intravenous, Q6H PRN, Tea Bustamante MD    sodium bicarbonate 75 mEq in D5 and 0.45% NaCl 1,000 mL infusion, , Intravenous,  Continuous, Jaycee Marinelli DO, Last Rate: 100 mL/hr at 05/13/25 0021, New Bag at 05/13/25 0021    sodium chloride 0.9% flush 10 mL, 10 mL, Intravenous, Q12H PRN, Tea Bustamante MD    vitamin D 1000 units tablet 1,000 Units, 1,000 Units, Oral, Daily, Estela Melendez DO, 1,000 Units at 05/11/25 0813

## 2025-05-13 NOTE — SUBJECTIVE & OBJECTIVE
Interval History: 05/13/25 Patient is evaluated by Dr Marlow this morning.  Family at bedside and plan of care discussed per Dr Marlow.  Patient sleeping but easily aroused.  He is more disoriented today and pulls IV out during exam.  Patient's Plts this morning decreased at 15.  Request to transfer to facility with availability for plasma exchange.  Results of HIDa scan discussed with family.  Need for surgical intervention/ Cholecystectomy when patient is stable. Recommendation to transfuse 1 unit FFP now and repeat possibly overnight.  Will discuss need for drain insertion with IR when patient stable and if unable to get transfer promptly.     Medications:  Continuous Infusions:   dexmedeTOMIDine (Precedex) infusion (titrating)  0-1.4 mcg/kg/hr Intravenous Continuous 4.91 mL/hr at 05/13/25 1210 0.3 mcg/kg/hr at 05/13/25 1210    sodium bicarbonate 75 mEq in D5 and 0.45% NaCl 1,000 mL infusion   Intravenous Continuous 100 mL/hr at 05/13/25 1210 Rate Verify at 05/13/25 1210     Scheduled Meds:   albuterol-ipratropium  3 mL Nebulization Q6H WAKE    [START ON 5/15/2025] famotidine  20 mg Oral Q48H    immun Globulin G (IGG)-PRO-IGA 10 % injection (Privigen)  30 g Intravenous Q24H    [START ON 5/14/2025] meropenem IV (PEDS and ADULTS)  500 mg Intravenous Q24H    methylPREDNISolone injection (PEDS and ADULTS)  125 mg Intravenous Q6H    metoprolol tartrate  12.5 mg Oral BID    mupirocin   Nasal BID    vitamin D  1,000 Units Oral Daily     PRN Meds:  Current Facility-Administered Medications:     0.9%  NaCl infusion (for blood administration), , Intravenous, Q24H PRN    0.9%  NaCl infusion (for blood administration), , Intravenous, Q24H PRN    acetaminophen, 650 mg, Rectal, Q6H PRN    acetaminophen, 1,000 mg, Oral, Q8H PRN    ALPRAZolam, 0.5 mg, Oral, TID PRN    aluminum-magnesium hydroxide-simethicone, 30 mL, Oral, QID PRN    dextrose 50%, 12.5 g, Intravenous, PRN    dextrose 50%, 25 g, Intravenous, PRN    diphenhydrAMINE, 50  mg, Intravenous, Daily PRN    docusate sodium, 100 mg, Oral, Daily PRN    famotidine (PF), 20 mg, Intravenous, Daily PRN    glucagon (human recombinant), 1 mg, Intramuscular, PRN    glucose, 16 g, Oral, PRN    glucose, 24 g, Oral, PRN    insulin aspart U-100, 0-10 Units, Subcutaneous, Q6H PRN    melatonin, 6 mg, Oral, Nightly PRN    naloxone, 0.02 mg, Intravenous, PRN    ondansetron, 4 mg, Oral, Q8H PRN    prochlorperazine, 5 mg, Intravenous, Q6H PRN    sodium chloride 0.9%, 10 mL, Intravenous, Q12H PRN     Review of patient's allergies indicates:  No Known Allergies  Objective:     Vital Signs (Most Recent):  Temp: 98.9 °F (37.2 °C) (05/13/25 1110)  Pulse: 92 (05/13/25 1200)  Resp: (!) 22 (05/13/25 1200)  BP: 131/65 (05/13/25 1200)  SpO2: (!) 92 % (05/13/25 1200) Vital Signs (24h Range):  Temp:  [97.2 °F (36.2 °C)-99.5 °F (37.5 °C)] 98.9 °F (37.2 °C)  Pulse:  [] 92  Resp:  [20-38] 22  SpO2:  [82 %-97 %] 92 %  BP: (103-141)/(49-97) 131/65     Weight: 65.4 kg (144 lb 2.9 oz)  Body mass index is 18.02 kg/m².    Intake/Output - Last 3 Shifts         05/11 0700  05/12 0659 05/12 0700  05/13 0659 05/13 0700  05/14 0659    P.O.       I.V. (mL/kg) 760 (11.8) 479.3 (7.3) 1735 (26.5)    Blood   106.7    IV Piggyback  286.9     Total Intake(mL/kg) 760 (11.8) 766.2 (11.7) 1841.7 (28.2)    Urine (mL/kg/hr) 1250 (0.8) 400 (0.3) 65 (0.2)    Stool   3    Total Output 1250 400 68    Net -490 +366.2 +1773.7                    Physical Exam  Vitals and nursing note reviewed.   Constitutional:       Appearance: He is ill-appearing and toxic-appearing.   HENT:      Head: Normocephalic.      Nose: Nose normal.      Mouth/Throat:      Mouth: Mucous membranes are dry.   Eyes:      Extraocular Movements: Extraocular movements intact.   Cardiovascular:      Rate and Rhythm: Tachycardia present.   Pulmonary:      Effort: Pulmonary effort is normal.   Abdominal:      Palpations: Abdomen is soft.   Musculoskeletal:         General:  Normal range of motion.      Cervical back: Normal range of motion.   Skin:     General: Skin is warm and dry.      Coloration: Skin is pale.   Neurological:      Mental Status: He is disoriented.   Psychiatric:         Attention and Perception: He is inattentive.          Significant Labs:  I have reviewed all pertinent lab results within the past 24 hours.  CBC:   Recent Labs   Lab 05/13/25  0325   WBC 4.27*   RBC 2.52*   HGB 8.2*   HCT 23.5*   PLT 15*   MCV 93.3   MCH 32.5*   MCHC 34.9     BMP:   Recent Labs   Lab 05/13/25  0325   *      K 4.6      CO2 22*   BUN >125*   CREATININE 6.55*   CALCIUM 6.3*   MG 2.9*     Microbiology Results (last 7 days)       ** No results found for the last 168 hours. **          Specimen (24h ago, onward)      None          Recent Labs   Lab 05/08/25  1818   COLORU Yellow   SPECGRAV 1.025   PHUR 6.0   PROTEINUA 100*   BACTERIA Many*   NITRITE Negative   LEUKOCYTESUR Negative   UROBILINOGEN 2.0*       Significant Diagnostics:  I have reviewed all pertinent imaging results/findings within the past 24 hours.

## 2025-05-13 NOTE — PROGRESS NOTES
Case discussed with Dr. Camarillo. I agree with his assessment that TTP is a strong consideration. [id number] has been ordered. Consider transferring him to a facility that can perform plasma exchange.

## 2025-05-13 NOTE — NURSING
0710: Pt rec'd in bed. Restless and agitated, pulling at lines. Responds to verbal stimuli. Resistive to care during bath post incontinent episode.   0730: Notified Infectious Disease of pt consult, spoke with Estela.   0810: Pt became combative and resisting care while cleaning up bowel movement. Trying to hit CNA and pulled out PIV to left FA. Bilateral wrist restraints applied with order.   0905: Transfused 1 unit platelets, see blood flowsheet.   1014: Precedex infusion started. Pt continues to resist care.   1220: Transfused 1 unit of FFP, see blood flowsheet.   1310: Left nare dubhoff placed for meds and nutritional needs. Some bleeding noted from throat/mouth post placement, vitals remain stable. NADN. Will continue to monitor.   1655: Left nare dubhoff removed after noticing it curled in pt's mouth. MD aware, advises not to replace for tonight.

## 2025-05-13 NOTE — SUBJECTIVE & OBJECTIVE
Interval History: The patient's condition continues to decline.  Awaiting transfer to another facility for care.    Review of patient's allergies indicates:  No Known Allergies  Current Facility-Administered Medications   Medication Frequency    0.9%  NaCl infusion (for blood administration) Q24H PRN    0.9%  NaCl infusion (for blood administration) Q24H PRN    0.9%  NaCl infusion (for blood administration) Q24H PRN    acetaminophen suppository 650 mg Q6H PRN    acetaminophen tablet 1,000 mg Q8H PRN    albuterol-ipratropium 2.5 mg-0.5 mg/3 mL nebulizer solution 3 mL Q6H WAKE    ALPRAZolam tablet 0.5 mg TID PRN    aluminum-magnesium hydroxide-simethicone 200-200-20 mg/5 mL suspension 30 mL QID PRN    dexmedetomidine (PRECEDEX) 400mcg/100mL 0.9% NaCL infusion Continuous    dextrose 50% injection 12.5 g PRN    dextrose 50% injection 25 g PRN    diphenhydrAMINE injection 50 mg Daily PRN    docusate sodium capsule 100 mg Daily PRN    famotidine (PF) injection 20 mg Daily PRN    [START ON 5/15/2025] famotidine tablet 20 mg Q48H    glucagon (human recombinant) injection 1 mg PRN    glucose chewable tablet 16 g PRN    glucose chewable tablet 24 g PRN    Immune Globulin G (IGG)-PRO-IGA 10 % injection (Privigen) 10 % injection 30 g Q24H    insulin aspart U-100 injection 0-10 Units Q6H PRN    melatonin tablet 6 mg Nightly PRN    [START ON 5/14/2025] meropenem injection 500 mg Q24H    methylPREDNISolone sodium succinate injection 125 mg Q6H    metoprolol tartrate (LOPRESSOR) split tablet 12.5 mg BID    mupirocin 2 % ointment BID    naloxone 0.4 mg/mL injection 0.02 mg PRN    ondansetron disintegrating tablet 4 mg Q8H PRN    prochlorperazine injection Soln 5 mg Q6H PRN    sodium bicarbonate 75 mEq in D5 and 0.45% NaCl 1,000 mL infusion Continuous    sodium chloride 0.9% flush 10 mL Q12H PRN    vitamin D 1000 units tablet 1,000 Units Daily       Objective:     Vital Signs (Most Recent):  Temp: 97.8 °F (36.6 °C) (05/13/25  1510)  Pulse: 82 (05/13/25 1700)  Resp: (!) 21 (05/13/25 1700)  BP: 95/62 (05/13/25 1700)  SpO2: (!) 91 % (05/13/25 1700) Vital Signs (24h Range):  Temp:  [97.2 °F (36.2 °C)-99.5 °F (37.5 °C)] 97.8 °F (36.6 °C)  Pulse:  [] 82  Resp:  [20-36] 21  SpO2:  [86 %-96 %] 91 %  BP: ()/(49-97) 95/62     Weight: 65.4 kg (144 lb 2.9 oz) (05/13/25 0316)  Body mass index is 18.02 kg/m².  Body surface area is 1.86 meters squared.    I/O last 3 completed shifts:  In: 1526.2 [I.V.:1239.3; IV Piggyback:286.9]  Out: 1350 [Urine:1350]     Physical Exam  Constitutional:       Appearance: He is ill-appearing and toxic-appearing.   HENT:      Head: Normocephalic.   Cardiovascular:      Rate and Rhythm: Tachycardia present.   Pulmonary:      Effort: Pulmonary effort is normal.   Abdominal:      General: Abdomen is flat.   Skin:     General: Skin is warm.          Significant Labs:  BMP:   Recent Labs   Lab 05/13/25  0325   *      K 4.6      CO2 22*   BUN >125*   CREATININE 6.55*   CALCIUM 6.3*   MG 2.9*     CBC:   Recent Labs   Lab 05/13/25 0325   WBC 4.27*   RBC 2.52*   HGB 8.2*   HCT 23.5*   PLT 15*   MCV 93.3   MCH 32.5*   MCHC 34.9        Significant Imaging:  Labs: Reviewed

## 2025-05-13 NOTE — PROGRESS NOTES
He is confused and critically ill. He responds when I examine him but does not follow instructions and is acutely ill, emaciated and cachectic. His lungs are fairly cl. He has tachycardia. See the other half of this note dictated earlier.

## 2025-05-13 NOTE — PLAN OF CARE
05/13/25 1353   Rounds   Attendance Provider;;Charge nurse;Physical therapist;Pharmacist   Discharge Plan A Hospital Transfer   Why the patient remains in the hospital Requires continued medical care   Transition of Care Barriers None     Chart reviewed. Per IDTM, pt receiving platelet transfusion. Pt possibly to OR tomorrow. Pt awaiting hospital transfer through [facility]. Ss following

## 2025-05-13 NOTE — PLAN OF CARE
Problem: Sepsis/Septic Shock  Goal: Blood Glucose Level Within Targeted Range  Outcome: Progressing  Intervention: Optimize Glycemic Control  Flowsheets (Taken 5/13/2025 1458)  Glycemic Management: blood glucose monitored     Problem: Acute Kidney Injury/Impairment  Goal: Fluid and Electrolyte Balance  Outcome: Progressing  Goal: Effective Renal Function  Outcome: Progressing  Intervention: Monitor and Support Renal Function  Flowsheets (Taken 5/13/2025 1458)  Medication Review/Management: medications reviewed     Problem: Sepsis/Septic Shock  Goal: Optimal Coping  Outcome: Not Progressing  Intervention: Optimize Psychosocial Adjustment to Illness  Flowsheets (Taken 5/13/2025 1458)  Supportive Measures: active listening utilized  Family/Support System Care:   support provided   presence promoted  Goal: Absence of Bleeding  Outcome: Not Progressing  Intervention: Monitor and Manage Bleeding  Flowsheets (Taken 5/13/2025 1458)  Bleeding Precautions:   blood pressure closely monitored   gentle oral care promoted   monitored for signs of bleeding  Bleeding Management: blood products administered

## 2025-05-14 ENCOUNTER — ANESTHESIA (OUTPATIENT)
Dept: INTENSIVE CARE | Facility: HOSPITAL | Age: 70
End: 2025-05-14
Payer: MEDICARE

## 2025-05-14 ENCOUNTER — ANESTHESIA EVENT (OUTPATIENT)
Dept: INTENSIVE CARE | Facility: HOSPITAL | Age: 70
End: 2025-05-14
Payer: MEDICARE

## 2025-05-14 PROCEDURE — 31500 INSERT EMERGENCY AIRWAY: CPT | Mod: ,,, | Performed by: ANESTHESIOLOGY

## 2025-05-14 NOTE — NURSING
10:45 Pt in resp. Distress. Resp 40, Sats 84%. Dr Camarillo called, Bipap applied at 12/5 at 100%. Dr Tapia on the way to intubate pt. Serina(POA) updated.   10:50: Bed received from [facility]. Bed 10 ROOM 232.   PT INUTBATED/SIZE 8.5 TUBE-24 LIP.   11:00- report given to DAWSON Burch at [facility].   [facility] form faxed.   13:15-[facility] arrived, Pt will remain on dip at 10 mcgs. Vent settings  /22/5/100%. IV's flushed and intact. Called DAWSON Burch with updates and metro on the way.

## 2025-05-14 NOTE — PLAN OF CARE
Problem: Adult Inpatient Plan of Care  Goal: Plan of Care Review  Outcome: Progressing  Goal: Patient-Specific Goal (Individualized)  Outcome: Progressing  Goal: Absence of Hospital-Acquired Illness or Injury  Outcome: Progressing  Goal: Optimal Comfort and Wellbeing  Outcome: Progressing  Goal: Readiness for Transition of Care  Outcome: Progressing     Problem: Skin Injury Risk Increased  Goal: Skin Health and Integrity  Outcome: Progressing     Problem: Sepsis/Septic Shock  Goal: Optimal Coping  Outcome: Progressing  Goal: Absence of Bleeding  Outcome: Progressing  Goal: Blood Glucose Level Within Targeted Range  Outcome: Progressing  Goal: Absence of Infection Signs and Symptoms  Outcome: Progressing  Goal: Optimal Nutrition Intake  Outcome: Progressing     Problem: Acute Kidney Injury/Impairment  Goal: Fluid and Electrolyte Balance  Outcome: Progressing  Goal: Improved Oral Intake  Outcome: Progressing  Goal: Effective Renal Function  Outcome: Progressing     Problem: Pneumonia  Goal: Fluid Balance  Outcome: Progressing  Goal: Resolution of Infection Signs and Symptoms  Outcome: Progressing  Goal: Effective Oxygenation and Ventilation  Outcome: Progressing     Problem: Gas Exchange Impaired  Goal: Optimal Gas Exchange  Outcome: Progressing     Problem: Infection  Goal: Absence of Infection Signs and Symptoms  Outcome: Progressing     Problem: Fall Injury Risk  Goal: Absence of Fall and Fall-Related Injury  Outcome: Progressing     Problem: Restraint, Nonviolent  Goal: Absence of Harm or Injury  Outcome: Progressing     Problem: Delirium  Goal: Optimal Coping  Outcome: Progressing  Goal: Improved Behavioral Control  Outcome: Progressing  Goal: Improved Attention and Thought Clarity  Outcome: Progressing  Goal: Improved Sleep  Outcome: Progressing     Problem: COPD (Chronic Obstructive Pulmonary Disease)  Goal: Effective Oxygenation and Ventilation  Outcome: Progressing

## 2025-05-14 NOTE — SUBJECTIVE & OBJECTIVE
Interval History/Significant Events:  Patient will awaken and sometimes interact but remains confused and encephalopathic    Review of Systems  Objective:     Vital Signs (Most Recent):  Temp: 97.8 °F (36.6 °C) (05/14/25 0142)  Pulse: 86 (05/14/25 0400)  Resp: (!) 30 (05/14/25 0400)  BP: (!) 140/77 (05/14/25 0400)  SpO2: (!) 88 % (05/14/25 0400) Vital Signs (24h Range):  Temp:  [97.7 °F (36.5 °C)-98.9 °F (37.2 °C)] 97.8 °F (36.6 °C)  Pulse:  [] 86  Resp:  [17-34] 30  SpO2:  [86 %-100 %] 88 %  BP: ()/(36-77) 140/77   Weight: 65.4 kg (144 lb 2.9 oz)  Body mass index is 18.02 kg/m².      Intake/Output Summary (Last 24 hours) at 5/14/2025 0603  Last data filed at 5/14/2025 0315  Gross per 24 hour   Intake 4096.24 ml   Output 340 ml   Net 3756.24 ml          Physical Exam  Vitals reviewed.   Constitutional:       Appearance: Normal appearance.      Interventions: He is not intubated.  HENT:      Head: Normocephalic and atraumatic.      Nose: Nose normal.      Mouth/Throat:      Mouth: Mucous membranes are dry.      Pharynx: Oropharynx is clear.   Eyes:      Extraocular Movements: Extraocular movements intact.      Conjunctiva/sclera: Conjunctivae normal.      Pupils: Pupils are equal, round, and reactive to light.   Cardiovascular:      Rate and Rhythm: Normal rate.      Heart sounds: Normal heart sounds. No murmur heard.  Pulmonary:      Effort: Pulmonary effort is normal. He is not intubated.      Breath sounds: Normal breath sounds.   Abdominal:      General: Abdomen is flat. Bowel sounds are normal.      Palpations: Abdomen is soft.   Musculoskeletal:         General: Normal range of motion.      Cervical back: Normal range of motion and neck supple.      Right lower leg: No edema.      Left lower leg: No edema.   Skin:     General: Skin is warm and dry.      Capillary Refill: Capillary refill takes less than 2 seconds.   Neurological:      General: No focal deficit present.      Mental Status: He is  alert and oriented to person, place, and time.   Psychiatric:         Mood and Affect: Mood normal.         Behavior: Behavior normal.            Vents:  Oxygen Concentration (%): 50 (05/13/25 1945)  Lines/Drains/Airways       Drain  Duration                  Urethral Catheter 05/10/25 1957 Straight-tip 16 Fr. 3 days              Peripheral Intravenous Line  Duration                  Peripheral IV - Single Lumen 05/09/25 20 G Anterior;Right Forearm 5 days         Peripheral IV - Single Lumen 05/13/25 0850 22 G Anterior;Right Shoulder <1 day                  Significant Labs:    CBC/Anemia Profile:  Recent Labs   Lab 05/12/25  1752 05/13/25  0325 05/13/25  1650 05/14/25  0231   WBC  --  4.27* 4.11* 4.00*   HGB  --  8.2* 7.1* 6.9*   HCT  --  23.5* 20.7* 20.4*   PLT  --  15* 26* 44*   MCV  --  93.3 94.5 97.1*   RDW  --  13.9 14.0 14.5   FERRITIN >1,676*  --   --   --         Chemistries:  Recent Labs   Lab 05/13/25  0325 05/14/25  0231    140   K 4.6 5.3*    101   CO2 22* 22*   BUN >125* 153*   CREATININE 6.55* 7.55*   CALCIUM 6.3* 6.1*   ALBUMIN 1.7* 1.8*   PROT 6.1 6.5   BILITOT 2.8* 3.0*   ALKPHOS 357* 444*   ALT 74* 69*   * 480*   MG 2.9* 3.0*   PHOS 5.3* 7.4*       Recent Lab Results  (Last 5 results in the past 24 hours)        05/14/25  0537   05/14/25  0231   05/14/25  0020   05/13/25  1706   05/13/25  1650        Unit Blood Type Code               Unit Expiration               Albumin/Globulin Ratio   0.4             Albumin   1.8             ALP   444             ALT   69             Anion Gap   22             Aniso   1+       1+       AST   480             Bands         1       Baso #   0.01       0.01       Basophil %   0.3       0.2       Bilirubin Direct   2.3             BILIRUBIN TOTAL   3.0             BUN   153             BUN/CREAT RATIO   20             Calcium   6.1  Comment: Critical Result called and verified by verbal readback to: Lin on 05/14/2025 at 03:43. Reported by  [id number].             Chloride   101             CO2   22             Creatinine   7.55             Crossmatch Interpretation               Differential Method   Manual       Manual       DISPENSE STATUS               eGFR   7  Comment: Estimated GFR calculated using the CKD-EPI creatinine (2021) equation.             Eos #   0.00       0.00       Eos %   0.0       0.0       Globulin, Total   4.7             Glucose   113             Hematocrit   20.4       20.7       Hemoglobin   6.9       7.1       Immature Grans (Abs)   0.13       0.21       Immature Granulocytes   3.3       5.1       Lymph #   0.28       0.56       Lymph %   7.0       13.6          7       4       Magnesium    3.0             MCH   32.9       32.4       MCHC   33.8       34.3       MCV   97.1       94.5       Metamyelocytes   1             Mono #   0.22       0.04       Mono %   5.5       1.0          2       2       MPV   11.3       12.4       Neutrophils, Abs   3.36       3.29       Neutrophils Relative   83.9       80.1       nRBC   0.5       0.5       NUCLEATED RBC ABSOLUTE   0.02       0.02       Phosphorus Level   7.4             PLATELET MORPHOLOGY   Decreased       Decreased       Platelet Count   44       26       POC Glucose 140     167   171         Potassium   5.3             Product Code               PROTEIN TOTAL   6.5             RBC   2.10       2.19       RDW   14.5       14.0       Segmented Neutrophils, Man %   90       93       Sodium   140             Target Cells   Few             Unit ABO/Rh               UNIT NUMBER               WBC   4.00       4.11                              Significant Imaging:  I have reviewed all pertinent imaging results/findings within the past 24 hours.

## 2025-05-14 NOTE — NURSING
Zahraa from transfer center called and requested that Return Acceptance letter be refaxed for 3rd time. I refaxed @[phone] to [phone].

## 2025-05-14 NOTE — ASSESSMENT & PLAN NOTE
Ongoing issue was no recommendation from ID to do lumbar puncture will hold on it for now due to this significant thrombocytopenia

## 2025-05-14 NOTE — PLAN OF CARE
[facility]  Discharge Final Note    Primary Care Provider: Crys Jackson FNP    Expected Discharge Date: 5/19/2025    Final Discharge Note (most recent)       Final Note - 05/14/25 1605          Final Note    Assessment Type Final Discharge Note     Anticipated Discharge Disposition Short Term Hospital     What phone number can be called within the next 1-3 days to see how you are doing after discharge? [phone]        Post-Acute Status    Coverage [facility] Medicare     Discharge Delays None known at this time                     Important Message from Medicare  Important Message from Medicare regarding Discharge Appeal Rights: Explained to patient/caregiver, Signed/date by patient/caregiver     Date IMM was signed: 05/09/25  Time IMM was signed: 1005    pt transferred to [facility] short term hospital. IM updated. No further needs noted

## 2025-05-14 NOTE — PROGRESS NOTES
[facility]  General Surgery  Progress Note    Subjective:     Interval History:  Patient's creatinine is increasing and he has got 1 unit of platelets yesterday.  Overnight otherwise no acute issues    Post-Op Info:  * No surgery found *          Medications:  Continuous Infusions:  Scheduled Meds:   albuterol-ipratropium  3 mL Nebulization Q6H WAKE    doxycycline IV (PEDS and ADULTS)  100 mg Intravenous Q12H    [START ON 5/15/2025] famotidine  20 mg Oral Q48H    immun Globulin G (IGG)-PRO-IGA 10 % injection (Privigen)  30 g Intravenous Q24H    meropenem IV (PEDS and ADULTS)  500 mg Intravenous Q24H    methylPREDNISolone injection (PEDS and ADULTS)  125 mg Intravenous Q6H    metoprolol tartrate  12.5 mg Oral BID    mupirocin   Nasal BID    vitamin D  1,000 Units Oral Daily     PRN Meds:  Current Facility-Administered Medications:     0.9%  NaCl infusion (for blood administration), , Intravenous, Q24H PRN    0.9%  NaCl infusion (for blood administration), , Intravenous, Q24H PRN    0.9%  NaCl infusion (for blood administration), , Intravenous, Q24H PRN    0.9%  NaCl infusion (for blood administration), , Intravenous, Q24H PRN    acetaminophen, 650 mg, Rectal, Q6H PRN    acetaminophen, 1,000 mg, Oral, Q8H PRN    ALPRAZolam, 0.5 mg, Oral, TID PRN    aluminum-magnesium hydroxide-simethicone, 30 mL, Oral, QID PRN    dextrose 50%, 12.5 g, Intravenous, PRN    dextrose 50%, 25 g, Intravenous, PRN    diphenhydrAMINE, 50 mg, Intravenous, Daily PRN    docusate sodium, 100 mg, Oral, Daily PRN    famotidine (PF), 20 mg, Intravenous, Daily PRN    glucagon (human recombinant), 1 mg, Intramuscular, PRN    glucose, 16 g, Oral, PRN    glucose, 24 g, Oral, PRN    insulin aspart U-100, 0-10 Units, Subcutaneous, Q6H PRN    melatonin, 6 mg, Oral, Nightly PRN    naloxone, 0.02 mg, Intravenous, PRN    ondansetron, 4 mg, Oral, Q8H PRN    prochlorperazine, 5 mg, Intravenous, Q6H PRN    sodium chloride 0.9%, 10 mL,  "Intravenous, Q12H PRN     Objective:     Vital Signs (Most Recent):  Temp: 97 °F (36.1 °C) (05/14/25 0711)  Pulse: 67 (05/14/25 0730)  Resp: 18 (05/14/25 0730)  BP: (!) 107/52 (05/14/25 0730)  SpO2: (!) 94 % (05/14/25 0730) Vital Signs (24h Range):  Temp:  [96.8 °F (36 °C)-98.9 °F (37.2 °C)] 97 °F (36.1 °C)  Pulse:  [] 67  Resp:  [16-34] 18  SpO2:  [86 %-100 %] 94 %  BP: ()/(36-81) 107/52       Intake/Output Summary (Last 24 hours) at 5/14/2025 0841  Last data filed at 5/14/2025 0315  Gross per 24 hour   Intake 2766.96 ml   Output 173 ml   Net 2593.96 ml       Physical Exam  Constitutional:       General: He is not in acute distress.  HENT:      Head: Normocephalic.   Cardiovascular:      Rate and Rhythm: Normal rate and regular rhythm.      Pulses: Normal pulses.   Pulmonary:      Effort: No respiratory distress.      Breath sounds: Normal breath sounds.   Abdominal:      General: Abdomen is flat. There is no distension.      Palpations: Abdomen is soft.      Tenderness: There is abdominal tenderness.   Musculoskeletal:         General: Normal range of motion.   Skin:     General: Skin is warm.   Neurological:      General: No focal deficit present.      Mental Status: He is oriented to person, place, and time.         Significant Labs:  CBC:   Recent Labs   Lab 05/14/25  0231   WBC 4.00*   RBC 2.10*   HGB 6.9*   HCT 20.4*   PLT 44*   MCV 97.1*   MCH 32.9*   MCHC 33.8     CMP:   Recent Labs   Lab 05/14/25  0231      CALCIUM 6.1*   ALBUMIN 1.8*   PROT 6.5      K 5.3*   CO2 22*      *   CREATININE 7.55*   ALKPHOS 444*   ALT 69*   *   BILITOT 3.0*     Coagulation:   Recent Labs   Lab 05/12/25  1752   INR 1.60   APTT 52.2*     Lactic Acid: No results for input(s): "LACTATE" in the last 48 hours.    Significant Diagnostics:  I have reviewed all pertinent imaging results/findings within the past 24 hours.    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] 05/11/2025 Yes    Elevated liver enzymes [R74.8] 05/10/2025 Yes    Thrombocytopenia [D69.6] 05/09/2025 Yes    Acute hypoxemic respiratory failure [J96.01] 05/09/2025 Yes    Pneumonia [J18.9] 05/09/2025 Yes    GEORGE (acute kidney injury) [N17.9] 05/09/2025 Yes    Elevated troponin [R79.89] 05/09/2025 Yes    Acute liver disease [K76.9] 05/09/2025 Yes    Severe protein-calorie malnutrition [E43] 05/09/2025 Yes    Gastroesophageal reflux disease [K21.9] 10/06/2021 Yes    Cigarette nicotine dependence without complication [F17.210] 10/06/2021 Yes    Coronary artery disease involving native coronary artery of native heart without angina pectoris [I25.10] 09/01/2021 Yes    Essential hypertension [I10] 09/01/2021 Yes    Hyperlipidemia [E78.5] 09/01/2021 Yes      Problems Resolved During this Admission:     Will need to have a dialysis catheter placed for possible dialysis.  He is getting his 2nd unit of platelets today hopefully his platelets would be above acceptable level.  Spoke to the family about the risks and benefits of both the IR drain placement for his gallbladder as well as the dialysis catheter will place both those after he gets his unit of platelets.  Risks and benefits explained to the family including risk of bleeding, infection, need for additional operations.  They understand the plan.  He is still awaiting bed but he has accepted the [facility].  All questions were answered.    Jose Guadalupe Marlow MD  General Surgery  [facility]

## 2025-05-14 NOTE — ASSESSMENT & PLAN NOTE
Discussed the case last night with  [facility] she has agreed she would like to take the patient that TTP is a possibility as well as hemolysis.  Continue course of IVIG in finish.  Continue IV steroids.  Patient may need further evaluation for this diagnosis abilio T 13 pending.  Platelet count 44,000 this morning after transfusion

## 2025-05-14 NOTE — PROGRESS NOTES
Mr. Patel has a better platelet count today, but his renal function is worse. I understand that a transfer is being attempted. I agree with this. I still think that he has a higher likelihood of having TTP. He is only responsive to painful stimulus and he did not verbalize when I evaluated him. No family was available this morning. On physical examination, his heart rhythm is a regular and rapid. His longs are relatively clear. No abdominal masses or ascites. His hemoglobin has dropped as he probably needs to be transferred.

## 2025-05-14 NOTE — ASSESSMENT & PLAN NOTE
Not sure the etiology here could be part of the hold ongoing issue multi-system disease in his getting worse CT abdomen showed no real detail about liver process

## 2025-05-14 NOTE — ASSESSMENT & PLAN NOTE
Discussed the case last night with  [facility] she has agreed she would like to take the patient that TTP is a possibility as well as hemolysis.  Continue course of IVIG in finish.  Continue IV steroids.  Patient may need further evaluation for this diagnosis abilio T 13 pending.  Platelet count 44,000 this morning after transfusion

## 2025-05-14 NOTE — PROGRESS NOTES
Urology Daily Progress Note    Subjective   Patient seen and examined. Patient had a Right ureteral stent insertion on 9/8/22 with Dr. Vanessa. She is doing well. On flomax and is Urinating on her own without any symptoms. Is on Bactrim as per ID's recommendation.    Denies abdominal pain, nausea, vomiting, fevers, chills.  No other  complaints.    She is scheduled for ureteroscopy stone extraction on 09/30 with Dr. Vanessa. 9/24/22 NCCT Abd/pelvis states previously noted right  ureteral stone is no longer identified.     REVIEW OF SYSTEMS:     Review of Systems   Constitutional: Negative.    HENT: Negative.    Respiratory: Negative.    Cardiovascular: Negative.    Gastrointestinal: Negative.    Genitourinary: Negative.    Musculoskeletal: Negative.    Skin: Negative.    Neurological: Negative.    Hematological: Negative.         Objective     I/O's    Intake/Output Summary (Last 24 hours) at 9/26/2022 1547  Last data filed at 9/26/2022 0331  Gross per 24 hour   Intake 2026.25 ml   Output 750 ml   Net 1276.25 ml       Last Recorded Vitals  Blood pressure 111/67, pulse 93, temperature 99 °F (37.2 °C), temperature source Oral, resp. rate 17, height 5' 8\" (1.727 m), weight 69.8 kg (153 lb 14.1 oz), last menstrual period 02/01/2016, SpO2 96 %.  Body mass index is 23.4 kg/m².    PE    General:  Alert and oriented x3, No acute distress.    Eye: Extraocular movements are intact.  HENT: Normocephalic, atraumatic.  Neck: Supple.  Respiratory: Non-labored respirations.  Gastrointestinal:  Soft, Non-tender, Non-distended.    Genitourinary:  No costovertebral angle tenderness.  Integumentary:  Warm, Dry.    Neurologic:  Alert, Oriented.    Musculoskeletal: No deformities.   Psychiatric:  Cooperative, Appropriate mood & affect.      Labs   WBC (K/mcL)   Date Value   09/26/2022 8.6     RBC (mil/mcL)   Date Value   09/26/2022 3.64 (L)     HCT (%)   Date Value   09/26/2022 31.3 (L)     HGB (g/dL)   Date Value   09/26/2022  Infectious Disease [facility] Live Follow-up - 5/14/25   Patient Name: Bhaskar VU Amanda    Attending Physician: Lam Camarillo MD   Primary Care Physician: Crys Jackson FNP     Consultation Information  Consultation was provided via telemedicine from the location in the above note header using two-way real-time interactive telecommunication including between the patient and telemedicine provider. This includes use of bluetooth stethoscope for auscultation performed by the telepresenter that the telemedicine provider can hear if described in the physical exam. 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 Patient has developed severe thrombocytopenia and AMS.  Patient was febrile, hypoxic and tachycardic on admission  Labs were significant lactate 4.2, Cr 3.04, , , , positive troponins. CBC showed normal WBC Hgb 11, platelets 25. Acute hepatitis panel negative. UA showed 5-10 wbcs. LDH 1, 198  Negative for COVID/influenza. Chest xray showed diffuse emphysematous changes with no acute pulmonary process. He was started on zosyn and azithromycin   Abdominal U/S showed mild splenomegaly, no acute liver pathology. CT Chest no infectious etiology.   GI, Surgery and Nephrology consulted. Heme/Onc also consulted given worsening thrombocytopenia     Review of EMR shows he presented to ED 5/5/25 with fever up to 102 and was discharged home 5/5 Bcx NG. No abx given then.      5/12 TF to ICU  Concern for ITP  Pending TF     HIDA scan Mildly delayed visualization of common bile duct and potentially dilated 2nd segment of duodenum and gallbladder is not visualized at expected  "location of gallbladder fossa and acute cholecystitis is not excluded     Antimicrobial Courses  Meropenem 5/9-  Azithro x1 5/8  Zosyn 5/8  Doxycycline 5/13-    MICRO  Lyme, RMSF P  5/5 Bcx Micrococcus  5/8 Bcx NG    ID related problem list  Anemia  Thromobocytopenia  Micrococcus in blood, likely contaminant     Recommendations  -Added doxycycline (low likelihood tick or atypical etiology)  -C/W Meropenem, day 6 for now      Thank you for involving us in the care of this patient. Infectious diseases will follow with you. Please contact us via the [facility] call center at [phone] should any questions arise.    SANJUANA MORALEZ MD  Infectious Diseases Attending  [facility]                  Objective   Vitals: BP (!) 107/52   Pulse 67   Temp 97 °F (36.1 °C) (Axillary)   Resp 18   Ht 6' 3" (1.905 m)   Wt 65.4 kg (144 lb 2.9 oz)   SpO2 (!) 94%   BMI 18.02 kg/m²  SaO2:(!) 94%,FiO2-O2 (L/m): , Dosing Wt:  Wt Readings from Last 1 Encounters:   05/13/25 65.4 kg (144 lb 2.9 oz)   , BMI:Body mass index is 18.02 kg/m².    Physical Exam: Physical Exam    Results Review    Labs:      CBC  Recent Labs     05/12/25  0334 05/13/25 0325 05/13/25  1650 05/14/25  0231   WBC 4.45* 4.27* 4.11* 4.00*   HGB 9.6* 8.2* 7.1* 6.9*   HCT 27.9* 23.5* 20.7* 20.4*   PLT 20* 15* 26* 44*     Renal function  Recent Labs     05/12/25  0334 05/13/25  0325 05/14/25  0231    139 140   K 4.1 4.6 5.3*   CREATININE 5.29* 6.55* 7.55*     Liver function  Recent Labs     05/12/25  0334 05/13/25  0325 05/14/25  0231   * 415* 480*   ALT 70* 74* 69*   BILITOT 2.5* 2.8* 3.0*   BILIDIR  --  2.3* 2.3*     Coagulation  Recent Labs     05/12/25  1752   INR 1.60   APTT 52.2*      Procalcitonin  No results for input(s): "PROCALCIT" in the last 72 hours.    Microbiology:      Susceptibility data from last 90 days.  Collected Specimen Info Organism Amp/Sulbactam Ampicillin AZTREONAM ETEST Cefazolin CEFEPIME ETEST Cefotaxime Ceftazidime " 10.2 (L)     PLT (K/mcL)   Date Value   09/26/2022 256      Sodium (mmol/L)   Date Value   09/26/2022 138     Potassium (mmol/L)   Date Value   09/26/2022 3.7     Chloride (mmol/L)   Date Value   09/26/2022 106     Glucose (mg/dL)   Date Value   09/26/2022 100 (H)     Calcium (mg/dL)   Date Value   09/26/2022 8.9     Carbon Dioxide (mmol/L)   Date Value   09/26/2022 26     BUN (mg/dL)   Date Value   09/26/2022 7     Creatinine (mg/dL)   Date Value   09/26/2022 0.55      Lab Results   Component Value Date    SPTYU URINE, CLEAN CATCH/MIDSTREAM 05/13/2018    COL Straw 09/24/2022    UAPP Clear 09/24/2022    UGLU Negative 09/24/2022    UBILI Negative 09/24/2022    UKET Negative 09/24/2022    USPG <1.005 (L) 09/24/2022    URBC Moderate (A) 09/24/2022    UPH 6.0 09/24/2022    UPROT Negative 09/24/2022    UROB 0.2 09/24/2022    UNITR Negative 09/24/2022    UWBC Large (A) 09/24/2022    SEPT 1 to 5 09/24/2022    ERYT 1 to 3 05/13/2018    LEUK Negative 09/15/2022    UBACTR FEW (A) 05/13/2018    HCAST NONE SEEN 05/13/2018    MUCUS Present 09/24/2022    TRI NOT APPLICABLE 05/13/2018      Microbiology Results     None           Imaging  COMPARISON:   09/08/2022     INDICATIONS:   Abdominal pain, fever.  Right-sided ureteral stone on prior  CT status post right-sided ureteral stent placement.     TECHNIQUE:   Contiguous axial images were obtained in a helical fashion  from the diaphragmatic domes to the pubic symphysis on a state of the art  CT scanner. Oral contrast was administered as indicated. . The radiation  dose ( kV and/or mA) adjusted according to patient size.      FINDINGS:               Limited images of the lung bases are unremarkable.     ABDOMEN:        LIVER:      Normal noncontrast appearance.     BILIARY:      Normal.     PANCREAS:      Normal.     SPLEEN:      Normal.     KIDNEYS:      No left-sided hydronephrosis or hydroureter.  There is mild  right-sided hydronephrosis, slightly improved from prior exam.   CEFTAZIDIME/AVIBACTAM SUSCEPTIBILITY Ceftriaxone Ciprofloxacin Ertapenem Gentamicin Meropenem Nitrofurantoin   05/08/25 Urine Escherichia coli ESBL  I  R  R  R  R  R  R  S  R  S  S  S  S  S   05/05/25 Blood Micrococcus and related species                   GRAM POSITIVE BOSTON RESEMBLING DIPHTHEROIDS                   Collected Specimen Info Organism Organism ID Only Pip/Tazo Tetracycline Tobramycin Trimeth/Sulfa   05/08/25 Urine Escherichia coli ESBL   S  R  S  R   05/05/25 Blood Micrococcus and related species           GRAM POSITIVE BOSTON RESEMBLING DIPHTHEROIDS            Imaging:     NM Hepatobiliary (HIDA) W Pharm and EF When Performed  Result Date: 5/13/2025  EXAMINATION: NM HEPATOBILIARY(HIDA) WITH PHARM AND EF WHEN PERFORMED CLINICAL HISTORY: Right upper quadrant abdominal pain, abnormal LFTs. TECHNIQUE: Following IV administration of hepatobiliary scan mCi of [id number], sequential anterior imaging performed over the upper abdomen through 60 minutes with supplemental delayed 90 minute, 2 hour, 3 hour and 4 hour anterior images. COMPARISON: CT abdomen 05/09/2025. FINDINGS: Patient's position appears mildly rotated to left. There is mildly delayed-prolonged pattern of tracer extraction by liver which can be associated with diffuse liver parenchymal disease.  Liver appears normal in size with no definite focal abnormality identified. Delayed visualization of intrahepatic and extrahepatic bile ducts. There is progressive tracer accumulation at midline inferior margin of liver which appears to represent tracer within extrahepatic bile duct and 2nd segment of duodenum. No definite tracer identified within expected location of gallbladder fossa through 60 minutes.  Imaging with morphine augmentation was not performed. There are 4 foci of tracer accumulation overlying superolateral right hepatic lobe and subsequently at mid inferolateral right hepatic lobe on 50 through 60 minute images which could represent  Ureteral  stent is noted.  The proximal aspect is in the proximal ureter.  In the  distal aspect is within the bladder.  Previously noted right-sided ureteral  stone is not seen on this exam.  No right-sided hydroureter.  There is  trace right perinephric stranding.  No focal perinephric fluid collections  identified.     ADRENAL GLANDS:      Normal.     AORTA:      Normal.     RETROPERITONEUM:      Normal.     BOWEL/MESENTERY:      Normal.     ABDOMINAL WALL:      Normal.     BONES:      No lytic or blastic osseous lesions.     PELVIS:     REPRODUCTIVE ORGANS:   Normal.     BLADDER:      Normal.     LYMPH NODES:      Normal.     OTHER:      None.       IMPRESSION:  1.  Right ureteral stent is noted.  Proximal aspect of the stent is in the  proximal right ureter, distal aspect in the bladder.  There is mild right  hydronephrosis, slightly improved from prior exam.  Previously noted right  ureteral stone is no longer identified.     Assessment & Plan  Right ureterolithiasis s/p Right stent placement on 9/8/22, doing well  -(9/26/22) No KARLI Cr 0.55  -(9/26/22) No Leukocytosis WBC 8.6  -Advised she is attend the outpatient appointment scheduled on 09/30 with Dr. Vanessa for ureteroscopy and stone/stent removal as planned.   -Continue 2 weeks of Bactrim antibiotics. Ok for discharge from  perspective.    Discussed with  attending, Dr. Cisneros.     EASTON Crystal PA-C  [facility]  Perfect Serve or          artifact. 60 minute image demonstrates tracer within small intestine. Subsequent 90 minute, 2 hour, 3 hour and 4 hour images demonstrate an oval-shaped collection of tracer adjacent to mid inferior margin of liver which could represent tracer with mildly dilated duodenum.  Gallbladder is not visualized and expected location of gallbladder fossa on these delayed phase images.     1.  Mild diffuse liver parenchymal disease suspected. 2.  Mildly delayed visualization of common bile duct and potentially dilated 2nd segment of duodenum. 3.  Gallbladder is not visualized at expected location of gallbladder fossa and acute cholecystitis is not excluded. 4.  Artifact suspected overlying right hepatic lobe on 50-60 minute images. Recommend: *Clinical correlation. *Gallbladder ultrasound. *Consideration for repeat hepatobiliary nuclear scan with morphine augmentation if not clinically contraindicated. This report was flagged in Epic as abnormal. Electronically signed by: Oliver Chavez Date:    05/13/2025 Time:    19:58    XR Gastric tube check, non-radiologist performed  Result Date: 5/13/2025  EXAMINATION: XR GASTRIC TUBE CHECK, NON-RADIOLOGIST PERFORMED CLINICAL HISTORY: placement of ng tube; TECHNIQUE: Single view of the chest. COMPARISON: Chest radiograph dated earlier same day FINDINGS: Interval placement of a right IJ approach central venous catheter terminating expected location of the SVC.  Interval placement of an enteric tube which terminates over the central mediastinum.  Given significant patient rotation, enteric tube either within the mid esophagus or left mainstem bronchus. Median sternotomy wires are intact and aligned.  Unchanged cardiomediastinal contour.  Diffuse interstitial and airspace opacities, no significant change compared to radiograph dated earlier same day.  The lung bases are excluded from field of view.  No pneumothorax.     Enteric tube not in adequate position.  Recommend removal and  repositioning. This report was flagged in Epic as abnormal.  Findings were discussed with Dr. Lam Camarillo via [employer] secure chat on 05/13/2025 at 16:52. Electronically signed by: Jenny Buchanan Date:    05/13/2025 Time:    16:52    X-Ray Chest 1 View  Result Date: 5/13/2025  EXAMINATION: XR CHEST 1 VIEW CLINICAL HISTORY: arf; FINDINGS: Portable chest with comparison chest x-ray 05/08/2025.  Normal cardiomediastinal silhouette.There is interval worsening of bilateral diffuse interstitial lung opacities.  Retrocardiac hazy ill-defined opacity also noted.  Pulmonary vasculature is normal. No acute osseous abnormality.     1. Interval worsening of bilateral diffuse interstitial lung opacities.  Findings could reflect an atypical lung infection versus pulmonary edema. 2. Retrocardiac hazy ill-defined opacity could reflect infiltrate and or atelectasis. Electronically signed by: Ángel Lagos Date:    05/13/2025 Time:    11:40

## 2025-05-14 NOTE — PROGRESS NOTES
[facility] ICU  Critical Care Medicine  Progress Note    Patient Name: Bhaskar Patel  MRN: [id number]  Admission Date: 5/8/2025  Hospital Length of Stay: 6 days  Code Status: Full Code  Attending Provider: Lam Camarillo MD  Primary Care Provider: Crys Jackson FNP   Principal Problem: Sepsis    Subjective:     HPI:  As per H&P:  71 y/o male with PMHx/PSHx of HTN, GERD, CAD (cardiac bypass), and left carotid endarterectomy who was transferred from [facility] on 5/8/25 to [facility] for nausea, abdominal pain and decrease PO intake. Pt states he developed decrease PO intake 1 week ago and later developed nausea and generalized abdominal pain. He describes his abdominal pain as 5/10, achy, intermittent without any radiation. He denies vomiting, diarrhea, congestion, rhinorrhea, sore throat, chest pain or chest tightness but reports intermittent cough, SOB, and fever. He denies any h/o COPD or PNA in the past. He denies any sick contacts. He was evaluated in the ER for nausea on 5/05 and was treated with zofran and IV fluids at that time. Of note, pt lives alone and ambulates without the use of any assistive device. He is able to do all ADLs. He has 60-pack-yr smoking hx and previous drank multiple beers on his days off quitting 2 yrs ago. He was subsequently admitted to the hospital medicine service at [facility] for continuation of care. He is now being followed by Nephrology, Hematology/Oncology, GI, & General Surgery.     Hospital/ICU Course:  5/12/25: Dr. Marinelli contacted me this morning about transferring this patient to the ICU for higher level of care. He has been in-patient since 5/8/25 & continues to be thrombocytopenic, unsure of cause. Nephrology is following for ARF, Hematology/Oncology is following for persistent thrombocytopenia, General surgery is following for abdominal pain (ruling out gallbladder concerns), & GI is following & recommended HIDA scan to evaluate for acalculous  cholecystitis given fever, presentation (abd pain, nausea), and elevated LFT's (though somewhat improving), ECHO to evaluate heart function, & daily INR & hepatic function panel.     On admission to the ICU, patient is confused, agitated, & uncooperative. No active bleeding noted. Vital signs are stable. Family is at bedside. Will continue to monitor & treat as necessary.     Interval History/Significant Events:  Patient will awaken and sometimes interact but remains confused and encephalopathic    Review of Systems  Objective:     Vital Signs (Most Recent):  Temp: 97.8 °F (36.6 °C) (05/14/25 0142)  Pulse: 86 (05/14/25 0400)  Resp: (!) 30 (05/14/25 0400)  BP: (!) 140/77 (05/14/25 0400)  SpO2: (!) 88 % (05/14/25 0400) Vital Signs (24h Range):  Temp:  [97.7 °F (36.5 °C)-98.9 °F (37.2 °C)] 97.8 °F (36.6 °C)  Pulse:  [] 86  Resp:  [17-34] 30  SpO2:  [86 %-100 %] 88 %  BP: ()/(36-77) 140/77   Weight: 65.4 kg (144 lb 2.9 oz)  Body mass index is 18.02 kg/m².      Intake/Output Summary (Last 24 hours) at 5/14/2025 0603  Last data filed at 5/14/2025 0315  Gross per 24 hour   Intake 4096.24 ml   Output 340 ml   Net 3756.24 ml          Physical Exam  Vitals reviewed.   Constitutional:       Appearance: Normal appearance.      Interventions: He is not intubated.  HENT:      Head: Normocephalic and atraumatic.      Nose: Nose normal.      Mouth/Throat:      Mouth: Mucous membranes are dry.      Pharynx: Oropharynx is clear.   Eyes:      Extraocular Movements: Extraocular movements intact.      Conjunctiva/sclera: Conjunctivae normal.      Pupils: Pupils are equal, round, and reactive to light.   Cardiovascular:      Rate and Rhythm: Normal rate.      Heart sounds: Normal heart sounds. No murmur heard.  Pulmonary:      Effort: Pulmonary effort is normal. He is not intubated.      Breath sounds: Normal breath sounds.   Abdominal:      General: Abdomen is flat. Bowel sounds are normal.      Palpations: Abdomen is soft.    Musculoskeletal:         General: Normal range of motion.      Cervical back: Normal range of motion and neck supple.      Right lower leg: No edema.      Left lower leg: No edema.   Skin:     General: Skin is warm and dry.      Capillary Refill: Capillary refill takes less than 2 seconds.   Neurological:      General: No focal deficit present.      Mental Status: He is alert and oriented to person, place, and time.   Psychiatric:         Mood and Affect: Mood normal.         Behavior: Behavior normal.            Vents:  Oxygen Concentration (%): 50 (05/13/25 1945)  Lines/Drains/Airways       Drain  Duration                  Urethral Catheter 05/10/25 1957 Straight-tip 16 Fr. 3 days              Peripheral Intravenous Line  Duration                  Peripheral IV - Single Lumen 05/09/25 20 G Anterior;Right Forearm 5 days         Peripheral IV - Single Lumen 05/13/25 0850 22 G Anterior;Right Shoulder <1 day                  Significant Labs:    CBC/Anemia Profile:  Recent Labs   Lab 05/12/25  1752 05/13/25  0325 05/13/25  1650 05/14/25  0231   WBC  --  4.27* 4.11* 4.00*   HGB  --  8.2* 7.1* 6.9*   HCT  --  23.5* 20.7* 20.4*   PLT  --  15* 26* 44*   MCV  --  93.3 94.5 97.1*   RDW  --  13.9 14.0 14.5   FERRITIN >1,676*  --   --   --         Chemistries:  Recent Labs   Lab 05/13/25  0325 05/14/25  0231    140   K 4.6 5.3*    101   CO2 22* 22*   BUN >125* 153*   CREATININE 6.55* 7.55*   CALCIUM 6.3* 6.1*   ALBUMIN 1.7* 1.8*   PROT 6.1 6.5   BILITOT 2.8* 3.0*   ALKPHOS 357* 444*   ALT 74* 69*   * 480*   MG 2.9* 3.0*   PHOS 5.3* 7.4*       Recent Lab Results  (Last 5 results in the past 24 hours)        05/14/25  0537   05/14/25  0231   05/14/25  0020   05/13/25  1706   05/13/25  1650        Unit Blood Type Code               Unit Expiration               Albumin/Globulin Ratio   0.4             Albumin   1.8             ALP   444             ALT   69             Anion Gap   22             Aniso   1+        1+       AST   480             Bands         1       Baso #   0.01       0.01       Basophil %   0.3       0.2       Bilirubin Direct   2.3             BILIRUBIN TOTAL   3.0             BUN   153             BUN/CREAT RATIO   20             Calcium   6.1  Comment: Critical Result called and verified by verbal readback to: Lin on 05/14/2025 at 03:43. Reported by [id number].             Chloride   101             CO2   22             Creatinine   7.55             Crossmatch Interpretation               Differential Method   Manual       Manual       DISPENSE STATUS               eGFR   7  Comment: Estimated GFR calculated using the CKD-EPI creatinine (2021) equation.             Eos #   0.00       0.00       Eos %   0.0       0.0       Globulin, Total   4.7             Glucose   113             Hematocrit   20.4       20.7       Hemoglobin   6.9       7.1       Immature Grans (Abs)   0.13       0.21       Immature Granulocytes   3.3       5.1       Lymph #   0.28       0.56       Lymph %   7.0       13.6          7       4       Magnesium    3.0             MCH   32.9       32.4       MCHC   33.8       34.3       MCV   97.1       94.5       Metamyelocytes   1             Mono #   0.22       0.04       Mono %   5.5       1.0          2       2       MPV   11.3       12.4       Neutrophils, Abs   3.36       3.29       Neutrophils Relative   83.9       80.1       nRBC   0.5       0.5       NUCLEATED RBC ABSOLUTE   0.02       0.02       Phosphorus Level   7.4             PLATELET MORPHOLOGY   Decreased       Decreased       Platelet Count   44       26       POC Glucose 140     167   171         Potassium   5.3             Product Code               PROTEIN TOTAL   6.5             RBC   2.10       2.19       RDW   14.5       14.0       Segmented Neutrophils, Man %   90       93       Sodium   140             Target Cells   Few             Unit ABO/Rh               UNIT NUMBER               WBC   4.00       4.11                               Significant Imaging:  I have reviewed all pertinent imaging results/findings within the past 24 hours.    ABG  Recent Labs   Lab 05/12/25  1646   PH 7.45   PO2 69*   PCO2 40   HCO3 27.8     Assessment/Plan:     Neuro  Acute metabolic encephalopathy  Ongoing issue was no recommendation from ID to do lumbar puncture will hold on it for now due to this significant thrombocytopenia    Pulmonary  Pneumonia  X-ray yesterday looks more like volume I do not see a distinct infiltrate continue antibiotics    Acute hypoxemic respiratory failure  Required non-rebreather he looks comfortable I do not think we need to intubate him yet will stop IV fluids goes I think volume is increasing on x-ray    Cardiac/Vascular  Elevated troponin  Elevated initially at 50, now resolved    Hyperlipidemia  Noted  - currently holding statin due to persistent thrombocytopenia    Coronary artery disease involving native coronary artery of native heart without angina pectoris  Noted  - s/p CABG  - continuous cardiac monitoring  - 12 Lead EKG PRN       Essential hypertension  Blood pressure remains stable this morning no need for meds    Renal/  Hypocalcemia  Corrected calcium today is 8.3    GEORGE (acute kidney injury)  Potassium 5.3 and CO2 22 will stop volume hemoglobin 6.9 by will not transfuse as it was not much of a change from yesterday and I do not want to give the potassium low will discuss with renal the possibility of dialysis    ID  * Sepsis  Uncertain of etiology he did grew micrococcus out and has what looks like an abnormal gallbladder.  Discussed with Dr. Bhatti proceed with the possibility of a drain today we added doxycycline    Endocrine  Severe protein-calorie malnutrition  Nutrition consulted. Most recent weight and BMI monitored-     Measurements:  Wt Readings from Last 1 Encounters:   05/12/25 65.5 kg (144 lb 6.4 oz)   Body mass index is 18.05 kg/m².    Patient has been screened and assessed by  RD.    Malnutrition Type:  Context: chronic illness  Level: severe    Malnutrition Characteristic Summary:  Weight Loss (Malnutrition): greater than 10% in 6 months  Energy Intake (Malnutrition): less than or equal to 50% for greater than or equal to 5 days  Subcutaneous Fat (Malnutrition): severe depletion  Muscle Mass (Malnutrition): severe depletion    Interventions/Recommendations (treatment strategy):  Recommend continue current diet as tolerated. Encourage good PO intakes.      GI  Nausea and vomiting  Noted by history    Epigastric pain  Noted on Protonix    Elevated liver enzymes  Liver tests remain abnormal    Acute liver disease  Not sure the etiology here could be part of the hold ongoing issue multi-system disease in his getting worse CT abdomen showed no real detail about liver process    Gastroesophageal reflux disease  Noted  - continue PPI    Other  Immune thrombocytopenia  Followed by Hematology    Thrombocytopenia  Discussed the case last night with  [facility] she has agreed she would like to take the patient that TTP is a possibility as well as hemolysis.  Continue course of IVIG in finish.  Continue IV steroids.  Patient may need further evaluation for this diagnosis abilio T 13 pending.  Platelet count 44,000 this morning after transfusion         Critical Care Daily Checklist:    A: Awake: RASS Goal/Actual Goal:    Actual:     B: Spontaneous Breathing Trial Performed?     C: SAT & SBT Coordinated?  No                      D: Delirium: CAM-ICU     E: Early Mobility Performed? No   F: Feeding Goal: Goals: Weight maintenance during admission, intake 50-75% of meals during admission  Status: Nutrition Goal Status: new   Current Diet Order   Procedures    Diet NPO      AS: Analgesia/Sedation Precedex   T: Thromboembolic Prophylaxis SCD hose   H: HOB > 300 Yes   U: Stress Ulcer Prophylaxis (if needed) Will add Protonix   G: Glucose Control Good control   B: Bowel Function     I:  Indwelling Catheter (Lines & Jones) Necessity Jones   D: De-escalation of Antimicrobials/Pharmacotherapies Not indicated    Plan for the day/ETD Weaning transferred to [facility]    Code Status:  Family/Goals of Care: Full Code  Discuss with family daily daughter last night     Critical Care Time:  40 minutes  Critical secondary to Patient has a condition that poses threat to life and bodily function:  Acute respiratory failure acute renal failure hemolysis possible TTP awaiting transfer      Critical care was time spent personally by me on the following activities: development of treatment plan with patient or surrogate and bedside caregivers, discussions with consultants, evaluation of patient's response to treatment, examination of patient, ordering and performing treatments and interventions, ordering and review of laboratory studies, ordering and review of radiographic studies, pulse oximetry, re-evaluation of patient's condition. This critical care time did not overlap with that of any other provider or involve time for any procedures.     Lam Camarillo MD  Critical Care Medicine  [facility]

## 2025-05-14 NOTE — DISCHARGE SUMMARY
[facility]  Critical Care Medicine  Discharge Summary      Patient Name: Bhaskar Patel  MRN: [id number]  Admission Date: 5/8/2025  Hospital Length of Stay: 6 days  Discharge Date and Time: 05/14/2025 11:00 AM  Attending Physician: Lam Camarillo MD   Discharging Provider: Lam Camarillo MD  Primary Care Provider: Crys Jackson FNP  Reason for Admission:  Acute respiratory failure encephalopathy and acute renal failure    HPI:   As per H&P:  71 y/o male with PMHx/PSHx of HTN, GERD, CAD (cardiac bypass), and left carotid endarterectomy who was transferred from [facility] on 5/8/25 to [facility] for nausea, abdominal pain and decrease PO intake. Pt states he developed decrease PO intake 1 week ago and later developed nausea and generalized abdominal pain. He describes his abdominal pain as 5/10, achy, intermittent without any radiation. He denies vomiting, diarrhea, congestion, rhinorrhea, sore throat, chest pain or chest tightness but reports intermittent cough, SOB, and fever. He denies any h/o COPD or PNA in the past. He denies any sick contacts. He was evaluated in the ER for nausea on 5/05 and was treated with zofran and IV fluids at that time. Of note, pt lives alone and ambulates without the use of any assistive device. He is able to do all ADLs. He has 60-pack-yr smoking hx and previous drank multiple beers on his days off quitting 2 yrs ago. He was subsequently admitted to the hospital medicine service at [facility] for continuation of care. He is now being followed by Nephrology, Hematology/Oncology, GI, & General Surgery.     * No surgery found *    Indwelling Lines/Drains at Time of Discharge:   Lines/Drains/Airways       Drain  Duration                  Urethral Catheter 05/10/25 1957 Straight-tip 16 Fr. 3 days                  Hospital Course:   5/12/25: Dr. Marinelli contacted me this morning about transferring this patient to the ICU for higher level of care. He has been  in-patient since 5/8/25 & continues to be thrombocytopenic, unsure of cause. Nephrology is following for ARF, Hematology/Oncology is following for persistent thrombocytopenia, General surgery is following for abdominal pain (ruling out gallbladder concerns), & GI is following & recommended HIDA scan to evaluate for acalculous cholecystitis given fever, presentation (abd pain, nausea), and elevated LFT's (though somewhat improving), ECHO to evaluate heart function, & daily INR & hepatic function panel.     On admission to the ICU, patient is confused, agitated, & uncooperative. No active bleeding noted. Vital signs are stable. Family is at bedside. Will continue to monitor & treat as necessary.   05/14/2025 patient was transferred to the ICU on 05/13/2025 patient had what seemed to be multi-system disorder which included metabolic encephalopathy, respiratory distress with recent diagnosis of pneumonia, acute renal failure, possible cholecystitis, hemolytic anemia, and thrombocytopenia.  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  Discussed the case with Dr. Wick last night in the MICU at [facility] agree to accept and now has a bed.  We have transfuse platelets day in anticipation of possibly placing a dialysis catheter in a cholecystotomy drain.  The decision was to go ahead and transfer end defer those procedures the [facility] potassium today was 5.2 respiratory status is borderline but does not need emergent dialysis but may need dialysis in the next 24 hours.  Infectious Disease has seen in the agrees with our current antibiotic choice.  We are going to intubate and ventilate the patient to protect his airway for transport he has been borderline needing to be intubated for 24 hours but family has not wanted to proceed.  Hemoglobin is 6.9 once dialysis is initiated patient could  receive a unit of packed red blood cells    Consults (From admission, onward)          Status Ordering Provider     Inpatient Consult to [facility]  Once        Provider:  [facility]    Completed ADILSON CAMPO     Inpatient consult to Nephrology  Once        Provider:  Miguel A Garg Jr., MD    Acknowledged SADI VEGA     Inpatient consult to General Surgery  Once        Provider:  Jose Guadalupe Marlow MD    Completed SADI VEGA     Inpatient consult to Gastroenterology  Once        Provider:  Joseph Alex MD    Completed SADI VEGA     Inpatient consult to Hematology/Oncology  Once        Provider:  Oliver Samson MD    Acknowledged LULÚ ALEXANDRA          Significant Labs:  Recent Lab Results  (Last 5 results in the past 24 hours)        05/14/25  0858   05/14/25  0726   05/14/25  0537   05/14/25  0231   05/14/25  0020        Unit Blood Type Code   6200  [P]             Unit Expiration   [id number]  [P]             Albumin/Globulin Ratio       0.4         Albumin       1.8         ALP       444         ALT       69         Anion Gap       22         Aniso       1+         PTT 60.7               AST       480         Bands               Baso #       0.01         Basophil %       0.3         Bilirubin Direct       2.3         BILIRUBIN TOTAL       3.0         BUN       153         BUN/CREAT RATIO       20         Calcium       6.1  Comment: Critical Result called and verified by verbal readback to: Lin on 05/14/2025 at 03:43. Reported by [id number].         Chloride       101         CO2       22         Creatinine       7.55         Crossmatch Interpretation   Not required  [P]             Differential Method       Manual         DISPENSE STATUS   Issued  [P]             eGFR       7  Comment: Estimated GFR calculated using the CKD-EPI creatinine (2021) equation.         Eos #       0.00         Eos %       0.0         Globulin, Total        4.7         Glucose       113         Hematocrit       20.4         Hemoglobin       6.9         Immature Grans (Abs)       0.13         Immature Granulocytes       3.3         INR 2.22               Lymph #       0.28         Lymph %       7.0                7         Magnesium        3.0         MCH       32.9         MCHC       33.8         MCV       97.1         Metamyelocytes       1         Mono #       0.22         Mono %       5.5                2         MPV       11.3         Neutrophils, Abs       3.36         Neutrophils Relative       83.9         nRBC       0.5         NUCLEATED RBC ABSOLUTE       0.02         Phosphorus Level       7.4         PLATELET MORPHOLOGY       Decreased         Platelet Count       44         POC Glucose     140     167       Potassium       5.3         Product Code   [id number]  [P]             PROTEIN TOTAL       6.5         PT 24.0               RBC       2.10         RDW       14.5         Segmented Neutrophils, Man %       90         Sodium       140         Target Cells       Few         Unit ABO/Rh   A POS  [P]             UNIT NUMBER   [id number]  [P]             WBC       4.00                                 [P] - Preliminary Result               Significant Imaging:  I have reviewed all pertinent imaging results/findings within the past 24 hours.    Pending Diagnostic Studies:       Procedure Component Value Units Date/Time    JGSJWO00 Evaluation [[id number]] Collected: 05/12/25 1752    Order Status: Sent Lab Status: In process Updated: 05/12/25 1757    Specimen: Blood     EXTRA TUBES [[id number]] Collected: 05/13/25 0434    Order Status: Sent Lab Status: In process Updated: 05/13/25 0434    Specimen: Blood, Venous     Narrative:      The following orders were created for panel order EXTRA TUBES.  Procedure                               Abnormality         Status                     ---------                               -----------         ------                      Light Green Top Hold[[id number]]                            In process                   Please view results for these tests on the individual orders.    EXTRA TUBES [[id number]] Collected: 05/12/25 1759    Order Status: Sent Lab Status: In process Updated: 05/12/25 1759    Specimen: Blood, Venous     Narrative:      The following orders were created for panel order EXTRA TUBES.  Procedure                               Abnormality         Status                     ---------                               -----------         ------                     Lavender Top Hold[[id number]]                               In process                 Lavender Top Hold[[id number]]                               In process                   Please view results for these tests on the individual orders.    EXTRA TUBES [[id number]] Collected: 05/10/25 1123    Order Status: Sent Lab Status: In process Updated: 05/10/25 1123    Specimen: Blood, Venous     Narrative:      The following orders were created for panel order EXTRA TUBES.  Procedure                               Abnormality         Status                     ---------                               -----------         ------                     Light Green Top Hold[[id number]]                            In process                 Lavender Top Hold[[id number]]                               In process                   Please view results for these tests on the individual orders.    IR Biliary Drain Catheter Placement [[id number]]     Order Status: Sent Lab Status: No result     Immunofixation, Serum [[id number]] Collected: 05/11/25 0931    Order Status: Sent Lab Status: In process Updated: 05/13/25 1145    Specimen: Blood     Lyme Antibody w/ Immunoblot Reflex [[id number]] Collected: 05/11/25 0930    Order Status: Sent Lab Status: In process Updated: 05/14/25 0745    Specimen: Blood     Platelet Count [[id number]]     Order Status: Sent Lab Status: No result     Specimen:  Blood     Spotted Fever Group Antibodies [[id number]] Collected: 05/14/25 0759    Order Status: Sent Lab Status: In process Updated: 05/14/25 0810    Specimen: Blood     Spotted Fever Group Antibodies [[id number]] Collected: 05/12/25 1752    Order Status: Sent Lab Status: In process Updated: 05/12/25 1757    Specimen: Blood           Final Active Diagnoses:    Diagnosis Date Noted POA    PRINCIPAL PROBLEM:  Sepsis [A41.9] 05/09/2025 Yes    Acute metabolic encephalopathy [G93.41] 05/13/2025 Yes    Epigastric pain [R10.13] 05/12/2025 Yes    Nausea and vomiting [R11.2] 05/12/2025 Yes    Hypocalcemia [E83.51] 05/12/2025 Yes    Immune thrombocytopenia [D69.3] 05/11/2025 Yes    Elevated liver enzymes [R74.8] 05/10/2025 Yes    Thrombocytopenia [D69.6] 05/09/2025 Yes    Acute hypoxemic respiratory failure [J96.01] 05/09/2025 Yes    Pneumonia [J18.9] 05/09/2025 Yes    GEORGE (acute kidney injury) [N17.9] 05/09/2025 Yes    Elevated troponin [R79.89] 05/09/2025 Yes    Acute liver disease [K76.9] 05/09/2025 Yes    Severe protein-calorie malnutrition [E43] 05/09/2025 Yes    Gastroesophageal reflux disease [K21.9] 10/06/2021 Yes    Cigarette nicotine dependence without complication [F17.210] 10/06/2021 Yes    Coronary artery disease involving native coronary artery of native heart without angina pectoris [I25.10] 09/01/2021 Yes    Essential hypertension [I10] 09/01/2021 Yes    Hyperlipidemia [E78.5] 09/01/2021 Yes      Problems Resolved During this Admission:     Neuro  Acute metabolic encephalopathy  Ongoing issue was no recommendation from ID to do lumbar puncture will hold on it for now due to this significant thrombocytopenia    Pulmonary  Pneumonia  X-ray yesterday looks more like volume I do not see a distinct infiltrate continue antibiotics    Acute hypoxemic respiratory failure  Required non-rebreather he looks comfortable I do not think we need to intubate him yet will stop IV fluids goes I think volume is increasing on  x-ray    Cardiac/Vascular  Elevated troponin  Elevated initially at 50, now resolved    Coronary artery disease involving native coronary artery of native heart without angina pectoris  Noted  - s/p CABG  - continuous cardiac monitoring  - 12 Lead EKG PRN       Essential hypertension  Blood pressure remains stable this morning no need for meds    Renal/  GEORGE (acute kidney injury)  Potassium 5.3 and CO2 22 will stop volume hemoglobin 6.9 by will not transfuse as it was not much of a change from yesterday and I do not want to give the potassium low will discuss with renal the possibility of dialysis    GI  Elevated liver enzymes  Liver tests remain abnormal    Gastroesophageal reflux disease  Noted  - continue PPI    Other  Thrombocytopenia  Discussed the case last night with Dr. Wick [facility] she has agreed she would like to take the patient that TTP is a possibility as well as hemolysis.  Continue course of IVIG in finish.  Continue IV steroids.  Patient may need further evaluation for this diagnosis abilio T 13 pending.  Platelet count 44,000 this morning after transfusion        Discharged Condition: critical    Disposition:       Patient Instructions:      Reason for not Ordering Smoking Cessation Referral     Order Specific Question Answer Comments   Reason for not ordering: Not medically appropriate at this time      Reason for not Prescribing Nicotine Replacement     Order Specific Question Answer Comments   Reason for not Prescribing: Not medically appropriate at this time      Medications:   Transfer Medications (for Discharge Readmit only): Current Medications[1]     Lam Camarillo MD  Critical Care Medicine  [facility] ICU       [1]   Current Facility-Administered Medications   Medication Dose Route Frequency Provider Last Rate Last Admin    0.9%  NaCl infusion (for blood administration)   Intravenous Q24H PRN Lam Camarillo MD   New Bag at 05/13/25 0911    0.9%   NaCl infusion (for blood administration)   Intravenous Q24H PRN Jeanine Guerrero NP   New Bag at 05/14/25 0556    0.9%  NaCl infusion (for blood administration)   Intravenous Q24H PRN Jose Guadalupe Marlow MD   New Bag at 05/14/25 0912    0.9%  NaCl infusion (for blood administration)   Intravenous Q24H PRN Lam Camarillo MD        acetaminophen suppository 650 mg  650 mg Rectal Q6H PRN Estela Melendez DO        acetaminophen tablet 1,000 mg  1,000 mg Oral Q8H PRN Tea Bustamante MD   1,000 mg at 05/10/25 1558    albuterol-ipratropium 2.5 mg-0.5 mg/3 mL nebulizer solution 3 mL  3 mL Nebulization Q6H WAKE Tea Bustamante MD   3 mL at 05/14/25 0711    ALPRAZolam tablet 0.5 mg  0.5 mg Oral TID PRN Albert Diego MD   0.5 mg at 05/10/25 1558    aluminum-magnesium hydroxide-simethicone 200-200-20 mg/5 mL suspension 30 mL  30 mL Oral QID PRN Tea Bustamante MD        dextrose 50% injection 12.5 g  12.5 g Intravenous PRN Jeanine Guerrero NP        dextrose 50% injection 25 g  25 g Intravenous PRN Tea Bustamante MD        diphenhydrAMINE injection 50 mg  50 mg Intravenous Daily PRN Lam Camarillo MD   50 mg at 05/12/25 1517    docusate sodium capsule 100 mg  100 mg Oral Daily PRN Tea Bustamante MD        doxycycline 100 mg in D5W 100 mL IVPB (MB+)  100 mg Intravenous Q12H Lam Camarillo  mL/hr at 05/14/25 1000 Rate Verify at 05/14/25 1000    famotidine (PF) injection 20 mg  20 mg Intravenous Daily PRN Lam Camarillo MD   20 mg at 05/13/25 1348    [START ON 5/15/2025] famotidine tablet 20 mg  20 mg Oral Q48H Lam Camarillo MD        glucagon (human recombinant) injection 1 mg  1 mg Intramuscular PRN Jeanine Guerrero NP        glucose chewable tablet 16 g  16 g Oral PRN Tea Bustamante MD        glucose chewable tablet 24 g  24 g Oral PRN Tea Bustamante MD        Immune Globulin G (IGG)-PRO-IGA 10 % injection (Privigen) 10 % injection 30 g  30 g Intravenous Q24H Lam Camarillo  MD ANGELIKA 156.96 mL/hr at 05/14/25 1000 Rate Verify at 05/14/25 1000    insulin aspart U-100 injection 0-10 Units  0-10 Units Subcutaneous Q6H PRN Jeanine Guerrero NP   1 Units at 05/14/25 0028    melatonin tablet 6 mg  6 mg Oral Nightly PRN Tea Bustamante MD        meropenem injection 500 mg  500 mg Intravenous Q24H Lam Camarillo MD   500 mg at 05/14/25 0920    methylPREDNISolone sodium succinate injection 125 mg  125 mg Intravenous Q6H Lam Camarillo MD   125 mg at 05/14/25 0555    metoprolol tartrate (LOPRESSOR) split tablet 12.5 mg  12.5 mg Oral BID Lam Camarillo MD   12.5 mg at 05/13/25 1345    mupirocin 2 % ointment   Nasal BID Estela Melendez, DO   Given at 05/14/25 0913    naloxone 0.4 mg/mL injection 0.02 mg  0.02 mg Intravenous PRN Tea Bustamante MD        ondansetron disintegrating tablet 4 mg  4 mg Oral Q8H PRN Tea Bustamante MD   4 mg at 05/09/25 1755    prochlorperazine injection Soln 5 mg  5 mg Intravenous Q6H PRN Tea Bustamante MD        sodium chloride 0.9% flush 10 mL  10 mL Intravenous Q12H PRN Tea Bustamante MD        vitamin D 1000 units tablet 1,000 Units  1,000 Units Oral Daily Estela Melendez, DO   1,000 Units at 05/13/25 1345

## 2025-05-14 NOTE — ASSESSMENT & PLAN NOTE
Required non-rebreather he looks comfortable I do not think we need to intubate him yet will stop IV fluids goes I think volume is increasing on x-ray

## 2025-05-14 NOTE — PLAN OF CARE
Problem: Adult Inpatient Plan of Care  Goal: Optimal Comfort and Wellbeing  Outcome: Progressing  Intervention: Monitor Pain and Promote Comfort  Flowsheets (Taken 5/13/2025 2051)  Pain Management Interventions:   warm blanket provided   relaxation techniques promoted   quiet environment facilitated   pillow support provided  Intervention: Provide Person-Centered Care  Flowsheets (Taken 5/13/2025 2051)  Trust Relationship/Rapport:   care explained   emotional support provided     Problem: Skin Injury Risk Increased  Goal: Skin Health and Integrity  Outcome: Progressing  Intervention: Optimize Skin Protection  Flowsheets (Taken 5/13/2025 2051)  Pressure Reduction Techniques: weight shift assistance provided  Pressure Reduction Devices:   specialty bed utilized   foam padding utilized   positioning supports utilized  Skin Protection:   incontinence pads utilized   silicone foam dressing in place   transparent dressing maintained

## 2025-05-14 NOTE — ANESTHESIA PROCEDURE NOTES
Ad Hoc Intubation    Date/Time: 5/14/2025 11:32 PM    Performed by: Choco Tapia MD  Authorized by: Choco Tapia MD    Indications:  Respiratory distress  Diagnosis:  Respiratory distress  Patient Location:  ICU  Timeout:  5/14/2025 11:30 PM  Procedure Start Time:  5/14/2025 11:31 PM  Procedure End Time:  5/14/2025 11:33 PM  Staff:     Anesthesiologist Present: Yes    Intubation:     Induction:  Intravenous    Intubated:  Postinduction    Mask Ventilation:  Easy mask    Attempts:  1    Attempted By:  Staff anesthesiologist    Method of Intubation:  Video laryngoscopy    Blade:  Glidescope 4    Laryngeal View Grade: Grade I - full view of chords      Difficult Airway Encountered?: No      Complications:  None    Airway Device:  Oral endotracheal tube    Airway Device Size:  8.5    Style/Cuff Inflation:  Cuffed    Tube secured:  24    Placement Verified By:  Chest x-ray and Capnometry    Complicating Factors:  None    Findings Post-Intubation:  BS equal bilateral

## 2025-05-14 NOTE — PROGRESS NOTES
The patient is now intubated.  He is scheduled to be transferred to [facility] today for further  care due to TTP.    Ventilated.  Critical situation.

## 2025-05-14 NOTE — PROGRESS NOTES
[facility] Tele[facility]  Adult Nutrition  Follow-up Note         Reason for Assessment  Reason For Assessment: RD follow-up        Assessment and Plan      5/14/2025: RD follow up. Patient transferred to unit on 5/12 for higher level of care. Currently awaiting hospital transfer through [facility]. He is currently NPO. May need to consider alternate route of nutrition if unable to resume PO diet. RD following.    5/9/2025: Patient is a 71yo male admitted 5/8 for sepsis. He was identified at risk by screening criteria with MST2. He endorsed 2-13 pound weight loss and poor PO intakes.     Patient is 64.2kg with a BMI of 17.70 and is severely underweight per geriatric standards. Chart review reveals a >5% significant unintentional weight loss x 1 month and patient endorses poor PO intakes <50% for greater than one week. He also has severe fat and muscle depletion to orbital, upper arm, clavicle and temple regions.     Per ASPEN guidelines, patient meets criteria for severe protein-calorie malnutrition secondary to chronic catabolic illness as evidenced by >5% significant unintentional weight loss x 1 month, PO intakes <50% for greater than one week, and severe fat and muscle depletion to upper arm, orbital, clavicle and temple regions.     Last Bowel Movement: 05/09/25    Medications/labs reviewed. RD following.    Learning Needs/Social Determinants of Health    Learning Assessment       05/09/2025 0220 [facility] (5/8/2025 - Present)   Created by Serina Gonzalez RN - RN (Nurse) Status: Complete                 PRIMARY LEARNER     Primary Learner Name:  Bhaskar  - 05/09/2025 0220    Comment: Bhaskar Patel     Does the primary learner have any barriers to learning?:  No Barriers JW - 05/09/2025 0220    What is the preferred language of the primary learner?:  English JW - 05/09/2025 0220    Is an  required?:  No JW - 05/09/2025 0220    How does the primary  learner prefer to learn new concepts?:  Listening  - 05/09/2025 0220    How often do you need to have someone help you read instructions, pamphlets, or written material from your doctor or pharmacy?:  Never JW - 05/09/2025 0220        CO-LEARNER #1     No question answered        CO-LEARNER #2     No question answered        SPECIAL TOPICS     Are there any special topics the patient/guardian would like to review?:  Oxygen reasoning to keep on JW - 05/09/2025 0220        ANSWERED BY:     -:  Other JW - 05/09/2025 0220        Comments     Patient requires additional time to answer and all answers do not need to have the verbiage in     above college level format. Instead of using scientific verbiage, use more common nouns/prounouns     and verbs.        Edit History       Gonzalez, DAWSON Smalls - RN (Nurse)   05/09/2025 0220                             Social Drivers of Health     Tobacco Use: High Risk (5/9/2025)    Patient History     Smoking Tobacco Use: Every Day     Smokeless Tobacco Use: Never     Passive Exposure: Current   Alcohol Use: Not At Risk (5/9/2025)    AUDIT-C     Frequency of Alcohol Consumption: Never     Average Number of Drinks: Patient does not drink     Frequency of Binge Drinking: Never   Financial Resource Strain: Low Risk  (5/9/2025)    Overall Financial Resource Strain (CARDIA)     Difficulty of Paying Living Expenses: Not very hard   Food Insecurity: No Food Insecurity (5/9/2025)    Hunger Vital Sign     Worried About Running Out of Food in the Last Year: Never true     Ran Out of Food in the Last Year: Never true   Transportation Needs: No Transportation Needs (5/9/2025)    PRAPARE - Transportation     Lack of Transportation (Medical): No     Lack of Transportation (Non-Medical): No   Physical Activity: Inactive (5/9/2025)    Exercise Vital Sign     Days of Exercise per Week: 0 days     Minutes of Exercise per Session: 0 min   Stress: No Stress Concern Present (5/9/2025)    [place]  [employer] - Occupational Stress Questionnaire     Feeling of Stress : Not at all   Housing Stability: Low Risk  (5/9/2025)    Housing Stability Vital Sign     Unable to Pay for Housing in the Last Year: No     Number of Times Moved in the Last Year: Not on file     Homeless in the Last Year: No   Depression: Low Risk  (5/8/2025)    Depression     Last PHQ-4: Flowsheet Data: 0   Utilities: Not At Risk (5/9/2025)    C Utilities     Threatened with loss of utilities: No   Health Literacy: Inadequate Health Literacy (5/9/2025)     Health Literacy     Frequency of need for help with medical instructions: Sometimes   Social Isolation: Socially Integrated (5/9/2025)    Social Isolation     Social Isolation: 1          Malnutrition  Is Patient Malnourished: Yes Malnutrition Assessment  Malnutrition Context: chronic illness  Malnutrition Level: severe          Weight Loss (Malnutrition): greater than 10% in 6 months  Energy Intake (Malnutrition): less than or equal to 50% for greater than or equal to 5 days  Subcutaneous Fat (Malnutrition): severe depletion  Muscle Mass (Malnutrition): severe depletion   Orbital Region (Subcutaneous Fat Loss): severe depletion  Upper Arm Region (Subcutaneous Fat Loss): severe depletion   [place] Region (Muscle Loss): severe depletion  Clavicle Bone Region (Muscle Loss): severe depletion                 Nutrition Diagnosis  Malnutrition (Severe) related to Catabolic illness and Chronic illness as evidenced by  >5% significant unintentional weight loss x 1 month, PO intakes <50% for greater than one week, and severe fat and muscle depletion to upper arm, orbital, clavicle and temple regions.   Comments: continue current diet as tolerated    Recent Labs   Lab 05/14/25  0231 05/14/25  0537     --    POCGLU  --  140*   Note: Glucose elevated. Likely related to metabolic stress    Nutrition Prescription / Recommendations  Recommendation/Intervention: Recommend resume diet  "as soon as medically appropriate or consider alternate route of nutrition  Goals: diet advancement x 2-3 days, weight maintenance during admission  Nutrition Goal Status: progressing towards goal  Current Diet Order: NPO  Chewing or Swallowing Difficulty?: No Chewing or swallowing difficulty  Recommended Diet: Regular  Recommended Oral Supplement: No Oral Supplements  Is Nutrition Support Recommended: No  Is Nutrition Education Recommended: No    Monitor and Evaluation  % current Intake: NPO  % intake to meet estimated needs: 50 - 75 %  Monitor and Evaluation: Food and beverage intake, Diet order, Weight, Electrolyte and renal panel, Gastrointestinal profile, Glucose/endocrine profile, Inflammatory profile, Lipid profile  Food and beverage intake, Diet order, Weight, Electrolyte and renal panel, Gastrointestinal profile, Glucose/endocrine profile, Inflammatory profile, Lipid profile    Current Medical Diagnosis and Past Medical History  Diagnosis: infection/sepsis  Past Medical History:   Diagnosis Date    GERD (gastroesophageal reflux disease)     Hypertension        Nutrition/Diet History  Food Allergies: NKFA    Lab/Procedures/Meds  Recent Labs   Lab 05/14/25  0231      K 5.3*   *   CREATININE 7.55*   CALCIUM 6.1*   ALBUMIN 1.8*      ALT 69*   *   PHOS 7.4*   Note: Ca++ low. Recommend consider replete to WNL as appropriate. BUN, Cr, K+, Phos elevated. Dx GEORGE. ALT, AST elevated. PMH liver disease. Alb low, likely related to poor PO intakes and liver disease.       Last A1c: No results found for: "HGBA1C"  Lab Results   Component Value Date    RBC 2.10 (L) 05/14/2025    HGB 6.9 (L) 05/14/2025    HCT 20.4 (L) 05/14/2025    MCV 97.1 (H) 05/14/2025    MCH 32.9 (H) 05/14/2025    MCHC 33.8 05/14/2025    TIBC 216 (L) 03/03/2025   Note: H&H low    Pertinent Labs Reviewed: reviewed  Pertinent Medications Reviewed: reviewed  Scheduled Meds:   albuterol-ipratropium  3 mL Nebulization Q6H WAKE    " "doxycycline IV (PEDS and ADULTS)  100 mg Intravenous Q12H    [START ON 5/15/2025] famotidine  20 mg Oral Q48H    immun Globulin G (IGG)-PRO-IGA 10 % injection (Privigen)  30 g Intravenous Q24H    meropenem IV (PEDS and ADULTS)  500 mg Intravenous Q24H    methylPREDNISolone injection (PEDS and ADULTS)  125 mg Intravenous Q6H    metoprolol tartrate  12.5 mg Oral BID    mupirocin   Nasal BID    vitamin D  1,000 Units Oral Daily     Continuous Infusions:      PRN Meds:.  Current Facility-Administered Medications:     0.9%  NaCl infusion (for blood administration), , Intravenous, Q24H PRN    0.9%  NaCl infusion (for blood administration), , Intravenous, Q24H PRN    0.9%  NaCl infusion (for blood administration), , Intravenous, Q24H PRN    0.9%  NaCl infusion (for blood administration), , Intravenous, Q24H PRN    acetaminophen, 650 mg, Rectal, Q6H PRN    acetaminophen, 1,000 mg, Oral, Q8H PRN    ALPRAZolam, 0.5 mg, Oral, TID PRN    aluminum-magnesium hydroxide-simethicone, 30 mL, Oral, QID PRN    dextrose 50%, 12.5 g, Intravenous, PRN    dextrose 50%, 25 g, Intravenous, PRN    diphenhydrAMINE, 50 mg, Intravenous, Daily PRN    docusate sodium, 100 mg, Oral, Daily PRN    famotidine (PF), 20 mg, Intravenous, Daily PRN    glucagon (human recombinant), 1 mg, Intramuscular, PRN    glucose, 16 g, Oral, PRN    glucose, 24 g, Oral, PRN    insulin aspart U-100, 0-10 Units, Subcutaneous, Q6H PRN    melatonin, 6 mg, Oral, Nightly PRN    naloxone, 0.02 mg, Intravenous, PRN    ondansetron, 4 mg, Oral, Q8H PRN    prochlorperazine, 5 mg, Intravenous, Q6H PRN    sodium chloride 0.9%, 10 mL, Intravenous, Q12H PRN    Anthropometrics  Height: 6' 3" (190.5 cm)  Height (inches): 75 in  Height Method: Estimated  Weight: 65.4 kg (144 lb 2.9 oz)  Weight (lb): 144.18 lb  Weight Method: Bed Scale  Ideal Body Weight (IBW), Male: 196 lb  % Ideal Body Weight, Male (lb): 73.67 %  BMI (Calculated): 18       Estimated/Assessed Needs  RMR ([place] " Jeor Equation): 1494.63     Temp: 97 °F (36.1 °C)Axillary  Weight Used For Calorie Calculations: 64.2 kg (141 lb 8.6 oz)     Energy Calorie Requirements (kcal): 1926-2247kcal (30-35kcal/kg)  Weight Used For Protein Calculations: 88.9 kg (196 lb)  Protein Requirements: 89-107g (1.0-1.2/kg ideal body weight)       RDA Method (mL): 1926       Nutrition by Nursing  Diet/Nutrition Received: NPO  Intake (%): 0%  Diet/Feeding Assistance: none  Diet/Feeding Tolerance: fair                Nutrition Follow-Up  RD Follow-up?: Yes      Nutrition Discharge Planning: Too early to determine, pending clinical course             Pat Colin, MS, RD, LD  Available via Secure Chat

## 2025-05-14 NOTE — ASSESSMENT & PLAN NOTE
Uncertain of etiology he did grew micrococcus out and has what looks like an abnormal gallbladder.  Discussed with Dr. Bhatti proceed with the possibility of a drain today we added doxycycline

## 2025-05-14 NOTE — ASSESSMENT & PLAN NOTE
Potassium 5.3 and CO2 22 will stop volume hemoglobin 6.9 by will not transfuse as it was not much of a change from yesterday and I do not want to give the potassium low will discuss with renal the possibility of dialysis

## 2025-05-16 LAB
COAGULATION SPECIALIST REVIEW: NORMAL
RICK SF IGG TITR SER IF: ABNORMAL {TITER}
RICK SF IGG TITR SER IF: ABNORMAL {TITER}
RICK SF IGM TITR SER IF: ABNORMAL {TITER}
RICK SF IGM TITR SER IF: ABNORMAL {TITER}
VWF CP INHIB PPP-ACNC: NORMAL BU

## 2025-05-21 LAB — B BURGDOR IGG SER QL IB: ABNORMAL

## 2025-05-23 LAB
B BURGDOR AB PATRN SER IB-IMP: NORMAL
B BURGDOR IGG PATRN SER IB-IMP: NORMAL KDA
B BURGDOR IGG SER QL IB: NEGATIVE
B BURGDOR IGM PATRN SER IB-IMP: NORMAL KDA
B BURGDOR IGM SER QL IB: NEGATIVE

## (undated) DEVICE — NEEDLE ECLIPSE 22GX1 1/2 IN SAFETY

## (undated) DEVICE — ISOVUE 370 100ML

## (undated) DEVICE — CLIP APPLIER LIGATION SM

## (undated) DEVICE — CDS CAROTID

## (undated) DEVICE — CLIPPER SURGICAL BLADE ASSEMBLY STERILE SNGL USE

## (undated) DEVICE — BAG-A-JET FLUID DISPENSER SYSTEM

## (undated) DEVICE — SHEATH INTRODUCER 6FR 10CM X 0.038 PINNACLE

## (undated) DEVICE — CLOSURE SKIN 1 X 5  R1548

## (undated) DEVICE — SUTURE PROLENE 7-0 BV1 DA 30

## (undated) DEVICE — CDS ANGIOGRAPHY PACK

## (undated) DEVICE — DRESSING IV TEGADERM 10X12CM

## (undated) DEVICE — GOWN SURGICAL STERILE LEVEL 3 / XX-LARGE

## (undated) DEVICE — SET IV PRIMARY ALARIS (PRIMARY)

## (undated) DEVICE — GLOVE SURGICAL PROTEXIS PI BLUE SIZE 6.5

## (undated) DEVICE — KIT ANGIO MANIFOLD CUSTOM RFH LEFT HEART KIT

## (undated) DEVICE — SUTURE PROLENE 5-0 BLU C-1 36IN

## (undated) DEVICE — SENSOR PULSE OX ADULT

## (undated) DEVICE — PROBE DOPPLER DISP STRAIGHT 8MHZ

## (undated) DEVICE — ADHESIVE LIQUID MASTISOL 2/3CC

## (undated) DEVICE — SUTURE PROLENE 6-0 30 BLUE C-1

## (undated) DEVICE — CATH IMPULSE 6FR MULTIPACK

## (undated) DEVICE — GLOVE SURGICAL PROTEXIS PI SIZE 8.0

## (undated) DEVICE — TOWEL OR STERILE BLUE 4/PK 20PK/CS

## (undated) DEVICE — SUTURE VICRYL 2-0 UNDYED CT-1 ANTI

## (undated) DEVICE — SUTURE VICRYL 3-0 SH UNDYED 27IN

## (undated) DEVICE — TUBING CAPNOLINE PLUS SMART 02 CONNECTOR(ORDER 65110)

## (undated) DEVICE — POSITIONER ULNAR NERVE

## (undated) DEVICE — TRAY DRESSING IV TEGADERM 6X7CM

## (undated) DEVICE — APPLICATOR CHLORAPREP LITE ORANGE 10.5ML STERILE

## (undated) DEVICE — GLOVE SURGICAL PROTEXIS PI SIZE 7.5

## (undated) DEVICE — KIT MICROINTRODUCER 4FR MINI STICK II

## (undated) DEVICE — GLOVE SURGICAL PROTEXIS PI SIZE 6.5

## (undated) DEVICE — APPLICATOR CHLORAPREP HI-LITE TINTED ORANGE 26ML

## (undated) DEVICE — DRAPE MAGNETIC  INSTRUMENT

## (undated) DEVICE — SUTURE MONOCRYL 4-0 27IN

## (undated) DEVICE — GOWN SURGICAL SMARTGOWN LEVEL 4 / EXTRA LARGE STERILE

## (undated) DEVICE — Device

## (undated) DEVICE — SOL IRRIGATION SALINE 0.9% 1000ML BOTTLE